# Patient Record
Sex: MALE | Race: BLACK OR AFRICAN AMERICAN | NOT HISPANIC OR LATINO | Employment: OTHER | URBAN - METROPOLITAN AREA
[De-identification: names, ages, dates, MRNs, and addresses within clinical notes are randomized per-mention and may not be internally consistent; named-entity substitution may affect disease eponyms.]

---

## 2017-03-13 PROBLEM — I25.10 CAD (CORONARY ARTERY DISEASE): Status: RESOLVED | Noted: 2017-03-13 | Resolved: 2017-03-13

## 2017-03-13 PROBLEM — I25.10 CAD (CORONARY ARTERY DISEASE): Status: ACTIVE | Noted: 2017-03-13

## 2018-05-10 ENCOUNTER — TELEPHONE (OUTPATIENT)
Dept: ADMINISTRATIVE | Facility: HOSPITAL | Age: 58
End: 2018-05-10

## 2019-07-16 PROBLEM — I25.119 ATHEROSCLEROSIS OF NATIVE CORONARY ARTERY OF NATIVE HEART WITH ANGINA PECTORIS: Status: ACTIVE | Noted: 2017-03-13

## 2019-07-16 PROBLEM — R06.02 SHORTNESS OF BREATH: Status: ACTIVE | Noted: 2019-07-16

## 2019-07-16 PROBLEM — K40.90 INGUINAL HERNIA OF LEFT SIDE WITHOUT OBSTRUCTION OR GANGRENE: Status: ACTIVE | Noted: 2019-07-16

## 2019-08-28 PROBLEM — R94.39 EQUIVOCAL STRESS TEST: Status: ACTIVE | Noted: 2019-08-28

## 2021-03-10 ENCOUNTER — OFFICE VISIT (OUTPATIENT)
Dept: INTERNAL MEDICINE | Facility: CLINIC | Age: 61
End: 2021-03-10
Payer: MEDICARE

## 2021-03-10 ENCOUNTER — TELEPHONE (OUTPATIENT)
Dept: PAIN MEDICINE | Facility: CLINIC | Age: 61
End: 2021-03-10

## 2021-03-10 VITALS
HEART RATE: 100 BPM | BODY MASS INDEX: 23.98 KG/M2 | WEIGHT: 135.38 LBS | SYSTOLIC BLOOD PRESSURE: 124 MMHG | DIASTOLIC BLOOD PRESSURE: 62 MMHG | OXYGEN SATURATION: 97 % | TEMPERATURE: 98 F

## 2021-03-10 DIAGNOSIS — K21.9 GASTROESOPHAGEAL REFLUX DISEASE WITHOUT ESOPHAGITIS: ICD-10-CM

## 2021-03-10 DIAGNOSIS — K22.0 ACHALASIA OF ESOPHAGUS: ICD-10-CM

## 2021-03-10 DIAGNOSIS — E78.2 MIXED HYPERLIPIDEMIA: ICD-10-CM

## 2021-03-10 DIAGNOSIS — Z00.00 ROUTINE GENERAL MEDICAL EXAMINATION AT A HEALTH CARE FACILITY: Primary | ICD-10-CM

## 2021-03-10 DIAGNOSIS — F41.8 MIXED ANXIETY AND DEPRESSIVE DISORDER: ICD-10-CM

## 2021-03-10 DIAGNOSIS — I25.119 ATHEROSCLEROSIS OF NATIVE CORONARY ARTERY OF NATIVE HEART WITH ANGINA PECTORIS: ICD-10-CM

## 2021-03-10 DIAGNOSIS — M47.26 OSTEOARTHRITIS OF SPINE WITH RADICULOPATHY, LUMBAR REGION: ICD-10-CM

## 2021-03-10 DIAGNOSIS — Z12.5 ENCOUNTER FOR SCREENING FOR MALIGNANT NEOPLASM OF PROSTATE: ICD-10-CM

## 2021-03-10 DIAGNOSIS — M47.22 OSTEOARTHRITIS OF SPINE WITH RADICULOPATHY, CERVICAL REGION: ICD-10-CM

## 2021-03-10 PROBLEM — R94.39 EQUIVOCAL STRESS TEST: Status: RESOLVED | Noted: 2019-08-28 | Resolved: 2021-03-10

## 2021-03-10 PROBLEM — K40.90 INGUINAL HERNIA OF LEFT SIDE WITHOUT OBSTRUCTION OR GANGRENE: Status: RESOLVED | Noted: 2019-07-16 | Resolved: 2021-03-10

## 2021-03-10 PROBLEM — R06.02 SHORTNESS OF BREATH: Status: RESOLVED | Noted: 2019-07-16 | Resolved: 2021-03-10

## 2021-03-10 PROCEDURE — 3078F PR MOST RECENT DIASTOLIC BLOOD PRESSURE < 80 MM HG: ICD-10-PCS | Mod: CPTII,S$GLB,, | Performed by: FAMILY MEDICINE

## 2021-03-10 PROCEDURE — 99386 PR PREVENTIVE VISIT,NEW,40-64: ICD-10-PCS | Mod: S$GLB,,, | Performed by: FAMILY MEDICINE

## 2021-03-10 PROCEDURE — 1125F PR PAIN SEVERITY QUANTIFIED, PAIN PRESENT: ICD-10-PCS | Mod: S$GLB,,, | Performed by: FAMILY MEDICINE

## 2021-03-10 PROCEDURE — 3074F SYST BP LT 130 MM HG: CPT | Mod: CPTII,S$GLB,, | Performed by: FAMILY MEDICINE

## 2021-03-10 PROCEDURE — 3078F DIAST BP <80 MM HG: CPT | Mod: CPTII,S$GLB,, | Performed by: FAMILY MEDICINE

## 2021-03-10 PROCEDURE — 3008F PR BODY MASS INDEX (BMI) DOCUMENTED: ICD-10-PCS | Mod: CPTII,S$GLB,, | Performed by: FAMILY MEDICINE

## 2021-03-10 PROCEDURE — 3008F BODY MASS INDEX DOCD: CPT | Mod: CPTII,S$GLB,, | Performed by: FAMILY MEDICINE

## 2021-03-10 PROCEDURE — 1125F AMNT PAIN NOTED PAIN PRSNT: CPT | Mod: S$GLB,,, | Performed by: FAMILY MEDICINE

## 2021-03-10 PROCEDURE — 99999 PR PBB SHADOW E&M-EST. PATIENT-LVL IV: ICD-10-PCS | Mod: PBBFAC,,, | Performed by: FAMILY MEDICINE

## 2021-03-10 PROCEDURE — 99999 PR PBB SHADOW E&M-EST. PATIENT-LVL IV: CPT | Mod: PBBFAC,,, | Performed by: FAMILY MEDICINE

## 2021-03-10 PROCEDURE — 3074F PR MOST RECENT SYSTOLIC BLOOD PRESSURE < 130 MM HG: ICD-10-PCS | Mod: CPTII,S$GLB,, | Performed by: FAMILY MEDICINE

## 2021-03-10 PROCEDURE — 99386 PREV VISIT NEW AGE 40-64: CPT | Mod: S$GLB,,, | Performed by: FAMILY MEDICINE

## 2021-03-10 RX ORDER — ATORVASTATIN CALCIUM 40 MG/1
40 TABLET, FILM COATED ORAL NIGHTLY
Qty: 90 TABLET | Refills: 1 | Status: SHIPPED | OUTPATIENT
Start: 2021-03-10 | End: 2021-03-11 | Stop reason: SDUPTHER

## 2021-03-11 ENCOUNTER — LAB VISIT (OUTPATIENT)
Dept: LAB | Facility: HOSPITAL | Age: 61
End: 2021-03-11
Attending: FAMILY MEDICINE
Payer: MEDICARE

## 2021-03-11 DIAGNOSIS — E78.2 MIXED HYPERLIPIDEMIA: ICD-10-CM

## 2021-03-11 DIAGNOSIS — Z12.5 ENCOUNTER FOR SCREENING FOR MALIGNANT NEOPLASM OF PROSTATE: ICD-10-CM

## 2021-03-11 DIAGNOSIS — K22.0 ACHALASIA OF ESOPHAGUS: ICD-10-CM

## 2021-03-11 DIAGNOSIS — I25.119 ATHEROSCLEROSIS OF NATIVE CORONARY ARTERY OF NATIVE HEART WITH ANGINA PECTORIS: ICD-10-CM

## 2021-03-11 DIAGNOSIS — Z00.00 ROUTINE GENERAL MEDICAL EXAMINATION AT A HEALTH CARE FACILITY: ICD-10-CM

## 2021-03-11 DIAGNOSIS — F41.8 MIXED ANXIETY AND DEPRESSIVE DISORDER: ICD-10-CM

## 2021-03-11 LAB
ALBUMIN SERPL BCP-MCNC: 3.4 G/DL (ref 3.5–5.2)
ALP SERPL-CCNC: 86 U/L (ref 55–135)
ALT SERPL W/O P-5'-P-CCNC: 15 U/L (ref 10–44)
ANION GAP SERPL CALC-SCNC: 7 MMOL/L (ref 8–16)
AST SERPL-CCNC: 19 U/L (ref 10–40)
BASOPHILS # BLD AUTO: 0.03 K/UL (ref 0–0.2)
BASOPHILS NFR BLD: 0.6 % (ref 0–1.9)
BILIRUB SERPL-MCNC: 0.5 MG/DL (ref 0.1–1)
BUN SERPL-MCNC: 9 MG/DL (ref 6–20)
CALCIUM SERPL-MCNC: 8.8 MG/DL (ref 8.7–10.5)
CHLORIDE SERPL-SCNC: 104 MMOL/L (ref 95–110)
CHOLEST SERPL-MCNC: 146 MG/DL (ref 120–199)
CHOLEST/HDLC SERPL: 1.9 {RATIO} (ref 2–5)
CO2 SERPL-SCNC: 29 MMOL/L (ref 23–29)
COMPLEXED PSA SERPL-MCNC: 0.37 NG/ML (ref 0–4)
CREAT SERPL-MCNC: 0.8 MG/DL (ref 0.5–1.4)
DIFFERENTIAL METHOD: ABNORMAL
EOSINOPHIL # BLD AUTO: 0 K/UL (ref 0–0.5)
EOSINOPHIL NFR BLD: 0.6 % (ref 0–8)
ERYTHROCYTE [DISTWIDTH] IN BLOOD BY AUTOMATED COUNT: 14.4 % (ref 11.5–14.5)
EST. GFR  (AFRICAN AMERICAN): >60 ML/MIN/1.73 M^2
EST. GFR  (NON AFRICAN AMERICAN): >60 ML/MIN/1.73 M^2
GLUCOSE SERPL-MCNC: 94 MG/DL (ref 70–110)
HCT VFR BLD AUTO: 35.9 % (ref 40–54)
HDLC SERPL-MCNC: 75 MG/DL (ref 40–75)
HDLC SERPL: 51.4 % (ref 20–50)
HGB BLD-MCNC: 11.3 G/DL (ref 14–18)
IMM GRANULOCYTES # BLD AUTO: 0.02 K/UL (ref 0–0.04)
IMM GRANULOCYTES NFR BLD AUTO: 0.4 % (ref 0–0.5)
LDLC SERPL CALC-MCNC: 59 MG/DL (ref 63–159)
LYMPHOCYTES # BLD AUTO: 1.5 K/UL (ref 1–4.8)
LYMPHOCYTES NFR BLD: 30 % (ref 18–48)
MCH RBC QN AUTO: 30.6 PG (ref 27–31)
MCHC RBC AUTO-ENTMCNC: 31.5 G/DL (ref 32–36)
MCV RBC AUTO: 97 FL (ref 82–98)
MONOCYTES # BLD AUTO: 0.4 K/UL (ref 0.3–1)
MONOCYTES NFR BLD: 8.7 % (ref 4–15)
NEUTROPHILS # BLD AUTO: 3 K/UL (ref 1.8–7.7)
NEUTROPHILS NFR BLD: 59.7 % (ref 38–73)
NONHDLC SERPL-MCNC: 71 MG/DL
NRBC BLD-RTO: 0 /100 WBC
PLATELET # BLD AUTO: 342 K/UL (ref 150–350)
PMV BLD AUTO: 9.2 FL (ref 9.2–12.9)
POTASSIUM SERPL-SCNC: 3.6 MMOL/L (ref 3.5–5.1)
PROT SERPL-MCNC: 6.6 G/DL (ref 6–8.4)
RBC # BLD AUTO: 3.69 M/UL (ref 4.6–6.2)
SODIUM SERPL-SCNC: 140 MMOL/L (ref 136–145)
TRIGL SERPL-MCNC: 60 MG/DL (ref 30–150)
TSH SERPL DL<=0.005 MIU/L-ACNC: 1.11 UIU/ML (ref 0.4–4)
WBC # BLD AUTO: 5.03 K/UL (ref 3.9–12.7)

## 2021-03-11 PROCEDURE — 84443 ASSAY THYROID STIM HORMONE: CPT | Performed by: FAMILY MEDICINE

## 2021-03-11 PROCEDURE — 36415 COLL VENOUS BLD VENIPUNCTURE: CPT | Performed by: FAMILY MEDICINE

## 2021-03-11 PROCEDURE — 84153 ASSAY OF PSA TOTAL: CPT | Performed by: FAMILY MEDICINE

## 2021-03-11 PROCEDURE — 85025 COMPLETE CBC W/AUTO DIFF WBC: CPT | Performed by: FAMILY MEDICINE

## 2021-03-11 PROCEDURE — 80053 COMPREHEN METABOLIC PANEL: CPT | Performed by: FAMILY MEDICINE

## 2021-03-11 PROCEDURE — 80061 LIPID PANEL: CPT | Performed by: FAMILY MEDICINE

## 2021-03-11 RX ORDER — ESCITALOPRAM OXALATE 20 MG/1
20 TABLET ORAL DAILY
Qty: 30 TABLET | Refills: 1 | Status: SHIPPED | OUTPATIENT
Start: 2021-03-11 | End: 2021-04-01

## 2021-03-11 RX ORDER — ATORVASTATIN CALCIUM 40 MG/1
40 TABLET, FILM COATED ORAL NIGHTLY
Qty: 90 TABLET | Refills: 1 | Status: SHIPPED | OUTPATIENT
Start: 2021-03-11 | End: 2021-06-03

## 2021-03-11 RX ORDER — CLONAZEPAM 0.5 MG/1
0.5 TABLET ORAL NIGHTLY
Qty: 30 TABLET | Refills: 1 | Status: SHIPPED | OUTPATIENT
Start: 2021-03-11 | End: 2021-08-02

## 2021-03-11 RX ORDER — QUETIAPINE FUMARATE 100 MG/1
100 TABLET, FILM COATED ORAL NIGHTLY
Qty: 30 TABLET | Refills: 1 | Status: SHIPPED | OUTPATIENT
Start: 2021-03-11 | End: 2021-04-01

## 2021-03-23 ENCOUNTER — OFFICE VISIT (OUTPATIENT)
Dept: CARDIOLOGY | Facility: CLINIC | Age: 61
End: 2021-03-23
Payer: MEDICARE

## 2021-03-23 ENCOUNTER — TELEPHONE (OUTPATIENT)
Dept: GASTROENTEROLOGY | Facility: CLINIC | Age: 61
End: 2021-03-23

## 2021-03-23 ENCOUNTER — OFFICE VISIT (OUTPATIENT)
Dept: GASTROENTEROLOGY | Facility: CLINIC | Age: 61
End: 2021-03-23
Payer: MEDICARE

## 2021-03-23 VITALS
DIASTOLIC BLOOD PRESSURE: 60 MMHG | HEART RATE: 77 BPM | SYSTOLIC BLOOD PRESSURE: 100 MMHG | WEIGHT: 138.69 LBS | DIASTOLIC BLOOD PRESSURE: 64 MMHG | WEIGHT: 138.69 LBS | BODY MASS INDEX: 24.57 KG/M2 | BODY MASS INDEX: 24.56 KG/M2 | HEART RATE: 88 BPM | HEIGHT: 63 IN | OXYGEN SATURATION: 99 % | SYSTOLIC BLOOD PRESSURE: 94 MMHG | OXYGEN SATURATION: 99 %

## 2021-03-23 DIAGNOSIS — K22.0 ACHALASIA OF ESOPHAGUS: ICD-10-CM

## 2021-03-23 DIAGNOSIS — K21.9 GASTROESOPHAGEAL REFLUX DISEASE WITHOUT ESOPHAGITIS: ICD-10-CM

## 2021-03-23 DIAGNOSIS — E78.2 MIXED HYPERLIPIDEMIA: ICD-10-CM

## 2021-03-23 DIAGNOSIS — I25.119 ATHEROSCLEROSIS OF NATIVE CORONARY ARTERY OF NATIVE HEART WITH ANGINA PECTORIS: ICD-10-CM

## 2021-03-23 DIAGNOSIS — I25.118 CORONARY ARTERY DISEASE OF NATIVE ARTERY OF NATIVE HEART WITH STABLE ANGINA PECTORIS: Primary | ICD-10-CM

## 2021-03-23 DIAGNOSIS — F17.200 SMOKING: ICD-10-CM

## 2021-03-23 PROCEDURE — 3008F PR BODY MASS INDEX (BMI) DOCUMENTED: ICD-10-PCS | Mod: CPTII,S$GLB,, | Performed by: NURSE PRACTITIONER

## 2021-03-23 PROCEDURE — 99999 PR PBB SHADOW E&M-EST. PATIENT-LVL III: CPT | Mod: PBBFAC,,, | Performed by: NURSE PRACTITIONER

## 2021-03-23 PROCEDURE — 1126F PR PAIN SEVERITY QUANTIFIED, NO PAIN PRESENT: ICD-10-PCS | Mod: S$GLB,,, | Performed by: INTERNAL MEDICINE

## 2021-03-23 PROCEDURE — 99999 PR PBB SHADOW E&M-EST. PATIENT-LVL III: ICD-10-PCS | Mod: PBBFAC,,, | Performed by: NURSE PRACTITIONER

## 2021-03-23 PROCEDURE — 3078F DIAST BP <80 MM HG: CPT | Mod: CPTII,S$GLB,, | Performed by: INTERNAL MEDICINE

## 2021-03-23 PROCEDURE — 99999 PR PBB SHADOW E&M-EST. PATIENT-LVL III: CPT | Mod: PBBFAC,,, | Performed by: INTERNAL MEDICINE

## 2021-03-23 PROCEDURE — 99999 PR PBB SHADOW E&M-EST. PATIENT-LVL III: ICD-10-PCS | Mod: PBBFAC,,, | Performed by: INTERNAL MEDICINE

## 2021-03-23 PROCEDURE — 3074F SYST BP LT 130 MM HG: CPT | Mod: CPTII,S$GLB,, | Performed by: NURSE PRACTITIONER

## 2021-03-23 PROCEDURE — 99215 PR OFFICE/OUTPT VISIT, EST, LEVL V, 40-54 MIN: ICD-10-PCS | Mod: S$GLB,,, | Performed by: INTERNAL MEDICINE

## 2021-03-23 PROCEDURE — 99215 OFFICE O/P EST HI 40 MIN: CPT | Mod: S$GLB,,, | Performed by: INTERNAL MEDICINE

## 2021-03-23 PROCEDURE — 3078F PR MOST RECENT DIASTOLIC BLOOD PRESSURE < 80 MM HG: ICD-10-PCS | Mod: CPTII,S$GLB,, | Performed by: NURSE PRACTITIONER

## 2021-03-23 PROCEDURE — 1126F AMNT PAIN NOTED NONE PRSNT: CPT | Mod: S$GLB,,, | Performed by: INTERNAL MEDICINE

## 2021-03-23 PROCEDURE — 3074F SYST BP LT 130 MM HG: CPT | Mod: CPTII,S$GLB,, | Performed by: INTERNAL MEDICINE

## 2021-03-23 PROCEDURE — 3008F PR BODY MASS INDEX (BMI) DOCUMENTED: ICD-10-PCS | Mod: CPTII,S$GLB,, | Performed by: INTERNAL MEDICINE

## 2021-03-23 PROCEDURE — 99214 OFFICE O/P EST MOD 30 MIN: CPT | Mod: S$GLB,,, | Performed by: NURSE PRACTITIONER

## 2021-03-23 PROCEDURE — 1126F PR PAIN SEVERITY QUANTIFIED, NO PAIN PRESENT: ICD-10-PCS | Mod: S$GLB,,, | Performed by: NURSE PRACTITIONER

## 2021-03-23 PROCEDURE — 3078F DIAST BP <80 MM HG: CPT | Mod: CPTII,S$GLB,, | Performed by: NURSE PRACTITIONER

## 2021-03-23 PROCEDURE — 3008F BODY MASS INDEX DOCD: CPT | Mod: CPTII,S$GLB,, | Performed by: INTERNAL MEDICINE

## 2021-03-23 PROCEDURE — 3074F PR MOST RECENT SYSTOLIC BLOOD PRESSURE < 130 MM HG: ICD-10-PCS | Mod: CPTII,S$GLB,, | Performed by: INTERNAL MEDICINE

## 2021-03-23 PROCEDURE — 3008F BODY MASS INDEX DOCD: CPT | Mod: CPTII,S$GLB,, | Performed by: NURSE PRACTITIONER

## 2021-03-23 PROCEDURE — 3078F PR MOST RECENT DIASTOLIC BLOOD PRESSURE < 80 MM HG: ICD-10-PCS | Mod: CPTII,S$GLB,, | Performed by: INTERNAL MEDICINE

## 2021-03-23 PROCEDURE — 1126F AMNT PAIN NOTED NONE PRSNT: CPT | Mod: S$GLB,,, | Performed by: NURSE PRACTITIONER

## 2021-03-23 PROCEDURE — 99214 PR OFFICE/OUTPT VISIT, EST, LEVL IV, 30-39 MIN: ICD-10-PCS | Mod: S$GLB,,, | Performed by: NURSE PRACTITIONER

## 2021-03-23 PROCEDURE — 3074F PR MOST RECENT SYSTOLIC BLOOD PRESSURE < 130 MM HG: ICD-10-PCS | Mod: CPTII,S$GLB,, | Performed by: NURSE PRACTITIONER

## 2021-03-23 RX ORDER — ASPIRIN 81 MG/1
81 TABLET ORAL DAILY
COMMUNITY
End: 2023-04-02

## 2021-03-23 RX ORDER — FAMOTIDINE 40 MG/1
40 TABLET, FILM COATED ORAL DAILY
COMMUNITY
End: 2021-07-21 | Stop reason: ALTCHOICE

## 2021-03-23 RX ORDER — PROMETHAZINE HYDROCHLORIDE 12.5 MG/1
12.5 TABLET ORAL 4 TIMES DAILY
COMMUNITY
End: 2021-07-21 | Stop reason: SDUPTHER

## 2021-04-01 ENCOUNTER — OFFICE VISIT (OUTPATIENT)
Dept: PSYCHIATRY | Facility: CLINIC | Age: 61
End: 2021-04-01
Payer: MEDICARE

## 2021-04-01 DIAGNOSIS — F41.8 MIXED ANXIETY AND DEPRESSIVE DISORDER: ICD-10-CM

## 2021-04-01 DIAGNOSIS — F63.81 INTERMITTENT EXPLOSIVE DISORDER: Primary | ICD-10-CM

## 2021-04-01 PROCEDURE — 99212 OFFICE O/P EST SF 10 MIN: CPT | Mod: PBBFAC | Performed by: PSYCHIATRY & NEUROLOGY

## 2021-04-01 PROCEDURE — 99999 PR PBB SHADOW E&M-EST. PATIENT-LVL II: CPT | Mod: PBBFAC,,, | Performed by: PSYCHIATRY & NEUROLOGY

## 2021-04-01 PROCEDURE — 90792 PR PSYCHIATRIC DIAGNOSTIC EVALUATION W/MEDICAL SERVICES: ICD-10-PCS | Mod: S$GLB,,, | Performed by: PSYCHIATRY & NEUROLOGY

## 2021-04-01 PROCEDURE — 99999 PR PBB SHADOW E&M-EST. PATIENT-LVL II: ICD-10-PCS | Mod: PBBFAC,,, | Performed by: PSYCHIATRY & NEUROLOGY

## 2021-04-01 PROCEDURE — 90792 PSYCH DIAG EVAL W/MED SRVCS: CPT | Mod: S$GLB,,, | Performed by: PSYCHIATRY & NEUROLOGY

## 2021-04-01 RX ORDER — DULOXETIN HYDROCHLORIDE 30 MG/1
CAPSULE, DELAYED RELEASE ORAL
Qty: 60 CAPSULE | Refills: 2 | Status: SHIPPED | OUTPATIENT
Start: 2021-04-01 | End: 2021-07-21 | Stop reason: SDUPTHER

## 2021-04-01 RX ORDER — DIAZEPAM 5 MG/1
5 TABLET ORAL EVERY 6 HOURS PRN
Qty: 60 TABLET | Refills: 2 | Status: SHIPPED | OUTPATIENT
Start: 2021-04-01 | End: 2021-05-19 | Stop reason: SDUPTHER

## 2021-04-01 RX ORDER — OLANZAPINE 10 MG/1
10 TABLET ORAL NIGHTLY
Qty: 30 TABLET | Refills: 2 | Status: SHIPPED | OUTPATIENT
Start: 2021-04-01 | End: 2021-06-03

## 2021-04-08 ENCOUNTER — HOSPITAL ENCOUNTER (OUTPATIENT)
Dept: CARDIOLOGY | Facility: HOSPITAL | Age: 61
Discharge: HOME | End: 2021-04-08
Attending: INTERNAL MEDICINE
Payer: MEDICARE

## 2021-04-08 ENCOUNTER — HOSPITAL ENCOUNTER (OUTPATIENT)
Dept: RADIOLOGY | Facility: HOSPITAL | Age: 61
Discharge: HOME | End: 2021-04-08
Attending: INTERNAL MEDICINE
Payer: MEDICARE

## 2021-04-08 DIAGNOSIS — I25.118 CORONARY ARTERY DISEASE OF NATIVE ARTERY OF NATIVE HEART WITH STABLE ANGINA PECTORIS: ICD-10-CM

## 2021-04-08 LAB
CV STRESS BASE HR: 59 BPM
DIASTOLIC BLOOD PRESSURE: 88 MMHG
NUC REST EJECTION FRACTION: 54
NUC STRESS EJECTION FRACTION: 65 %
OHS CV CPX 1 MINUTE RECOVERY HEART RATE: 108 BPM
OHS CV CPX 85 PERCENT MAX PREDICTED HEART RATE MALE: 136
OHS CV CPX ESTIMATED METS: 11
OHS CV CPX MAX PREDICTED HEART RATE: 160
OHS CV CPX PATIENT IS FEMALE: 0
OHS CV CPX PATIENT IS MALE: 1
OHS CV CPX PEAK DIASTOLIC BLOOD PRESSURE: 81 MMHG
OHS CV CPX PEAK HEAR RATE: 139 BPM
OHS CV CPX PEAK RATE PRESSURE PRODUCT: NORMAL
OHS CV CPX PEAK SYSTOLIC BLOOD PRESSURE: 180 MMHG
OHS CV CPX PERCENT MAX PREDICTED HEART RATE ACHIEVED: 87
OHS CV CPX RATE PRESSURE PRODUCT PRESENTING: 7670
STRESS ECHO POST EXERCISE DUR MIN: 8 MINUTES
STRESS ECHO POST EXERCISE DUR SEC: 30 SECONDS
SYSTOLIC BLOOD PRESSURE: 130 MMHG

## 2021-04-08 PROCEDURE — 78452 HT MUSCLE IMAGE SPECT MULT: CPT

## 2021-04-08 PROCEDURE — 93017 CV STRESS TEST TRACING ONLY: CPT

## 2021-04-08 PROCEDURE — 93018 CV STRESS TEST I&R ONLY: CPT | Mod: ,,, | Performed by: INTERNAL MEDICINE

## 2021-04-08 PROCEDURE — 78452 HT MUSCLE IMAGE SPECT MULT: CPT | Mod: 26,,, | Performed by: INTERNAL MEDICINE

## 2021-04-08 PROCEDURE — A9502 TC99M TETROFOSMIN: HCPCS

## 2021-04-08 PROCEDURE — 93016 STRESS TEST WITH MYOCARDIAL PERFUSION (CUPID ONLY): ICD-10-PCS | Mod: ,,, | Performed by: INTERNAL MEDICINE

## 2021-04-08 PROCEDURE — 78452 STRESS TEST WITH MYOCARDIAL PERFUSION (CUPID ONLY): ICD-10-PCS | Mod: 26,,, | Performed by: INTERNAL MEDICINE

## 2021-04-08 PROCEDURE — 93016 CV STRESS TEST SUPVJ ONLY: CPT | Mod: ,,, | Performed by: INTERNAL MEDICINE

## 2021-04-08 PROCEDURE — 93018 STRESS TEST WITH MYOCARDIAL PERFUSION (CUPID ONLY): ICD-10-PCS | Mod: ,,, | Performed by: INTERNAL MEDICINE

## 2021-04-09 ENCOUNTER — TELEPHONE (OUTPATIENT)
Dept: CARDIOLOGY | Facility: CLINIC | Age: 61
End: 2021-04-09

## 2021-04-16 ENCOUNTER — TELEPHONE (OUTPATIENT)
Dept: PREADMISSION TESTING | Facility: HOSPITAL | Age: 61
End: 2021-04-16

## 2021-04-16 ENCOUNTER — PATIENT MESSAGE (OUTPATIENT)
Dept: PREADMISSION TESTING | Facility: HOSPITAL | Age: 61
End: 2021-04-16

## 2021-04-19 ENCOUNTER — LAB VISIT (OUTPATIENT)
Dept: OTOLARYNGOLOGY | Facility: CLINIC | Age: 61
End: 2021-04-19
Payer: MEDICARE

## 2021-04-19 DIAGNOSIS — K21.9 GASTROESOPHAGEAL REFLUX DISEASE WITHOUT ESOPHAGITIS: ICD-10-CM

## 2021-04-19 DIAGNOSIS — K22.0 ACHALASIA OF ESOPHAGUS: ICD-10-CM

## 2021-04-19 PROCEDURE — U0005 INFEC AGEN DETEC AMPLI PROBE: HCPCS | Performed by: NURSE PRACTITIONER

## 2021-04-19 PROCEDURE — U0003 INFECTIOUS AGENT DETECTION BY NUCLEIC ACID (DNA OR RNA); SEVERE ACUTE RESPIRATORY SYNDROME CORONAVIRUS 2 (SARS-COV-2) (CORONAVIRUS DISEASE [COVID-19]), AMPLIFIED PROBE TECHNIQUE, MAKING USE OF HIGH THROUGHPUT TECHNOLOGIES AS DESCRIBED BY CMS-2020-01-R: HCPCS | Performed by: NURSE PRACTITIONER

## 2021-04-20 LAB — SARS-COV-2 RNA RESP QL NAA+PROBE: NOT DETECTED

## 2021-04-22 ENCOUNTER — TELEPHONE (OUTPATIENT)
Dept: ENDOSCOPY | Facility: HOSPITAL | Age: 61
End: 2021-04-22

## 2021-04-23 ENCOUNTER — OFFICE VISIT (OUTPATIENT)
Dept: PSYCHIATRY | Facility: CLINIC | Age: 61
End: 2021-04-23
Payer: MEDICARE

## 2021-04-23 DIAGNOSIS — F41.8 MIXED ANXIETY AND DEPRESSIVE DISORDER: ICD-10-CM

## 2021-04-23 DIAGNOSIS — R69 PSYCHIATRIC DIAGNOSIS DEFERRED: Primary | ICD-10-CM

## 2021-04-23 PROCEDURE — 99499 UNLISTED E&M SERVICE: CPT | Mod: S$PBB,,, | Performed by: SOCIAL WORKER

## 2021-04-23 PROCEDURE — 99499 NO LOS: ICD-10-PCS | Mod: S$PBB,,, | Performed by: SOCIAL WORKER

## 2021-04-28 ENCOUNTER — PATIENT MESSAGE (OUTPATIENT)
Dept: RESEARCH | Facility: HOSPITAL | Age: 61
End: 2021-04-28

## 2021-05-17 ENCOUNTER — OFFICE VISIT (OUTPATIENT)
Dept: INTERNAL MEDICINE | Facility: CLINIC | Age: 61
End: 2021-05-17
Payer: MEDICARE

## 2021-05-17 ENCOUNTER — LAB VISIT (OUTPATIENT)
Dept: LAB | Facility: HOSPITAL | Age: 61
End: 2021-05-17
Attending: NURSE PRACTITIONER
Payer: MEDICARE

## 2021-05-17 VITALS
HEART RATE: 84 BPM | DIASTOLIC BLOOD PRESSURE: 68 MMHG | OXYGEN SATURATION: 97 % | SYSTOLIC BLOOD PRESSURE: 94 MMHG | TEMPERATURE: 97 F | HEIGHT: 63 IN | BODY MASS INDEX: 25.32 KG/M2 | WEIGHT: 142.88 LBS

## 2021-05-17 DIAGNOSIS — D64.9 ANEMIA, UNSPECIFIED TYPE: ICD-10-CM

## 2021-05-17 DIAGNOSIS — K22.0 ACHALASIA OF ESOPHAGUS: ICD-10-CM

## 2021-05-17 DIAGNOSIS — M47.812 OSTEOARTHRITIS OF CERVICAL AND LUMBAR SPINE: ICD-10-CM

## 2021-05-17 DIAGNOSIS — M47.816 OSTEOARTHRITIS OF CERVICAL AND LUMBAR SPINE: ICD-10-CM

## 2021-05-17 DIAGNOSIS — K21.9 GASTROESOPHAGEAL REFLUX DISEASE WITHOUT ESOPHAGITIS: ICD-10-CM

## 2021-05-17 DIAGNOSIS — Z09 HOSPITAL DISCHARGE FOLLOW-UP: Primary | ICD-10-CM

## 2021-05-17 LAB
BASOPHILS # BLD AUTO: 0.04 K/UL (ref 0–0.2)
BASOPHILS NFR BLD: 0.7 % (ref 0–1.9)
DIFFERENTIAL METHOD: ABNORMAL
EOSINOPHIL # BLD AUTO: 0.3 K/UL (ref 0–0.5)
EOSINOPHIL NFR BLD: 4.8 % (ref 0–8)
ERYTHROCYTE [DISTWIDTH] IN BLOOD BY AUTOMATED COUNT: 13.5 % (ref 11.5–14.5)
FERRITIN SERPL-MCNC: 319 NG/ML (ref 20–300)
HCT VFR BLD AUTO: 40.3 % (ref 40–54)
HGB BLD-MCNC: 12.7 G/DL (ref 14–18)
IMM GRANULOCYTES # BLD AUTO: 0.06 K/UL (ref 0–0.04)
IMM GRANULOCYTES NFR BLD AUTO: 1 % (ref 0–0.5)
IRON SERPL-MCNC: 80 UG/DL (ref 45–160)
LYMPHOCYTES # BLD AUTO: 1.7 K/UL (ref 1–4.8)
LYMPHOCYTES NFR BLD: 28.4 % (ref 18–48)
MCH RBC QN AUTO: 30.9 PG (ref 27–31)
MCHC RBC AUTO-ENTMCNC: 31.5 G/DL (ref 32–36)
MCV RBC AUTO: 98 FL (ref 82–98)
MONOCYTES # BLD AUTO: 0.5 K/UL (ref 0.3–1)
MONOCYTES NFR BLD: 8.6 % (ref 4–15)
NEUTROPHILS # BLD AUTO: 3.4 K/UL (ref 1.8–7.7)
NEUTROPHILS NFR BLD: 56.5 % (ref 38–73)
NRBC BLD-RTO: 0 /100 WBC
PLATELET # BLD AUTO: 326 K/UL (ref 150–450)
PMV BLD AUTO: 9.2 FL (ref 9.2–12.9)
RBC # BLD AUTO: 4.11 M/UL (ref 4.6–6.2)
SATURATED IRON: 29 % (ref 20–50)
TOTAL IRON BINDING CAPACITY: 272 UG/DL (ref 250–450)
TRANSFERRIN SERPL-MCNC: 184 MG/DL (ref 200–375)
WBC # BLD AUTO: 6.05 K/UL (ref 3.9–12.7)

## 2021-05-17 PROCEDURE — 3008F PR BODY MASS INDEX (BMI) DOCUMENTED: ICD-10-PCS | Mod: CPTII,S$GLB,, | Performed by: NURSE PRACTITIONER

## 2021-05-17 PROCEDURE — 99213 PR OFFICE/OUTPT VISIT, EST, LEVL III, 20-29 MIN: ICD-10-PCS | Mod: S$GLB,,, | Performed by: NURSE PRACTITIONER

## 2021-05-17 PROCEDURE — 3008F BODY MASS INDEX DOCD: CPT | Mod: CPTII,S$GLB,, | Performed by: NURSE PRACTITIONER

## 2021-05-17 PROCEDURE — 1125F PR PAIN SEVERITY QUANTIFIED, PAIN PRESENT: ICD-10-PCS | Mod: S$GLB,,, | Performed by: NURSE PRACTITIONER

## 2021-05-17 PROCEDURE — 99999 PR PBB SHADOW E&M-EST. PATIENT-LVL V: ICD-10-PCS | Mod: PBBFAC,,, | Performed by: NURSE PRACTITIONER

## 2021-05-17 PROCEDURE — 36415 COLL VENOUS BLD VENIPUNCTURE: CPT | Performed by: NURSE PRACTITIONER

## 2021-05-17 PROCEDURE — 1125F AMNT PAIN NOTED PAIN PRSNT: CPT | Mod: S$GLB,,, | Performed by: NURSE PRACTITIONER

## 2021-05-17 PROCEDURE — 99999 PR PBB SHADOW E&M-EST. PATIENT-LVL V: CPT | Mod: PBBFAC,,, | Performed by: NURSE PRACTITIONER

## 2021-05-17 PROCEDURE — 85025 COMPLETE CBC W/AUTO DIFF WBC: CPT | Performed by: NURSE PRACTITIONER

## 2021-05-17 PROCEDURE — 82728 ASSAY OF FERRITIN: CPT | Performed by: NURSE PRACTITIONER

## 2021-05-17 PROCEDURE — 83540 ASSAY OF IRON: CPT | Performed by: NURSE PRACTITIONER

## 2021-05-17 PROCEDURE — 99213 OFFICE O/P EST LOW 20 MIN: CPT | Mod: S$GLB,,, | Performed by: NURSE PRACTITIONER

## 2021-05-17 RX ORDER — ESCITALOPRAM OXALATE 20 MG/1
TABLET ORAL
COMMUNITY
Start: 2021-05-12 | End: 2021-07-21 | Stop reason: ALTCHOICE

## 2021-05-19 RX ORDER — DIAZEPAM 5 MG/1
5 TABLET ORAL EVERY 6 HOURS PRN
Qty: 60 TABLET | Refills: 1 | Status: SHIPPED | OUTPATIENT
Start: 2021-05-19 | End: 2021-07-21 | Stop reason: SDUPTHER

## 2021-05-19 RX ORDER — DIAZEPAM 5 MG/1
5 TABLET ORAL EVERY 6 HOURS PRN
Qty: 60 TABLET | Refills: 2 | OUTPATIENT
Start: 2021-05-19 | End: 2021-06-18

## 2021-05-20 ENCOUNTER — TELEPHONE (OUTPATIENT)
Dept: INTERNAL MEDICINE | Facility: CLINIC | Age: 61
End: 2021-05-20

## 2021-05-21 ENCOUNTER — OFFICE VISIT (OUTPATIENT)
Dept: GASTROENTEROLOGY | Facility: CLINIC | Age: 61
End: 2021-05-21
Payer: MEDICARE

## 2021-05-21 ENCOUNTER — PATIENT MESSAGE (OUTPATIENT)
Dept: INTERNAL MEDICINE | Facility: CLINIC | Age: 61
End: 2021-05-21

## 2021-05-21 ENCOUNTER — PATIENT MESSAGE (OUTPATIENT)
Dept: GASTROENTEROLOGY | Facility: CLINIC | Age: 61
End: 2021-05-21

## 2021-05-21 ENCOUNTER — PATIENT OUTREACH (OUTPATIENT)
Dept: ADMINISTRATIVE | Facility: OTHER | Age: 61
End: 2021-05-21

## 2021-05-21 VITALS
HEART RATE: 92 BPM | HEIGHT: 63 IN | DIASTOLIC BLOOD PRESSURE: 64 MMHG | SYSTOLIC BLOOD PRESSURE: 100 MMHG | BODY MASS INDEX: 25.62 KG/M2 | OXYGEN SATURATION: 99 % | WEIGHT: 144.63 LBS

## 2021-05-21 DIAGNOSIS — K21.9 GASTROESOPHAGEAL REFLUX DISEASE WITHOUT ESOPHAGITIS: ICD-10-CM

## 2021-05-21 DIAGNOSIS — K22.0 ACHALASIA OF ESOPHAGUS: ICD-10-CM

## 2021-05-21 PROCEDURE — 3008F BODY MASS INDEX DOCD: CPT | Mod: CPTII,S$GLB,, | Performed by: PHYSICIAN ASSISTANT

## 2021-05-21 PROCEDURE — 99214 OFFICE O/P EST MOD 30 MIN: CPT | Mod: S$GLB,,, | Performed by: PHYSICIAN ASSISTANT

## 2021-05-21 PROCEDURE — 99214 PR OFFICE/OUTPT VISIT, EST, LEVL IV, 30-39 MIN: ICD-10-PCS | Mod: S$GLB,,, | Performed by: PHYSICIAN ASSISTANT

## 2021-05-21 PROCEDURE — 99999 PR PBB SHADOW E&M-EST. PATIENT-LVL IV: ICD-10-PCS | Mod: PBBFAC,,, | Performed by: PHYSICIAN ASSISTANT

## 2021-05-21 PROCEDURE — 1126F AMNT PAIN NOTED NONE PRSNT: CPT | Mod: S$GLB,,, | Performed by: PHYSICIAN ASSISTANT

## 2021-05-21 PROCEDURE — 3008F PR BODY MASS INDEX (BMI) DOCUMENTED: ICD-10-PCS | Mod: CPTII,S$GLB,, | Performed by: PHYSICIAN ASSISTANT

## 2021-05-21 PROCEDURE — 1126F PR PAIN SEVERITY QUANTIFIED, NO PAIN PRESENT: ICD-10-PCS | Mod: S$GLB,,, | Performed by: PHYSICIAN ASSISTANT

## 2021-05-21 PROCEDURE — 99999 PR PBB SHADOW E&M-EST. PATIENT-LVL IV: CPT | Mod: PBBFAC,,, | Performed by: PHYSICIAN ASSISTANT

## 2021-05-21 RX ORDER — PANTOPRAZOLE SODIUM 40 MG/1
40 TABLET, DELAYED RELEASE ORAL DAILY
Qty: 30 TABLET | Refills: 11 | Status: SHIPPED | OUTPATIENT
Start: 2021-05-21 | End: 2021-05-24 | Stop reason: ALTCHOICE

## 2021-05-24 DIAGNOSIS — K21.9 GASTROESOPHAGEAL REFLUX DISEASE WITHOUT ESOPHAGITIS: ICD-10-CM

## 2021-05-24 DIAGNOSIS — K22.0 ACHALASIA OF ESOPHAGUS: Primary | ICD-10-CM

## 2021-05-24 RX ORDER — OMEPRAZOLE 40 MG/1
40 CAPSULE, DELAYED RELEASE ORAL DAILY
Qty: 30 CAPSULE | Refills: 11 | Status: SHIPPED | OUTPATIENT
Start: 2021-05-24 | End: 2021-07-21 | Stop reason: SDUPTHER

## 2021-05-27 ENCOUNTER — OFFICE VISIT (OUTPATIENT)
Dept: PSYCHIATRY | Facility: CLINIC | Age: 61
End: 2021-05-27
Payer: MEDICARE

## 2021-05-27 VITALS
HEART RATE: 88 BPM | WEIGHT: 148.13 LBS | BODY MASS INDEX: 26.24 KG/M2 | SYSTOLIC BLOOD PRESSURE: 112 MMHG | DIASTOLIC BLOOD PRESSURE: 71 MMHG

## 2021-05-27 DIAGNOSIS — F63.81 INTERMITTENT EXPLOSIVE DISORDER: Primary | ICD-10-CM

## 2021-05-27 DIAGNOSIS — R44.0 AUDITORY HALLUCINATIONS: ICD-10-CM

## 2021-05-27 PROCEDURE — 99999 PR PBB SHADOW E&M-EST. PATIENT-LVL II: CPT | Mod: PBBFAC,,, | Performed by: PSYCHIATRY & NEUROLOGY

## 2021-05-27 PROCEDURE — 3008F BODY MASS INDEX DOCD: CPT | Mod: CPTII,S$GLB,, | Performed by: PSYCHIATRY & NEUROLOGY

## 2021-05-27 PROCEDURE — 99499 UNLISTED E&M SERVICE: CPT | Mod: S$GLB,,, | Performed by: PSYCHIATRY & NEUROLOGY

## 2021-05-27 PROCEDURE — 99499 RISK ADDL DX/OHS AUDIT: ICD-10-PCS | Mod: S$GLB,,, | Performed by: PSYCHIATRY & NEUROLOGY

## 2021-05-27 PROCEDURE — 99214 OFFICE O/P EST MOD 30 MIN: CPT | Mod: S$GLB,,, | Performed by: PSYCHIATRY & NEUROLOGY

## 2021-05-27 PROCEDURE — 99214 PR OFFICE/OUTPT VISIT, EST, LEVL IV, 30-39 MIN: ICD-10-PCS | Mod: S$GLB,,, | Performed by: PSYCHIATRY & NEUROLOGY

## 2021-05-27 PROCEDURE — 99999 PR PBB SHADOW E&M-EST. PATIENT-LVL II: ICD-10-PCS | Mod: PBBFAC,,, | Performed by: PSYCHIATRY & NEUROLOGY

## 2021-05-27 PROCEDURE — 3008F PR BODY MASS INDEX (BMI) DOCUMENTED: ICD-10-PCS | Mod: CPTII,S$GLB,, | Performed by: PSYCHIATRY & NEUROLOGY

## 2021-06-15 ENCOUNTER — TELEPHONE (OUTPATIENT)
Dept: INTERNAL MEDICINE | Facility: CLINIC | Age: 61
End: 2021-06-15

## 2021-06-22 ENCOUNTER — TELEPHONE (OUTPATIENT)
Dept: INTERNAL MEDICINE | Facility: CLINIC | Age: 61
End: 2021-06-22

## 2021-07-08 ENCOUNTER — PATIENT MESSAGE (OUTPATIENT)
Dept: PSYCHIATRY | Facility: CLINIC | Age: 61
End: 2021-07-08

## 2021-07-08 ENCOUNTER — TELEPHONE (OUTPATIENT)
Dept: PSYCHIATRY | Facility: CLINIC | Age: 61
End: 2021-07-08

## 2021-07-13 ENCOUNTER — TELEPHONE (OUTPATIENT)
Dept: INTERNAL MEDICINE | Facility: CLINIC | Age: 61
End: 2021-07-13

## 2021-07-14 ENCOUNTER — TELEPHONE (OUTPATIENT)
Dept: INTERNAL MEDICINE | Facility: CLINIC | Age: 61
End: 2021-07-14

## 2021-07-15 ENCOUNTER — TELEPHONE (OUTPATIENT)
Dept: PSYCHIATRY | Facility: CLINIC | Age: 61
End: 2021-07-15

## 2021-07-17 ENCOUNTER — NURSE TRIAGE (OUTPATIENT)
Dept: ADMINISTRATIVE | Facility: CLINIC | Age: 61
End: 2021-07-17

## 2021-07-19 ENCOUNTER — PATIENT OUTREACH (OUTPATIENT)
Dept: ADMINISTRATIVE | Facility: OTHER | Age: 61
End: 2021-07-19

## 2021-07-19 ENCOUNTER — TELEPHONE (OUTPATIENT)
Dept: INTERNAL MEDICINE | Facility: CLINIC | Age: 61
End: 2021-07-19

## 2021-07-20 ENCOUNTER — TELEPHONE (OUTPATIENT)
Dept: INTERNAL MEDICINE | Facility: CLINIC | Age: 61
End: 2021-07-20

## 2021-07-20 DIAGNOSIS — K22.0 ACHALASIA OF ESOPHAGUS: ICD-10-CM

## 2021-07-20 DIAGNOSIS — K21.9 GASTROESOPHAGEAL REFLUX DISEASE WITHOUT ESOPHAGITIS: ICD-10-CM

## 2021-07-20 RX ORDER — DULOXETIN HYDROCHLORIDE 30 MG/1
CAPSULE, DELAYED RELEASE ORAL
Qty: 60 CAPSULE | Refills: 2 | OUTPATIENT
Start: 2021-07-20

## 2021-07-20 RX ORDER — ESCITALOPRAM OXALATE 20 MG/1
TABLET ORAL
OUTPATIENT
Start: 2021-07-20

## 2021-07-20 RX ORDER — DIAZEPAM 5 MG/1
5 TABLET ORAL EVERY 6 HOURS PRN
Qty: 60 TABLET | Refills: 1 | OUTPATIENT
Start: 2021-07-20 | End: 2021-08-19

## 2021-07-21 RX ORDER — DIAZEPAM 5 MG/1
5 TABLET ORAL EVERY 6 HOURS PRN
Qty: 60 TABLET | Refills: 1 | Status: SHIPPED | OUTPATIENT
Start: 2021-07-21 | End: 2021-09-01 | Stop reason: SDUPTHER

## 2021-07-21 RX ORDER — OMEPRAZOLE 40 MG/1
40 CAPSULE, DELAYED RELEASE ORAL DAILY
Qty: 90 CAPSULE | Refills: 1 | Status: SHIPPED | OUTPATIENT
Start: 2021-07-21 | End: 2021-12-31

## 2021-07-21 RX ORDER — DULOXETIN HYDROCHLORIDE 30 MG/1
CAPSULE, DELAYED RELEASE ORAL
Qty: 60 CAPSULE | Refills: 1 | Status: SHIPPED | OUTPATIENT
Start: 2021-07-21 | End: 2021-09-10

## 2021-07-21 RX ORDER — OLANZAPINE 10 MG/1
TABLET ORAL
Qty: 30 TABLET | Refills: 1 | Status: SHIPPED | OUTPATIENT
Start: 2021-07-21 | End: 2021-08-24

## 2021-07-21 RX ORDER — PROMETHAZINE HYDROCHLORIDE 12.5 MG/1
12.5 TABLET ORAL 2 TIMES DAILY PRN
Qty: 30 TABLET | Refills: 0 | Status: SHIPPED | OUTPATIENT
Start: 2021-07-21 | End: 2021-07-22

## 2021-07-22 ENCOUNTER — OFFICE VISIT (OUTPATIENT)
Dept: INTERNAL MEDICINE | Facility: CLINIC | Age: 61
End: 2021-07-22
Payer: MEDICARE

## 2021-07-22 ENCOUNTER — LAB VISIT (OUTPATIENT)
Dept: LAB | Facility: HOSPITAL | Age: 61
End: 2021-07-22
Payer: MEDICARE

## 2021-07-22 VITALS
BODY MASS INDEX: 28.75 KG/M2 | SYSTOLIC BLOOD PRESSURE: 130 MMHG | HEART RATE: 68 BPM | WEIGHT: 162.25 LBS | TEMPERATURE: 98 F | DIASTOLIC BLOOD PRESSURE: 86 MMHG | HEIGHT: 63 IN | OXYGEN SATURATION: 97 %

## 2021-07-22 DIAGNOSIS — K22.0 ACHALASIA OF ESOPHAGUS: ICD-10-CM

## 2021-07-22 DIAGNOSIS — K21.9 GASTROESOPHAGEAL REFLUX DISEASE WITHOUT ESOPHAGITIS: ICD-10-CM

## 2021-07-22 DIAGNOSIS — R11.0 NAUSEA: ICD-10-CM

## 2021-07-22 DIAGNOSIS — R19.7 DIARRHEA, UNSPECIFIED TYPE: Primary | ICD-10-CM

## 2021-07-22 DIAGNOSIS — F41.8 MIXED ANXIETY AND DEPRESSIVE DISORDER: ICD-10-CM

## 2021-07-22 DIAGNOSIS — R19.7 DIARRHEA, UNSPECIFIED TYPE: ICD-10-CM

## 2021-07-22 LAB
ALBUMIN SERPL BCP-MCNC: 3.5 G/DL (ref 3.5–5.2)
ALP SERPL-CCNC: 99 U/L (ref 55–135)
ALT SERPL W/O P-5'-P-CCNC: 13 U/L (ref 10–44)
ANION GAP SERPL CALC-SCNC: 7 MMOL/L (ref 8–16)
AST SERPL-CCNC: 16 U/L (ref 10–40)
BASOPHILS # BLD AUTO: 0.01 K/UL (ref 0–0.2)
BASOPHILS NFR BLD: 0.1 % (ref 0–1.9)
BILIRUB SERPL-MCNC: 0.3 MG/DL (ref 0.1–1)
BUN SERPL-MCNC: 11 MG/DL (ref 8–23)
CALCIUM SERPL-MCNC: 8.9 MG/DL (ref 8.7–10.5)
CHLORIDE SERPL-SCNC: 104 MMOL/L (ref 95–110)
CO2 SERPL-SCNC: 28 MMOL/L (ref 23–29)
CREAT SERPL-MCNC: 0.8 MG/DL (ref 0.5–1.4)
DIFFERENTIAL METHOD: ABNORMAL
EOSINOPHIL # BLD AUTO: 0.6 K/UL (ref 0–0.5)
EOSINOPHIL NFR BLD: 7.9 % (ref 0–8)
ERYTHROCYTE [DISTWIDTH] IN BLOOD BY AUTOMATED COUNT: 13.7 % (ref 11.5–14.5)
EST. GFR  (AFRICAN AMERICAN): >60 ML/MIN/1.73 M^2
EST. GFR  (NON AFRICAN AMERICAN): >60 ML/MIN/1.73 M^2
GLUCOSE SERPL-MCNC: 88 MG/DL (ref 70–110)
HCT VFR BLD AUTO: 39.1 % (ref 40–54)
HGB BLD-MCNC: 12.5 G/DL (ref 14–18)
IMM GRANULOCYTES # BLD AUTO: 0.03 K/UL (ref 0–0.04)
IMM GRANULOCYTES NFR BLD AUTO: 0.4 % (ref 0–0.5)
LYMPHOCYTES # BLD AUTO: 1.6 K/UL (ref 1–4.8)
LYMPHOCYTES NFR BLD: 20.4 % (ref 18–48)
MCH RBC QN AUTO: 30.3 PG (ref 27–31)
MCHC RBC AUTO-ENTMCNC: 32 G/DL (ref 32–36)
MCV RBC AUTO: 95 FL (ref 82–98)
MONOCYTES # BLD AUTO: 0.6 K/UL (ref 0.3–1)
MONOCYTES NFR BLD: 7.3 % (ref 4–15)
NEUTROPHILS # BLD AUTO: 4.9 K/UL (ref 1.8–7.7)
NEUTROPHILS NFR BLD: 63.9 % (ref 38–73)
NRBC BLD-RTO: 0 /100 WBC
PLATELET # BLD AUTO: 234 K/UL (ref 150–450)
PMV BLD AUTO: 9.1 FL (ref 9.2–12.9)
POTASSIUM SERPL-SCNC: 3.7 MMOL/L (ref 3.5–5.1)
PROT SERPL-MCNC: 6.9 G/DL (ref 6–8.4)
RBC # BLD AUTO: 4.13 M/UL (ref 4.6–6.2)
SODIUM SERPL-SCNC: 139 MMOL/L (ref 136–145)
WBC # BLD AUTO: 7.63 K/UL (ref 3.9–12.7)

## 2021-07-22 PROCEDURE — 99999 PR PBB SHADOW E&M-EST. PATIENT-LVL IV: CPT | Mod: PBBFAC,,, | Performed by: PHYSICIAN ASSISTANT

## 2021-07-22 PROCEDURE — 99214 PR OFFICE/OUTPT VISIT, EST, LEVL IV, 30-39 MIN: ICD-10-PCS | Mod: S$GLB,,, | Performed by: PHYSICIAN ASSISTANT

## 2021-07-22 PROCEDURE — 85025 COMPLETE CBC W/AUTO DIFF WBC: CPT | Performed by: PHYSICIAN ASSISTANT

## 2021-07-22 PROCEDURE — 3008F BODY MASS INDEX DOCD: CPT | Mod: CPTII,S$GLB,, | Performed by: PHYSICIAN ASSISTANT

## 2021-07-22 PROCEDURE — 80053 COMPREHEN METABOLIC PANEL: CPT | Performed by: PHYSICIAN ASSISTANT

## 2021-07-22 PROCEDURE — 99214 OFFICE O/P EST MOD 30 MIN: CPT | Mod: S$GLB,,, | Performed by: PHYSICIAN ASSISTANT

## 2021-07-22 PROCEDURE — 99999 PR PBB SHADOW E&M-EST. PATIENT-LVL IV: ICD-10-PCS | Mod: PBBFAC,,, | Performed by: PHYSICIAN ASSISTANT

## 2021-07-22 PROCEDURE — 3008F PR BODY MASS INDEX (BMI) DOCUMENTED: ICD-10-PCS | Mod: CPTII,S$GLB,, | Performed by: PHYSICIAN ASSISTANT

## 2021-07-22 RX ORDER — PROMETHAZINE HYDROCHLORIDE 6.25 MG/5ML
25 SYRUP ORAL EVERY 6 HOURS PRN
Qty: 473 ML | Refills: 0 | Status: SHIPPED | OUTPATIENT
Start: 2021-07-22 | End: 2021-08-02

## 2021-07-28 ENCOUNTER — OFFICE VISIT (OUTPATIENT)
Dept: INTERNAL MEDICINE | Facility: CLINIC | Age: 61
End: 2021-07-28
Payer: MEDICARE

## 2021-07-28 ENCOUNTER — HOSPITAL ENCOUNTER (OUTPATIENT)
Dept: RADIOLOGY | Facility: HOSPITAL | Age: 61
Discharge: HOME | End: 2021-07-28
Attending: NURSE PRACTITIONER
Payer: MEDICARE

## 2021-07-28 ENCOUNTER — TELEPHONE (OUTPATIENT)
Dept: URGENT CARE | Facility: CLINIC | Age: 61
End: 2021-07-28

## 2021-07-28 VITALS
WEIGHT: 148.81 LBS | DIASTOLIC BLOOD PRESSURE: 84 MMHG | OXYGEN SATURATION: 96 % | BODY MASS INDEX: 26.37 KG/M2 | HEIGHT: 63 IN | TEMPERATURE: 98 F | RESPIRATION RATE: 18 BRPM | SYSTOLIC BLOOD PRESSURE: 124 MMHG | HEART RATE: 114 BPM

## 2021-07-28 DIAGNOSIS — R19.7 DIARRHEA, UNSPECIFIED TYPE: Primary | ICD-10-CM

## 2021-07-28 DIAGNOSIS — R19.7 DIARRHEA, UNSPECIFIED TYPE: ICD-10-CM

## 2021-07-28 PROCEDURE — 74019 RADEX ABDOMEN 2 VIEWS: CPT | Mod: 26,,, | Performed by: RADIOLOGY

## 2021-07-28 PROCEDURE — 3008F BODY MASS INDEX DOCD: CPT | Mod: CPTII,S$GLB,, | Performed by: NURSE PRACTITIONER

## 2021-07-28 PROCEDURE — 74019 RADEX ABDOMEN 2 VIEWS: CPT | Mod: TC

## 2021-07-28 PROCEDURE — 1159F PR MEDICATION LIST DOCUMENTED IN MEDICAL RECORD: ICD-10-PCS | Mod: CPTII,S$GLB,, | Performed by: NURSE PRACTITIONER

## 2021-07-28 PROCEDURE — 99999 PR PBB SHADOW E&M-EST. PATIENT-LVL IV: CPT | Mod: PBBFAC,,, | Performed by: NURSE PRACTITIONER

## 2021-07-28 PROCEDURE — 3074F PR MOST RECENT SYSTOLIC BLOOD PRESSURE < 130 MM HG: ICD-10-PCS | Mod: CPTII,S$GLB,, | Performed by: NURSE PRACTITIONER

## 2021-07-28 PROCEDURE — 3079F PR MOST RECENT DIASTOLIC BLOOD PRESSURE 80-89 MM HG: ICD-10-PCS | Mod: CPTII,S$GLB,, | Performed by: NURSE PRACTITIONER

## 2021-07-28 PROCEDURE — 1159F MED LIST DOCD IN RCRD: CPT | Mod: CPTII,S$GLB,, | Performed by: NURSE PRACTITIONER

## 2021-07-28 PROCEDURE — 99999 PR PBB SHADOW E&M-EST. PATIENT-LVL IV: ICD-10-PCS | Mod: PBBFAC,,, | Performed by: NURSE PRACTITIONER

## 2021-07-28 PROCEDURE — 3008F PR BODY MASS INDEX (BMI) DOCUMENTED: ICD-10-PCS | Mod: CPTII,S$GLB,, | Performed by: NURSE PRACTITIONER

## 2021-07-28 PROCEDURE — 3074F SYST BP LT 130 MM HG: CPT | Mod: CPTII,S$GLB,, | Performed by: NURSE PRACTITIONER

## 2021-07-28 PROCEDURE — 3079F DIAST BP 80-89 MM HG: CPT | Mod: CPTII,S$GLB,, | Performed by: NURSE PRACTITIONER

## 2021-07-28 PROCEDURE — 99214 OFFICE O/P EST MOD 30 MIN: CPT | Mod: S$GLB,,, | Performed by: NURSE PRACTITIONER

## 2021-07-28 PROCEDURE — 99214 PR OFFICE/OUTPT VISIT, EST, LEVL IV, 30-39 MIN: ICD-10-PCS | Mod: S$GLB,,, | Performed by: NURSE PRACTITIONER

## 2021-07-28 PROCEDURE — 74019 XR ABDOMEN FLAT AND ERECT: ICD-10-PCS | Mod: 26,,, | Performed by: RADIOLOGY

## 2021-07-28 RX ORDER — ACETAMINOPHEN AND CODEINE PHOSPHATE 300; 30 MG/1; MG/1
1 TABLET ORAL EVERY 6 HOURS PRN
COMMUNITY
Start: 2021-07-24 | End: 2021-08-02

## 2021-07-28 RX ORDER — CHOLESTYRAMINE 4 G/9G
4 POWDER, FOR SUSPENSION ORAL
Qty: 9 PACKET | Refills: 0 | Status: SHIPPED | OUTPATIENT
Start: 2021-07-28 | End: 2021-08-10 | Stop reason: SDUPTHER

## 2021-07-28 RX ORDER — PENICILLIN V POTASSIUM 500 MG/1
500 TABLET, FILM COATED ORAL
COMMUNITY
Start: 2021-07-24 | End: 2021-08-02

## 2021-07-28 RX ORDER — QUETIAPINE FUMARATE 100 MG/1
TABLET, FILM COATED ORAL
COMMUNITY
Start: 2021-06-17 | End: 2021-09-10

## 2021-08-02 ENCOUNTER — OFFICE VISIT (OUTPATIENT)
Dept: INTERNAL MEDICINE | Facility: CLINIC | Age: 61
End: 2021-08-02
Payer: MEDICARE

## 2021-08-02 VITALS
BODY MASS INDEX: 27.18 KG/M2 | TEMPERATURE: 97 F | HEART RATE: 91 BPM | WEIGHT: 153.44 LBS | SYSTOLIC BLOOD PRESSURE: 102 MMHG | OXYGEN SATURATION: 98 % | DIASTOLIC BLOOD PRESSURE: 68 MMHG

## 2021-08-02 DIAGNOSIS — F41.8 MIXED ANXIETY AND DEPRESSIVE DISORDER: ICD-10-CM

## 2021-08-02 DIAGNOSIS — R41.89 COGNITIVE DEFICITS: ICD-10-CM

## 2021-08-02 DIAGNOSIS — K21.9 GASTROESOPHAGEAL REFLUX DISEASE WITHOUT ESOPHAGITIS: ICD-10-CM

## 2021-08-02 DIAGNOSIS — K22.0 ACHALASIA OF ESOPHAGUS: ICD-10-CM

## 2021-08-02 DIAGNOSIS — R19.7 DIARRHEA, UNSPECIFIED TYPE: Primary | ICD-10-CM

## 2021-08-02 PROBLEM — F17.200 SMOKING: Status: RESOLVED | Noted: 2021-03-23 | Resolved: 2021-08-02

## 2021-08-02 PROCEDURE — 99214 OFFICE O/P EST MOD 30 MIN: CPT | Mod: S$GLB,,, | Performed by: FAMILY MEDICINE

## 2021-08-02 PROCEDURE — 3074F SYST BP LT 130 MM HG: CPT | Mod: CPTII,S$GLB,, | Performed by: FAMILY MEDICINE

## 2021-08-02 PROCEDURE — 1159F MED LIST DOCD IN RCRD: CPT | Mod: CPTII,S$GLB,, | Performed by: FAMILY MEDICINE

## 2021-08-02 PROCEDURE — 3074F PR MOST RECENT SYSTOLIC BLOOD PRESSURE < 130 MM HG: ICD-10-PCS | Mod: CPTII,S$GLB,, | Performed by: FAMILY MEDICINE

## 2021-08-02 PROCEDURE — 99214 PR OFFICE/OUTPT VISIT, EST, LEVL IV, 30-39 MIN: ICD-10-PCS | Mod: S$GLB,,, | Performed by: FAMILY MEDICINE

## 2021-08-02 PROCEDURE — 3078F DIAST BP <80 MM HG: CPT | Mod: CPTII,S$GLB,, | Performed by: FAMILY MEDICINE

## 2021-08-02 PROCEDURE — 1125F PR PAIN SEVERITY QUANTIFIED, PAIN PRESENT: ICD-10-PCS | Mod: CPTII,S$GLB,, | Performed by: FAMILY MEDICINE

## 2021-08-02 PROCEDURE — 99999 PR PBB SHADOW E&M-EST. PATIENT-LVL IV: CPT | Mod: PBBFAC,,, | Performed by: FAMILY MEDICINE

## 2021-08-02 PROCEDURE — 1160F RVW MEDS BY RX/DR IN RCRD: CPT | Mod: CPTII,S$GLB,, | Performed by: FAMILY MEDICINE

## 2021-08-02 PROCEDURE — 1125F AMNT PAIN NOTED PAIN PRSNT: CPT | Mod: CPTII,S$GLB,, | Performed by: FAMILY MEDICINE

## 2021-08-02 PROCEDURE — 3008F BODY MASS INDEX DOCD: CPT | Mod: CPTII,S$GLB,, | Performed by: FAMILY MEDICINE

## 2021-08-02 PROCEDURE — 1159F PR MEDICATION LIST DOCUMENTED IN MEDICAL RECORD: ICD-10-PCS | Mod: CPTII,S$GLB,, | Performed by: FAMILY MEDICINE

## 2021-08-02 PROCEDURE — 1160F PR REVIEW ALL MEDS BY PRESCRIBER/CLIN PHARMACIST DOCUMENTED: ICD-10-PCS | Mod: CPTII,S$GLB,, | Performed by: FAMILY MEDICINE

## 2021-08-02 PROCEDURE — 3078F PR MOST RECENT DIASTOLIC BLOOD PRESSURE < 80 MM HG: ICD-10-PCS | Mod: CPTII,S$GLB,, | Performed by: FAMILY MEDICINE

## 2021-08-02 PROCEDURE — 3008F PR BODY MASS INDEX (BMI) DOCUMENTED: ICD-10-PCS | Mod: CPTII,S$GLB,, | Performed by: FAMILY MEDICINE

## 2021-08-02 PROCEDURE — 99999 PR PBB SHADOW E&M-EST. PATIENT-LVL IV: ICD-10-PCS | Mod: PBBFAC,,, | Performed by: FAMILY MEDICINE

## 2021-08-02 RX ORDER — LOPERAMIDE HYDROCHLORIDE 2 MG/1
2 CAPSULE ORAL 4 TIMES DAILY PRN
Qty: 30 CAPSULE | Refills: 0 | Status: SHIPPED | OUTPATIENT
Start: 2021-08-02 | End: 2021-08-12

## 2021-08-02 RX ORDER — LOPERAMIDE HYDROCHLORIDE 2 MG/1
2 CAPSULE ORAL 4 TIMES DAILY PRN
Qty: 30 CAPSULE | Refills: 0 | Status: SHIPPED | OUTPATIENT
Start: 2021-08-02 | End: 2021-08-02 | Stop reason: SDUPTHER

## 2021-08-02 RX ORDER — PROMETHAZINE HYDROCHLORIDE 12.5 MG/1
TABLET ORAL
COMMUNITY
Start: 2021-07-22 | End: 2021-08-02

## 2021-08-03 ENCOUNTER — TELEPHONE (OUTPATIENT)
Dept: PAIN MEDICINE | Facility: CLINIC | Age: 61
End: 2021-08-03

## 2021-08-04 ENCOUNTER — TELEPHONE (OUTPATIENT)
Dept: RADIOLOGY | Facility: HOSPITAL | Age: 61
End: 2021-08-04

## 2021-08-04 ENCOUNTER — PATIENT MESSAGE (OUTPATIENT)
Dept: PAIN MEDICINE | Facility: CLINIC | Age: 61
End: 2021-08-04

## 2021-08-04 ENCOUNTER — TELEPHONE (OUTPATIENT)
Dept: PAIN MEDICINE | Facility: CLINIC | Age: 61
End: 2021-08-04

## 2021-08-06 ENCOUNTER — TELEPHONE (OUTPATIENT)
Dept: PAIN MEDICINE | Facility: CLINIC | Age: 61
End: 2021-08-06

## 2021-08-10 ENCOUNTER — OFFICE VISIT (OUTPATIENT)
Dept: GASTROENTEROLOGY | Facility: CLINIC | Age: 61
End: 2021-08-10
Payer: MEDICARE

## 2021-08-10 ENCOUNTER — LAB VISIT (OUTPATIENT)
Dept: LAB | Facility: HOSPITAL | Age: 61
End: 2021-08-10
Attending: NURSE PRACTITIONER
Payer: MEDICARE

## 2021-08-10 VITALS
BODY MASS INDEX: 26.48 KG/M2 | HEIGHT: 63 IN | WEIGHT: 149.44 LBS | DIASTOLIC BLOOD PRESSURE: 64 MMHG | SYSTOLIC BLOOD PRESSURE: 90 MMHG

## 2021-08-10 DIAGNOSIS — R19.7 DIARRHEA, UNSPECIFIED TYPE: ICD-10-CM

## 2021-08-10 DIAGNOSIS — K22.0 ACHALASIA OF ESOPHAGUS: ICD-10-CM

## 2021-08-10 DIAGNOSIS — R19.7 DIARRHEA, UNSPECIFIED TYPE: Primary | ICD-10-CM

## 2021-08-10 LAB
ANION GAP SERPL CALC-SCNC: 11 MMOL/L (ref 8–16)
BUN SERPL-MCNC: 10 MG/DL (ref 8–23)
CALCIUM SERPL-MCNC: 9.3 MG/DL (ref 8.7–10.5)
CHLORIDE SERPL-SCNC: 99 MMOL/L (ref 95–110)
CO2 SERPL-SCNC: 28 MMOL/L (ref 23–29)
CREAT SERPL-MCNC: 0.8 MG/DL (ref 0.5–1.4)
EST. GFR  (AFRICAN AMERICAN): >60 ML/MIN/1.73 M^2
EST. GFR  (NON AFRICAN AMERICAN): >60 ML/MIN/1.73 M^2
GLUCOSE SERPL-MCNC: 69 MG/DL (ref 70–110)
IGA SERPL-MCNC: 355 MG/DL (ref 40–350)
POTASSIUM SERPL-SCNC: 2.8 MMOL/L (ref 3.5–5.1)
SODIUM SERPL-SCNC: 138 MMOL/L (ref 136–145)

## 2021-08-10 PROCEDURE — 1160F RVW MEDS BY RX/DR IN RCRD: CPT | Mod: CPTII,S$GLB,, | Performed by: NURSE PRACTITIONER

## 2021-08-10 PROCEDURE — 1159F MED LIST DOCD IN RCRD: CPT | Mod: CPTII,S$GLB,, | Performed by: NURSE PRACTITIONER

## 2021-08-10 PROCEDURE — 1126F AMNT PAIN NOTED NONE PRSNT: CPT | Mod: CPTII,S$GLB,, | Performed by: NURSE PRACTITIONER

## 2021-08-10 PROCEDURE — 1126F PR PAIN SEVERITY QUANTIFIED, NO PAIN PRESENT: ICD-10-PCS | Mod: CPTII,S$GLB,, | Performed by: NURSE PRACTITIONER

## 2021-08-10 PROCEDURE — 99999 PR PBB SHADOW E&M-EST. PATIENT-LVL III: ICD-10-PCS | Mod: PBBFAC,,, | Performed by: NURSE PRACTITIONER

## 2021-08-10 PROCEDURE — 99999 PR PBB SHADOW E&M-EST. PATIENT-LVL III: CPT | Mod: PBBFAC,,, | Performed by: NURSE PRACTITIONER

## 2021-08-10 PROCEDURE — 3074F SYST BP LT 130 MM HG: CPT | Mod: CPTII,S$GLB,, | Performed by: NURSE PRACTITIONER

## 2021-08-10 PROCEDURE — 3078F PR MOST RECENT DIASTOLIC BLOOD PRESSURE < 80 MM HG: ICD-10-PCS | Mod: CPTII,S$GLB,, | Performed by: NURSE PRACTITIONER

## 2021-08-10 PROCEDURE — 99214 OFFICE O/P EST MOD 30 MIN: CPT | Mod: S$GLB,,, | Performed by: NURSE PRACTITIONER

## 2021-08-10 PROCEDURE — 80048 BASIC METABOLIC PNL TOTAL CA: CPT | Performed by: NURSE PRACTITIONER

## 2021-08-10 PROCEDURE — 83516 IMMUNOASSAY NONANTIBODY: CPT | Performed by: NURSE PRACTITIONER

## 2021-08-10 PROCEDURE — 3078F DIAST BP <80 MM HG: CPT | Mod: CPTII,S$GLB,, | Performed by: NURSE PRACTITIONER

## 2021-08-10 PROCEDURE — 3074F PR MOST RECENT SYSTOLIC BLOOD PRESSURE < 130 MM HG: ICD-10-PCS | Mod: CPTII,S$GLB,, | Performed by: NURSE PRACTITIONER

## 2021-08-10 PROCEDURE — 3008F PR BODY MASS INDEX (BMI) DOCUMENTED: ICD-10-PCS | Mod: CPTII,S$GLB,, | Performed by: NURSE PRACTITIONER

## 2021-08-10 PROCEDURE — 1159F PR MEDICATION LIST DOCUMENTED IN MEDICAL RECORD: ICD-10-PCS | Mod: CPTII,S$GLB,, | Performed by: NURSE PRACTITIONER

## 2021-08-10 PROCEDURE — 99214 PR OFFICE/OUTPT VISIT, EST, LEVL IV, 30-39 MIN: ICD-10-PCS | Mod: S$GLB,,, | Performed by: NURSE PRACTITIONER

## 2021-08-10 PROCEDURE — 3008F BODY MASS INDEX DOCD: CPT | Mod: CPTII,S$GLB,, | Performed by: NURSE PRACTITIONER

## 2021-08-10 PROCEDURE — 82784 ASSAY IGA/IGD/IGG/IGM EACH: CPT | Performed by: NURSE PRACTITIONER

## 2021-08-10 PROCEDURE — 1160F PR REVIEW ALL MEDS BY PRESCRIBER/CLIN PHARMACIST DOCUMENTED: ICD-10-PCS | Mod: CPTII,S$GLB,, | Performed by: NURSE PRACTITIONER

## 2021-08-10 RX ORDER — CHOLESTYRAMINE 4 G/9G
4 POWDER, FOR SUSPENSION ORAL 2 TIMES DAILY
Qty: 60 PACKET | Refills: 0 | Status: SHIPPED | OUTPATIENT
Start: 2021-08-10 | End: 2021-09-30

## 2021-08-11 ENCOUNTER — PATIENT MESSAGE (OUTPATIENT)
Dept: GASTROENTEROLOGY | Facility: CLINIC | Age: 61
End: 2021-08-11

## 2021-08-11 DIAGNOSIS — E87.6 HYPOKALEMIA: Primary | ICD-10-CM

## 2021-08-11 RX ORDER — POTASSIUM CHLORIDE 20 MEQ/1
20 TABLET, EXTENDED RELEASE ORAL 2 TIMES DAILY
Qty: 6 TABLET | Refills: 0 | Status: SHIPPED | OUTPATIENT
Start: 2021-08-11 | End: 2021-08-14

## 2021-08-12 ENCOUNTER — OFFICE VISIT (OUTPATIENT)
Dept: PAIN MEDICINE | Facility: CLINIC | Age: 61
End: 2021-08-12
Payer: MEDICARE

## 2021-08-12 DIAGNOSIS — M47.816 LUMBAR SPONDYLOSIS: ICD-10-CM

## 2021-08-12 DIAGNOSIS — M54.16 LUMBAR RADICULOPATHY: ICD-10-CM

## 2021-08-12 DIAGNOSIS — M47.812 CERVICAL SPONDYLOSIS: Primary | ICD-10-CM

## 2021-08-12 DIAGNOSIS — M54.9 DORSALGIA, UNSPECIFIED: ICD-10-CM

## 2021-08-12 PROCEDURE — 1160F PR REVIEW ALL MEDS BY PRESCRIBER/CLIN PHARMACIST DOCUMENTED: ICD-10-PCS | Mod: CPTII,95,, | Performed by: ANESTHESIOLOGY

## 2021-08-12 PROCEDURE — 99204 PR OFFICE/OUTPT VISIT, NEW, LEVL IV, 45-59 MIN: ICD-10-PCS | Mod: 95,,, | Performed by: ANESTHESIOLOGY

## 2021-08-12 PROCEDURE — 1160F RVW MEDS BY RX/DR IN RCRD: CPT | Mod: CPTII,95,, | Performed by: ANESTHESIOLOGY

## 2021-08-12 PROCEDURE — 99204 OFFICE O/P NEW MOD 45 MIN: CPT | Mod: 95,,, | Performed by: ANESTHESIOLOGY

## 2021-08-12 PROCEDURE — 1159F MED LIST DOCD IN RCRD: CPT | Mod: CPTII,95,, | Performed by: ANESTHESIOLOGY

## 2021-08-12 PROCEDURE — 1159F PR MEDICATION LIST DOCUMENTED IN MEDICAL RECORD: ICD-10-PCS | Mod: CPTII,95,, | Performed by: ANESTHESIOLOGY

## 2021-08-12 RX ORDER — GABAPENTIN 300 MG/1
CAPSULE ORAL
Qty: 90 CAPSULE | Refills: 0 | Status: SHIPPED | OUTPATIENT
Start: 2021-08-12 | End: 2021-12-08

## 2021-08-13 DIAGNOSIS — K86.89 PANCREATIC INSUFFICIENCY: Primary | ICD-10-CM

## 2021-08-13 LAB — TTG IGA SER-ACNC: 11 UNITS

## 2021-08-13 RX ORDER — PANCRELIPASE 30000; 6000; 19000 [USP'U]/1; [USP'U]/1; [USP'U]/1
1 CAPSULE, DELAYED RELEASE PELLETS ORAL
Qty: 90 CAPSULE | Refills: 11 | Status: SHIPPED | OUTPATIENT
Start: 2021-08-13 | End: 2021-09-30

## 2021-08-17 ENCOUNTER — TELEPHONE (OUTPATIENT)
Dept: RADIOLOGY | Facility: HOSPITAL | Age: 61
End: 2021-08-17

## 2021-08-18 ENCOUNTER — HOSPITAL ENCOUNTER (OUTPATIENT)
Dept: RADIOLOGY | Facility: HOSPITAL | Age: 61
Discharge: HOME | End: 2021-08-18
Attending: ANESTHESIOLOGY
Payer: MEDICARE

## 2021-08-18 ENCOUNTER — HOSPITAL ENCOUNTER (OUTPATIENT)
Dept: RADIOLOGY | Facility: HOSPITAL | Age: 61
Discharge: HOME | End: 2021-08-18
Attending: NURSE PRACTITIONER
Payer: MEDICARE

## 2021-08-18 DIAGNOSIS — R19.7 DIARRHEA, UNSPECIFIED TYPE: ICD-10-CM

## 2021-08-18 DIAGNOSIS — M54.9 DORSALGIA, UNSPECIFIED: ICD-10-CM

## 2021-08-18 DIAGNOSIS — M47.812 CERVICAL SPONDYLOSIS: ICD-10-CM

## 2021-08-18 PROCEDURE — 72052 XR CERVICAL SPINE 5 VIEW WITH FLEX AND EXT: ICD-10-PCS | Mod: 26,,, | Performed by: RADIOLOGY

## 2021-08-18 PROCEDURE — A9698 NON-RAD CONTRAST MATERIALNOC: HCPCS | Performed by: NURSE PRACTITIONER

## 2021-08-18 PROCEDURE — 72052 X-RAY EXAM NECK SPINE 6/>VWS: CPT | Mod: TC

## 2021-08-18 PROCEDURE — 72052 X-RAY EXAM NECK SPINE 6/>VWS: CPT | Mod: 26,,, | Performed by: RADIOLOGY

## 2021-08-18 PROCEDURE — 74178 CT ABDOMEN PELVIS W WO CONTRAST: ICD-10-PCS | Mod: 26,,, | Performed by: RADIOLOGY

## 2021-08-18 PROCEDURE — 72110 X-RAY EXAM L-2 SPINE 4/>VWS: CPT | Mod: TC

## 2021-08-18 PROCEDURE — 74178 CT ABD&PLV WO CNTR FLWD CNTR: CPT | Mod: TC

## 2021-08-18 PROCEDURE — 25500020 PHARM REV CODE 255: Performed by: NURSE PRACTITIONER

## 2021-08-18 PROCEDURE — 72110 X-RAY EXAM L-2 SPINE 4/>VWS: CPT | Mod: 26,,, | Performed by: RADIOLOGY

## 2021-08-18 PROCEDURE — 72110 XR LUMBAR SPINE 5 VIEW WITH FLEX AND EXT: ICD-10-PCS | Mod: 26,,, | Performed by: RADIOLOGY

## 2021-08-18 PROCEDURE — 74178 CT ABD&PLV WO CNTR FLWD CNTR: CPT | Mod: 26,,, | Performed by: RADIOLOGY

## 2021-08-18 RX ADMIN — IOHEXOL 75 ML: 350 INJECTION, SOLUTION INTRAVENOUS at 12:08

## 2021-08-18 RX ADMIN — IOHEXOL 1000 ML: 12 SOLUTION ORAL at 11:08

## 2021-08-24 RX ORDER — ISOPROPYL ALCOHOL 70 ML/100ML
SWAB TOPICAL
Qty: 100 EACH | Refills: 11 | Status: SHIPPED | OUTPATIENT
Start: 2021-08-24 | End: 2021-09-30

## 2021-08-24 RX ORDER — BLOOD-GLUCOSE METER
EACH MISCELLANEOUS
Qty: 1 EACH | Refills: 0 | Status: SHIPPED | OUTPATIENT
Start: 2021-08-24 | End: 2021-09-30

## 2021-08-24 RX ORDER — PROMETHAZINE HYDROCHLORIDE 6.25 MG/5ML
SYRUP ORAL
Qty: 2 ML | Refills: 0 | OUTPATIENT
Start: 2021-08-24

## 2021-08-24 RX ORDER — ESCITALOPRAM OXALATE 20 MG/1
20 TABLET ORAL NIGHTLY
Qty: 90 TABLET | Refills: 0 | Status: SHIPPED | OUTPATIENT
Start: 2021-08-24 | End: 2022-05-16 | Stop reason: SDUPTHER

## 2021-08-24 RX ORDER — DEXTROSE 4 G
TABLET,CHEWABLE ORAL
Qty: 1 EACH | Refills: 0 | Status: SHIPPED | OUTPATIENT
Start: 2021-08-24 | End: 2021-09-30

## 2021-08-24 RX ORDER — LANCETS 33 GAUGE
EACH MISCELLANEOUS
Qty: 100 EACH | Refills: 11 | Status: SHIPPED | OUTPATIENT
Start: 2021-08-24 | End: 2021-09-30

## 2021-08-25 ENCOUNTER — TELEPHONE (OUTPATIENT)
Dept: PAIN MEDICINE | Facility: CLINIC | Age: 61
End: 2021-08-25

## 2021-08-27 ENCOUNTER — TELEPHONE (OUTPATIENT)
Dept: PSYCHIATRY | Facility: CLINIC | Age: 61
End: 2021-08-27

## 2021-08-27 ENCOUNTER — TELEPHONE (OUTPATIENT)
Dept: INTERNAL MEDICINE | Facility: CLINIC | Age: 61
End: 2021-08-27

## 2021-09-02 RX ORDER — DIAZEPAM 5 MG/1
5 TABLET ORAL EVERY 6 HOURS PRN
Qty: 60 TABLET | Refills: 1 | Status: SHIPPED | OUTPATIENT
Start: 2021-09-02 | End: 2021-09-14 | Stop reason: SDUPTHER

## 2021-09-10 ENCOUNTER — TELEPHONE (OUTPATIENT)
Dept: GASTROENTEROLOGY | Facility: CLINIC | Age: 61
End: 2021-09-10

## 2021-09-10 ENCOUNTER — OFFICE VISIT (OUTPATIENT)
Dept: INTERNAL MEDICINE | Facility: CLINIC | Age: 61
End: 2021-09-10
Payer: MEDICARE

## 2021-09-10 DIAGNOSIS — M47.22 OSTEOARTHRITIS OF SPINE WITH RADICULOPATHY, CERVICAL REGION: ICD-10-CM

## 2021-09-10 DIAGNOSIS — E78.2 MIXED HYPERLIPIDEMIA: ICD-10-CM

## 2021-09-10 DIAGNOSIS — M47.26 OSTEOARTHRITIS OF SPINE WITH RADICULOPATHY, LUMBAR REGION: ICD-10-CM

## 2021-09-10 DIAGNOSIS — K21.9 GASTROESOPHAGEAL REFLUX DISEASE WITHOUT ESOPHAGITIS: ICD-10-CM

## 2021-09-10 DIAGNOSIS — I25.118 CORONARY ARTERY DISEASE OF NATIVE ARTERY OF NATIVE HEART WITH STABLE ANGINA PECTORIS: ICD-10-CM

## 2021-09-10 DIAGNOSIS — F41.8 MIXED ANXIETY AND DEPRESSIVE DISORDER: Primary | ICD-10-CM

## 2021-09-10 PROCEDURE — 99442 PR PHYSICIAN TELEPHONE EVALUATION 11-20 MIN: ICD-10-PCS | Mod: 95,,, | Performed by: FAMILY MEDICINE

## 2021-09-10 PROCEDURE — 1159F MED LIST DOCD IN RCRD: CPT | Mod: CPTII,95,, | Performed by: FAMILY MEDICINE

## 2021-09-10 PROCEDURE — 1160F RVW MEDS BY RX/DR IN RCRD: CPT | Mod: CPTII,95,, | Performed by: FAMILY MEDICINE

## 2021-09-10 PROCEDURE — 99442 PR PHYSICIAN TELEPHONE EVALUATION 11-20 MIN: CPT | Mod: 95,,, | Performed by: FAMILY MEDICINE

## 2021-09-10 PROCEDURE — 1159F PR MEDICATION LIST DOCUMENTED IN MEDICAL RECORD: ICD-10-PCS | Mod: CPTII,95,, | Performed by: FAMILY MEDICINE

## 2021-09-10 PROCEDURE — 1160F PR REVIEW ALL MEDS BY PRESCRIBER/CLIN PHARMACIST DOCUMENTED: ICD-10-PCS | Mod: CPTII,95,, | Performed by: FAMILY MEDICINE

## 2021-09-12 ENCOUNTER — PATIENT OUTREACH (OUTPATIENT)
Dept: ADMINISTRATIVE | Facility: OTHER | Age: 61
End: 2021-09-12

## 2021-09-13 ENCOUNTER — TELEPHONE (OUTPATIENT)
Dept: PSYCHIATRY | Facility: CLINIC | Age: 61
End: 2021-09-13

## 2021-09-14 ENCOUNTER — TELEPHONE (OUTPATIENT)
Dept: PSYCHIATRY | Facility: CLINIC | Age: 61
End: 2021-09-14

## 2021-09-14 RX ORDER — DIAZEPAM 5 MG/1
5 TABLET ORAL EVERY 6 HOURS PRN
Qty: 60 TABLET | Refills: 1 | Status: CANCELLED | OUTPATIENT
Start: 2021-09-14 | End: 2021-10-14

## 2021-09-14 RX ORDER — DIAZEPAM 5 MG/1
5 TABLET ORAL EVERY 6 HOURS PRN
Qty: 60 TABLET | Refills: 1 | Status: SHIPPED | OUTPATIENT
Start: 2021-09-14 | End: 2022-01-10 | Stop reason: SDUPTHER

## 2021-09-15 ENCOUNTER — OFFICE VISIT (OUTPATIENT)
Dept: PAIN MEDICINE | Facility: CLINIC | Age: 61
End: 2021-09-15
Payer: MEDICARE

## 2021-09-15 DIAGNOSIS — M47.816 LUMBAR SPONDYLOSIS: ICD-10-CM

## 2021-09-15 DIAGNOSIS — M54.16 LUMBAR RADICULOPATHY: Primary | ICD-10-CM

## 2021-09-15 DIAGNOSIS — M47.812 CERVICAL SPONDYLOSIS: ICD-10-CM

## 2021-09-15 PROCEDURE — 1160F PR REVIEW ALL MEDS BY PRESCRIBER/CLIN PHARMACIST DOCUMENTED: ICD-10-PCS | Mod: CPTII,95,, | Performed by: ANESTHESIOLOGY

## 2021-09-15 PROCEDURE — 99214 OFFICE O/P EST MOD 30 MIN: CPT | Mod: 95,,, | Performed by: ANESTHESIOLOGY

## 2021-09-15 PROCEDURE — 1159F MED LIST DOCD IN RCRD: CPT | Mod: CPTII,95,, | Performed by: ANESTHESIOLOGY

## 2021-09-15 PROCEDURE — 99214 PR OFFICE/OUTPT VISIT, EST, LEVL IV, 30-39 MIN: ICD-10-PCS | Mod: 95,,, | Performed by: ANESTHESIOLOGY

## 2021-09-15 PROCEDURE — 1160F RVW MEDS BY RX/DR IN RCRD: CPT | Mod: CPTII,95,, | Performed by: ANESTHESIOLOGY

## 2021-09-15 PROCEDURE — 1159F PR MEDICATION LIST DOCUMENTED IN MEDICAL RECORD: ICD-10-PCS | Mod: CPTII,95,, | Performed by: ANESTHESIOLOGY

## 2021-09-22 ENCOUNTER — TELEPHONE (OUTPATIENT)
Dept: INTERNAL MEDICINE | Facility: CLINIC | Age: 61
End: 2021-09-22

## 2021-09-27 ENCOUNTER — TELEPHONE (OUTPATIENT)
Dept: PAIN MEDICINE | Facility: CLINIC | Age: 61
End: 2021-09-27

## 2021-09-30 ENCOUNTER — OFFICE VISIT (OUTPATIENT)
Dept: GASTROENTEROLOGY | Facility: CLINIC | Age: 61
End: 2021-09-30
Payer: MEDICARE

## 2021-09-30 VITALS
SYSTOLIC BLOOD PRESSURE: 102 MMHG | WEIGHT: 165.56 LBS | HEART RATE: 64 BPM | DIASTOLIC BLOOD PRESSURE: 72 MMHG | BODY MASS INDEX: 29.34 KG/M2 | HEIGHT: 63 IN

## 2021-09-30 DIAGNOSIS — K22.0 ACHALASIA OF ESOPHAGUS: ICD-10-CM

## 2021-09-30 DIAGNOSIS — K86.89 PANCREATIC INSUFFICIENCY: Primary | ICD-10-CM

## 2021-09-30 DIAGNOSIS — R11.0 NAUSEA: ICD-10-CM

## 2021-09-30 PROCEDURE — 1159F PR MEDICATION LIST DOCUMENTED IN MEDICAL RECORD: ICD-10-PCS | Mod: CPTII,S$GLB,, | Performed by: NURSE PRACTITIONER

## 2021-09-30 PROCEDURE — 99999 PR PBB SHADOW E&M-EST. PATIENT-LVL III: CPT | Mod: PBBFAC,,, | Performed by: NURSE PRACTITIONER

## 2021-09-30 PROCEDURE — 1160F RVW MEDS BY RX/DR IN RCRD: CPT | Mod: CPTII,S$GLB,, | Performed by: NURSE PRACTITIONER

## 2021-09-30 PROCEDURE — 1160F PR REVIEW ALL MEDS BY PRESCRIBER/CLIN PHARMACIST DOCUMENTED: ICD-10-PCS | Mod: CPTII,S$GLB,, | Performed by: NURSE PRACTITIONER

## 2021-09-30 PROCEDURE — 3078F PR MOST RECENT DIASTOLIC BLOOD PRESSURE < 80 MM HG: ICD-10-PCS | Mod: CPTII,S$GLB,, | Performed by: NURSE PRACTITIONER

## 2021-09-30 PROCEDURE — 99214 PR OFFICE/OUTPT VISIT, EST, LEVL IV, 30-39 MIN: ICD-10-PCS | Mod: S$GLB,,, | Performed by: NURSE PRACTITIONER

## 2021-09-30 PROCEDURE — 99214 OFFICE O/P EST MOD 30 MIN: CPT | Mod: S$GLB,,, | Performed by: NURSE PRACTITIONER

## 2021-09-30 PROCEDURE — 3074F SYST BP LT 130 MM HG: CPT | Mod: CPTII,S$GLB,, | Performed by: NURSE PRACTITIONER

## 2021-09-30 PROCEDURE — 1159F MED LIST DOCD IN RCRD: CPT | Mod: CPTII,S$GLB,, | Performed by: NURSE PRACTITIONER

## 2021-09-30 PROCEDURE — 3078F DIAST BP <80 MM HG: CPT | Mod: CPTII,S$GLB,, | Performed by: NURSE PRACTITIONER

## 2021-09-30 PROCEDURE — 99999 PR PBB SHADOW E&M-EST. PATIENT-LVL III: ICD-10-PCS | Mod: PBBFAC,,, | Performed by: NURSE PRACTITIONER

## 2021-09-30 PROCEDURE — 3008F BODY MASS INDEX DOCD: CPT | Mod: CPTII,S$GLB,, | Performed by: NURSE PRACTITIONER

## 2021-09-30 PROCEDURE — 3008F PR BODY MASS INDEX (BMI) DOCUMENTED: ICD-10-PCS | Mod: CPTII,S$GLB,, | Performed by: NURSE PRACTITIONER

## 2021-09-30 PROCEDURE — 3074F PR MOST RECENT SYSTOLIC BLOOD PRESSURE < 130 MM HG: ICD-10-PCS | Mod: CPTII,S$GLB,, | Performed by: NURSE PRACTITIONER

## 2021-09-30 RX ORDER — ONDANSETRON 4 MG/1
4 TABLET, ORALLY DISINTEGRATING ORAL EVERY 6 HOURS PRN
Qty: 60 TABLET | Refills: 2 | Status: SHIPPED | OUTPATIENT
Start: 2021-09-30 | End: 2022-05-16

## 2021-10-01 ENCOUNTER — OFFICE VISIT (OUTPATIENT)
Dept: OPHTHALMOLOGY | Facility: CLINIC | Age: 61
End: 2021-10-01
Payer: MEDICARE

## 2021-10-01 DIAGNOSIS — H25.12 NUCLEAR SCLEROSIS OF LEFT EYE: ICD-10-CM

## 2021-10-01 DIAGNOSIS — H40.033 ANATOMICAL NARROW ANGLE OF BOTH EYES: Primary | ICD-10-CM

## 2021-10-01 DIAGNOSIS — M35.01 KERATOCONJUNCTIVITIS SICCA: ICD-10-CM

## 2021-10-01 DIAGNOSIS — H25.11 NUCLEAR SCLEROSIS OF RIGHT EYE: ICD-10-CM

## 2021-10-01 PROCEDURE — 1159F PR MEDICATION LIST DOCUMENTED IN MEDICAL RECORD: ICD-10-PCS | Mod: CPTII,S$GLB,, | Performed by: STUDENT IN AN ORGANIZED HEALTH CARE EDUCATION/TRAINING PROGRAM

## 2021-10-01 PROCEDURE — 99499 UNLISTED E&M SERVICE: CPT | Mod: S$GLB,,, | Performed by: STUDENT IN AN ORGANIZED HEALTH CARE EDUCATION/TRAINING PROGRAM

## 2021-10-01 PROCEDURE — 92133 CPTRZD OPH DX IMG PST SGM ON: CPT | Mod: S$GLB,,, | Performed by: STUDENT IN AN ORGANIZED HEALTH CARE EDUCATION/TRAINING PROGRAM

## 2021-10-01 PROCEDURE — 99999 PR PBB SHADOW E&M-EST. PATIENT-LVL II: ICD-10-PCS | Mod: PBBFAC,,, | Performed by: STUDENT IN AN ORGANIZED HEALTH CARE EDUCATION/TRAINING PROGRAM

## 2021-10-01 PROCEDURE — 92133 POSTERIOR SEGMENT OCT OPTIC NERVE(OCULAR COHERENCE TOMOGRAPHY) - OU - BOTH EYES: ICD-10-PCS | Mod: S$GLB,,, | Performed by: STUDENT IN AN ORGANIZED HEALTH CARE EDUCATION/TRAINING PROGRAM

## 2021-10-01 PROCEDURE — 99499 RISK ADDL DX/OHS AUDIT: ICD-10-PCS | Mod: S$GLB,,, | Performed by: STUDENT IN AN ORGANIZED HEALTH CARE EDUCATION/TRAINING PROGRAM

## 2021-10-01 PROCEDURE — 92004 COMPRE OPH EXAM NEW PT 1/>: CPT | Mod: S$GLB,,, | Performed by: STUDENT IN AN ORGANIZED HEALTH CARE EDUCATION/TRAINING PROGRAM

## 2021-10-01 PROCEDURE — 99999 PR PBB SHADOW E&M-EST. PATIENT-LVL II: CPT | Mod: PBBFAC,,, | Performed by: STUDENT IN AN ORGANIZED HEALTH CARE EDUCATION/TRAINING PROGRAM

## 2021-10-01 PROCEDURE — 92004 PR EYE EXAM, NEW PATIENT,COMPREHESV: ICD-10-PCS | Mod: S$GLB,,, | Performed by: STUDENT IN AN ORGANIZED HEALTH CARE EDUCATION/TRAINING PROGRAM

## 2021-10-01 PROCEDURE — 1160F PR REVIEW ALL MEDS BY PRESCRIBER/CLIN PHARMACIST DOCUMENTED: ICD-10-PCS | Mod: CPTII,S$GLB,, | Performed by: STUDENT IN AN ORGANIZED HEALTH CARE EDUCATION/TRAINING PROGRAM

## 2021-10-01 PROCEDURE — 92020 GONIOSCOPY: CPT | Mod: S$GLB,,, | Performed by: STUDENT IN AN ORGANIZED HEALTH CARE EDUCATION/TRAINING PROGRAM

## 2021-10-01 PROCEDURE — 92020 PR SPECIAL EYE EVAL,GONIOSCOPY: ICD-10-PCS | Mod: S$GLB,,, | Performed by: STUDENT IN AN ORGANIZED HEALTH CARE EDUCATION/TRAINING PROGRAM

## 2021-10-01 PROCEDURE — 1160F RVW MEDS BY RX/DR IN RCRD: CPT | Mod: CPTII,S$GLB,, | Performed by: STUDENT IN AN ORGANIZED HEALTH CARE EDUCATION/TRAINING PROGRAM

## 2021-10-01 PROCEDURE — 1159F MED LIST DOCD IN RCRD: CPT | Mod: CPTII,S$GLB,, | Performed by: STUDENT IN AN ORGANIZED HEALTH CARE EDUCATION/TRAINING PROGRAM

## 2021-10-25 ENCOUNTER — OFFICE VISIT (OUTPATIENT)
Dept: UROLOGY | Facility: CLINIC | Age: 61
End: 2021-10-25
Payer: MEDICARE

## 2021-10-25 VITALS
DIASTOLIC BLOOD PRESSURE: 87 MMHG | BODY MASS INDEX: 30 KG/M2 | HEIGHT: 63 IN | HEART RATE: 101 BPM | TEMPERATURE: 97 F | WEIGHT: 169.31 LBS | SYSTOLIC BLOOD PRESSURE: 126 MMHG

## 2021-10-25 DIAGNOSIS — N13.8 BENIGN PROSTATIC HYPERPLASIA WITH URINARY OBSTRUCTION: Primary | ICD-10-CM

## 2021-10-25 DIAGNOSIS — N52.9 ERECTILE DYSFUNCTION, UNSPECIFIED ERECTILE DYSFUNCTION TYPE: ICD-10-CM

## 2021-10-25 DIAGNOSIS — R39.15 URGENCY OF URINATION: ICD-10-CM

## 2021-10-25 DIAGNOSIS — N40.1 BENIGN PROSTATIC HYPERPLASIA WITH URINARY OBSTRUCTION: Primary | ICD-10-CM

## 2021-10-25 PROCEDURE — 3008F PR BODY MASS INDEX (BMI) DOCUMENTED: ICD-10-PCS | Mod: HCNC,CPTII,S$GLB, | Performed by: UROLOGY

## 2021-10-25 PROCEDURE — 3074F SYST BP LT 130 MM HG: CPT | Mod: HCNC,CPTII,S$GLB, | Performed by: UROLOGY

## 2021-10-25 PROCEDURE — 3074F PR MOST RECENT SYSTOLIC BLOOD PRESSURE < 130 MM HG: ICD-10-PCS | Mod: HCNC,CPTII,S$GLB, | Performed by: UROLOGY

## 2021-10-25 PROCEDURE — 1160F RVW MEDS BY RX/DR IN RCRD: CPT | Mod: HCNC,CPTII,S$GLB, | Performed by: UROLOGY

## 2021-10-25 PROCEDURE — 99999 PR PBB SHADOW E&M-EST. PATIENT-LVL IV: CPT | Mod: PBBFAC,HCNC,, | Performed by: UROLOGY

## 2021-10-25 PROCEDURE — 3008F BODY MASS INDEX DOCD: CPT | Mod: HCNC,CPTII,S$GLB, | Performed by: UROLOGY

## 2021-10-25 PROCEDURE — 3079F PR MOST RECENT DIASTOLIC BLOOD PRESSURE 80-89 MM HG: ICD-10-PCS | Mod: HCNC,CPTII,S$GLB, | Performed by: UROLOGY

## 2021-10-25 PROCEDURE — 1160F PR REVIEW ALL MEDS BY PRESCRIBER/CLIN PHARMACIST DOCUMENTED: ICD-10-PCS | Mod: HCNC,CPTII,S$GLB, | Performed by: UROLOGY

## 2021-10-25 PROCEDURE — 99204 OFFICE O/P NEW MOD 45 MIN: CPT | Mod: HCNC,S$GLB,, | Performed by: UROLOGY

## 2021-10-25 PROCEDURE — 1159F MED LIST DOCD IN RCRD: CPT | Mod: HCNC,CPTII,S$GLB, | Performed by: UROLOGY

## 2021-10-25 PROCEDURE — 3079F DIAST BP 80-89 MM HG: CPT | Mod: HCNC,CPTII,S$GLB, | Performed by: UROLOGY

## 2021-10-25 PROCEDURE — 1159F PR MEDICATION LIST DOCUMENTED IN MEDICAL RECORD: ICD-10-PCS | Mod: HCNC,CPTII,S$GLB, | Performed by: UROLOGY

## 2021-10-25 PROCEDURE — 99999 PR PBB SHADOW E&M-EST. PATIENT-LVL IV: ICD-10-PCS | Mod: PBBFAC,HCNC,, | Performed by: UROLOGY

## 2021-10-25 PROCEDURE — 99204 PR OFFICE/OUTPT VISIT, NEW, LEVL IV, 45-59 MIN: ICD-10-PCS | Mod: HCNC,S$GLB,, | Performed by: UROLOGY

## 2021-10-25 RX ORDER — TAMSULOSIN HYDROCHLORIDE 0.4 MG/1
0.4 CAPSULE ORAL DAILY
Qty: 30 CAPSULE | Refills: 11 | Status: SHIPPED | OUTPATIENT
Start: 2021-10-25 | End: 2022-08-01

## 2021-10-25 RX ORDER — SILDENAFIL CITRATE 20 MG/1
20 TABLET ORAL DAILY PRN
Qty: 40 TABLET | Refills: 11 | Status: SHIPPED | OUTPATIENT
Start: 2021-10-25 | End: 2022-08-01

## 2021-10-29 ENCOUNTER — TELEPHONE (OUTPATIENT)
Dept: UROLOGY | Facility: CLINIC | Age: 61
End: 2021-10-29
Payer: MEDICARE

## 2021-12-22 ENCOUNTER — PATIENT OUTREACH (OUTPATIENT)
Dept: ADMINISTRATIVE | Facility: OTHER | Age: 61
End: 2021-12-22
Payer: MEDICARE

## 2022-01-06 ENCOUNTER — PATIENT MESSAGE (OUTPATIENT)
Dept: PSYCHIATRY | Facility: CLINIC | Age: 62
End: 2022-01-06
Payer: COMMERCIAL

## 2022-01-10 ENCOUNTER — TELEPHONE (OUTPATIENT)
Dept: INTERNAL MEDICINE | Facility: CLINIC | Age: 62
End: 2022-01-10
Payer: COMMERCIAL

## 2022-01-10 DIAGNOSIS — F41.8 MIXED ANXIETY AND DEPRESSIVE DISORDER: Primary | ICD-10-CM

## 2022-01-10 RX ORDER — DIAZEPAM 5 MG/1
5 TABLET ORAL EVERY 12 HOURS PRN
Qty: 30 TABLET | Refills: 0 | Status: SHIPPED | OUTPATIENT
Start: 2022-01-10 | End: 2022-02-16 | Stop reason: SDUPTHER

## 2022-01-10 NOTE — TELEPHONE ENCOUNTER
Spoke with pt. Pt tested positive for Covid on 12/29/21. Pt stated he has been extremely anxious and would like a refill on anxiety medication. Pt was advised request may be denied. Please advise.

## 2022-01-10 NOTE — TELEPHONE ENCOUNTER
----- Message from Sinai Avina sent at 1/10/2022  4:22 PM CST -----  Contact: [phone]  Patient would like to get medical advice.  Symptoms (please be specific):  covid +  How long have you had these symptoms: two days ago   Would you like a call back, or a response through your [facility] portal?:   call or [facility]   Pharmacy name and phone # (copy from chart):    [id number] - [address] & [address] [address]  Phone: [phone] Fax: [phone]          Comments:

## 2022-01-11 NOTE — TELEPHONE ENCOUNTER
S/w pt and informed that pharmacy states that medication is being processed and that they actually do have medication sent in for pt. Pt wrote down RX number and informed that he will inform them of this./[place]

## 2022-01-11 NOTE — TELEPHONE ENCOUNTER
----- Message from Juana Angel sent at 1/11/2022  3:23 PM CST -----  Regarding: refill for his diazePAM (VALIUM) 5 MG tablet  Contact: pt  Caller is requesting a call back regarding a refill for his diazePAM (VALIUM) 5 MG tablet.  Please call back at [phone].  Thanks.      Caller is at the pharmacy now !! Caller stated that he call for the refill on 5-10-22.    [id number] - [address] & [address] [address]  Phone: [phone] Fax: [phone]

## 2022-01-12 ENCOUNTER — PATIENT MESSAGE (OUTPATIENT)
Dept: UROLOGY | Facility: CLINIC | Age: 62
End: 2022-01-12
Payer: MEDICARE

## 2022-01-19 ENCOUNTER — HOSPITAL ENCOUNTER (EMERGENCY)
Facility: HOSPITAL | Age: 62
Discharge: HOME | End: 2022-01-19
Attending: FAMILY MEDICINE
Payer: MEDICARE

## 2022-01-19 VITALS
BODY MASS INDEX: 30.2 KG/M2 | SYSTOLIC BLOOD PRESSURE: 110 MMHG | OXYGEN SATURATION: 98 % | TEMPERATURE: 98 F | HEIGHT: 63 IN | DIASTOLIC BLOOD PRESSURE: 84 MMHG | HEART RATE: 84 BPM | RESPIRATION RATE: 18 BRPM | WEIGHT: 170.44 LBS
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
## 2022-01-19 DIAGNOSIS — S09.90XA INJURY OF HEAD, INITIAL ENCOUNTER: ICD-10-CM

## 2022-01-19 DIAGNOSIS — M25.559 HIP PAIN: ICD-10-CM

## 2022-01-19 DIAGNOSIS — M54.2 NECK PAIN: ICD-10-CM

## 2022-01-19 PROCEDURE — 99284 EMERGENCY DEPT VISIT MOD MDM: CPT | Mod: 25

## 2022-01-19 RX ORDER — HYDROCODONE BITARTRATE AND ACETAMINOPHEN 7.5; 325 MG/1; MG/1
1 TABLET ORAL EVERY 6 HOURS PRN
Qty: 12 TABLET | Refills: 0 | Status: SHIPPED | OUTPATIENT
Start: 2022-01-19 | End: 2022-10-03

## 2022-01-19 NOTE — Clinical Note
"Lenny Perez" Yeison was seen and treated in our emergency department on 1/19/2022.  He may return to work on 01/22/2022.       If you have any questions or concerns, please don't hesitate to call.      Justin Barnes Jr., FNP"

## 2022-01-20 NOTE — ED PROVIDER NOTES
Encounter Date: 1/19/2022       History     Chief Complaint   Patient presents with    Fall     Fell 3 days ago at work; Hit head, Rt side pain    Hip Pain    Neck Injury     The history is provided by the patient.   Fall  The accident occurred several days ago. The fall occurred while walking. He landed on concrete. The point of impact was the head, neck and right hip. He was ambulatory at the scene. There was no entrapment after the fall. There was no alcohol use involved in the accident. Associated symptoms include neck pain and headaches. Pertinent negatives include no back pain, no fever, no nausea, no vomiting and no loss of consciousness. The symptoms are aggravated by activity.   Hip Pain  This is a new problem. The current episode started more than 2 days ago. The problem occurs constantly. Associated symptoms include headaches. Pertinent negatives include no chest pain and no shortness of breath.   Neck Injury  This is a new problem. The current episode started more than 2 days ago. The problem occurs constantly. The problem has not changed since onset.Associated symptoms include headaches. Pertinent negatives include no chest pain and no shortness of breath.     Review of patient's allergies indicates:  No Known Allergies  Past Medical History:   Diagnosis Date    Achalasia of esophagus 11/28/2016    Anxiety state, unspecified     Coronary artery disease     Esophageal reflux     Esophageal stricture     Hypertrophy of prostate without urinary obstruction and other lower urinary tract symptoms (LUTS)     Screening PSA (prostate specific antigen) 12/7/12    0.4    Unspecified essential hypertension      Past Surgical History:   Procedure Laterality Date    BACK SURGERY      CERVICAL EPIDURAL STEROID INJECTION      COLONOSCOPY      ESOPHAGOGASTRODUODENOSCOPY      ESOPHAGOGASTRODUODENOSCOPY N/A 8/16/2019    Procedure: EGD (ESOPHAGOGASTRODUODENOSCOPY);  Surgeon: Pawan Schwab MD;  Location:  [facility];  Service: Endoscopy;  Laterality: N/A;    HERNIA REPAIR      INGUINAL HERNIA REPAIR Right     LUMBAR EPIDURAL STEROID INJECTION      UPPER GASTROINTESTINAL ENDOSCOPY  04/27/2016     Family History   Problem Relation Age of Onset    Lung cancer Mother     Hypertension Father      Social History     Tobacco Use    Smoking status: Never Smoker    Smokeless tobacco: Never Used   Substance Use Topics    Alcohol use: Yes     Comment: Social  none since incarcerated    Drug use: Yes     Types: Marijuana     Review of Systems   Constitutional: Negative for fever.   HENT: Negative for sore throat.    Respiratory: Negative for shortness of breath.    Cardiovascular: Negative for chest pain.   Gastrointestinal: Negative for nausea and vomiting.   Genitourinary: Negative for dysuria.   Musculoskeletal: Positive for arthralgias and neck pain. Negative for back pain.   Skin: Negative for rash.   Neurological: Positive for headaches. Negative for loss of consciousness and weakness.   Hematological: Does not bruise/bleed easily.   All other systems reviewed and are negative.      Physical Exam     Initial Vitals [01/19/22 1752]   BP Pulse Resp Temp SpO2   110/84 84 18 98 °F (36.7 °C) 98 %      MAP       --         Physical Exam    Constitutional: He appears well-developed and well-nourished. No distress.   HENT:   Head: Normocephalic and atraumatic. Head is without raccoon's eyes and without Jamil's sign.       Nose: Nose normal.   Mouth/Throat: Uvula is midline and oropharynx is clear and moist.   Eyes: Conjunctivae and EOM are normal. Pupils are equal, round, and reactive to light.   Neck: Neck supple.   Normal range of motion.  Cardiovascular: Normal rate and regular rhythm.   Pulmonary/Chest: Effort normal and breath sounds normal. No respiratory distress. He has no decreased breath sounds. He has no wheezes. He has no rales.   Abdominal: Abdomen is soft. Normal appearance and bowel sounds are normal.  There is no abdominal tenderness.   Musculoskeletal:         General: Normal range of motion.      Cervical back: Normal range of motion and neck supple. Spinous process tenderness and muscular tenderness present.      Right hip: Tenderness present. Normal range of motion.        Legs:      Neurological: He is alert and oriented to person, place, and time. He has normal strength. GCS eye subscore is 4. GCS verbal subscore is 5. GCS motor subscore is 6.   Skin: Skin is warm and dry. Capillary refill takes less than 2 seconds. No rash noted.   Psychiatric: He has a normal mood and affect. His speech is normal and behavior is normal.         ED Course   Procedures  Labs Reviewed - No data to display       Imaging Results          X-Ray Hip 2 or 3 views Right (with Pelvis when performed) (Final result)  Result time 01/19/22 19:01:00    Final result by Maximus Mccarthy MD (01/19/22 19:01:00)                 Impression:      No acute fracture or dislocation.      Electronically signed by: Maximus Mccarthy MD  Date:    01/19/2022  Time:    19:01             Narrative:    EXAMINATION:  XR HIP WITH PELVIS WHEN PERFORMED, 2 OR 3  VIEWS RIGHT    CLINICAL HISTORY:  XR HIP WITH PELVIS WHEN PERFORMED, 2 OR 3  VIEWS RIGHTFall on same level from slipping, tripping and stumbling without subsequent striking against object, initial encounter    COMPARISON:  07/27/2016    FINDINGS:  Three views of the right hip were obtained.    No evidence of acute fracture or dislocation.  Bony mineralization is normal.  Soft tissues are unremarkable.   Vascular phleboliths are seen throughout the pelvis.  Mild degenerative changes.  Moderate constipation.                               CT Cervical Spine Without Contrast (Final result)  Result time 01/19/22 18:33:53    Final result by Maximus Mccarthy MD (01/19/22 18:33:53)                 Impression:      No acute posttraumatic abnormalities.    All CT scans at this facility use dose modulation,  iterative reconstruction, and/or weight base dosing when appropriate to reduce radiation dose to as low as reasonably achievable.      Electronically signed by: Maximus Mccarthy MD  Date:    01/19/2022  Time:    18:33             Narrative:    EXAMINATION:  CT CERVICAL SPINE WITHOUT CONTRAST    CLINICAL HISTORY:  Neck trauma, dangerous injury mechanism (Age 16-64y);    TECHNIQUE:  1.25 mm axial noncontrast CT images were obtained through the cervical spine using soft tissue and bony algorithm.  Sagittal coronal reformats were also submitted for interpretation.    COMPARISON:  None    FINDINGS:  The cervical vertebral bodies demonstrate adequate alignment.The vertebral body heights are well-maintained. Intervertebral disc space height is maintained. No fractures are identified. Prevertebral soft tissues are unremarkable.    Advanced degenerative changes at C5/6 with disc space narrowing and small posterior disc osteophyte complex as well as moderate bilateral neural foraminal narrowing    Please note that routine CT of the spine is limited in its evaluation of extradural/epidural disease.    Shotty adenopathy.  Diffuse esophageal thickening and moderate dilatation could reflect esophagitis.  Recommend follow-up nonemergent esophagram.                               CT Head Without Contrast (Final result)  Result time 01/19/22 18:26:30    Final result by Maximus Mccarthy MD (01/19/22 18:26:30)                 Impression:      Chronic microvascular ischemic changes.      Electronically signed by: Maximus Mccarthy MD  Date:    01/19/2022  Time:    18:26             Narrative:    EXAMINATION:  CT HEAD WITHOUT CONTRAST    CLINICAL HISTORY:  Head trauma, moderate-severe;    TECHNIQUE:  Low dose axial CT images obtained throughout the head without intravenous contrast. Sagittal and coronal reconstructions were performed.  All CT scans at this facility use dose modulation, iterative reconstruction and/or weight based dosing when  appropriate to reduce radiation dose to as low as reasonably achievable.    COMPARISON:  Comparison 06/18/2020    FINDINGS:  Intracranial compartment:    The brain parenchyma demonstrates areas of decreased attenuation with mild to moderate periventricular white matter consistent with chronic microvascular ischemic changes..  No parenchymal mass, hemorrhage, edema or major vascular distribution infarct.  Vascular calcifications are noted.    Mild prominence of the sulci and ventricles are consistent with age-related involutional changes.    No extra-axial blood or fluid collections.    Skull/extracranial contents (limited evaluation): No fracture.  Mild sinus mucosal thickening and patchy ethmoid opacification.  Mastoid air cells and paranasal sinuses are essentially clear.                                 Medications - No data to display       I discussed with patient and/or family/caretaker that negative X-ray does not rule out occult fracture or other soft tissue injury.  Persistent pain greater than 7-10 days or increased pain requires follow up, specifically with orthopedics.     The patient has family members that will observe him/her over the next 24 hours and that are competent to bring him/her back to the Emergency Department if concerning signs or symptoms develop. The family members are comfortable with this responsibility.  I have given detailed written and verbal instructions to the family to bring the patient back to the ED should any concerning signs such as excessive sleepiness, lethargy, confusion, unequal pupils, recurrent vomiting, seizure activity, loss of consciousness, or focal weakness develop.    Patient's headache is consistent with previous headaches and lacks features concerning for emergent or life threatening condition.  I do not suspect SAH, meningitis, increased IC pressure, infectious, toxic, vascular, CNS, or other EMC.  I have discussed this at length with patient.  Regarding  treatment, advised patient to take nonsteroidal antiinflammatory medications, acetaminophen, or any medications prescribed as instructed.  To prevent headaches, patient advised to avoid muscle tension (do not stay in one position for long periods of time), avoid eye strain (make sure there is adequate lighting for reading and routine tasks), eat healthy foods, exercise, and do not smoke or drink excessive alcohol.  Patient also advised to avoid overuse of over-the-counter or prescription medications. Patient was instructed to contact primary healthcare provider if: headaches continue to get worse; occur often enough that they affect daily work or normal activities; frequent medication use is needed to manage headaches; headaches that worsen and cause vomiting; or there are any questions or concerns about the condition or care. Advised patient to return to the emergency department or call 911 if they develop a sudden headache that presents suddenly and much worse than usual headaches; have difficulty seeing, speaking, or moving; become confused or have seizure activity; or develop a fever and stiff neck with the headache.                      Clinical Impression:   Final diagnoses:  [W01.0XXA] Fall from slip, trip, or stumble, initial encounter (Primary)  [M25.559] Hip pain  [M54.2] Neck pain  [S09.90XA] Injury of head, initial encounter          ED Disposition Condition    Discharge Stable        ED Prescriptions     Medication Sig Dispense Start Date End Date Auth. Provider    HYDROcodone-acetaminophen (NORCO) 7.5-325 mg per tablet Take 1 tablet by mouth every 6 (six) hours as needed for Pain. 12 tablet 1/19/2022  Justin Barnes Jr., JORDON        Follow-up Information     Follow up With Specialties Details Why Contact Info    Herminia Longoria MD Family Medicine In 1 week  [address]  [place] [address]  [phone]             JORDON Butler Jr.  01/19/22 1961

## 2022-01-20 NOTE — FIRST PROVIDER EVALUATION
Medical screening exam completed.  I have conducted a focused provider triage encounter, findings are as follows:    Brief history of present illness:  Headache since ground level fall 3 days ago, head struck concrete    There were no vitals filed for this visit.    Pertinent physical exam:  nad    Preliminary workup initiated; this workup will be continued and followed by the physician or advanced practice provider that is assigned to the patient when roomed.

## 2022-01-24 ENCOUNTER — OFFICE VISIT (OUTPATIENT)
Dept: INTERNAL MEDICINE | Facility: CLINIC | Age: 62
End: 2022-01-24
Payer: MEDICARE

## 2022-01-24 VITALS
TEMPERATURE: 98 F | DIASTOLIC BLOOD PRESSURE: 60 MMHG | SYSTOLIC BLOOD PRESSURE: 100 MMHG | OXYGEN SATURATION: 95 % | BODY MASS INDEX: 30.5 KG/M2 | WEIGHT: 172.19 LBS | HEART RATE: 100 BPM

## 2022-01-24 DIAGNOSIS — G44.311 INTRACTABLE ACUTE POST-TRAUMATIC HEADACHE: Primary | ICD-10-CM

## 2022-01-24 DIAGNOSIS — I25.118 CORONARY ARTERY DISEASE OF NATIVE ARTERY OF NATIVE HEART WITH STABLE ANGINA PECTORIS: ICD-10-CM

## 2022-01-24 DIAGNOSIS — K21.9 GASTROESOPHAGEAL REFLUX DISEASE WITHOUT ESOPHAGITIS: ICD-10-CM

## 2022-01-24 DIAGNOSIS — E66.9 OBESITY (BMI 30.0-34.9): ICD-10-CM

## 2022-01-24 DIAGNOSIS — F41.8 MIXED ANXIETY AND DEPRESSIVE DISORDER: ICD-10-CM

## 2022-01-24 DIAGNOSIS — M47.22 OSTEOARTHRITIS OF SPINE WITH RADICULOPATHY, CERVICAL REGION: ICD-10-CM

## 2022-01-24 DIAGNOSIS — R13.10 DYSPHAGIA, UNSPECIFIED TYPE: ICD-10-CM

## 2022-01-24 DIAGNOSIS — M62.838 CERVICAL PARASPINAL MUSCLE SPASM: ICD-10-CM

## 2022-01-24 PROBLEM — E66.811 OBESITY (BMI 30.0-34.9): Status: ACTIVE | Noted: 2022-01-24

## 2022-01-24 PROCEDURE — 1160F PR REVIEW ALL MEDS BY PRESCRIBER/CLIN PHARMACIST DOCUMENTED: ICD-10-PCS | Mod: CPTII,S$GLB,, | Performed by: FAMILY MEDICINE

## 2022-01-24 PROCEDURE — 1160F RVW MEDS BY RX/DR IN RCRD: CPT | Mod: CPTII,S$GLB,, | Performed by: FAMILY MEDICINE

## 2022-01-24 PROCEDURE — 3074F PR MOST RECENT SYSTOLIC BLOOD PRESSURE < 130 MM HG: ICD-10-PCS | Mod: CPTII,S$GLB,, | Performed by: FAMILY MEDICINE

## 2022-01-24 PROCEDURE — 3074F SYST BP LT 130 MM HG: CPT | Mod: CPTII,S$GLB,, | Performed by: FAMILY MEDICINE

## 2022-01-24 PROCEDURE — 3008F BODY MASS INDEX DOCD: CPT | Mod: CPTII,S$GLB,, | Performed by: FAMILY MEDICINE

## 2022-01-24 PROCEDURE — 99214 OFFICE O/P EST MOD 30 MIN: CPT | Mod: S$GLB,,, | Performed by: FAMILY MEDICINE

## 2022-01-24 PROCEDURE — 85025 COMPLETE CBC W/AUTO DIFF WBC: CPT | Performed by: FAMILY MEDICINE

## 2022-01-24 PROCEDURE — 99999 PR PBB SHADOW E&M-EST. PATIENT-LVL V: ICD-10-PCS | Mod: PBBFAC,,, | Performed by: FAMILY MEDICINE

## 2022-01-24 PROCEDURE — 1159F MED LIST DOCD IN RCRD: CPT | Mod: CPTII,S$GLB,, | Performed by: FAMILY MEDICINE

## 2022-01-24 PROCEDURE — 3078F DIAST BP <80 MM HG: CPT | Mod: CPTII,S$GLB,, | Performed by: FAMILY MEDICINE

## 2022-01-24 PROCEDURE — 3008F PR BODY MASS INDEX (BMI) DOCUMENTED: ICD-10-PCS | Mod: CPTII,S$GLB,, | Performed by: FAMILY MEDICINE

## 2022-01-24 PROCEDURE — 3078F PR MOST RECENT DIASTOLIC BLOOD PRESSURE < 80 MM HG: ICD-10-PCS | Mod: CPTII,S$GLB,, | Performed by: FAMILY MEDICINE

## 2022-01-24 PROCEDURE — 99214 PR OFFICE/OUTPT VISIT, EST, LEVL IV, 30-39 MIN: ICD-10-PCS | Mod: S$GLB,,, | Performed by: FAMILY MEDICINE

## 2022-01-24 PROCEDURE — 1159F PR MEDICATION LIST DOCUMENTED IN MEDICAL RECORD: ICD-10-PCS | Mod: CPTII,S$GLB,, | Performed by: FAMILY MEDICINE

## 2022-01-24 PROCEDURE — 99999 PR PBB SHADOW E&M-EST. PATIENT-LVL V: CPT | Mod: PBBFAC,,, | Performed by: FAMILY MEDICINE

## 2022-01-24 PROCEDURE — 80061 LIPID PANEL: CPT | Performed by: FAMILY MEDICINE

## 2022-01-24 PROCEDURE — 80053 COMPREHEN METABOLIC PANEL: CPT | Performed by: FAMILY MEDICINE

## 2022-01-24 RX ORDER — TIZANIDINE 4 MG/1
4 TABLET ORAL 3 TIMES DAILY PRN
Qty: 30 TABLET | Refills: 0 | Status: SHIPPED | OUTPATIENT
Start: 2022-01-24 | End: 2022-02-03

## 2022-01-24 NOTE — PROGRESS NOTES
Subjective:       Patient ID: Lenny Latham Jr. is a 61 y.o. male.    Chief Complaint: Neck Pain, Headache, and Shoulder Pain (Right )    61-year-old  male patient with Patient Active Problem List:     GERD (gastroesophageal reflux disease)     Achalasia of esophagus     Atherosclerosis of native coronary artery of native heart with angina pectoris     Mixed anxiety and depressive disorder     Mixed hyperlipidemia     Coronary artery disease of native artery of native heart with stable angina pectoris     Benign prostatic hyperplasia with urinary obstruction     Urgency of urination     Erectile dysfunction     Obesity (BMI 30.0-34.9)   here reports that patient had a fall at work on 01/12/2022, tripped over and fell on the right side and hit his head, since then has been having severe headaches with radiation of pain to the right side of the neck, for which he had CT scan of the head and the cervical spine imaging showing chronic changes.  Patient has been taking Norco, and is scheduled to see his pain management doctor Dr. Abdifatah staley.  Reports that since he had a fall patient has been forgetful and having memory deficits, made appointment with neurology but appointment not given until March.   Patient has been taking Zyprexa and Lexapro but reports that he has been irritable  Has appointment with psychiatry in 2 weeks, would like to consider counseling to help with anxiety.   Reports neck pain and headache up to 7/10  Denies any tingling and numbness sensation to extremities  Has been having discomfort with swallowing and reports that patient had esophageal surgery done at our [facility] couple of years ago, occasionally has been having choking like episodes    Review of Systems   Constitutional: Negative for appetite change, chills, fatigue and fever.   HENT: Positive for trouble swallowing. Negative for congestion.    Eyes: Negative for visual disturbance.   Respiratory: Negative for  shortness of breath.    Cardiovascular: Negative for chest pain, palpitations and leg swelling.   Gastrointestinal: Negative for abdominal pain, nausea and vomiting.   Musculoskeletal: Positive for myalgias and neck pain.   Skin: Negative for rash.   Neurological: Positive for headaches. Negative for weakness and numbness.   Psychiatric/Behavioral: Negative for sleep disturbance.         /60 (BP Location: Right arm, Patient Position: Sitting, BP Method: Large (Manual))   Pulse 100   Temp 97.7 °F (36.5 °C) (Tympanic)   Wt 78.1 kg (172 lb 2.9 oz)   SpO2 95%   BMI 30.50 kg/m²   Objective:      Physical Exam  Constitutional:       Appearance: He is well-developed.   HENT:      Head: Normocephalic and atraumatic.   Cardiovascular:      Rate and Rhythm: Normal rate and regular rhythm.      Heart sounds: Normal heart sounds. No murmur heard.      Pulmonary:      Effort: Pulmonary effort is normal.      Breath sounds: Normal breath sounds. No wheezing.   Abdominal:      General: Bowel sounds are normal.      Palpations: Abdomen is soft.      Tenderness: There is no abdominal tenderness.   Musculoskeletal:         General: Tenderness present.      Comments: Positive for paraspinal cervical muscle tenderness noted on the right side  No significant restricted range of motion noted with the C-spine     Skin:     General: Skin is warm and dry.      Findings: No rash.   Neurological:      General: No focal deficit present.      Mental Status: He is alert and oriented to person, place, and time.   Psychiatric:         Mood and Affect: Mood normal.         X-Ray Hip 2 or 3 views Right (with Pelvis when performed)  Narrative: EXAMINATION:  XR HIP WITH PELVIS WHEN PERFORMED, 2 OR 3  VIEWS RIGHT    CLINICAL HISTORY:  XR HIP WITH PELVIS WHEN PERFORMED, 2 OR 3  VIEWS RIGHTFall on same level from slipping, tripping and stumbling without subsequent striking against object, initial  encounter    COMPARISON:  07/27/2016    FINDINGS:  Three views of the right hip were obtained.    No evidence of acute fracture or dislocation.  Bony mineralization is normal.  Soft tissues are unremarkable.   Vascular phleboliths are seen throughout the pelvis.  Mild degenerative changes.  Moderate constipation.  Impression: No acute fracture or dislocation.    Electronically signed by: Maximus Mccarthy MD  Date:    01/19/2022  Time:    19:01  CT Cervical Spine Without Contrast  Narrative: EXAMINATION:  CT CERVICAL SPINE WITHOUT CONTRAST    CLINICAL HISTORY:  Neck trauma, dangerous injury mechanism (Age 16-64y);    TECHNIQUE:  1.25 mm axial noncontrast CT images were obtained through the cervical spine using soft tissue and bony algorithm.  Sagittal coronal reformats were also submitted for interpretation.    COMPARISON:  None    FINDINGS:  The cervical vertebral bodies demonstrate adequate alignment.The vertebral body heights are well-maintained. Intervertebral disc space height is maintained. No fractures are identified. Prevertebral soft tissues are unremarkable.    Advanced degenerative changes at C5/6 with disc space narrowing and small posterior disc osteophyte complex as well as moderate bilateral neural foraminal narrowing    Please note that routine CT of the spine is limited in its evaluation of extradural/epidural disease.    Shotty adenopathy.  Diffuse esophageal thickening and moderate dilatation could reflect esophagitis.  Recommend follow-up nonemergent esophagram.  Impression: No acute posttraumatic abnormalities.    All CT scans at this facility use dose modulation, iterative reconstruction, and/or weight base dosing when appropriate to reduce radiation dose to as low as reasonably achievable.    Electronically signed by: Maximus Mccarthy MD  Date:    01/19/2022  Time:    18:33  CT Head Without Contrast  Narrative: EXAMINATION:  CT HEAD WITHOUT CONTRAST    CLINICAL HISTORY:  Head trauma,  moderate-severe;    TECHNIQUE:  Low dose axial CT images obtained throughout the head without intravenous contrast. Sagittal and coronal reconstructions were performed.  All CT scans at this facility use dose modulation, iterative reconstruction and/or weight based dosing when appropriate to reduce radiation dose to as low as reasonably achievable.    COMPARISON:  Comparison 06/18/2020    FINDINGS:  Intracranial compartment:    The brain parenchyma demonstrates areas of decreased attenuation with mild to moderate periventricular white matter consistent with chronic microvascular ischemic changes..  No parenchymal mass, hemorrhage, edema or major vascular distribution infarct.  Vascular calcifications are noted.    Mild prominence of the sulci and ventricles are consistent with age-related involutional changes.    No extra-axial blood or fluid collections.    Skull/extracranial contents (limited evaluation): No fracture.  Mild sinus mucosal thickening and patchy ethmoid opacification.  Mastoid air cells and paranasal sinuses are essentially clear.  Impression: Chronic microvascular ischemic changes.    Electronically signed by: Maximus Mccarthy MD  Date:    01/19/2022  Time:    18:26    Assessment/Plan:   1. Intractable acute post-traumatic headache  - tiZANidine (ZANAFLEX) 4 MG tablet; Take 1 tablet (4 mg total) by mouth 3 (three) times daily as needed (pain).  Dispense: 30 tablet; Refill: 0  - CBC Auto Differential  Likely from radiation from the neck  Zanaflex prescribed today for symptomatic relief and continue Norco    2. Osteoarthritis of spine with radiculopathy, cervical region  7. Cervical paraspinal muscle spasm  - tiZANidine (ZANAFLEX) 4 MG tablet; Take 1 tablet (4 mg total) by mouth 3 (three) times daily as needed (pain).  Dispense: 30 tablet; Refill: 0  As noted above  If any worsening pains advised to discuss further with Dr. Gardiner and consider CLAUDE versus physical therapy    Reviewed CT scan of the head  and CT of the cervical spine showing chronic changes    3. Dysphagia, unspecified type  - CBC Auto Differential  Patient will be refer to Gastroenterology for further evaluation, may need non emergent esophagogram    4. Coronary artery disease of native artery of native heart with stable angina pectoris  - Comprehensive Metabolic Panel  - Lipid Panel  - CBC Auto Differential  Followed by Cardiology currently taking aspirin statins    5. Mixed anxiety and depressive disorder  - Ambulatory referral/consult to Psychology; Future  Currently on Zyprexa 10 mg, Lexapro 20 mg and Valium as needed  Patient reports that patient continues to have irritability and anxiety  Advised to discuss further with Psychiatry and referral has been placed to Psychology for counseling    6. Gastroesophageal reflux disease without esophagitis  Currently taking omeprazole 40 mg daily    8. Obesity (BMI 30.0-34.9)   Lifestyle modifications discussed to lose weight with BMI 30

## 2022-01-25 LAB
ALBUMIN SERPL BCP-MCNC: 3.7 G/DL (ref 3.5–5.2)
ALP SERPL-CCNC: 107 U/L (ref 55–135)
ALT SERPL W/O P-5'-P-CCNC: 15 U/L (ref 10–44)
ANION GAP SERPL CALC-SCNC: 11 MMOL/L (ref 8–16)
AST SERPL-CCNC: 25 U/L (ref 10–40)
BASOPHILS # BLD AUTO: 0.03 K/UL (ref 0–0.2)
BASOPHILS NFR BLD: 0.5 % (ref 0–1.9)
BILIRUB SERPL-MCNC: 0.4 MG/DL (ref 0.1–1)
BUN SERPL-MCNC: 12 MG/DL (ref 8–23)
CALCIUM SERPL-MCNC: 9.6 MG/DL (ref 8.7–10.5)
CHLORIDE SERPL-SCNC: 103 MMOL/L (ref 95–110)
CHOLEST SERPL-MCNC: 148 MG/DL (ref 120–199)
CHOLEST/HDLC SERPL: 2.3 {RATIO} (ref 2–5)
CO2 SERPL-SCNC: 22 MMOL/L (ref 23–29)
CREAT SERPL-MCNC: 1 MG/DL (ref 0.5–1.4)
DIFFERENTIAL METHOD: ABNORMAL
EOSINOPHIL # BLD AUTO: 0.1 K/UL (ref 0–0.5)
EOSINOPHIL NFR BLD: 1.8 % (ref 0–8)
ERYTHROCYTE [DISTWIDTH] IN BLOOD BY AUTOMATED COUNT: 13.7 % (ref 11.5–14.5)
EST. GFR  (AFRICAN AMERICAN): >60 ML/MIN/1.73 M^2
EST. GFR  (NON AFRICAN AMERICAN): >60 ML/MIN/1.73 M^2
GLUCOSE SERPL-MCNC: 67 MG/DL (ref 70–110)
HCT VFR BLD AUTO: 43.5 % (ref 40–54)
HDLC SERPL-MCNC: 63 MG/DL (ref 40–75)
HDLC SERPL: 42.6 % (ref 20–50)
HGB BLD-MCNC: 13.8 G/DL (ref 14–18)
IMM GRANULOCYTES # BLD AUTO: 0.02 K/UL (ref 0–0.04)
IMM GRANULOCYTES NFR BLD AUTO: 0.3 % (ref 0–0.5)
LDLC SERPL CALC-MCNC: 65.2 MG/DL (ref 63–159)
LYMPHOCYTES # BLD AUTO: 1.9 K/UL (ref 1–4.8)
LYMPHOCYTES NFR BLD: 31.8 % (ref 18–48)
MCH RBC QN AUTO: 29.9 PG (ref 27–31)
MCHC RBC AUTO-ENTMCNC: 31.7 G/DL (ref 32–36)
MCV RBC AUTO: 94 FL (ref 82–98)
MONOCYTES # BLD AUTO: 0.6 K/UL (ref 0.3–1)
MONOCYTES NFR BLD: 10.1 % (ref 4–15)
NEUTROPHILS # BLD AUTO: 3.3 K/UL (ref 1.8–7.7)
NEUTROPHILS NFR BLD: 55.5 % (ref 38–73)
NONHDLC SERPL-MCNC: 85 MG/DL
NRBC BLD-RTO: 0 /100 WBC
PLATELET # BLD AUTO: 201 K/UL (ref 150–450)
PMV BLD AUTO: 9.4 FL (ref 9.2–12.9)
POTASSIUM SERPL-SCNC: 4.2 MMOL/L (ref 3.5–5.1)
PROT SERPL-MCNC: 7.5 G/DL (ref 6–8.4)
RBC # BLD AUTO: 4.61 M/UL (ref 4.6–6.2)
SODIUM SERPL-SCNC: 136 MMOL/L (ref 136–145)
TRIGL SERPL-MCNC: 99 MG/DL (ref 30–150)
WBC # BLD AUTO: 5.95 K/UL (ref 3.9–12.7)

## 2022-01-26 ENCOUNTER — PATIENT MESSAGE (OUTPATIENT)
Dept: NEUROLOGY | Facility: CLINIC | Age: 62
End: 2022-01-26
Payer: MEDICARE

## 2022-01-31 ENCOUNTER — OFFICE VISIT (OUTPATIENT)
Dept: UROLOGY | Facility: CLINIC | Age: 62
End: 2022-01-31
Payer: MEDICARE

## 2022-01-31 VITALS
TEMPERATURE: 98 F | HEART RATE: 118 BPM | BODY MASS INDEX: 31.05 KG/M2 | DIASTOLIC BLOOD PRESSURE: 71 MMHG | WEIGHT: 175.25 LBS | SYSTOLIC BLOOD PRESSURE: 103 MMHG | HEIGHT: 63 IN

## 2022-01-31 DIAGNOSIS — N40.1 BENIGN PROSTATIC HYPERPLASIA WITH URINARY OBSTRUCTION: Primary | ICD-10-CM

## 2022-01-31 DIAGNOSIS — N52.9 ERECTILE DYSFUNCTION, UNSPECIFIED ERECTILE DYSFUNCTION TYPE: ICD-10-CM

## 2022-01-31 DIAGNOSIS — N13.8 BENIGN PROSTATIC HYPERPLASIA WITH URINARY OBSTRUCTION: Primary | ICD-10-CM

## 2022-01-31 DIAGNOSIS — R39.15 URGENCY OF URINATION: ICD-10-CM

## 2022-01-31 PROCEDURE — 99214 PR OFFICE/OUTPT VISIT, EST, LEVL IV, 30-39 MIN: ICD-10-PCS | Mod: S$GLB,,, | Performed by: UROLOGY

## 2022-01-31 PROCEDURE — 3008F PR BODY MASS INDEX (BMI) DOCUMENTED: ICD-10-PCS | Mod: CPTII,S$GLB,, | Performed by: UROLOGY

## 2022-01-31 PROCEDURE — 1159F PR MEDICATION LIST DOCUMENTED IN MEDICAL RECORD: ICD-10-PCS | Mod: CPTII,S$GLB,, | Performed by: UROLOGY

## 2022-01-31 PROCEDURE — 3074F PR MOST RECENT SYSTOLIC BLOOD PRESSURE < 130 MM HG: ICD-10-PCS | Mod: CPTII,S$GLB,, | Performed by: UROLOGY

## 2022-01-31 PROCEDURE — 99214 OFFICE O/P EST MOD 30 MIN: CPT | Mod: S$GLB,,, | Performed by: UROLOGY

## 2022-01-31 PROCEDURE — 3074F SYST BP LT 130 MM HG: CPT | Mod: CPTII,S$GLB,, | Performed by: UROLOGY

## 2022-01-31 PROCEDURE — 99999 PR PBB SHADOW E&M-EST. PATIENT-LVL IV: ICD-10-PCS | Mod: PBBFAC,,, | Performed by: UROLOGY

## 2022-01-31 PROCEDURE — 3078F DIAST BP <80 MM HG: CPT | Mod: CPTII,S$GLB,, | Performed by: UROLOGY

## 2022-01-31 PROCEDURE — 1159F MED LIST DOCD IN RCRD: CPT | Mod: CPTII,S$GLB,, | Performed by: UROLOGY

## 2022-01-31 PROCEDURE — 99999 PR PBB SHADOW E&M-EST. PATIENT-LVL IV: CPT | Mod: PBBFAC,,, | Performed by: UROLOGY

## 2022-01-31 PROCEDURE — 3078F PR MOST RECENT DIASTOLIC BLOOD PRESSURE < 80 MM HG: ICD-10-PCS | Mod: CPTII,S$GLB,, | Performed by: UROLOGY

## 2022-01-31 PROCEDURE — 3008F BODY MASS INDEX DOCD: CPT | Mod: CPTII,S$GLB,, | Performed by: UROLOGY

## 2022-01-31 NOTE — PROGRESS NOTES
Chief Complaint:   Encounter Diagnoses   Name Primary?    Benign prostatic hyperplasia with urinary obstruction Yes    Urgency of urination     Erectile dysfunction, unspecified erectile dysfunction type        HPI:    1/31/22- patient's voiding is much better on tamsulosin, including here urgency.  Erections are still an issue but he is only taking 20 mg.  61-year-old gentleman who comes in with multiple urological complaints.  He states that he is getting up to void about every hour for the last couple months.  Feels this all stems back to hernia repair 6 months ago, although he was voiding okay right after this.  He has had no dysuria, no gross hematuria, he has never been a smoker.  During the daytime he is going about every 2-3 hours.  Has definite increased frequency with increased urgency, no incontinence.  States that lately he will have intermittent split stream, otherwise his stream is usually week.  He does not remember having a Meade catheter during the surgery of note.  He has had no issues prior to this time.  In addition is also complaining of erectile dysfunction, can still get some morning erections but falls quickly.  He had a father and grandfather both with prostate cancer, no other family history of urological cancers or stones.  Patient states that he has good libido, no coronary artery disease.  He has tried no medications for his BPH or his erectile dysfunction.    Allergies:  Patient has no known allergies.    Medications:  has a current medication list which includes the following prescription(s): aspirin, atorvastatin, diazepam, escitalopram oxalate, hydrocodone-acetaminophen, olanzapine, omeprazole, ondansetron, sildenafil, tamsulosin, and tizanidine, and the following Facility-Administered Medications: sodium chloride 0.9% and ondansetron.    Review of Systems:  General: No fever, chills, fatigability, or weight loss.  Skin: No rashes, itching, or changes in color or texture of  skin.  Chest: Denies EVANS, cyanosis, wheezing, cough, and sputum production.  Abdomen: Appetite fine. No weight loss. Denies diarrhea, abdominal pain, hematemesis, or blood in stool.  Musculoskeletal: No joint stiffness or swelling. Denies back pain.  : As above.  All other review of systems negative.    PMH:   has a past medical history of Achalasia of esophagus (11/28/2016), Anxiety state, unspecified, Coronary artery disease, Esophageal reflux, Esophageal stricture, Hypertrophy of prostate without urinary obstruction and other lower urinary tract symptoms (LUTS), Screening PSA (prostate specific antigen) (12/7/12), and Unspecified essential hypertension.    PSH:   has a past surgical history that includes Back surgery; Esophagogastroduodenoscopy; Colonoscopy; Hernia repair; Upper gastrointestinal endoscopy (04/27/2016); Inguinal hernia repair (Right); CERVICAL EPIDURAL STEROID INJECTION; LUMBAR EPIDURAL STEROID INJECTION; and Esophagogastroduodenoscopy (N/A, 8/16/2019).    FamHx: family history includes Hypertension in his father; Lung cancer in his mother.    SocHx:  reports that he has never smoked. He has never used smokeless tobacco. He reports current alcohol use. He reports current drug use. Drug: Marijuana.      Physical Exam:  Vitals:    01/31/22 1359   BP: 103/71   Pulse: (!) 118   Temp: 98.4 °F (36.9 °C)     General: A&Ox3, no apparent distress, no deformities  Neck: No masses, normal ROM  Lungs: normal inspiration, no use of accessory muscles  Heart: normal pulse, no arrhythmias  Abdomen: Soft, NT, ND, no masses, no hernias, no hepatosplenomegaly  Skin: The skin is warm and dry. No jaundice.  Ext: No c/c/e.  :  10/21- Test desc shahla, no abnormalities of epididymus. Normal penile and scrotal skin. Meatus normal. Normal rectal tone. Prost 30 gm no nodules or masses appreciated. SV not palpable. Perineum and anus normal.    Labs/Studies:   PVR 12 mL 10/21  PSA 0.37 3/21    Impression/Plan:     1. BPH-  significantly improved after tamsulosin will continue for now.  See me in 6 months with a PSA and if stable yearly from there.    2. Urgency- please see above, improved on tamsulosin.    3. Erectile dysfunction- will increase this to 60 mg, patient knows not to go over 100 mg. Please see the last note in regards to our discussion.  Call with any complaints prior to the next appointment.

## 2022-02-01 ENCOUNTER — TELEPHONE (OUTPATIENT)
Dept: PSYCHIATRY | Facility: CLINIC | Age: 62
End: 2022-02-01
Payer: MEDICARE

## 2022-02-01 ENCOUNTER — TELEPHONE (OUTPATIENT)
Dept: INTERNAL MEDICINE | Facility: CLINIC | Age: 62
End: 2022-02-01
Payer: MEDICARE

## 2022-02-01 NOTE — TELEPHONE ENCOUNTER
----- Message from Mirta Adhikari sent at 2/1/2022  4:17 PM CST -----  Contact: Lenny jennifer [phone]  Requesting an RX refill or new RX.  Is this a refill or new RX: refill  RX name and strength:  diazePAM (VALIUM) 5 MG tablet  Is this a 30 day or 90 day RX:   Pharmacy name and phone # :  [id number] [place] [facility] [address] AT [facility]   Phone: [phone]  The doctors have asked that we provide their patients with the following 2 reminders -- prescription refills can take up to 72 hours, and a friendly reminder that in the future you can use your [facility] account to request refills:

## 2022-02-01 NOTE — TELEPHONE ENCOUNTER
S/w pt and informed that provider has denied refill due to pt needing appt with Psych provider. Informed pt that I will send a message to the staff to see if provider can assist pt request. Pt voiced understanding./[place]

## 2022-02-02 NOTE — TELEPHONE ENCOUNTER
----- Message from Pita Morales MA sent at 2/1/2022  4:29 PM CST -----  Contact: Lenny rodriguez [phone]  Can the provider assist pt with refill of medication? Dr. Longoria prescribed initially for pt until he is edilson to get in with Psych provider.   ----- Message -----  From: Mirta Adhikari  Sent: 2/1/2022   4:19 PM CST  To: [facility] Staff    Requesting an RX refill or new RX.  Is this a refill or new RX: refill  RX name and strength:  diazePAM (VALIUM) 5 MG tablet  Is this a 30 day or 90 day RX:   Pharmacy name and phone # :  [id number] - [place] - [address]   Phone: [phone]  The doctors have asked that we provide their patients with the following 2 reminders -- prescription refills can take up to 72 hours, and a friendly reminder that in the future you can use your [facility] account to request refills:

## 2022-02-14 ENCOUNTER — OFFICE VISIT (OUTPATIENT)
Dept: GASTROENTEROLOGY | Facility: CLINIC | Age: 62
End: 2022-02-14
Payer: MEDICARE

## 2022-02-14 VITALS
HEIGHT: 63 IN | WEIGHT: 176.56 LBS | DIASTOLIC BLOOD PRESSURE: 82 MMHG | BODY MASS INDEX: 31.29 KG/M2 | SYSTOLIC BLOOD PRESSURE: 120 MMHG | HEART RATE: 82 BPM

## 2022-02-14 DIAGNOSIS — R13.10 DYSPHAGIA, UNSPECIFIED TYPE: ICD-10-CM

## 2022-02-14 DIAGNOSIS — K22.0 ACHALASIA OF ESOPHAGUS: Primary | ICD-10-CM

## 2022-02-14 DIAGNOSIS — K21.9 GASTROESOPHAGEAL REFLUX DISEASE WITHOUT ESOPHAGITIS: ICD-10-CM

## 2022-02-14 PROCEDURE — 99214 OFFICE O/P EST MOD 30 MIN: CPT | Mod: S$GLB,,, | Performed by: NURSE PRACTITIONER

## 2022-02-14 PROCEDURE — 1159F PR MEDICATION LIST DOCUMENTED IN MEDICAL RECORD: ICD-10-PCS | Mod: CPTII,S$GLB,, | Performed by: NURSE PRACTITIONER

## 2022-02-14 PROCEDURE — 3079F DIAST BP 80-89 MM HG: CPT | Mod: CPTII,S$GLB,, | Performed by: NURSE PRACTITIONER

## 2022-02-14 PROCEDURE — 99214 PR OFFICE/OUTPT VISIT, EST, LEVL IV, 30-39 MIN: ICD-10-PCS | Mod: S$GLB,,, | Performed by: NURSE PRACTITIONER

## 2022-02-14 PROCEDURE — 99999 PR PBB SHADOW E&M-EST. PATIENT-LVL V: ICD-10-PCS | Mod: PBBFAC,,, | Performed by: NURSE PRACTITIONER

## 2022-02-14 PROCEDURE — 1160F PR REVIEW ALL MEDS BY PRESCRIBER/CLIN PHARMACIST DOCUMENTED: ICD-10-PCS | Mod: CPTII,S$GLB,, | Performed by: NURSE PRACTITIONER

## 2022-02-14 PROCEDURE — 3074F SYST BP LT 130 MM HG: CPT | Mod: CPTII,S$GLB,, | Performed by: NURSE PRACTITIONER

## 2022-02-14 PROCEDURE — 3074F PR MOST RECENT SYSTOLIC BLOOD PRESSURE < 130 MM HG: ICD-10-PCS | Mod: CPTII,S$GLB,, | Performed by: NURSE PRACTITIONER

## 2022-02-14 PROCEDURE — 3008F PR BODY MASS INDEX (BMI) DOCUMENTED: ICD-10-PCS | Mod: CPTII,S$GLB,, | Performed by: NURSE PRACTITIONER

## 2022-02-14 PROCEDURE — 99999 PR PBB SHADOW E&M-EST. PATIENT-LVL V: CPT | Mod: PBBFAC,,, | Performed by: NURSE PRACTITIONER

## 2022-02-14 PROCEDURE — 3008F BODY MASS INDEX DOCD: CPT | Mod: CPTII,S$GLB,, | Performed by: NURSE PRACTITIONER

## 2022-02-14 PROCEDURE — 3079F PR MOST RECENT DIASTOLIC BLOOD PRESSURE 80-89 MM HG: ICD-10-PCS | Mod: CPTII,S$GLB,, | Performed by: NURSE PRACTITIONER

## 2022-02-14 PROCEDURE — 1159F MED LIST DOCD IN RCRD: CPT | Mod: CPTII,S$GLB,, | Performed by: NURSE PRACTITIONER

## 2022-02-14 PROCEDURE — 1160F RVW MEDS BY RX/DR IN RCRD: CPT | Mod: CPTII,S$GLB,, | Performed by: NURSE PRACTITIONER

## 2022-02-14 NOTE — PROGRESS NOTES
Clinic Follow Up:  [facility] Follow Up Note    Reason for Follow Up:  The primary encounter diagnosis was Achalasia of esophagus. Diagnoses of Dysphagia, unspecified type and Gastroesophageal reflux disease without esophagitis were also pertinent to this visit.    PCP: Herminia Longoira   [id number] [facility] / [place] [address]    HPI:  This is a 61 y.o. male here for follow up of Achalasia.   A lot of history comes from his previous medical records.     He was first diagnosed with achalasia in 2014  Was supposed to undergo Heller myotomy with desii fundoplication in 2016 but was incarcerated and was not done.   He eventually had surgery the following year--Heller myotomy with deisi fundoplication was performed in 4/2017 at [facility].   EGD (date unknown but prior to surgery)- distal esophageal stricture. Normal esophageal mucosa. normal gastric and duodenal mucosa. Path showed mild chronic inflammation and mild squamous epithelial hyperplasia, changes consistent with reflux esophagitis.Negative for intestinal metaplasia, dysplasia, malignancy, ulcer, and exudate.  Esophagram (4/2016) that showed Numerous findings as described with narrowing of the posterior mid cervical esophagus, significant dysmotility of the esophagus with continuing changing of the esophagus at the junction of the mid and proximal thirds of the thoracic esophagus, multiple tertiary contractions, and persistent narrowing in the distal esophagus at the GE junction with contrast retention.  Manometry (date unknown but prior to surgery)- Achalasia with esophageal compression (subtype II)  EGD (2019)- I do not have report  Esophagram (10/2020)- moderate-sized hiatal hernia,a significant area of persistent narrowing of the proximal stomach just distal to the hiatal hernia. Contrast material does pass through this narrowing, slowly. The esophagus is abnormally dilated with significant associated esophageal  dysmotility.  EGD (5/5/2021- at [facility])- Botox injection- had some improvements in dysphagia.      Interval history:  He is overall feeling better with his dysphagia but still has issues. The other day, he had a food bolus (steak) that eventually cleared without intervention. He has continued to gain weight back. He finds that alcohol and chocolate worsen his symptoms so he has been avoiding those foods. He is also not eating late at night.     Review of Systems   Constitutional: Negative for activity change and appetite change.        As per interval history above   Respiratory: Negative for cough and shortness of breath.    Cardiovascular: Negative for chest pain.   Gastrointestinal: Negative for abdominal distention, abdominal pain, anal bleeding, blood in stool, constipation, diarrhea, nausea, rectal pain and vomiting.        Dysphagia    Skin: Negative for color change and rash.       Medical History:  Past Medical History:   Diagnosis Date    Achalasia of esophagus 11/28/2016    Anxiety state, unspecified     Coronary artery disease     Esophageal reflux     Esophageal stricture     Hypertrophy of prostate without urinary obstruction and other lower urinary tract symptoms (LUTS)     Screening PSA (prostate specific antigen) 12/7/12    0.4    Unspecified essential hypertension        Surgical History:   Past Surgical History:   Procedure Laterality Date    BACK SURGERY      CERVICAL EPIDURAL STEROID INJECTION      COLONOSCOPY      ESOPHAGOGASTRODUODENOSCOPY      ESOPHAGOGASTRODUODENOSCOPY N/A 8/16/2019    Procedure: EGD (ESOPHAGOGASTRODUODENOSCOPY);  Surgeon: Pawan Schwab MD;  [facility];  Service: Endoscopy;  Laterality: N/A;    HERNIA REPAIR      INGUINAL HERNIA REPAIR Right     LUMBAR EPIDURAL STEROID INJECTION      UPPER GASTROINTESTINAL ENDOSCOPY  04/27/2016       Family History:   Family History   Problem Relation Age of Onset    Lung cancer Mother     Hypertension Father         Social History:   Social History     Tobacco Use    Smoking status: Never Smoker    Smokeless tobacco: Never Used   Substance Use Topics    Alcohol use: Yes     Comment: Social  none since incarcerated    Drug use: Yes     Types: Marijuana       Allergies: Review of patient's allergies indicates:  No Known Allergies    Home Medications:  Current Outpatient Medications on File Prior to Visit   Medication Sig Dispense Refill    aspirin (ECOTRIN) 81 MG EC tablet Take 81 mg by mouth once daily.      atorvastatin (LIPITOR) 40 MG tablet Take 1 tablet (40 mg total) by mouth every evening. 90 tablet 1    HYDROcodone-acetaminophen (NORCO) 7.5-325 mg per tablet Take 1 tablet by mouth every 6 (six) hours as needed for Pain. 12 tablet 0    omeprazole (PRILOSEC) 40 MG capsule TAKE 1 CAPSULE EVERY DAY 90 capsule 1    ondansetron (ZOFRAN-ODT) 4 MG TbDL Take 1 tablet (4 mg total) by mouth every 6 (six) hours as needed (nausea). 60 tablet 2    tamsulosin (FLOMAX) 0.4 mg Cap Take 1 capsule (0.4 mg total) by mouth once daily. 30 capsule 11    diazePAM (VALIUM) 5 MG tablet Take 1 tablet (5 mg total) by mouth every 12 (twelve) hours as needed for Anxiety. 30 tablet 0    EScitalopram oxalate (LEXAPRO) 20 MG tablet Take 1 tablet (20 mg total) by mouth every evening. (Patient not taking: Reported on 2/14/2022) 90 tablet 0    OLANZapine (ZYPREXA) 10 MG tablet TAKE 1 TABLET EVERY EVENING (Patient not taking: No sig reported) 90 tablet 0    sildenafil (REVATIO) 20 mg Tab Take 1 tablet (20 mg total) by mouth daily as needed. (Patient not taking: Reported on 2/14/2022) 40 tablet 11     Current Facility-Administered Medications on File Prior to Visit   Medication Dose Route Frequency Provider Last Rate Last Admin    0.9%  NaCl infusion   Intravenous Continuous Brenden Pizarro MD        ondansetron injection 4 mg  4 mg Intravenous Q12H PRN Brenden Pizarro MD           /82 (BP Location: Left arm, Patient  "Position: Sitting, BP Method: Medium (Manual))   Pulse 82   Ht 5' 3" (1.6 m)   Wt 80.1 kg (176 lb 9.4 oz)   BMI 31.28 kg/m²   Body mass index is 31.28 kg/m².  Physical Exam  Constitutional:       General: He is not in acute distress.  HENT:      Head: Normocephalic and atraumatic.   Eyes:      General: No scleral icterus.     Conjunctiva/sclera: Conjunctivae normal.   Cardiovascular:      Rate and Rhythm: Normal rate and regular rhythm.      Heart sounds: No murmur heard.      Pulmonary:      Effort: Pulmonary effort is normal. No respiratory distress.      Breath sounds: Normal breath sounds. No wheezing.   Abdominal:      General: Abdomen is flat. Bowel sounds are normal.      Palpations: Abdomen is soft.      Tenderness: There is no abdominal tenderness.   Skin:     General: Skin is warm and dry.   Neurological:      General: No focal deficit present.      Mental Status: He is alert and oriented to person, place, and time.      Cranial Nerves: No cranial nerve deficit.   Psychiatric:         Mood and Affect: Mood normal.         Judgment: Judgment normal.         Labs: Pertinent labs reviewed.  CRC Screening:     Assessment:   1. Achalasia of esophagus    2. Dysphagia, unspecified type    3. Gastroesophageal reflux disease without esophagitis      Patient with type II achalasia. S/P myotomy in 2017, Botox injection in 2021. Still having issues with dysphagia, however, is overall better than is has been in the past.     Recommendations:   - discussed GERD diet  - discussed dysphagia diet.   - since he is still having issues, he will mostly likely need repeat manometry and workup as it was last done many years ago. Will refer to Dr. Hoover for evaluation and development of a plan.   - of note, he does request liquid phenergan. Discussed that I would not prescribe.     Achalasia of esophagus  -     Ambulatory referral/consult to Gastroenterology; Future; Expected date: 02/21/2022    Dysphagia, unspecified type  - "     Ambulatory referral/consult to Gastroenterology    Gastroesophageal reflux disease without esophagitis  -     Ambulatory referral/consult to Gastroenterology      Return to Clinic:  Dr. Hoover input     Thank you for the opportunity to participate in the care of this patient.  RANULFO Bryant

## 2022-02-14 NOTE — PATIENT INSTRUCTIONS
Lifestyle modifications  for acid reflux/GERD    These include weight loss, proper elevation of the head of the bed, eating small frequent meals, avoiding foods that trigger reflux, avoiding late meals and staying upright for at least 1.5 hours after meals.    Foods that trigger reflux and should be avoided:  - citrus foods such as tomato based products/red sauces.   - citrus drinks such as orange juice  - spicy foods   - high fat foods  - alcohol  - caffeine  - chocolate

## 2022-02-15 ENCOUNTER — TELEPHONE (OUTPATIENT)
Dept: PSYCHIATRY | Facility: CLINIC | Age: 62
End: 2022-02-15
Payer: MEDICARE

## 2022-02-16 ENCOUNTER — OFFICE VISIT (OUTPATIENT)
Dept: PSYCHIATRY | Facility: CLINIC | Age: 62
End: 2022-02-16
Payer: MEDICARE

## 2022-02-16 DIAGNOSIS — F63.81 INTERMITTENT EXPLOSIVE DISORDER: Primary | ICD-10-CM

## 2022-02-16 DIAGNOSIS — F41.8 MIXED ANXIETY AND DEPRESSIVE DISORDER: ICD-10-CM

## 2022-02-16 PROCEDURE — 99213 OFFICE O/P EST LOW 20 MIN: CPT | Mod: S$GLB,,, | Performed by: PSYCHIATRY & NEUROLOGY

## 2022-02-16 PROCEDURE — 99499 UNLISTED E&M SERVICE: CPT | Mod: S$GLB,,, | Performed by: PSYCHIATRY & NEUROLOGY

## 2022-02-16 PROCEDURE — 99999 PR PBB SHADOW E&M-EST. PATIENT-LVL I: ICD-10-PCS | Mod: PBBFAC,,, | Performed by: PSYCHIATRY & NEUROLOGY

## 2022-02-16 PROCEDURE — 99999 PR PBB SHADOW E&M-EST. PATIENT-LVL I: CPT | Mod: PBBFAC,,, | Performed by: PSYCHIATRY & NEUROLOGY

## 2022-02-16 PROCEDURE — 99213 PR OFFICE/OUTPT VISIT, EST, LEVL III, 20-29 MIN: ICD-10-PCS | Mod: S$GLB,,, | Performed by: PSYCHIATRY & NEUROLOGY

## 2022-02-16 PROCEDURE — 99499 RISK ADDL DX/OHS AUDIT: ICD-10-PCS | Mod: S$GLB,,, | Performed by: PSYCHIATRY & NEUROLOGY

## 2022-02-16 RX ORDER — DIAZEPAM 5 MG/1
5 TABLET ORAL EVERY 12 HOURS PRN
Qty: 30 TABLET | Refills: 3 | Status: SHIPPED | OUTPATIENT
Start: 2022-02-16 | End: 2022-06-16 | Stop reason: SDUPTHER

## 2022-02-16 RX ORDER — OLANZAPINE 10 MG/1
10 TABLET ORAL NIGHTLY
Qty: 90 TABLET | Refills: 1 | Status: SHIPPED | OUTPATIENT
Start: 2022-02-16 | End: 2022-06-16 | Stop reason: SDUPTHER
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
## 2022-03-11 ENCOUNTER — PATIENT MESSAGE (OUTPATIENT)
Dept: INTERNAL MEDICINE | Facility: CLINIC | Age: 62
End: 2022-03-11
Payer: MEDICARE

## 2022-03-11 ENCOUNTER — HOSPITAL ENCOUNTER (EMERGENCY)
Facility: HOSPITAL | Age: 62
Discharge: HOME | End: 2022-03-11
Attending: EMERGENCY MEDICINE
Payer: MEDICARE

## 2022-03-11 VITALS
TEMPERATURE: 99 F | RESPIRATION RATE: 18 BRPM | DIASTOLIC BLOOD PRESSURE: 78 MMHG | HEART RATE: 95 BPM | BODY MASS INDEX: 29.51 KG/M2 | WEIGHT: 166.56 LBS | SYSTOLIC BLOOD PRESSURE: 141 MMHG | OXYGEN SATURATION: 99 % | HEIGHT: 63 IN

## 2022-03-11 DIAGNOSIS — R11.2 NON-INTRACTABLE VOMITING WITH NAUSEA, UNSPECIFIED VOMITING TYPE: Primary | ICD-10-CM

## 2022-03-11 DIAGNOSIS — R00.0 TACHYCARDIA: ICD-10-CM

## 2022-03-11 LAB
ALBUMIN SERPL BCP-MCNC: 4 G/DL (ref 3.5–5.2)
ALP SERPL-CCNC: 76 U/L (ref 55–135)
ALT SERPL W/O P-5'-P-CCNC: 14 U/L (ref 10–44)
ANION GAP SERPL CALC-SCNC: 8 MMOL/L (ref 8–16)
AST SERPL-CCNC: 14 U/L (ref 10–40)
BASOPHILS # BLD AUTO: 0.02 K/UL (ref 0–0.2)
BASOPHILS NFR BLD: 0.2 % (ref 0–1.9)
BILIRUB SERPL-MCNC: 1.5 MG/DL (ref 0.1–1)
BUN SERPL-MCNC: 19 MG/DL (ref 8–23)
CALCIUM SERPL-MCNC: 9.7 MG/DL (ref 8.7–10.5)
CHLORIDE SERPL-SCNC: 102 MMOL/L (ref 95–110)
CO2 SERPL-SCNC: 27 MMOL/L (ref 23–29)
CREAT SERPL-MCNC: 0.8 MG/DL (ref 0.5–1.4)
DIFFERENTIAL METHOD: ABNORMAL
EOSINOPHIL # BLD AUTO: 0 K/UL (ref 0–0.5)
EOSINOPHIL NFR BLD: 0.3 % (ref 0–8)
ERYTHROCYTE [DISTWIDTH] IN BLOOD BY AUTOMATED COUNT: 13.4 % (ref 11.5–14.5)
EST. GFR  (AFRICAN AMERICAN): >60 ML/MIN/1.73 M^2
EST. GFR  (NON AFRICAN AMERICAN): >60 ML/MIN/1.73 M^2
GLUCOSE SERPL-MCNC: 119 MG/DL (ref 70–110)
HCT VFR BLD AUTO: 47.1 % (ref 40–54)
HGB BLD-MCNC: 15.7 G/DL (ref 14–18)
IMM GRANULOCYTES # BLD AUTO: 0.03 K/UL (ref 0–0.04)
IMM GRANULOCYTES NFR BLD AUTO: 0.3 % (ref 0–0.5)
LYMPHOCYTES # BLD AUTO: 1.8 K/UL (ref 1–4.8)
LYMPHOCYTES NFR BLD: 19.8 % (ref 18–48)
MAGNESIUM SERPL-MCNC: 2.1 MG/DL (ref 1.6–2.6)
MCH RBC QN AUTO: 29.5 PG (ref 27–31)
MCHC RBC AUTO-ENTMCNC: 33.3 G/DL (ref 32–36)
MCV RBC AUTO: 89 FL (ref 82–98)
MONOCYTES # BLD AUTO: 0.8 K/UL (ref 0.3–1)
MONOCYTES NFR BLD: 8.4 % (ref 4–15)
NEUTROPHILS # BLD AUTO: 6.5 K/UL (ref 1.8–7.7)
NEUTROPHILS NFR BLD: 71 % (ref 38–73)
NRBC BLD-RTO: 0 /100 WBC
PLATELET # BLD AUTO: 269 K/UL (ref 150–450)
PMV BLD AUTO: 9 FL (ref 9.2–12.9)
POTASSIUM SERPL-SCNC: 3.7 MMOL/L (ref 3.5–5.1)
PROT SERPL-MCNC: 7.9 G/DL (ref 6–8.4)
RBC # BLD AUTO: 5.32 M/UL (ref 4.6–6.2)
SODIUM SERPL-SCNC: 137 MMOL/L (ref 136–145)
WBC # BLD AUTO: 9.21 K/UL (ref 3.9–12.7)

## 2022-03-11 PROCEDURE — 85025 COMPLETE CBC W/AUTO DIFF WBC: CPT | Performed by: NURSE PRACTITIONER

## 2022-03-11 PROCEDURE — 63600175 PHARM REV CODE 636 W HCPCS: Performed by: NURSE PRACTITIONER

## 2022-03-11 PROCEDURE — 80053 COMPREHEN METABOLIC PANEL: CPT | Performed by: NURSE PRACTITIONER

## 2022-03-11 PROCEDURE — 25000003 PHARM REV CODE 250: Performed by: NURSE PRACTITIONER

## 2022-03-11 PROCEDURE — 99284 EMERGENCY DEPT VISIT MOD MDM: CPT | Mod: 25

## 2022-03-11 PROCEDURE — 93005 ELECTROCARDIOGRAM TRACING: CPT

## 2022-03-11 PROCEDURE — 83735 ASSAY OF MAGNESIUM: CPT | Performed by: EMERGENCY MEDICINE

## 2022-03-11 PROCEDURE — 93010 ELECTROCARDIOGRAM REPORT: CPT | Mod: ,,, | Performed by: INTERNAL MEDICINE

## 2022-03-11 PROCEDURE — 96361 HYDRATE IV INFUSION ADD-ON: CPT

## 2022-03-11 PROCEDURE — 96376 TX/PRO/DX INJ SAME DRUG ADON: CPT

## 2022-03-11 PROCEDURE — 93010 EKG 12-LEAD: ICD-10-PCS | Mod: ,,, | Performed by: INTERNAL MEDICINE

## 2022-03-11 PROCEDURE — 96374 THER/PROPH/DIAG INJ IV PUSH: CPT

## 2022-03-11 RX ORDER — ONDANSETRON 2 MG/ML
4 INJECTION INTRAMUSCULAR; INTRAVENOUS
Status: COMPLETED | OUTPATIENT
Start: 2022-03-11 | End: 2022-03-11

## 2022-03-11 RX ORDER — PROMETHAZINE HYDROCHLORIDE 25 MG/1
25 TABLET ORAL EVERY 6 HOURS PRN
Qty: 15 TABLET | Refills: 0 | Status: SHIPPED | OUTPATIENT
Start: 2022-03-11 | End: 2022-05-16

## 2022-03-11 RX ORDER — ONDANSETRON 4 MG/1
4 TABLET, FILM COATED ORAL EVERY 6 HOURS PRN
Qty: 20 TABLET | Refills: 0 | Status: SHIPPED | OUTPATIENT
Start: 2022-03-11 | End: 2022-05-16

## 2022-03-11 RX ADMIN — SODIUM CHLORIDE 1000 ML: 0.9 INJECTION, SOLUTION INTRAVENOUS at 03:03

## 2022-03-11 RX ADMIN — SODIUM CHLORIDE 1000 ML: 0.9 INJECTION, SOLUTION INTRAVENOUS at 02:03

## 2022-03-11 RX ADMIN — ONDANSETRON 4 MG: 2 INJECTION INTRAMUSCULAR; INTRAVENOUS at 01:03

## 2022-03-11 RX ADMIN — ONDANSETRON 4 MG: 2 INJECTION INTRAMUSCULAR; INTRAVENOUS at 03:03

## 2022-03-11 NOTE — ED PROVIDER NOTES
HISTORY     Chief Complaint   Patient presents with    Vomiting     Pt. States he has been vomiting for past 5-6 days, has hx. Of GERD. Pt. Advised he has been unable to hold anything down since last Sunday. Pt. States he was taking meds. Prior to vomiting starting last Sunday but has been unable to hold down since.      Review of patient's allergies indicates:  No Known Allergies     HPI   61-year-old male presents to emergency department for vomiting times several days.  States he has an upcoming appointment with GI; however, since he has not been able to hold anything down for the last several days he was instructed to come to the ER for evaluation.  Patient denies any abdominal pain, fevers, chest pain, shortness of breath, dysuria, flank pain, and all other concerns at this time.    The history is provided by the patient. No  was used.        PCP: Herminia Longoria MD     Past Medical History:  Past Medical History:   Diagnosis Date    Achalasia of esophagus 11/28/2016    Anxiety state, unspecified     Coronary artery disease     Esophageal reflux     Esophageal stricture     Hypertrophy of prostate without urinary obstruction and other lower urinary tract symptoms (LUTS)     Screening PSA (prostate specific antigen) 12/7/12    0.4    Unspecified essential hypertension         Past Surgical History:  Past Surgical History:   Procedure Laterality Date    BACK SURGERY      CERVICAL EPIDURAL STEROID INJECTION      COLONOSCOPY      ESOPHAGOGASTRODUODENOSCOPY      ESOPHAGOGASTRODUODENOSCOPY N/A 8/16/2019    Procedure: EGD (ESOPHAGOGASTRODUODENOSCOPY);  Surgeon: Pawan Schwab MD;  Location: [facility];  Service: Endoscopy;  Laterality: N/A;    HERNIA REPAIR      INGUINAL HERNIA REPAIR Right     LUMBAR EPIDURAL STEROID INJECTION      UPPER GASTROINTESTINAL ENDOSCOPY  04/27/2016        Family History:  Family History   Problem Relation Age of Onset    Lung cancer Mother      Hypertension Father         Social History:  Social History     Tobacco Use    Smoking status: Never Smoker    Smokeless tobacco: Never Used   Substance and Sexual Activity    Alcohol use: Yes     Comment: Social  none since incarcerated    Drug use: Yes     Types: Marijuana    Sexual activity: Not on file         ROS   Review of Systems   Constitutional: Negative for fever.   HENT: Negative for sore throat.    Respiratory: Negative for shortness of breath.    Cardiovascular: Negative for chest pain.   Gastrointestinal: Positive for nausea and vomiting.   Genitourinary: Negative for dysuria.   Musculoskeletal: Negative for back pain.   Skin: Negative for rash.   Neurological: Negative for weakness.   Hematological: Does not bruise/bleed easily.       PHYSICAL EXAM     Initial Vitals [03/11/22 1317]   BP Pulse Resp Temp SpO2   112/80 (!) 125 18 99 °F (37.2 °C) 95 %      MAP       --           Physical Exam    Nursing note and vitals reviewed.  Constitutional: He appears well-developed and well-nourished. He is not diaphoretic. No distress.   HENT:   Head: Normocephalic and atraumatic.   Eyes: Right eye exhibits no discharge. Left eye exhibits no discharge.   Neck: Neck supple.   Normal range of motion.  Cardiovascular:   Tachycardic   Pulmonary/Chest: No respiratory distress.   Abdominal: Abdomen is soft. He exhibits no distension. There is no abdominal tenderness.   Musculoskeletal:         General: Normal range of motion.      Cervical back: Normal range of motion and neck supple.     Neurological: He is alert and oriented to person, place, and time. He has normal strength.   Skin: Skin is warm and dry.   Psychiatric: He has a normal mood and affect. His behavior is normal. Thought content normal.          ED COURSE   Procedures  ED ONGOING VITALS:  Vitals:    03/11/22 1317 03/11/22 1450 03/11/22 1700   BP: 112/80 (!) 142/86 (!) 141/78   Pulse: (!) 125 96 95   Resp: 18 20 18   Temp: 99 °F (37.2 °C)    "  TempSrc: Oral     SpO2: 95% 98% 99%   Weight: 75.5 kg (166 lb 8.9 oz)     Height: 5' 3" (1.6 m)           ABNORMAL LAB VALUES:  Labs Reviewed   CBC W/ AUTO DIFFERENTIAL - Abnormal; Notable for the following components:       Result Value    MPV 9.0 (*)     All other components within normal limits   COMPREHENSIVE METABOLIC PANEL - Abnormal; Notable for the following components:    Glucose 119 (*)     Total Bilirubin 1.5 (*)     All other components within normal limits   MAGNESIUM         ALL LAB VALUES:  Results for orders placed or performed during the hospital encounter of 03/11/22   CBC auto differential   Result Value Ref Range    WBC 9.21 3.90 - 12.70 K/uL    RBC 5.32 4.60 - 6.20 M/uL    Hemoglobin 15.7 14.0 - 18.0 g/dL    Hematocrit 47.1 40.0 - 54.0 %    MCV 89 82 - 98 fL    MCH 29.5 27.0 - 31.0 pg    MCHC 33.3 32.0 - 36.0 g/dL    RDW 13.4 11.5 - 14.5 %    Platelets 269 150 - 450 K/uL    MPV 9.0 (L) 9.2 - 12.9 fL    Immature Granulocytes 0.3 0.0 - 0.5 %    Gran # (ANC) 6.5 1.8 - 7.7 K/uL    Immature Grans (Abs) 0.03 0.00 - 0.04 K/uL    Lymph # 1.8 1.0 - 4.8 K/uL    Mono # 0.8 0.3 - 1.0 K/uL    Eos # 0.0 0.0 - 0.5 K/uL    Baso # 0.02 0.00 - 0.20 K/uL    nRBC 0 0 /100 WBC    Gran % 71.0 38.0 - 73.0 %    Lymph % 19.8 18.0 - 48.0 %    Mono % 8.4 4.0 - 15.0 %    Eosinophil % 0.3 0.0 - 8.0 %    Basophil % 0.2 0.0 - 1.9 %    Differential Method Automated    Comprehensive metabolic panel   Result Value Ref Range    Sodium 137 136 - 145 mmol/L    Potassium 3.7 3.5 - 5.1 mmol/L    Chloride 102 95 - 110 mmol/L    CO2 27 23 - 29 mmol/L    Glucose 119 (H) 70 - 110 mg/dL    BUN 19 8 - 23 mg/dL    Creatinine 0.8 0.5 - 1.4 mg/dL    Calcium 9.7 8.7 - 10.5 mg/dL    Total Protein 7.9 6.0 - 8.4 g/dL    Albumin 4.0 3.5 - 5.2 g/dL    Total Bilirubin 1.5 (H) 0.1 - 1.0 mg/dL    Alkaline Phosphatase 76 55 - 135 U/L    AST 14 10 - 40 U/L    ALT 14 10 - 44 U/L    Anion Gap 8 8 - 16 mmol/L    eGFR if African American >60 >60 " mL/min/1.73 m^2    eGFR if non African American >60 >60 mL/min/1.73 m^2   Magnesium   Result Value Ref Range    Magnesium 2.1 1.6 - 2.6 mg/dL           RADIOLOGY STUDIES:  Imaging Results    None           EKG Readings: (Independently Interpreted)   Initial Reading: No STEMI. Rhythm: Normal Sinus Rhythm. Heart Rate: 99.          The above vital signs and test results have been reviewed by the emergency provider.     ED Medications:  Medications   sodium chloride 0.9% bolus 1,000 mL (0 mLs Intravenous Stopped 3/11/22 1451)   ondansetron injection 4 mg (4 mg Intravenous Given 3/11/22 1359)   sodium chloride 0.9% bolus 1,000 mL (0 mLs Intravenous Stopped 3/11/22 1713)   ondansetron injection 4 mg (4 mg Intravenous Given 3/11/22 1529)       Current Discharge Medication List        Discharge Medications:  Discharge Medication List as of 3/11/2022  3:38 PM      START taking these medications    Details   ondansetron (ZOFRAN) 4 MG tablet Take 1 tablet (4 mg total) by mouth every 6 (six) hours as needed., Starting Fri 3/11/2022, Print             Follow-up Information     Schedule an appointment as soon as possible for a visit  with [facility] - Gastroenterology.    Specialty: Gastroenterology  Contact information:  [address] [place] [id number] [phone]  Additional information:  Please take [address] to the Medical Office Building. Check in on the 4th floor.                      3:41 PM    I discussed with patient and/or family/caretaker that evaluation in the ED does not suggest any emergent or life threatening medical conditions requiring immediate intervention beyond what was provided in the ED, and I believe patient is safe for discharge. Regardless, an unremarkable evaluation in the ED does not preclude the development or presence of a serious or life threatening condition. As such, patient was instructed to return immediately for any worsening or change in current  symptoms.        MEDICAL DECISION MAKING   Medical Decision Making:   ED Management:  Patient reports the symptoms have greatly improved this time and is ready for discharge.  I have provided patient with a call back number for our GI team and states he will call to see if we can get a sooner appointment.               CLINICAL IMPRESSION       ICD-10-CM ICD-9-CM   1. Non-intractable vomiting with nausea, unspecified vomiting type  R11.2 787.01   2. Tachycardia  R00.0 785.0       Disposition:   Disposition: Discharged  Condition: Stable         Manuel Gaitan NP  03/12/22 0331

## 2022-03-11 NOTE — FIRST PROVIDER EVALUATION
"Medical screening exam completed.  I have conducted a focused provider triage encounter, findings are as follows:    Brief history of present illness:  Vomiting x several days     Vitals:    03/11/22 1317   BP: 112/80   BP Location: Right arm   Patient Position: Sitting   Pulse: (!) 125   Resp: 18   Temp: 99 °F (37.2 °C)   TempSrc: Oral   SpO2: 95%   Weight: 75.5 kg (166 lb 8.9 oz)   Height: 5' 3" (1.6 m)       Pertinent physical exam:  Tachycardic , uncomfortable     Brief workup plan:  Antiemetics, fluids, labs     Preliminary workup initiated; this workup will be continued and followed by the physician or advanced practice provider that is assigned to the patient when roomed.  "

## 2022-03-11 NOTE — ED NOTES
The patient is resting quietly, eyes closed, arouses easily to stimuli. Airway is open and patent, respirations are spontaneous, normal respiratory effort and rate noted, skin warm and dry, appearance: in no acute distress and resting comfortably.

## 2022-03-14 ENCOUNTER — PATIENT MESSAGE (OUTPATIENT)
Dept: GASTROENTEROLOGY | Facility: CLINIC | Age: 62
End: 2022-03-14
Payer: MEDICARE

## 2022-03-18 ENCOUNTER — TELEPHONE (OUTPATIENT)
Dept: GASTROENTEROLOGY | Facility: CLINIC | Age: 62
End: 2022-03-18
Payer: MEDICARE

## 2022-03-28 DIAGNOSIS — E78.2 MIXED HYPERLIPIDEMIA: ICD-10-CM

## 2022-03-28 NOTE — TELEPHONE ENCOUNTER
No new care gaps identified.  Powered by [employer] Reference number: [id number].   3/28/2022 2:03:56 PM CDT

## 2022-03-29 RX ORDER — ATORVASTATIN CALCIUM 40 MG/1
40 TABLET, FILM COATED ORAL NIGHTLY
Qty: 90 TABLET | Refills: 1 | Status: SHIPPED | OUTPATIENT
Start: 2022-03-29 | End: 2022-03-30 | Stop reason: SDUPTHER

## 2022-03-29 NOTE — TELEPHONE ENCOUNTER
This Rx Request does not qualify for assessment with the [facility]   Please review protocol details and the Care Due Message for extra clinical information    Reasons Rx Request may be deferred:   Patient has been seen in the ED/Hospital since the last PCP visit    Note composed:1:16 PM 03/29/2022

## 2022-03-30 DIAGNOSIS — E78.2 MIXED HYPERLIPIDEMIA: ICD-10-CM

## 2022-03-30 RX ORDER — ATORVASTATIN CALCIUM 40 MG/1
40 TABLET, FILM COATED ORAL NIGHTLY
Qty: 90 TABLET | Refills: 1 | Status: SHIPPED | OUTPATIENT
Start: 2022-03-30 | End: 2022-11-07

## 2022-03-30 NOTE — TELEPHONE ENCOUNTER
No new care gaps identified.  Powered by [employer]. Reference number: [id number].   3/30/2022 4:28:30 PM CDT

## 2022-03-30 NOTE — TELEPHONE ENCOUNTER
----- Message from Susana Norton sent at 3/30/2022  1:21 PM CDT -----  Contact: Patient, [phone]  Calling because he does not have [employer] insurance anymore, Rx atorvastatin (LIPITOR) 40 MG tablet needs to be sent to      [id number] - [place] - [address] AT [facility]  [address] [address]  Phone: [phone] Fax: [phone]

## 2022-04-02 ENCOUNTER — PATIENT OUTREACH (OUTPATIENT)
Dept: ADMINISTRATIVE | Facility: OTHER | Age: 62
End: 2022-04-02
Payer: MEDICARE

## 2022-04-02 NOTE — PROGRESS NOTES
LINKS immunization registry updated  Care Everywhere updated  Health Maintenance updated  Chart reviewed for overdue [facility]active [facility] ([facility]) health maintenance testing (CRS, Breast Ca, Diabetic Eye Exam)   Orders entered:N/A

## 2022-04-05 ENCOUNTER — TELEPHONE (OUTPATIENT)
Dept: GASTROENTEROLOGY | Facility: CLINIC | Age: 62
End: 2022-04-05
Payer: MEDICARE

## 2022-04-05 NOTE — TELEPHONE ENCOUNTER
Pt unable to attend the visit  Records review     05/05/2021.  Dr. Bud Aburto  Dysphagia for 6 weeks.  Achalasia status post Heller myotomy and fundoplication 2017. Weight loss.  Abnormal CT with retained food.  Plan EGD plus dilation plus Botox today.  Consider evaluation for polyp pending EGD results.      CT abdomen 08/18/2021:  Trace right-sided pleural effusion.  Small hiatal hernia with suspected GERD.  Tiny 6 mm hypodensity posterior segment of right hepatic lobe too small to further characterize likely representing a small cyst.  Diverticulosis without diverticulitis.  Constipation.  EGD 05/05/2021:  Distal half of the esophagus had cloudy fluid filling the lumen which was suctioned away during examination.  Extensive food stuff essentially completely occluding the lumen.  Mild resistance at GE junction.  Sigmoid esophagus.  Very tight GE junction with a ring of muscle here and the significant ambulatory dilation of esophagus around this area with an almost pseudodiverticulum type appearance at 3:00 a.m. position where large pocket or curve of esophagus was seen with foods ever retained.  We were able to evacuate the GE junction which was found at about 39 cm.  J-shaped stomach retained liquid and food.  Normal duodenum.  Botox injection and balloon dilation.  20 mm balloon.  CT abdomen pelvis 05/04/2021:  Normal CT of the abdomen and spleen quite small.  Prostate enlarged.  Distention of distal esophagus containing food stuff or other debris.  Consider achalasia versus esophageal paraesophageal diverticulum.  Recommend follow-up esophagram.  Esophagram 10/13/2020:  Moderate size hiatal hernia.  Significant area of persistent narrowing of proximal stomach just distal to the hernia contrast material does pass through the narrowing slowly.  The esophagus is abnormally dilated with significant associated esophageal dysmotility.  Limited exam secondary to patient's inability to tolerate further imaging.  CT  chest 10/13/2020:  Air and fluid filled distention of esophagus with evidence of small hiatal hernia otherwise no significant abnormality in the chest or pelvis.  Manometry 05/09/2016:  IRP greater than 15. 30.9.  No normal peristalsis.  Pan esophageal pressurization.  100%.  Achalasia type 2.  Esophagram 04/22/2016:  Numerous findings as described with narrowing of posterior mid cervical esophagus significant dysmotility of esophagus with continuous changing of esophagus at the junction in the mid and proximal 3rd of the thoracic esophagus multiple tertiary contractions a persistent narrowing in the distal esophagus eg junction with contrast retention.  Incompletely filled stomach but with irregularity and prominent folds suggestive region of body and proximal stomach being of uncertain significance an inflammatory infectious disease or even neoplastic involvement cannot be ruled out.  Of        04/26/2017:  Laparoscopic Heller myotomy with Junior fundoplication.  Dr. Nathaniel Montana.

## 2022-04-06 ENCOUNTER — PATIENT MESSAGE (OUTPATIENT)
Dept: GASTROENTEROLOGY | Facility: CLINIC | Age: 62
End: 2022-04-06
Payer: MEDICARE

## 2022-04-26 ENCOUNTER — TELEPHONE (OUTPATIENT)
Dept: GASTROENTEROLOGY | Facility: CLINIC | Age: 62
End: 2022-04-26
Payer: MEDICARE

## 2022-04-26 ENCOUNTER — OFFICE VISIT (OUTPATIENT)
Dept: GASTROENTEROLOGY | Facility: CLINIC | Age: 62
End: 2022-04-26
Payer: MEDICARE

## 2022-04-26 ENCOUNTER — TELEPHONE (OUTPATIENT)
Dept: GASTROENTEROLOGY | Facility: CLINIC | Age: 62
End: 2022-04-26

## 2022-04-26 VITALS — WEIGHT: 190.38 LBS | HEIGHT: 63 IN | BODY MASS INDEX: 33.73 KG/M2

## 2022-04-26 DIAGNOSIS — K21.9 GASTROESOPHAGEAL REFLUX DISEASE WITHOUT ESOPHAGITIS: ICD-10-CM

## 2022-04-26 DIAGNOSIS — R13.10 DYSPHAGIA, UNSPECIFIED TYPE: Primary | ICD-10-CM

## 2022-04-26 DIAGNOSIS — R07.89 NON-CARDIAC CHEST PAIN: ICD-10-CM

## 2022-04-26 DIAGNOSIS — K22.0 ACHALASIA: ICD-10-CM

## 2022-04-26 PROCEDURE — 99499 UNLISTED E&M SERVICE: CPT | Mod: S$GLB,,, | Performed by: INTERNAL MEDICINE

## 2022-04-26 PROCEDURE — 1160F RVW MEDS BY RX/DR IN RCRD: CPT | Mod: CPTII,S$GLB,, | Performed by: INTERNAL MEDICINE

## 2022-04-26 PROCEDURE — 1159F PR MEDICATION LIST DOCUMENTED IN MEDICAL RECORD: ICD-10-PCS | Mod: CPTII,S$GLB,, | Performed by: INTERNAL MEDICINE

## 2022-04-26 PROCEDURE — 99215 OFFICE O/P EST HI 40 MIN: CPT | Mod: S$GLB,,, | Performed by: INTERNAL MEDICINE

## 2022-04-26 PROCEDURE — 99999 PR PBB SHADOW E&M-EST. PATIENT-LVL IV: CPT | Mod: PBBFAC,,, | Performed by: INTERNAL MEDICINE

## 2022-04-26 PROCEDURE — 3008F PR BODY MASS INDEX (BMI) DOCUMENTED: ICD-10-PCS | Mod: CPTII,S$GLB,, | Performed by: INTERNAL MEDICINE

## 2022-04-26 PROCEDURE — 1160F PR REVIEW ALL MEDS BY PRESCRIBER/CLIN PHARMACIST DOCUMENTED: ICD-10-PCS | Mod: CPTII,S$GLB,, | Performed by: INTERNAL MEDICINE

## 2022-04-26 PROCEDURE — 3008F BODY MASS INDEX DOCD: CPT | Mod: CPTII,S$GLB,, | Performed by: INTERNAL MEDICINE

## 2022-04-26 PROCEDURE — 99999 PR PBB SHADOW E&M-EST. PATIENT-LVL IV: ICD-10-PCS | Mod: PBBFAC,,, | Performed by: INTERNAL MEDICINE

## 2022-04-26 PROCEDURE — 1159F MED LIST DOCD IN RCRD: CPT | Mod: CPTII,S$GLB,, | Performed by: INTERNAL MEDICINE

## 2022-04-26 PROCEDURE — 99499 RISK ADDL DX/OHS AUDIT: ICD-10-PCS | Mod: S$GLB,,, | Performed by: INTERNAL MEDICINE

## 2022-04-26 PROCEDURE — 99215 PR OFFICE/OUTPT VISIT, EST, LEVL V, 40-54 MIN: ICD-10-PCS | Mod: S$GLB,,, | Performed by: INTERNAL MEDICINE

## 2022-04-26 RX ORDER — TIZANIDINE 4 MG/1
4 TABLET ORAL 3 TIMES DAILY
COMMUNITY
Start: 2022-02-06 | End: 2023-04-02

## 2022-04-26 NOTE — PATIENT INSTRUCTIONS
-Eat small, frequent meals. Avoid large or medium sized meals.  -Never lay down after eating. The food sitting in  our esophagus can regurgitate and end up in your lungs.   -Give yourself 3 hours after eating before laying down.   -Avoid food that are either very cold or hot   -Be very careful about taking pills. Pills can spend a long time in your esophagus and cause significant damage. Swallow pills with a large amount of water (8-12 oz) and remain upright for 1 hr after taking them. You may also try getting liquid formulations of your medicines if possible. Compounding pharmacies can make almost any medication in liquid form.   Eat sitting completely upright only as gravity can help empty the esophagus.   Swallow only once per bite. Avoid double swallows as taking a second swallow can diminish esophageal motility. Wait 15 seconds after every swallow before initiating a second swallow.     -Complete esophageal manometry   During esophageal manometry, a thin, flexible tube (catheter) that contains pressure sensors is passed through your nose, down your esophagus and into your stomach. Esophageal manometry can be helpful in diagnosing certain disorders that can affect your esophagus.  Esophageal manometry provides information about the movement of food through the esophagus into the stomach. The test measures how well the muscles at the top and bottom of your esophagus (sphincter muscles) open and close, as well as the pressure, speed and pattern of the wave of esophageal muscle contractions that moves food along.    -Complete timed barium esophagogram   Barium esophagram is an X-ray of the esophagus. The patient drinks a liquid that contains barium (a silver-white metallic compound). The liquid coats the esophagus and X-rays are taken.    -Complete EGD with EndoFlip  EGD is an endoscopic procedure that allows your doctor to examine your esophagus, stomach and duodenum (part of your small intestine). EGD is an  outpatient procedure, meaning you can go home that same day. It takes approximately 30 to 60 minutes to perform.    EndoFLIP is a technology that simultaneously measures the area across the inside of a gastrointestinal organ (for example, the esophagus) and the pressure inside that organ. The ratio of the two measurements is called distensibility (stiffness).  How is EndoFLIP performed?  During upper endoscopy, while you are sedated, your doctor will place a catheter through your mouth into your esophagus. Fluid is passed through the catheter to inflate a cylinder-shaped balloon that contains specialized sensors. From the patients perspective, undergoing a diagnostic EndoFLIP is exactly similar to the experience of undergoing upper endoscopy. The procedure can add 10 to 15 minutes to the total duration of the endoscopy.  Who needs EndoFLIP?  EndoFLIP can help find the cause of difficulty swallowing; for example, in patients with suspected achalasia, or in patients who have difficulty swallowing after surgery for gastroesophageal reflux disease (GERD). EndoFLIP can also be used to perform a therapeutic dilation, without the need for X-rays.  What are the potential complications of EndoFLIP?  When EndoFLIP is used for diagnostic purposes, in theory, there is a risk of bleeding or perforation (putting a hole in the wall of the gut), but the catheter is connected to a computer that drives the inflation and deflation of the balloon, thus limiting the size and pressure of the inflation. There are no known reports of serious complications when EndoFLIP has been used for diagnostic purposes.

## 2022-04-26 NOTE — TELEPHONE ENCOUNTER
AVS reviewed with pt, copy given    TBS scheduled 5/5/22 @ 9 am per pt request. Reviewed pre-procedure instructions and copy given to pt.  Apt letter given to pt.    Asked pt to call Jenna KITCHEN in 1 wk if does not hear from Jenna.  Direct ph no provided.    Fu ~ 3 mos

## 2022-04-26 NOTE — TELEPHONE ENCOUNTER
MOTILITY CLINIC PROCEDURE ORDERS    CLEARANCE FOR PROCEDURES:  [x] Not needed   [] Cardiology   [] Pulmonary  [] PCP    PROCEDURES  [] EGD   [] Colonoscopy   [x] EGD with EndoFlip   [x] Esophageal manometry with impedance - dysphagia   [] Esophageal manometry with impedance - rumination  [] Esophageal manometry with impedance - belching   [] EGD with BRAVO 48 hours   [] EGD with BRAVO 96 hours   [] pH Impedance 24 hours   [] Anorectal manometry   [] Rectal Ultrasound     Does manometry need to be placed endoscopically:   [x] Yes    FLOOR:  [] 4th Floor   [x] 2nd Floor    Reason for 2nd Floor:   [] Gastroparesis   [x] End stage achalasia  [] Pre lung transplant   [] Pre hear transplant   [] Pulmonary HTN (PAP>50 or on meds)   [] Severe pulmonary Disease   [] Complex medical problems   [] BMI>50  [] History of anesthesia issues  [] Other:    PREP  [] Standard Prep  [] Severe Constipation Prep (Low residue diet 7 days.  1.5 prep the day prior, 0.5 prep the day of procedure)  [] Full liquid diet 5 days   [x] Full liquid diet 3 days   [] Full liquid diet 2 days   [] Full liquid diet 1 day   [] Other:     MEDICATIONS    pH Studies  [] ON PPI/H2 Blocker   [] OFF PPI/H2 Blocker     Metal allergy (stainless steal w chromium, nickel, copper, cobalt and iron).:   []  Yes    Pacemaker/defibrillator:  [] Yes    Motility Studies (esophageal manometry/anorectal manometry)  Hold narcotics x 1 days if able   Hold TCA x 1 days if able  Propofol/lidocaine only during sedation.  Discuss with Dr. Hoover if additional sedation needed.   Hold baclofen for thee days (at least one day) if able   Hold muscle relaxants x 1 day if able     Anticoagulation/antiplt agents:   []  Yes    ORDER OF TESTING:  Day 1: TBS  Day 2: EGD/Flip/mano    Or to switch order     [] No special requirements - pt lives locally     SCHEDULING PRIORITY  [] Urgent  (<7 days)  [x] Priority (<30 days)  [] Routine 1 (schedule ASAP)  [] Routine 2 (next available, < 3  months)   [] Routine 3 (ok if > 3 months)       PHYSICIAN  []  Ok with Dr. Elizondo   []  Ok with any GI MD

## 2022-04-26 NOTE — PROGRESS NOTES
[facility] Consultation Note    Reason for Consult:    Chief Complaint   Patient presents with    Heartburn    Dysphagia    Gas    Bloated         PCP:   Herminia Longoria   [id number] [place] [address] DRIVE / [facility] [id number]    Referring MD:  Izzy Zurita Np  [address] [place] [address],  [place] [id number]      HPI:  Lenyn Latham Jr. is a 61 y.o. male referred to motility clinic for second opinion regarding the following problems:    02/14/2022:  Izzy Zurita NP  Diagnosed with achalasia in 2014.   Heller myotomy with Junior fundoplication performed for 2017er.  He is overall feeling better with his dysphagia was still has issues.  The other day he had food bolus that eventually cleared without intervention.  He has continued to gain weight.  He finds that all:  Chocolate worsen the symptoms so he has been avoiding those.  He is not eating late at night.  Assessment and achalasia.  GERD.  Dysphagia.  Patient with type 2 achalasia status post myotomy in 2017, Botox injection 2021. Still having issues with dysphagia however is overall better than it has been in the past.  Recommendation:  Discussed GERD diet.  Discussed dysphagia diet.  Since he is still having issues we will most likely need repeat manometry in workup as it was last done many years ago.  Will refer to Dr. Hoover for evaluation.    HPI 05/2021:  60-year-old male who presented to [place] ED with reported 6 days of intractable nausea and vomiting with inability keep anything down.  Patient had past medical history of anxiety, achalasia, GERD, status post fundoplication with significant weight loss.  Patient appears to received most of his     GERD.   Retrosternal pyrosis:occasional   Regurgitation: occasional     MEDs:   no improvement w prilosec     Dysphagia.   Problems with solids: occasional 2-3 per week   Problems with liquids: rare  Location: mid to lower esophagus  Onset of symptoms: 2011  Symptoms  started after exposure to chemicals after [employer] oil spill     History of food impactions requiring ED visit: no  History of allergies/eczema. No    Chest pain/spasm: occasional     Regurgitation: occasional     Eckardt Symptom Score  Dysphagia: 1  Regurgitation: 1  Retrosternal pain: 1  Weight Loss: 0  Total: 3    Heller myotomy with Junior fundoplication performed for 2017 w significant improvement in symptoms  EGD w Botox injection/dilation 20mm  2021 w some improvement.    Early satiety  Rare nausea   No vomiting     Weight loss: not recently   Lost weight years ago, but now gained it back   240 to 140 after the oil spill   Eventually gained weight   Now 190lb     Denies vomiting, abd pain, bloating, diarrhea, constipation, BRBPR, melena, weight loss.       Total visit time was  62  minutes, more than 50% of which was spent in face-to-face counseling with patient regarding symptoms, diagnostic results, prognosis, risks and benefits of treatment options, instructions for management, importance of compliance with chosen treatment options and coping strategies.      Previous Studies:   CT abdomen 08/18/2021:  Trace right-sided pleural effusion.  Small hiatal hernia with GERD.  Tiny 6 mm hypodensity in the right hepatic lobe likely small cysts.  Diverticulosis.  Constipation.  EGD 05/05/2021:  Sigmoid esophagus with retained food consistent with diagnosis of achalasia.  Resent tonically close GE junction with significant dilation around the area consistent with achalasia.  Currently otherwise normal stomach and duodenum the some fluid retention.  Gastric emptying delay is seen in the proximally to slightly less than 20% of patients with achalasia.  Successful injection of 100 units botulin toxin in 4 different aliquots at [facility].  Successful dilation of GE junction to 20 mm.  Referral to Dr. Ariel Mcclendon to consider poem.    CT of abdomen 05/04/2021:  Normal CT of abdomen spleen is quite small.  Prostate enlarged.  Post  distention of distal esophagus containing food and debris.  Consider achalasia versus esophageal paraesophageal diverticulum.  Esophagram 10/13/2020:  Moderate size hiatal hernia.  Significant area of persistent narrowing of proximal stomach just distal to the hiatal hernia.  Contrast material this passed through this narrowing slowly.  The esophagus is abnormally dilated with significant associated esophageal dysmotility.  Limited exam secondary to patient's inability to tolerate further imaging.  CT CT chest 10/13/2020:  Radha fluid distention of esophagus with evidence of small hiatal hernia otherwise no significant abnormality.  Esophagram 04/22/2016:  Narrowing of posterior mid cervical esophagus, significant dysmotility of the esophagus with continuing narrowing of the esophagus at junction and mid and proximal 3rd of the thoracic esophagus, multiple tertiary contractions and persistent narrowing in the distal esophagus at GE junction with contrast retention.  Incomplete filled stomach with irregularity and prominent folds suggestive of region of body and proximal stomach being of uncertain significance in inflammatory or infectious disease or even neoplastic involvement cannot be ruled out.    Manometry 05/09/2016:  Achalasia type 2.  IRP 30.9.  Pan esophageal pressurization 100%.    Relevant Surgical History:    Heller myotomy with Junior fundoplication performed for 2017      ROS:  ROS   Constitutional: No fevers, no chills, no night sweats, no weight loss  ENT: No congestion, no rhinorrhea, no chronic sinus problems  CV: No chest pain, no palpitations  Pulm: No cough, no shortness of breath  Ophtho: No blurry vision, no eye redness  GI: see HPI  Derm: No rash  Heme: No lymphadenopathy, no bruising  MSK: + arthritis, no joint swelling, no Raynauds  : No dysuria, + frequent urination, no blood in urine  Endo: No hot or cold intolerance  Neuro: No dizziness, no syncope, no seizure  Psych: + anxiety, no  depression  Complete ROS negative except as above    Medical History:   Past Medical History:   Diagnosis Date    Achalasia of esophagus 11/28/2016    Anxiety state, unspecified     Coronary artery disease     Esophageal reflux     Esophageal stricture     Hypertrophy of prostate without urinary obstruction and other lower urinary tract symptoms (LUTS)     Screening PSA (prostate specific antigen) 12/7/12    0.4    Unspecified essential hypertension         Surgical History:   Past Surgical History:   Procedure Laterality Date    BACK SURGERY      CERVICAL EPIDURAL STEROID INJECTION      COLONOSCOPY      ESOPHAGOGASTRODUODENOSCOPY      ESOPHAGOGASTRODUODENOSCOPY N/A 8/16/2019    Procedure: EGD (ESOPHAGOGASTRODUODENOSCOPY);  Surgeon: Pawan Schwab MD;  Location: [facility];  Service: Endoscopy;  Laterality: N/A;    HERNIA REPAIR      INGUINAL HERNIA REPAIR Right     LUMBAR EPIDURAL STEROID INJECTION      UPPER GASTROINTESTINAL ENDOSCOPY  04/27/2016        Family History:   Family History   Problem Relation Age of Onset    Lung cancer Mother     Hypertension Father     Ulcers Sister     Ulcers Brother     Prostate cancer Maternal Grandfather     Ulcers Sister     Celiac disease Neg Hx     Colon cancer Neg Hx     Colon polyps Neg Hx     Crohn's disease Neg Hx     Esophageal cancer Neg Hx     Inflammatory bowel disease Neg Hx     Irritable bowel syndrome Neg Hx     Liver cancer Neg Hx     Liver disease Neg Hx     Rectal cancer Neg Hx     Stomach cancer Neg Hx         Social History:   Social History     Socioeconomic History    Marital status: Single    Years of education: 12th   Occupational History    Occupation: Disabled   Tobacco Use    Smoking status: Never Smoker    Smokeless tobacco: Never Used   Substance and Sexual Activity    Alcohol use: Not Currently     Comment: Social  none since incarcerated    Drug use: Not Currently     Types: Marijuana        Review of  patient's allergies indicates:  No Known Allergies    Current Outpatient Medications   Medication Sig Dispense Refill    aspirin (ECOTRIN) 81 MG EC tablet Take 81 mg by mouth once daily.      atorvastatin (LIPITOR) 40 MG tablet Take 1 tablet (40 mg total) by mouth every evening. 90 tablet 1    EScitalopram oxalate (LEXAPRO) 20 MG tablet Take 1 tablet (20 mg total) by mouth every evening. 90 tablet 0    HYDROcodone-acetaminophen (NORCO) 7.5-325 mg per tablet Take 1 tablet by mouth every 6 (six) hours as needed for Pain. 12 tablet 0    OLANZapine (ZYPREXA) 10 MG tablet Take 1 tablet (10 mg total) by mouth every evening. 90 tablet 1    omeprazole (PRILOSEC) 40 MG capsule TAKE 1 CAPSULE EVERY DAY 90 capsule 1    ondansetron (ZOFRAN) 4 MG tablet Take 1 tablet (4 mg total) by mouth every 6 (six) hours as needed. 20 tablet 0    promethazine (PHENERGAN) 25 MG tablet Take 1 tablet (25 mg total) by mouth every 6 (six) hours as needed for Nausea. 15 tablet 0    sildenafil (REVATIO) 20 mg Tab Take 1 tablet (20 mg total) by mouth daily as needed. 40 tablet 11    tiZANidine (ZANAFLEX) 4 MG tablet Take 4 mg by mouth 3 (three) times daily.      diazePAM (VALIUM) 5 MG tablet Take 1 tablet (5 mg total) by mouth every 12 (twelve) hours as needed for Anxiety. 30 tablet 3    ondansetron (ZOFRAN-ODT) 4 MG TbDL Take 1 tablet (4 mg total) by mouth every 6 (six) hours as needed (nausea). 60 tablet 2    tamsulosin (FLOMAX) 0.4 mg Cap Take 1 capsule (0.4 mg total) by mouth once daily. (Patient not taking: Reported on 4/26/2022) 30 capsule 11     No current facility-administered medications for this visit.     Facility-Administered Medications Ordered in Other Visits   Medication Dose Route Frequency Provider Last Rate Last Admin    0.9%  NaCl infusion   Intravenous Continuous Brenden Pizarro MD        ondansetron injection 4 mg  4 mg Intravenous Q12H PRN Brenden Pizarro MD            Objective Findings:  Vital  "Signs:  Ht 5' 3" (1.6 m)   Wt 86.4 kg (190 lb 6.4 oz)   BMI 33.73 kg/m²   Body mass index is 33.73 kg/m².    Physical Exam:  General appearance: alert, cooperative, no distress  HENT: Normocephalic, atraumatic, neck symmetrical, no nasal discharge  Eyes: conjunctivae/corneas clear, PERRL, EOM's intact  Lungs: clear to auscultation bilaterally, no dullness to percussion bilaterally  Heart: regular rate and rhythm without rub; no displacement of the PMI  Abdomen: soft, non-tender; bowel sounds normoactive; no organomegaly  Extremities: extremities symmetric; no clubbing, cyanosis, or edema  Integument: Skin color, texture, turgor normal; no rashes; hair distrubution normal  Neurologic: Alert and oriented X 3, normal strength, normal coordination and gait  Psychiatric: no pressured speech; normal affect; no evidence of impaired cognition    Labs:   Reviewed in Epic/record      Assessment and Plan:  Lenny Armstrong Yeison Tejeda is a 61 y.o. male with referred to Es[facility]ility Clinic for 2nd opinion regarding following problems:    GERD.   On prilosec 40 daily     Dysphagia to solids   Chest pain/spasm  Regurgitation  Onset of symptoms: 2011, after exposure to chemicals during clean up of [employer] oil spill  Eckardt Symptom Score:3/12  Manometry w Achalasia Type II 2016  Heller myotomy with Junior fundoplication performed for 2017 w significant improvement in symptoms  EGD w sigmoid esophagus s/p Botox injection/dilation 20mm  2021 w some improvement.  Esophagogram w mod HH, narrowing of proximal stomach, dilated esophagus - concerning for slipped fundoplication   On norco BID   On zanaflex   On sidenafil for ED  -TBS   -EGD W EOE, Flip  -Manometry endoscopically placed   -Discussed pathophysiology, presentation and treatment of achalasia.  Discussed testing     Narrowing of posteriocervical esophagus on esophagogram 4/2016    Early satiety. Rare nausea     Weight loss  Lost weight years ago, but now stable     Arthritis   On " norco BID   On Zanaflex     Anxiety   -On lexapro   -On Zyprexa     Follow up in about 3 months (around 7/26/2022).    1. Dysphagia, unspecified type    2. Gastroesophageal reflux disease without esophagitis    3. Non-cardiac chest pain    4. Achalasia          Order summary:  Orders Placed This Encounter    FL Esophagram Complete    Case Request Endoscopy: ESOPHAGOGASTRODUODENOSCOPY (EGD), MANOMETRY-ESOPHAGEAL-WITH IMPEDANCE       Discussed with PT that I act as a consult service and do not accept patients to be their primary GI provider. Discussed that the goal of our visits is to address relevant motility problems while deferring other GI problems as well as screening and surveillance to his/her primary GI provider.   Discussed that he/she needs to continue to follow with his local primary GI provider.  Discussed that we will complete his/her workup, clarify diagnosis and attempt to optimize his/her symptoms with intention of him/her returning to referring GI provider for long term GI care.   Pt verbalized understanding.        Thank you so much for allowing me to participate in the care of Lenny Latham Jr.      Madina Hoover MD      This note was created using voice recognition software, and may contain some unrecognized transcriptional errors.

## 2022-04-29 ENCOUNTER — PATIENT MESSAGE (OUTPATIENT)
Dept: ENDOSCOPY | Facility: HOSPITAL | Age: 62
End: 2022-04-29
Payer: MEDICARE

## 2022-04-29 ENCOUNTER — TELEPHONE (OUTPATIENT)
Dept: ENDOSCOPY | Facility: HOSPITAL | Age: 62
End: 2022-04-29
Payer: MEDICARE

## 2022-04-29 NOTE — TELEPHONE ENCOUNTER
Contacted patient to schedule procedure. As per our conversation,  patient is scheduled for EGD/Endoflip and Esophageal Manometry on 5/23/22 at 1:00 pm and 2:30 pm . Instructions reviewed with patient and informed instructions will be mailed and on patient portal for review.

## 2022-05-05 ENCOUNTER — HOSPITAL ENCOUNTER (OUTPATIENT)
Dept: RADIOLOGY | Facility: HOSPITAL | Age: 62
Discharge: HOME | End: 2022-05-05
Attending: INTERNAL MEDICINE
Payer: MEDICARE

## 2022-05-05 DIAGNOSIS — R13.10 DYSPHAGIA, UNSPECIFIED TYPE: ICD-10-CM

## 2022-05-05 PROCEDURE — 74220 X-RAY XM ESOPHAGUS 1CNTRST: CPT | Mod: 26,,, | Performed by: INTERNAL MEDICINE

## 2022-05-05 PROCEDURE — 74220 X-RAY XM ESOPHAGUS 1CNTRST: CPT | Mod: TC

## 2022-05-05 PROCEDURE — A9698 NON-RAD CONTRAST MATERIALNOC: HCPCS | Performed by: INTERNAL MEDICINE

## 2022-05-05 PROCEDURE — 74220 FL ESOPHAGRAM COMPLETE: ICD-10-PCS | Mod: 26,,, | Performed by: INTERNAL MEDICINE

## 2022-05-05 PROCEDURE — 25500020 PHARM REV CODE 255: Performed by: INTERNAL MEDICINE

## 2022-05-05 RX ADMIN — BARIUM SULFATE 200 ML: 0.81 POWDER, FOR SUSPENSION ORAL at 09:05

## 2022-05-05 RX ADMIN — BARIUM SULFATE 50 ML: 0.6 SUSPENSION ORAL at 09:05

## 2022-05-15 ENCOUNTER — PATIENT MESSAGE (OUTPATIENT)
Dept: INTERNAL MEDICINE | Facility: CLINIC | Age: 62
End: 2022-05-15
Payer: MEDICARE

## 2022-05-16 ENCOUNTER — OFFICE VISIT (OUTPATIENT)
Dept: INTERNAL MEDICINE | Facility: CLINIC | Age: 62
End: 2022-05-16
Payer: MEDICARE

## 2022-05-16 ENCOUNTER — HOSPITAL ENCOUNTER (OUTPATIENT)
Dept: CARDIOLOGY | Facility: HOSPITAL | Age: 62
Discharge: HOME | End: 2022-05-16
Payer: MEDICARE

## 2022-05-16 VITALS
OXYGEN SATURATION: 96 % | WEIGHT: 187.81 LBS | SYSTOLIC BLOOD PRESSURE: 126 MMHG | HEART RATE: 82 BPM | TEMPERATURE: 98 F | DIASTOLIC BLOOD PRESSURE: 72 MMHG | BODY MASS INDEX: 33.27 KG/M2

## 2022-05-16 DIAGNOSIS — Z12.11 COLON CANCER SCREENING: ICD-10-CM

## 2022-05-16 DIAGNOSIS — N13.8 BENIGN PROSTATIC HYPERPLASIA WITH URINARY OBSTRUCTION: ICD-10-CM

## 2022-05-16 DIAGNOSIS — F41.8 MIXED ANXIETY AND DEPRESSIVE DISORDER: ICD-10-CM

## 2022-05-16 DIAGNOSIS — N40.1 BENIGN PROSTATIC HYPERPLASIA WITH URINARY OBSTRUCTION: ICD-10-CM

## 2022-05-16 DIAGNOSIS — G89.29 CHRONIC PAIN OF RIGHT KNEE: ICD-10-CM

## 2022-05-16 DIAGNOSIS — M12.811 ROTATOR CUFF TEAR ARTHROPATHY OF RIGHT SHOULDER: ICD-10-CM

## 2022-05-16 DIAGNOSIS — M75.101 ROTATOR CUFF TEAR ARTHROPATHY OF RIGHT SHOULDER: ICD-10-CM

## 2022-05-16 DIAGNOSIS — I25.119 ATHEROSCLEROSIS OF NATIVE CORONARY ARTERY OF NATIVE HEART WITH ANGINA PECTORIS: ICD-10-CM

## 2022-05-16 DIAGNOSIS — K21.9 GASTROESOPHAGEAL REFLUX DISEASE WITHOUT ESOPHAGITIS: ICD-10-CM

## 2022-05-16 DIAGNOSIS — K22.0 ACHALASIA OF ESOPHAGUS: ICD-10-CM

## 2022-05-16 DIAGNOSIS — Z01.818 PRE-OPERATIVE CLEARANCE: ICD-10-CM

## 2022-05-16 DIAGNOSIS — E66.9 OBESITY (BMI 30.0-34.9): ICD-10-CM

## 2022-05-16 DIAGNOSIS — G89.29 CHRONIC PAIN OF RIGHT KNEE: Primary | ICD-10-CM

## 2022-05-16 DIAGNOSIS — E78.2 MIXED HYPERLIPIDEMIA: ICD-10-CM

## 2022-05-16 DIAGNOSIS — I25.118 CORONARY ARTERY DISEASE OF NATIVE ARTERY OF NATIVE HEART WITH STABLE ANGINA PECTORIS: ICD-10-CM

## 2022-05-16 DIAGNOSIS — M25.561 CHRONIC PAIN OF RIGHT KNEE: Primary | ICD-10-CM

## 2022-05-16 DIAGNOSIS — M25.561 CHRONIC PAIN OF RIGHT KNEE: ICD-10-CM

## 2022-05-16 PROCEDURE — 3008F BODY MASS INDEX DOCD: CPT | Mod: CPTII,S$GLB,, | Performed by: FAMILY MEDICINE

## 2022-05-16 PROCEDURE — 93010 EKG 12-LEAD: ICD-10-PCS | Mod: ,,, | Performed by: INTERNAL MEDICINE

## 2022-05-16 PROCEDURE — 1159F PR MEDICATION LIST DOCUMENTED IN MEDICAL RECORD: ICD-10-PCS | Mod: CPTII,S$GLB,, | Performed by: FAMILY MEDICINE

## 2022-05-16 PROCEDURE — 93010 ELECTROCARDIOGRAM REPORT: CPT | Mod: ,,, | Performed by: INTERNAL MEDICINE

## 2022-05-16 PROCEDURE — 99214 PR OFFICE/OUTPT VISIT, EST, LEVL IV, 30-39 MIN: ICD-10-PCS | Mod: S$GLB,,, | Performed by: FAMILY MEDICINE

## 2022-05-16 PROCEDURE — 3074F PR MOST RECENT SYSTOLIC BLOOD PRESSURE < 130 MM HG: ICD-10-PCS | Mod: CPTII,S$GLB,, | Performed by: FAMILY MEDICINE

## 2022-05-16 PROCEDURE — 99214 OFFICE O/P EST MOD 30 MIN: CPT | Mod: S$GLB,,, | Performed by: FAMILY MEDICINE

## 2022-05-16 PROCEDURE — 99999 PR PBB SHADOW E&M-EST. PATIENT-LVL III: CPT | Mod: PBBFAC,,, | Performed by: FAMILY MEDICINE

## 2022-05-16 PROCEDURE — 3008F PR BODY MASS INDEX (BMI) DOCUMENTED: ICD-10-PCS | Mod: CPTII,S$GLB,, | Performed by: FAMILY MEDICINE

## 2022-05-16 PROCEDURE — 99499 UNLISTED E&M SERVICE: CPT | Mod: S$GLB,,, | Performed by: FAMILY MEDICINE

## 2022-05-16 PROCEDURE — 1160F PR REVIEW ALL MEDS BY PRESCRIBER/CLIN PHARMACIST DOCUMENTED: ICD-10-PCS | Mod: CPTII,S$GLB,, | Performed by: FAMILY MEDICINE

## 2022-05-16 PROCEDURE — 1159F MED LIST DOCD IN RCRD: CPT | Mod: CPTII,S$GLB,, | Performed by: FAMILY MEDICINE

## 2022-05-16 PROCEDURE — 99999 PR PBB SHADOW E&M-EST. PATIENT-LVL III: ICD-10-PCS | Mod: PBBFAC,,, | Performed by: FAMILY MEDICINE

## 2022-05-16 PROCEDURE — 1160F RVW MEDS BY RX/DR IN RCRD: CPT | Mod: CPTII,S$GLB,, | Performed by: FAMILY MEDICINE

## 2022-05-16 PROCEDURE — 3078F DIAST BP <80 MM HG: CPT | Mod: CPTII,S$GLB,, | Performed by: FAMILY MEDICINE

## 2022-05-16 PROCEDURE — 3074F SYST BP LT 130 MM HG: CPT | Mod: CPTII,S$GLB,, | Performed by: FAMILY MEDICINE

## 2022-05-16 PROCEDURE — 3078F PR MOST RECENT DIASTOLIC BLOOD PRESSURE < 80 MM HG: ICD-10-PCS | Mod: CPTII,S$GLB,, | Performed by: FAMILY MEDICINE

## 2022-05-16 PROCEDURE — 93005 ELECTROCARDIOGRAM TRACING: CPT

## 2022-05-16 PROCEDURE — 99499 RISK ADDL DX/OHS AUDIT: ICD-10-PCS | Mod: S$GLB,,, | Performed by: FAMILY MEDICINE

## 2022-05-16 RX ORDER — ESCITALOPRAM OXALATE 20 MG/1
20 TABLET ORAL NIGHTLY
Qty: 90 TABLET | Refills: 1 | Status: SHIPPED | OUTPATIENT
Start: 2022-05-16 | End: 2022-08-05

## 2022-05-16 NOTE — PROGRESS NOTES
Subjective:       Patient ID: Lenny Latham Jr. is a 61 y.o. male.    Chief Complaint: Pre-op Exam    61-year-old  male patient with Patient Active Problem List:     GERD (gastroesophageal reflux disease)     Achalasia of esophagus     Atherosclerosis of native coronary artery of native heart with angina pectoris     Mixed anxiety and depressive disorder     Mixed hyperlipidemia     Coronary artery disease of native artery of native heart with stable angina pectoris     Benign prostatic hyperplasia with urinary obstruction     Urgency of urination     Erectile dysfunction     Obesity (BMI 30.0-34.9)  Here for preop clearance secondary to chronic right knee pain scheduled for right knee arthroscopy with partial medial meniscectomy by Dr. Navi Barragan at [facility], take to be decided.  Patient has been having chronic right knee pain with gait discomfort and has been having right shoulder pain as well for which she had MRI of the shoulder done showing mild rotator cuff tendinosis with low-grade partial-thickness tear as 50% supraspinatus insertional fibers and moderate arthritis changes.   MRI of the right knee shows degenerative appearance of the medial meniscus body segment with associated long to little tear and advanced medial compartment degenerative arthrosis and moderate patellofemoral and lateral tibiofemoral degenerative changes also noted medium-sized effusion with moderate synovitis and 5 cm minimally complex popliteal Baker's cyst.   Denies any chest pain or difficulty breathing with palpitations but has been having occasional cough and patient is scheduled to get EGD on 05/23/2022, also due for colonoscopy and would like to get it scheduled at the same time    Denies any other complaints today    Review of Systems   Constitutional: Negative for appetite change and fatigue.   Eyes: Negative for visual disturbance.   Respiratory: Positive for cough. Negative for shortness  of breath.    Cardiovascular: Negative for chest pain, palpitations and leg swelling.   Gastrointestinal: Negative for abdominal pain, nausea and vomiting.   Musculoskeletal: Positive for arthralgias and myalgias.   Skin: Negative for rash.   Neurological: Negative for headaches.   Psychiatric/Behavioral: Negative for sleep disturbance.         /72 (BP Location: Left arm, Patient Position: Sitting, BP Method: Large (Manual))   Pulse 82   Temp 97.6 °F (36.4 °C) (Tympanic)   Wt 85.2 kg (187 lb 13.3 oz)   SpO2 96%   BMI 33.27 kg/m²   Objective:      Physical Exam  Constitutional:       Appearance: He is well-developed.   HENT:      Head: Normocephalic and atraumatic.   Cardiovascular:      Rate and Rhythm: Normal rate and regular rhythm.      Heart sounds: Normal heart sounds. No murmur heard.  Pulmonary:      Effort: Pulmonary effort is normal.      Breath sounds: Normal breath sounds. No wheezing.   Abdominal:      General: Bowel sounds are normal.      Palpations: Abdomen is soft.      Tenderness: There is no abdominal tenderness.   Musculoskeletal:         General: Tenderness present.      Comments: Positive for right shoulder anteriorly and positive for right knee anteriorly and medially   Skin:     General: Skin is warm and dry.      Findings: No rash.   Neurological:      Mental Status: He is alert and oriented to person, place, and time.   Psychiatric:         Mood and Affect: Mood normal.         Lab Visit on 05/16/2022   Component Date Value Ref Range Status    WBC 05/16/2022 6.33  3.90 - 12.70 K/uL Final    RBC 05/16/2022 4.92  4.60 - 6.20 M/uL Final    Hemoglobin 05/16/2022 14.7  14.0 - 18.0 g/dL Final    Hematocrit 05/16/2022 46.7  40.0 - 54.0 % Final    MCV 05/16/2022 95  82 - 98 fL Final    MCH 05/16/2022 29.9  27.0 - 31.0 pg Final    MCHC 05/16/2022 31.5 (A) 32.0 - 36.0 g/dL Final    RDW 05/16/2022 14.5  11.5 - 14.5 % Final    Platelets 05/16/2022 241  150 - 450 K/uL Final    MPV  05/16/2022 9.6  9.2 - 12.9 fL Final    Immature Granulocytes 05/16/2022 0.3  0.0 - 0.5 % Final    Gran # (ANC) 05/16/2022 3.5  1.8 - 7.7 K/uL Final    Immature Grans (Abs) 05/16/2022 0.02  0.00 - 0.04 K/uL Final    Comment: Mild elevation in immature granulocytes is non specific and   can be seen in a variety of conditions including stress response,   acute inflammation, trauma and pregnancy. Correlation with other   laboratory and clinical findings is essential.      Lymph # 05/16/2022 2.3  1.0 - 4.8 K/uL Final    Mono # 05/16/2022 0.5  0.3 - 1.0 K/uL Final    Eos # 05/16/2022 0.1  0.0 - 0.5 K/uL Final    Baso # 05/16/2022 0.03  0.00 - 0.20 K/uL Final    nRBC 05/16/2022 0  0 /100 WBC Final    Gran % 05/16/2022 54.8  38.0 - 73.0 % Final    Lymph % 05/16/2022 35.7  18.0 - 48.0 % Final    Mono % 05/16/2022 7.6  4.0 - 15.0 % Final    Eosinophil % 05/16/2022 1.1  0.0 - 8.0 % Final    Basophil % 05/16/2022 0.5  0.0 - 1.9 % Final    Differential Method 05/16/2022 Automated   Final    Sodium 05/16/2022 136  136 - 145 mmol/L Final    Potassium 05/16/2022 4.6  3.5 - 5.1 mmol/L Final    Chloride 05/16/2022 102  95 - 110 mmol/L Final    CO2 05/16/2022 24  23 - 29 mmol/L Final    Glucose 05/16/2022 86  70 - 110 mg/dL Final    BUN 05/16/2022 10  8 - 23 mg/dL Final    Creatinine 05/16/2022 1.0  0.5 - 1.4 mg/dL Final    Calcium 05/16/2022 9.4  8.7 - 10.5 mg/dL Final    Anion Gap 05/16/2022 10  8 - 16 mmol/L Final    eGFR if African American 05/16/2022 >60.0  >60 mL/min/1.73 m^2 Final    eGFR if non African American 05/16/2022 >60.0  >60 mL/min/1.73 m^2 Final    Comment: Calculation used to obtain the estimated glomerular filtration  rate (eGFR) is the CKD-EPI equation.        Results for orders placed or performed during the hospital encounter of 05/16/22   EKG 12-lead    Collection Time: 05/16/22  3:13 PM    Narrative    Test Reason : G89.29    Vent. Rate : 074 BPM     Atrial Rate : 074 BPM     P-R Int :  132 ms          QRS Dur : 084 ms      QT Int : 368 ms       P-R-T Axes : 051 003 028 degrees     QTc Int : 408 ms    Normal sinus rhythm  Normal ECG  When compared with ECG of 11-MAR-2022 13:37,  No significant change was found  Confirmed by ELIZABETH PLASCENCIA MD ([id number]) on 5/16/2022 11:33:57 PM    Referred By: TAPAN PINEDA           Confirmed By:ELIZABETH PLASCENCIA MD     Assessment/Plan:   1. Chronic pain of right knee  - CBC Auto Differential; Future  - Basic Metabolic Panel; Future  - EKG 12-lead; Future  2. Pre-operative clearance  - CBC Auto Differential; Future  - Basic Metabolic Panel; Future  - EKG 12-lead; Future    Reviewed preop labs which looks stable including EKG  Patient is at low risk for the proposed surgery  will fax preop documents at [phone]  Patient clinically doing well, currently taking Norco as needed    3. Rotator cuff tear arthropathy of right shoulder  To be discuss further with orthopedic physician    4. Gastroesophageal reflux disease without esophagitis  5. Achalasia of esophagus  Currently scheduled for EGD soon  Currently taking omeprazole 40 mg daily    6. Atherosclerosis of native coronary artery of native heart with angina pectoris  7. Coronary artery disease of native artery of native heart with stable angina pectoris  Stable and asymptomatic    8. Mixed hyperlipidemia  Currently on Lipitor 40 mg daily    9. Mixed anxiety and depressive disorder  - EScitalopram oxalate (LEXAPRO) 20 MG tablet; Take 1 tablet (20 mg total) by mouth every evening.  Dispense: 90 tablet; Refill: 1  Refill given on Lexapro 20 mg daily and patient was encouraged to take it regularly and avoid stress  Also taking Zyprexa 10 mg daily    10. Benign prostatic hyperplasia with urinary obstruction  Stable on Flomax    11. Obesity (BMI 30.0-34.9)  Lifestyle modifications discussed to lose weight with BMI 33    12. Colon cancer screening  - Ambulatory referral/consult to Endo Procedure ; Future   Due for  colonoscopy and was advised to consider getting it scheduled along with EGD

## 2022-05-17 ENCOUNTER — PATIENT MESSAGE (OUTPATIENT)
Dept: INTERNAL MEDICINE | Facility: CLINIC | Age: 62
End: 2022-05-17
Payer: MEDICARE

## 2022-05-19 ENCOUNTER — TELEPHONE (OUTPATIENT)
Dept: GASTROENTEROLOGY | Facility: CLINIC | Age: 62
End: 2022-05-19
Payer: MEDICARE

## 2022-05-19 NOTE — TELEPHONE ENCOUNTER
----- Message from Clementina Drake sent at 5/19/2022  8:40 AM CDT -----  Contact: [phone]  Type:  Needs Medical Advice    Who Called: Pt   Reason for call: regarding Procedure on 5/23 and instructions    Would the patient rather a call back or a response via [facility]? Call back  Best Call Back Number: [phone]

## 2022-05-23 ENCOUNTER — ANESTHESIA EVENT (OUTPATIENT)
Dept: ENDOSCOPY | Facility: HOSPITAL | Age: 62
End: 2022-05-23
Payer: MEDICARE

## 2022-05-23 ENCOUNTER — HOSPITAL ENCOUNTER (OUTPATIENT)
Facility: HOSPITAL | Age: 62
Discharge: HOME | End: 2022-05-23
Attending: INTERNAL MEDICINE | Admitting: INTERNAL MEDICINE
Payer: MEDICARE

## 2022-05-23 ENCOUNTER — ANESTHESIA (OUTPATIENT)
Dept: ENDOSCOPY | Facility: HOSPITAL | Age: 62
End: 2022-05-23
Payer: MEDICARE

## 2022-05-23 VITALS
OXYGEN SATURATION: 98 % | HEIGHT: 63 IN | RESPIRATION RATE: 20 BRPM | HEART RATE: 70 BPM | BODY MASS INDEX: 31.89 KG/M2 | DIASTOLIC BLOOD PRESSURE: 90 MMHG | SYSTOLIC BLOOD PRESSURE: 136 MMHG | WEIGHT: 180 LBS | TEMPERATURE: 98 F

## 2022-05-23 DIAGNOSIS — K22.0 ACHALASIA OF ESOPHAGUS: Primary | ICD-10-CM

## 2022-05-23 DIAGNOSIS — R13.10 DYSPHAGIA: ICD-10-CM

## 2022-05-23 PROCEDURE — 91040 PR ESOPH BALLOON DISTENSION TST: ICD-10-PCS | Mod: 26,,, | Performed by: INTERNAL MEDICINE

## 2022-05-23 PROCEDURE — 88305 TISSUE EXAM BY PATHOLOGIST: CPT | Performed by: STUDENT IN AN ORGANIZED HEALTH CARE EDUCATION/TRAINING PROGRAM

## 2022-05-23 PROCEDURE — D9220A PRA ANESTHESIA: Mod: CRNA,,, | Performed by: NURSE ANESTHETIST, CERTIFIED REGISTERED

## 2022-05-23 PROCEDURE — 43239 EGD BIOPSY SINGLE/MULTIPLE: CPT | Mod: ,,, | Performed by: INTERNAL MEDICINE

## 2022-05-23 PROCEDURE — D9220A PRA ANESTHESIA: Mod: ANES,,, | Performed by: ANESTHESIOLOGY

## 2022-05-23 PROCEDURE — 25000003 PHARM REV CODE 250: Performed by: NURSE ANESTHETIST, CERTIFIED REGISTERED

## 2022-05-23 PROCEDURE — 37000008 HC ANESTHESIA 1ST 15 MINUTES: Performed by: INTERNAL MEDICINE

## 2022-05-23 PROCEDURE — 43239 PR EGD, FLEX, W/BIOPSY, SGL/MULTI: ICD-10-PCS | Mod: ,,, | Performed by: INTERNAL MEDICINE

## 2022-05-23 PROCEDURE — 91040 ESOPH BALLOON DISTENSION TST: CPT | Mod: 26,,, | Performed by: INTERNAL MEDICINE

## 2022-05-23 PROCEDURE — 63600175 PHARM REV CODE 636 W HCPCS: Performed by: NURSE ANESTHETIST, CERTIFIED REGISTERED

## 2022-05-23 PROCEDURE — 27201012 HC FORCEPS, HOT/COLD, DISP: Performed by: INTERNAL MEDICINE

## 2022-05-23 PROCEDURE — 27201014 HC GRASPER DEVICE: Performed by: INTERNAL MEDICINE

## 2022-05-23 PROCEDURE — 91040 ESOPH BALLOON DISTENSION TST: CPT | Performed by: INTERNAL MEDICINE

## 2022-05-23 PROCEDURE — D9220A PRA ANESTHESIA: ICD-10-PCS | Mod: CRNA,,, | Performed by: NURSE ANESTHETIST, CERTIFIED REGISTERED

## 2022-05-23 PROCEDURE — 88305 TISSUE EXAM BY PATHOLOGIST: CPT | Mod: 26,59,, | Performed by: STUDENT IN AN ORGANIZED HEALTH CARE EDUCATION/TRAINING PROGRAM

## 2022-05-23 PROCEDURE — 88305 TISSUE EXAM BY PATHOLOGIST: ICD-10-PCS | Mod: 26,,, | Performed by: STUDENT IN AN ORGANIZED HEALTH CARE EDUCATION/TRAINING PROGRAM

## 2022-05-23 PROCEDURE — D9220A PRA ANESTHESIA: ICD-10-PCS | Mod: ANES,,, | Performed by: ANESTHESIOLOGY

## 2022-05-23 PROCEDURE — 37000009 HC ANESTHESIA EA ADD 15 MINS: Performed by: INTERNAL MEDICINE

## 2022-05-23 PROCEDURE — 88342 IMHCHEM/IMCYTCHM 1ST ANTB: CPT | Performed by: STUDENT IN AN ORGANIZED HEALTH CARE EDUCATION/TRAINING PROGRAM

## 2022-05-23 PROCEDURE — 43239 EGD BIOPSY SINGLE/MULTIPLE: CPT | Performed by: INTERNAL MEDICINE

## 2022-05-23 PROCEDURE — C1726 CATH, BAL DIL, NON-VASCULAR: HCPCS | Performed by: INTERNAL MEDICINE

## 2022-05-23 PROCEDURE — 88342 IMHCHEM/IMCYTCHM 1ST ANTB: CPT | Mod: 26,,, | Performed by: STUDENT IN AN ORGANIZED HEALTH CARE EDUCATION/TRAINING PROGRAM

## 2022-05-23 PROCEDURE — 25000003 PHARM REV CODE 250: Performed by: STUDENT IN AN ORGANIZED HEALTH CARE EDUCATION/TRAINING PROGRAM

## 2022-05-23 PROCEDURE — 88342 CHG IMMUNOCYTOCHEMISTRY: ICD-10-PCS | Mod: 26,,, | Performed by: STUDENT IN AN ORGANIZED HEALTH CARE EDUCATION/TRAINING PROGRAM

## 2022-05-23 RX ORDER — PROPOFOL 10 MG/ML
VIAL (ML) INTRAVENOUS
Status: DISCONTINUED | OUTPATIENT
Start: 2022-05-23 | End: 2022-05-23

## 2022-05-23 RX ORDER — PHENYLEPHRINE HCL IN 0.9% NACL 1 MG/10 ML
SYRINGE (ML) INTRAVENOUS
Status: DISCONTINUED | OUTPATIENT
Start: 2022-05-23 | End: 2022-05-23

## 2022-05-23 RX ORDER — PROCHLORPERAZINE EDISYLATE 5 MG/ML
5 INJECTION INTRAMUSCULAR; INTRAVENOUS EVERY 30 MIN PRN
Status: DISCONTINUED | OUTPATIENT
Start: 2022-05-23 | End: 2022-05-24 | Stop reason: HOSPADM

## 2022-05-23 RX ORDER — LIDOCAINE HYDROCHLORIDE 20 MG/ML
INJECTION, SOLUTION EPIDURAL; INFILTRATION; INTRACAUDAL; PERINEURAL
Status: DISCONTINUED | OUTPATIENT
Start: 2022-05-23 | End: 2022-05-23

## 2022-05-23 RX ORDER — SODIUM CHLORIDE 0.9 % (FLUSH) 0.9 %
10 SYRINGE (ML) INJECTION
Status: DISCONTINUED | OUTPATIENT
Start: 2022-05-23 | End: 2022-05-24 | Stop reason: HOSPADM

## 2022-05-23 RX ORDER — SODIUM CHLORIDE 9 MG/ML
INJECTION, SOLUTION INTRAVENOUS CONTINUOUS
Status: DISCONTINUED | OUTPATIENT
Start: 2022-05-23 | End: 2022-05-24 | Stop reason: HOSPADM

## 2022-05-23 RX ORDER — LIDOCAINE HYDROCHLORIDE 20 MG/ML
JELLY TOPICAL ONCE
Status: DISCONTINUED | OUTPATIENT
Start: 2022-05-23 | End: 2022-05-24 | Stop reason: HOSPADM

## 2022-05-23 RX ORDER — ONDANSETRON 2 MG/ML
4 INJECTION INTRAMUSCULAR; INTRAVENOUS DAILY PRN
Status: DISCONTINUED | OUTPATIENT
Start: 2022-05-23 | End: 2022-05-24 | Stop reason: HOSPADM

## 2022-05-23 RX ORDER — PROPOFOL 10 MG/ML
VIAL (ML) INTRAVENOUS CONTINUOUS PRN
Status: DISCONTINUED | OUTPATIENT
Start: 2022-05-23 | End: 2022-05-23

## 2022-05-23 RX ADMIN — LIDOCAINE HYDROCHLORIDE 100 MG: 20 INJECTION, SOLUTION EPIDURAL; INFILTRATION; INTRACAUDAL at 01:05

## 2022-05-23 RX ADMIN — PROPOFOL 175 MCG/KG/MIN: 10 INJECTION, EMULSION INTRAVENOUS at 01:05

## 2022-05-23 RX ADMIN — Medication 100 MCG: at 01:05

## 2022-05-23 RX ADMIN — Medication 70 MG: at 01:05

## 2022-05-23 RX ADMIN — SODIUM CHLORIDE: 0.9 INJECTION, SOLUTION INTRAVENOUS at 12:05

## 2022-05-23 NOTE — H&P
Short Stay Endoscopy History and Physical    PCP - Herminia Longoria MD     Procedure - EGD/Flip/mano  ASA - per anesthesia  Mallampati - per anesthesia  History of Anesthesia problems - no  Family history Anesthesia problems -  no   Plan of anesthesia - General    HPI:  This is a 61 y.o. male here for evaluation of dysphagia, gerd        Medical History:  has a past medical history of Achalasia of esophagus (11/28/2016), Anxiety state, unspecified, Coronary artery disease, Esophageal reflux, Esophageal stricture, Hypertrophy of prostate without urinary obstruction and other lower urinary tract symptoms (LUTS), Screening PSA (prostate specific antigen) (12/7/12), and Unspecified essential hypertension.    Surgical History:  has a past surgical history that includes Back surgery; Esophagogastroduodenoscopy; Colonoscopy; Hernia repair; Upper gastrointestinal endoscopy (04/27/2016); Inguinal hernia repair (Right); CERVICAL EPIDURAL STEROID INJECTION; LUMBAR EPIDURAL STEROID INJECTION; and Esophagogastroduodenoscopy (N/A, 8/16/2019).    Family History: family history includes Hypertension in his father; Lung cancer in his mother; Prostate cancer in his maternal grandfather; Ulcers in his brother, sister, and sister.. Otherwise no colon cancer, inflammatory bowel disease, or GI malignancies.    Social History:  reports that he has never smoked. He has never used smokeless tobacco. He reports previous alcohol use. He reports previous drug use. Drug: Marijuana.    Review of patient's allergies indicates:  No Known Allergies    Medications:   No medications prior to admission.       Physical Exam:    Vital Signs: There were no vitals filed for this visit.    I have explained the risks and benefits of endoscopy procedures to the patient including but not limited to bleeding, perforation, infection, and death.      Madina Hoover MD

## 2022-05-23 NOTE — TRANSFER OF CARE
"Anesthesia Transfer of Care Note    Patient: Lenny Latham Jr.    Procedure(s) Performed: Procedure(s) (LRB):  ESOPHAGOGASTRODUODENOSCOPY (EGD) (N/A)  MANOMETRY-ESOPHAGEAL-WITH IMPEDANCE (N/A)    Patient location: [facility]    Anesthesia Type: general    Transport from OR: Transported from OR on room air with adequate spontaneous ventilation    Post pain: adequate analgesia    Post assessment: no apparent anesthetic complications and tolerated procedure well    Post vital signs: stable    Level of consciousness: awake, alert and oriented    Nausea/Vomiting: no nausea/vomiting    Complications: none    Transfer of care protocol was followed      Last vitals:   Visit Vitals  /79 (BP Location: Left arm, Patient Position: Lying)   Pulse 69   Temp 36.7 °C (98.1 °F) (Temporal)   Resp 16   Ht 5' 3" (1.6 m)   Wt 81.6 kg (180 lb)   SpO2 96%   BMI 31.89 kg/m²     "

## 2022-05-23 NOTE — PROVATION PATIENT INSTRUCTIONS
Discharge Summary/Instructions after an Endoscopic Procedure  Patient Name: Lenny Latham  Patient MRN: [id number]  Patient YOB: 1960  Monday, May 23, 2022  Madina Hoover MD  Dear patient,  As a result of recent federal legislation (The Federal Cures Act), you may   receive lab or pathology results from your procedure in your [facility]   account before your physician is able to contact you. Your physician or   their representative will relay the results to you with their   recommendations at their soonest availability.  Thank you,  RESTRICTIONS:  During your procedure today, you received medications for sedation.  These   medications may affect your judgment, balance and coordination.  Therefore,   for 24 hours, you have the following restrictions:   - DO NOT drive a car, operate machinery, make legal/financial decisions,   sign important papers or drink alcohol.    ACTIVITY:  Today: no heavy lifting, straining or running due to procedural   sedation/anesthesia.  The following day: return to full activity including work.  DIET:  Eat and drink normally unless instructed otherwise.     TREATMENT FOR COMMON SIDE EFFECTS:  - Mild abdominal pain, nausea, belching, bloating or excessive gas:  rest,   eat lightly and use a heating pad.  - Sore Throat: treat with throat lozenges and/or gargle with warm salt   water.  - Because air was used during the procedure, expelling large amounts of air   from your rectum or belching is normal.  - If a bowel prep was taken, you may not have a bowel movement for 1-3 days.    This is normal.  SYMPTOMS TO WATCH FOR AND REPORT TO YOUR PHYSICIAN:  1. Abdominal pain or bloating, other than gas cramps.  2. Chest pain.  3. Back pain.  4. Signs of infection such as: chills or fever occurring within 24 hours   after the procedure.  5. Rectal bleeding, which would show as bright red, maroon, or black stools.   (A tablespoon of blood from the rectum is not serious, especially if    hemorrhoids are present.)  6. Vomiting.  7. Weakness or dizziness.  GO DIRECTLY TO THE NEAREST EMERGENCY ROOM IF YOU HAVE ANY OF THE FOLLOWING:      Difficulty breathing              Chills and/or fever over 101 F   Persistent vomiting and/or vomiting blood   Severe abdominal pain   Severe chest pain   Black, tarry stools   Bleeding- more than one tablespoon   Any other symptom or condition that you feel may need urgent attention  Your doctor recommends these additional instructions:  If any biopsies were taken, your doctors clinic will contact you in 1 to 2   weeks with any results.  - Await pathology results.   - Discharge patient to home (with escort).   - Resume previous diet.   - Continue present medications.   - The findings and recommendations were discussed with the patient.   - Return to my office as previously scheduled.   For questions, problems or results please call your physician - Madina Hoover MD at Work:  [phone].  [facility] [place], EMERGENCY ROOM PHONE NUMBER: [phone]  IF A COMPLICATION OR EMERGENCY SITUATION ARISES AND YOU ARE UNABLE TO REACH   YOUR PHYSICIAN - GO DIRECTLY TO THE EMERGENCY ROOM.  Madina Hoover MD  5/23/2022 1:45:03 PM  This report has been verified and signed electronically.  Dear patient,  As a result of recent federal legislation (The Federal Cures Act), you may   receive lab or pathology results from your procedure in your [facility]   account before your physician is able to contact you. Your physician or   their representative will relay the results to you with their   recommendations at their soonest availability.  Thank you,  PROVATION

## 2022-05-23 NOTE — ANESTHESIA PREPROCEDURE EVALUATION
05/23/2022  Lenny Latham Jr. is a 61 y.o., male     Patient Active Problem List    Diagnosis Date Noted    Obesity (BMI 30.0-34.9) 01/24/2022    Benign prostatic hyperplasia with urinary obstruction 10/25/2021    Urgency of urination 10/25/2021    Erectile dysfunction 10/25/2021    Coronary artery disease of native artery of native heart with stable angina pectoris 03/23/2021    Mixed anxiety and depressive disorder 03/10/2021    Mixed hyperlipidemia 03/10/2021    Atherosclerosis of native coronary artery of native heart with angina pectoris 03/13/2017    Achalasia of esophagus 11/28/2016    GERD (gastroesophageal reflux disease) 10/27/2014     .      Pre-op Assessment    I have reviewed the Patient Summary Reports.     I have reviewed the Nursing Notes. I have reviewed the NPO Status.   I have reviewed the Medications.     Review of Systems  Social:  Social Alcohol Use, Smoker Occasional cigars   Cardiovascular:   Hypertension CAD asymptomatic  hyperlipidemia ECG has been reviewed. Normal sinus rhythm with sinus arrhythmia    4/2021 nuclear stress test    Normal myocardial perfusion scan. There is no evidence of myocardial ischemia or infarction.    The gated perfusion images showed an ejection fraction of 54% at rest. The gated perfusion images showed an ejection fraction of 65% post stress. Normal ejection fraction is greater than 53%.    The EKG portion of this study is negative for ischemia.    The patient reported no chest pain during the stress test.    There were no arrhythmias during stress.   Hepatic/GI:   GERD, poorly controlled Typically poorly controlled GERD, on liquid diet since Thursday with no symptoms since then   Psych:   Psychiatric History anxiety depression          Physical Exam  General: Well nourished, Cooperative and Alert    Airway:  Mallampati: I   Mouth  Opening: Normal  TM Distance: Normal  Tongue: Normal  Neck ROM: Normal ROM    Dental:        Anesthesia Plan  Type of Anesthesia, risks & benefits discussed:    Anesthesia Type: Gen Natural Airway  Intra-op Monitoring Plan: Standard ASA Monitors  Post Op Pain Control Plan: multimodal analgesia and IV/PO Opioids PRN  Induction:  IV  Informed Consent: Informed consent signed with the Patient and all parties understand the risks and agree with anesthesia plan.  All questions answered.   ASA Score: 3    Ready For Surgery From Anesthesia Perspective.     .

## 2022-05-23 NOTE — ANESTHESIA POSTPROCEDURE EVALUATION
Anesthesia Post Evaluation    Patient: Lenny Latham Jr.    Procedure(s) Performed: Procedure(s) (LRB):  ESOPHAGOGASTRODUODENOSCOPY (EGD) (N/A)  MANOMETRY-ESOPHAGEAL-WITH IMPEDANCE (N/A)    Final Anesthesia Type: general      Patient location during evaluation: [facility]  Patient participation: Yes- Able to Participate  Level of consciousness: awake and alert  Post-procedure vital signs: reviewed and stable  Pain management: adequate  Airway patency: patent  NATALIE mitigation strategies: Extubation while patient is awake  PONV status at discharge: No PONV  Anesthetic complications: no      Cardiovascular status: stable  Respiratory status: unassisted and spontaneous ventilation  Hydration status: euvolemic  Follow-up not needed.          Vitals Value Taken Time   /90 05/23/22 1416   Temp 36.7 °C (98 °F) 05/23/22 1415   Pulse 72 05/23/22 1424   Resp 20 05/23/22 1421   SpO2 96 % 05/23/22 1424   Vitals shown include unvalidated device data.      No case tracking events are documented in the log.      Pain/Laly Score: Laly Score: 10 (5/23/2022  2:18 PM)

## 2022-05-23 NOTE — PLAN OF CARE
Discharge paper work given to wife. MD Hoover to bedside to address findings and POC. Pt to be discharged by 4th floor endo post manometry study. No acute distress noted.

## 2022-05-27 PROCEDURE — 91010 ESOPHAGUS MOTILITY STUDY: CPT | Mod: 26,,, | Performed by: INTERNAL MEDICINE

## 2022-05-27 PROCEDURE — 91037 PR GERD TST W/ NASAL IMPEDENCE ELECTROD: ICD-10-PCS | Mod: 26,,, | Performed by: INTERNAL MEDICINE

## 2022-05-27 PROCEDURE — 91010 PR ESOPHAGEAL MOTILITY STUDY, MA2METRY: ICD-10-PCS | Mod: 26,,, | Performed by: INTERNAL MEDICINE

## 2022-05-27 PROCEDURE — 91037 ESOPH IMPED FUNCTION TEST: CPT | Mod: 26,,, | Performed by: INTERNAL MEDICINE

## 2022-05-31 LAB
FINAL PATHOLOGIC DIAGNOSIS: NORMAL
Lab: NORMAL

## 2022-05-31 NOTE — PROGRESS NOTES
Subjective:       Patient ID: Lenny Latham Jr. is a 61 y.o. male.    Chief Complaint: Memory issues  and Neck and back pain           HPI The patient is here for memory evaluating. Accompanied by girlfriend. Patient began having memory changes in 2020. On January 12, 2022, he reports worsening of s/s after falling.   He reports he was at work, and he tripped and fell and hit his head on the concrete, no KRISTIN, No LOC, he states he was dizzy following the fall and he began having headaches,and became very forgetful. The main problems the patient has are related to short term memory loss. For example, the patient would repeat the same question over and over again. The patient excessively forgets where placed certain things. The patient denies forgetting names. The patient is driving and denies getting lost.He has trouble sometimes navigating new areas and requires assisatance.  The patient is not losing personal items and does not put them in odd places. No confusion around and inside the house. Has trouble remembering the date and time, he states he has to write everything down, The patient gf keep up with medications and appointments. Patient is aware  major holidays and political changes. The patient gf assist with handling finances. The patient is still independent with ADLs. No hallucinations or delusions. No seizures. Patient reports that he is easily agitated. He reports he takes Valium daily as long term use for ADI. He began taking valium at the  age of 5 years old. As an aldult he went 20 years off the valium but resumed taking the medication over a year ago. He is currently being managed per psychiatrist he is currently taking Lexapro, Zyprexa, and Cymbalta as well. Admits to social Alcohol use, states he drink 1 to 2 beers per month. No history of B12 deficiency. Patient has chronic pain and has Chronic opioid use for Chronic pain back neck, right knee pain.  No language problems. No problems  handling tools. No history of strokes. Chronic headaches right side, throbbing pain last 1 hour 2 hour, he states increased anxiety causes a headaches. No history of hypothyroidism.   No history of STDs.  No history of HIV infection. 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  No tremors. Last  Fall was yesterday no serious injury noted.  No urinary incontinence. Has difficult with sleep, difficult initiating sleep and remaining asleep. GF states he snores at night, denies waking up feeling tired, denies gasping for air. No change appetite. No family history of dementia.        Review of Systems   Constitutional: Positive for activity change.   HENT: Negative.    Eyes: Negative.    Respiratory: Negative.    Cardiovascular: Positive for leg swelling.        Right knee and leg swelling    Gastrointestinal: Negative.    Endocrine: Negative.    Genitourinary: Negative.    Musculoskeletal: Positive for arthralgias, back pain, gait problem, joint swelling and myalgias.   Skin: Negative.    Allergic/Immunologic: Negative.    Neurological: Positive for dizziness and headaches.   Psychiatric/Behavioral: Positive for confusion, decreased concentration, dysphoric mood and sleep disturbance. The patient is nervous/anxious.                  Current Outpatient Medications:     aspirin (ECOTRIN) 81 MG EC tablet, Take 81 mg by mouth once daily., Disp: , Rfl:     atorvastatin (LIPITOR) 40 MG tablet, Take 1 tablet (40 mg total) by mouth every evening., Disp: 90 tablet, Rfl: 1    EScitalopram oxalate (LEXAPRO) 20 MG tablet, Take 1 tablet (20 mg total) by mouth every evening., Disp: 90 tablet, Rfl: 1    HYDROcodone-acetaminophen (NORCO) 7.5-325 mg per tablet, Take 1 tablet by mouth every 6 (six) hours as needed for Pain., Disp: 12 tablet, Rfl: 0    OLANZapine (ZYPREXA) 10 MG tablet, Take 1 tablet (10 mg total) by mouth every evening., Disp: 90 tablet, Rfl: 1    omeprazole (PRILOSEC) 40 MG  capsule, TAKE 1 CAPSULE EVERY DAY, Disp: 90 capsule, Rfl: 1    tamsulosin (FLOMAX) 0.4 mg Cap, Take 1 capsule (0.4 mg total) by mouth once daily., Disp: 30 capsule, Rfl: 11    tiZANidine (ZANAFLEX) 4 MG tablet, Take 4 mg by mouth 3 (three) times daily., Disp: , Rfl:     diazePAM (VALIUM) 5 MG tablet, Take 1 tablet (5 mg total) by mouth every 12 (twelve) hours as needed for Anxiety., Disp: 30 tablet, Rfl: 3    sildenafil (REVATIO) 20 mg Tab, Take 1 tablet (20 mg total) by mouth daily as needed. (Patient not taking: No sig reported), Disp: 40 tablet, Rfl: 11  No current facility-administered medications for this visit.    Facility-Administered Medications Ordered in Other Visits:     0.9%  NaCl infusion, , Intravenous, Continuous, Brenden Pizarro MD, [place] at 05/23/22 1238    ondansetron injection 4 mg, 4 mg, Intravenous, Q12H PRN, Brenden Pizarro MD  Past Medical History:   Diagnosis Date    Achalasia of esophagus 11/28/2016    Anxiety state, unspecified     Coronary artery disease     Esophageal reflux     Esophageal stricture     Hypertrophy of prostate without urinary obstruction and other lower urinary tract symptoms (LUTS)     Screening PSA (prostate specific antigen) 12/7/12    0.4    Unspecified essential hypertension      Past Surgical History:   Procedure Laterality Date    BACK SURGERY      CERVICAL EPIDURAL STEROID INJECTION      COLONOSCOPY      ESOPHAGEAL MANOMETRY WITH MEASUREMENT OF IMPEDANCE N/A 5/23/2022    Procedure: MANOMETRY-ESOPHAGEAL-WITH IMPEDANCE;  Surgeon: Madina Hoover MD;  Location: [facility] ([address] [place] [address]);  Service: Endoscopy;  Laterality: N/A;  hold pain medication 24 hours prior to procedure    ESOPHAGOGASTRODUODENOSCOPY      ESOPHAGOGASTRODUODENOSCOPY N/A 8/16/2019    Procedure: EGD (ESOPHAGOGASTRODUODENOSCOPY);  Surgeon: Pawan Schwab MD;  Location: [facility];  Service: Endoscopy;  Laterality: N/A;    ESOPHAGOGASTRODUODENOSCOPY N/A 5/23/2022     Procedure: ESOPHAGOGASTRODUODENOSCOPY (EGD);  Surgeon: Madina Hoover MD;  Location: [facility] ([address] [place] [address]);  Service: Endoscopy;  Laterality: N/A;  Endoflip/Endoscopically placed manometry probe  2nd floor-End stage achalasia  propofol only  full iquid diet x3 days, clear liquid diet x1 day prior to procedure  fully vaccinated, instructions sent to [facility] and [facility]    HERNIA REPAIR      INGUINAL HERNIA REPAIR Right     LUMBAR EPIDURAL STEROID INJECTION      UPPER GASTROINTESTINAL ENDOSCOPY  04/27/2016     Social History     Socioeconomic History    Marital status: Single    Years of education: 12th   Occupational History    Occupation: Disabled   Tobacco Use    Smoking status: Never Smoker    Smokeless tobacco: Never Used   Substance and Sexual Activity    Alcohol use: Not Currently     Comment: Social  none since incarcerated    Drug use: Not Currently     Types: Marijuana             Past/Current Medical/Surgical History, Past/Current Social History, Past/Current Family History and Past/Current Medications were reviewed in detail.        Objective:           VITAL SIGNS WERE REVIEWED      GENERAL APPEARANCE:     The patient looks comfortable.    Body habitus overweight BMI 33.27     No signs of respiratory distress.    Normal breathing pattern.    No dysmorphic features    Normal eye contact.     GENERAL MEDICAL EXAM:    HEENT:  Head is atraumatic normocephalic.     Neck and Axillae: No JVD. No visible lesions.    No carotid bruits. No thyromegaly. No lymphadenopathy.    Cardiopulmonary: No cyanosis. No tachypnea. Normal respiratory effort.    Gastrointestinal/Urogenital:  No jaundice. No stomas or lesions. No visible hernias. No catheters.     Abdomen is soft non-tender. No masses or organomegaly.    Skin, Hair and Nails: No pathognonomic skin rash. No neurofibromatosis. No visible lesions.No stigmata of autoimmune disease. No clubbing.    Skin is warm and moist. No palpable masses.    Limbs:  No varicose veins. Right knee visible swelling, non-pitting  Pulses are symmetric. Pedal pulses are palpable.limping gait due to right knee pain, antalgic gait      Muskoskeletal: RT knee, visible deformities.No visible lesions.    No spine tenderness. No signs of longstanding neuropathy. No dislocations or fractures.            Neurologic Exam     Mental Status   Oriented to person, place, and time.   Registration: recalls 3 of 3 objects. Recall at 5 minutes: recalls 3 of 3 objects. Follows 3 step commands.   Attention: normal. Concentration: normal.   Speech: speech is normal   Level of consciousness: alert  Knowledge: good and consistent with education.   Able to name object. Able to read. Able to repeat. Able to write. Normal comprehension.     MOCA 30/30    Visuospatial/Executive 5/5    Naming                            3/3    Attention                         6/6    Language                         3/3    Abstraction                    2/2    Recall                                5/5    Orientation                     6/6    NORMAL-MILD NCD 26-30    HS graduate      Cranial Nerves     CN II   Visual fields full to confrontation.   Visual acuity: normal  Right visual field deficit: none  Left visual field deficit: none     CN III, IV, VI   Pupils are equal, round, and reactive to light.  Extraocular motions are normal.   Right pupil: Size: 2 mm. Shape: regular. Reactivity: brisk. Consensual response: intact.   Left pupil: Size: 2 mm. Shape: regular. Reactivity: brisk. Consensual response: intact.   Nystagmus: none   Diplopia: none  Ophthalmoparesis: none  Upgaze: normal  Downgaze: normal  Conjugate gaze: present  Vestibulo-ocular reflex: present    CN V   Facial sensation intact.   Right facial sensation deficit: none  Left facial sensation deficit: none    CN VII   Facial expression full, symmetric.     CN VIII   CN VIII normal.   Hearing: intact    CN IX, X   CN IX normal.     CN XI   CN XI normal.     CN XII    Tongue: not atrophic  Fasciculations: absent  Tongue deviation: none    Motor Exam   Muscle bulk: normal  Overall muscle tone: normal  Right arm tone: normal  Left arm tone: normal  Right arm pronator drift: absent  Left arm pronator drift: absent  Right leg tone: normal  Left leg tone: normal    Strength   Right neck flexion: 5/5  Left neck flexion: 5/5  Right neck extension: 5/5  Left neck extension: 5/5  Right deltoid: 5/5  Left deltoid: 5/5  Right biceps: 5/5  Left biceps: 5/5  Right triceps: 5/5  Left triceps: 5/5  Right wrist flexion: 5/5  Left wrist flexion: 5/5  Right wrist extension: 5/5  Left wrist extension: 5/5  Right interossei: 5/5  Left interossei: 5/5  Right abdominals: 5/5  Left abdominals: 5/5  Right iliopsoas: 5/5  Left iliopsoas: 5/5  Right quadriceps: 4/5  Left quadriceps: 5/5  Right hamstrin/5  Left hamstrin/5  Right glutei: 5/5  Left glutei: 5/5  Right anterior tibial: 5/5  Left anterior tibial: 5/5  Right posterior tibial: 5/5  Left posterior tibial: 5/5  Right peroneal: 5/5  Left peroneal: 5/5  Right gastroc: 5/5  Left gastroc: 5/5    Sensory Exam   Light touch normal.   Right arm light touch: normal  Left arm light touch: normal  Right leg light touch: normal  Left leg light touch: normal  Vibration normal.   Right arm vibration: normal  Left arm vibration: normal  Right leg vibration: normal  Left leg vibration: normal  Proprioception normal.   Right arm proprioception: normal  Left arm proprioception: normal  Right leg proprioception: normal  Left leg proprioception: normal  Pinprick normal.   Right arm pinprick: normal  Left arm pinprick: normal  Right leg pinprick: normal  Left leg pinprick: normal  Graphesthesia: normal  Stereognosis: normal    Gait, Coordination, and Reflexes     Gait  Gait: (limping gait )    Coordination   Romberg: negative  Finger to nose coordination: normal    Tremor   Resting tremor: absent  Intention tremor: absent  Action tremor: absent    Reflexes    Right brachioradialis: 2+  Left brachioradialis: 2+  Right biceps: 2+  Left biceps: 2+  Right triceps: 2+  Left triceps: 2+  Right patellar: 2+  Left patellar: 2+  Right plantar: normal  Left plantar: normal  Right Ortega: absent  Left Ortega: absent  Right ankle clonus: absent  Left ankle clonus: absent  Right pendular knee jerk: absent      Lab Results   Component Value Date    WBC 6.33 05/16/2022    HGB 14.7 05/16/2022    HCT 46.7 05/16/2022    MCV 95 05/16/2022     05/16/2022     Sodium   Date Value Ref Range Status   05/16/2022 136 136 - 145 mmol/L Final     Potassium   Date Value Ref Range Status   05/16/2022 4.6 3.5 - 5.1 mmol/L Final     Chloride   Date Value Ref Range Status   05/16/2022 102 95 - 110 mmol/L Final     CO2   Date Value Ref Range Status   05/16/2022 24 23 - 29 mmol/L Final     Glucose   Date Value Ref Range Status   05/16/2022 86 70 - 110 mg/dL Final     BUN   Date Value Ref Range Status   05/16/2022 10 8 - 23 mg/dL Final     Creatinine   Date Value Ref Range Status   05/16/2022 1.0 0.5 - 1.4 mg/dL Final     Calcium   Date Value Ref Range Status   05/16/2022 9.4 8.7 - 10.5 mg/dL Final     Total Protein   Date Value Ref Range Status   03/11/2022 7.9 6.0 - 8.4 g/dL Final     Albumin   Date Value Ref Range Status   03/11/2022 4.0 3.5 - 5.2 g/dL Final     Total Bilirubin   Date Value Ref Range Status   03/11/2022 1.5 (H) 0.1 - 1.0 mg/dL Final     Comment:     For infants and newborns, interpretation of results should be based  on gestational age, weight and in agreement with clinical  observations.    Premature Infant recommended reference ranges:  Up to 24 hours.............<8.0 mg/dL  Up to 48 hours............<12.0 mg/dL  3-5 days..................<15.0 mg/dL  6-29 days.................<15.0 mg/dL       Alkaline Phosphatase   Date Value Ref Range Status   03/11/2022 76 55 - 135 U/L Final     AST   Date Value Ref Range Status   03/11/2022 14 10 - 40 U/L Final     ALT   Date Value Ref  Range Status   03/11/2022 14 10 - 44 U/L Final     Anion Gap   Date Value Ref Range Status   05/16/2022 10 8 - 16 mmol/L Final     eGFR if    Date Value Ref Range Status   05/16/2022 >60.0 >60 mL/min/1.73 m^2 Final     eGFR if non    Date Value Ref Range Status   05/16/2022 >60.0 >60 mL/min/1.73 m^2 Final     Comment:     Calculation used to obtain the estimated glomerular filtration  rate (eGFR) is the CKD-EPI equation.        No results found for: [id number]  Lab Results   Component Value Date    TSH 1.111 03/11/2021 07-     NMO neg, Vitamin B 6 2 Low (5- 50)      Labs Reviewed:     06-     MMA 0.15 NL, Vit B 6 2 Low, Vit B2 5 NL, HC 11.0 NL, Paraneoplastic NL, RPR Neg, Vitamin B 12 744 NL FA 6.1  NL, TSH NL    MRI Brain WO Results:    07-       No acute abnormality or significant change compared to the head CT from 01/19/2022.   No results found in the last 24 hours.      Reviewed the neuroimaging independently       Assessment:       1. Cognitive complaints with normal exam    2. Mixed anxiety and depressive disorder    3. Chronic pain syndrome    4. Agitation    5. Adjustment disorder, unspecified type    6. Chronic prescription benzodiazepine use    7. Opioid abuse, daily use    8. Fear of needles        Plan:         COGNITIVE COMPLAINT WITH NORMAL EXAM/ MIXED ANXIETY AND MDD/ CHRONIC PAIN SYNDROME/ AGITATION/ CHRONIC BENZODIAZEPINE USE/ OPIOID DAILY USE/INSOMMINA/ FEAR OF NEEDLES.       EVALUATION     Brain MRI to evaluate the frontal and temporal lobes.    T4, FA,TSH, B2, B6, HC, B12, MMA, RPR to evaluate for treatable causes of memory loss.    Comprehensive Neuropsychological Testing     No DMA treatment recommended at this time    Recommend optimizing ADI/ MDD therapy    Follow up with Psychiatrist for management      Informed patient that chronic opioid use and benzodiazapine use can alter cognitive function    Existing PM referral     MOCA 30/30  normal testing     Refer for sleep evaluation see Pulmonology to evaluation and treatment     HOME CARE     Falling Down Precautions. Gait is affected by the disease as well.     Avoid antihistamines and anticholinergics.    Avoid changing routine.    Use written reminders.    Avoid multitasking.    Healthy diet, exercise (physical and cognitive).    Good sleep hygiene.    MEDICAL/SURGICAL COMORBIDITIES     All relevant medical comorbidities noted and managed by primary care physician and medical care team.          MISCELLANEOUS MEDICAL PROBLEMS       HEALTHY LIFESTYLE AND PREVENTATIVE CARE    Encouraged the patient to adhere to the age-appropriate health maintenance guidelines including screening tests and vaccinations.     Discussed the overall importance of healthy lifestyle, optimal weight, exercise, healthy diet, good sleep hygiene and avoiding drugs including smoking, alcohol and recreational drugs. The patient verbalized full understanding.       Advised the patient to follow COVID-19 prevention measures.       I spent 128 minutes face to face with the patient    Time spent in counseling and coordination of care including discussions etiology of diagnosis, pathogenesis of diagnosis, prognosis of diagnosis,, diagnostic results, impression and recommendations, diagnostic studies, management, risks and benefits of treatment, instructions of disease self-management, treatment instructions, follow up requirements, patient and family counseling/involvement in care compliance with treatment regimen. All of the patient's questions were answered during this discussion.       Avril Florence, MSN NP      Collaborating Provider: Manny Merida MD, FAAN Neurologist/Epileptologist

## 2022-06-01 ENCOUNTER — LAB VISIT (OUTPATIENT)
Dept: LAB | Facility: HOSPITAL | Age: 62
End: 2022-06-01
Attending: NURSE PRACTITIONER
Payer: MEDICARE

## 2022-06-01 ENCOUNTER — OFFICE VISIT (OUTPATIENT)
Dept: NEUROLOGY | Facility: CLINIC | Age: 62
End: 2022-06-01
Payer: MEDICARE

## 2022-06-01 VITALS
DIASTOLIC BLOOD PRESSURE: 77 MMHG | RESPIRATION RATE: 16 BRPM | SYSTOLIC BLOOD PRESSURE: 107 MMHG | HEART RATE: 94 BPM | WEIGHT: 187.81 LBS | OXYGEN SATURATION: 95 % | BODY MASS INDEX: 33.28 KG/M2 | HEIGHT: 63 IN

## 2022-06-01 DIAGNOSIS — R41.9 COGNITIVE COMPLAINTS WITH NORMAL EXAM: ICD-10-CM

## 2022-06-01 DIAGNOSIS — F11.10 OPIOID ABUSE, DAILY USE: ICD-10-CM

## 2022-06-01 DIAGNOSIS — F43.20 ADJUSTMENT DISORDER, UNSPECIFIED TYPE: ICD-10-CM

## 2022-06-01 DIAGNOSIS — R41.9 COGNITIVE COMPLAINTS WITH NORMAL EXAM: Primary | ICD-10-CM

## 2022-06-01 DIAGNOSIS — R06.83 SNORES: ICD-10-CM

## 2022-06-01 DIAGNOSIS — R45.1 AGITATION: ICD-10-CM

## 2022-06-01 DIAGNOSIS — G89.4 CHRONIC PAIN SYNDROME: ICD-10-CM

## 2022-06-01 DIAGNOSIS — Z79.899 CHRONIC PRESCRIPTION BENZODIAZEPINE USE: ICD-10-CM

## 2022-06-01 DIAGNOSIS — F40.298 FEAR OF NEEDLES: ICD-10-CM

## 2022-06-01 DIAGNOSIS — F41.8 MIXED ANXIETY AND DEPRESSIVE DISORDER: ICD-10-CM

## 2022-06-01 DIAGNOSIS — G47.00 INSOMNIA, UNSPECIFIED TYPE: ICD-10-CM

## 2022-06-01 LAB
FOLATE SERPL-MCNC: 6.1 NG/ML (ref 4–24)
HCYS SERPL-SCNC: 11 UMOL/L (ref 4–16.5)
TSH SERPL DL<=0.005 MIU/L-ACNC: 1.23 UIU/ML (ref 0.4–4)
VIT B12 SERPL-MCNC: 744 PG/ML (ref 210–950)

## 2022-06-01 PROCEDURE — 83519 RIA NONANTIBODY: CPT | Mod: 59 | Performed by: NURSE PRACTITIONER

## 2022-06-01 PROCEDURE — 84207 ASSAY OF VITAMIN B-6: CPT | Performed by: NURSE PRACTITIONER

## 2022-06-01 PROCEDURE — 99499 RISK ADDL DX/OHS AUDIT: ICD-10-PCS | Mod: S$GLB,,, | Performed by: NURSE PRACTITIONER

## 2022-06-01 PROCEDURE — 82607 VITAMIN B-12: CPT | Performed by: NURSE PRACTITIONER

## 2022-06-01 PROCEDURE — 99999 PR PBB SHADOW E&M-EST. PATIENT-LVL V: CPT | Mod: PBBFAC,,, | Performed by: NURSE PRACTITIONER

## 2022-06-01 PROCEDURE — 86255 FLUORESCENT ANTIBODY SCREEN: CPT | Mod: 59 | Performed by: NURSE PRACTITIONER

## 2022-06-01 PROCEDURE — 86592 SYPHILIS TEST NON-TREP QUAL: CPT | Performed by: NURSE PRACTITIONER

## 2022-06-01 PROCEDURE — 83090 ASSAY OF HOMOCYSTEINE: CPT | Performed by: NURSE PRACTITIONER

## 2022-06-01 PROCEDURE — 1159F PR MEDICATION LIST DOCUMENTED IN MEDICAL RECORD: ICD-10-PCS | Mod: CPTII,S$GLB,, | Performed by: NURSE PRACTITIONER

## 2022-06-01 PROCEDURE — 99499 UNLISTED E&M SERVICE: CPT | Mod: S$GLB,,, | Performed by: NURSE PRACTITIONER

## 2022-06-01 PROCEDURE — 87389 HIV-1 AG W/HIV-1&-2 AB AG IA: CPT | Performed by: NURSE PRACTITIONER

## 2022-06-01 PROCEDURE — 99205 PR OFFICE/OUTPT VISIT, NEW, LEVL V, 60-74 MIN: ICD-10-PCS | Mod: S$GLB,,, | Performed by: NURSE PRACTITIONER

## 2022-06-01 PROCEDURE — 3074F SYST BP LT 130 MM HG: CPT | Mod: CPTII,S$GLB,, | Performed by: NURSE PRACTITIONER

## 2022-06-01 PROCEDURE — 36415 COLL VENOUS BLD VENIPUNCTURE: CPT | Performed by: NURSE PRACTITIONER

## 2022-06-01 PROCEDURE — 3008F BODY MASS INDEX DOCD: CPT | Mod: CPTII,S$GLB,, | Performed by: NURSE PRACTITIONER

## 2022-06-01 PROCEDURE — 1160F RVW MEDS BY RX/DR IN RCRD: CPT | Mod: CPTII,S$GLB,, | Performed by: NURSE PRACTITIONER

## 2022-06-01 PROCEDURE — 3074F PR MOST RECENT SYSTOLIC BLOOD PRESSURE < 130 MM HG: ICD-10-PCS | Mod: CPTII,S$GLB,, | Performed by: NURSE PRACTITIONER

## 2022-06-01 PROCEDURE — 84443 ASSAY THYROID STIM HORMONE: CPT | Performed by: NURSE PRACTITIONER

## 2022-06-01 PROCEDURE — 1159F MED LIST DOCD IN RCRD: CPT | Mod: CPTII,S$GLB,, | Performed by: NURSE PRACTITIONER

## 2022-06-01 PROCEDURE — 82746 ASSAY OF FOLIC ACID SERUM: CPT | Performed by: NURSE PRACTITIONER

## 2022-06-01 PROCEDURE — 3078F DIAST BP <80 MM HG: CPT | Mod: CPTII,S$GLB,, | Performed by: NURSE PRACTITIONER

## 2022-06-01 PROCEDURE — 99205 OFFICE O/P NEW HI 60 MIN: CPT | Mod: S$GLB,,, | Performed by: NURSE PRACTITIONER

## 2022-06-01 PROCEDURE — 3008F PR BODY MASS INDEX (BMI) DOCUMENTED: ICD-10-PCS | Mod: CPTII,S$GLB,, | Performed by: NURSE PRACTITIONER

## 2022-06-01 PROCEDURE — 1160F PR REVIEW ALL MEDS BY PRESCRIBER/CLIN PHARMACIST DOCUMENTED: ICD-10-PCS | Mod: CPTII,S$GLB,, | Performed by: NURSE PRACTITIONER

## 2022-06-01 PROCEDURE — 83921 ORGANIC ACID SINGLE QUANT: CPT | Performed by: NURSE PRACTITIONER

## 2022-06-01 PROCEDURE — 99999 PR PBB SHADOW E&M-EST. PATIENT-LVL V: ICD-10-PCS | Mod: PBBFAC,,, | Performed by: NURSE PRACTITIONER

## 2022-06-01 PROCEDURE — 84252 ASSAY OF VITAMIN B-2: CPT | Performed by: NURSE PRACTITIONER

## 2022-06-01 PROCEDURE — 3078F PR MOST RECENT DIASTOLIC BLOOD PRESSURE < 80 MM HG: ICD-10-PCS | Mod: CPTII,S$GLB,, | Performed by: NURSE PRACTITIONER

## 2022-06-02 ENCOUNTER — PATIENT MESSAGE (OUTPATIENT)
Dept: PULMONOLOGY | Facility: CLINIC | Age: 62
End: 2022-06-02
Payer: MEDICARE

## 2022-06-02 LAB
HIV 1+2 AB+HIV1 P24 AG SERPL QL IA: NEGATIVE
RPR SER QL: NORMAL

## 2022-06-03 ENCOUNTER — TELEPHONE (OUTPATIENT)
Dept: GASTROENTEROLOGY | Facility: CLINIC | Age: 62
End: 2022-06-03
Payer: MEDICARE

## 2022-06-03 DIAGNOSIS — K22.0 ACHALASIA: Primary | ICD-10-CM

## 2022-06-03 NOTE — TELEPHONE ENCOUNTER
Spoke with to review pt results and recommendations per dr truong     Pt is schedule to dr truong 6/27/22 @ 1:40 and dr edgar @ 2:30

## 2022-06-03 NOTE — TELEPHONE ENCOUNTER
Manometry Results:    No [place] Classification abnormality   Normal LES pressure with complete relaxation   Panesophageal pressurization  Incomplete bolus clearance  Hiatal hernia (vs myotomy with diverticulum)   Abnormal multiple rapid swallows test  No significant difference with provocative maneuvers   Evidence of residual at the end of 200cc bolus      Please let patient know that the manometry shows    Weak muscles of the esophagus    Recommendation:  Follow up with Dr. Hoover and Dr. Gwen Parnell on the same day (after appointment with Dr. Hoover)

## 2022-06-03 NOTE — PROVATION PATIENT INSTRUCTIONS
Discharge Summary/Instructions after an Endoscopic Procedure  Patient Name: Lenny Latham  Patient MRN: [id number]  Patient YOB: 1960  Friday, May 27, 2022  Madina Hoover MD  Dear patient,  As a result of recent federal legislation (The Federal Cures Act), you may   receive lab or pathology results from your procedure in your [facility]   account before your physician is able to contact you. Your physician or   their representative will relay the results to you with their   recommendations at their soonest availability.  Thank you,  RESTRICTIONS:  During your procedure today, you received medications for sedation.  These   medications may affect your judgment, balance and coordination.  Therefore,   for 24 hours, you have the following restrictions:   - DO NOT drive a car, operate machinery, make legal/financial decisions,   sign important papers or drink alcohol.    ACTIVITY:  Today: no heavy lifting, straining or running due to procedural   sedation/anesthesia.  The following day: return to full activity including work.  DIET:  Eat and drink normally unless instructed otherwise.     TREATMENT FOR COMMON SIDE EFFECTS:  - Mild abdominal pain, nausea, belching, bloating or excessive gas:  rest,   eat lightly and use a heating pad.  - Sore Throat: treat with throat lozenges and/or gargle with warm salt   water.  - Because air was used during the procedure, expelling large amounts of air   from your rectum or belching is normal.  - If a bowel prep was taken, you may not have a bowel movement for 1-3 days.    This is normal.  SYMPTOMS TO WATCH FOR AND REPORT TO YOUR PHYSICIAN:  1. Abdominal pain or bloating, other than gas cramps.  2. Chest pain.  3. Back pain.  4. Signs of infection such as: chills or fever occurring within 24 hours   after the procedure.  5. Rectal bleeding, which would show as bright red, maroon, or black stools.   (A tablespoon of blood from the rectum is not serious, especially if    hemorrhoids are present.)  6. Vomiting.  7. Weakness or dizziness.  GO DIRECTLY TO THE NEAREST EMERGENCY ROOM IF YOU HAVE ANY OF THE FOLLOWING:      Difficulty breathing              Chills and/or fever over 101 F   Persistent vomiting and/or vomiting blood   Severe abdominal pain   Severe chest pain   Black, tarry stools   Bleeding- more than one tablespoon   Any other symptom or condition that you feel may need urgent attention  Your doctor recommends these additional instructions:  If any biopsies were taken, your doctors clinic will contact you in 1 to 2   weeks with any results.  - Return to my office as previously scheduled.  For questions, problems or results please call your physician - Madina Hoover MD at Work:  [phone].  [facility] [place], EMERGENCY ROOM PHONE NUMBER: [phone]  IF A COMPLICATION OR EMERGENCY SITUATION ARISES AND YOU ARE UNABLE TO REACH   YOUR PHYSICIAN - GO DIRECTLY TO THE EMERGENCY ROOM.  Madina Hoover MD  6/3/2022 1:18:17 PM  This report has been verified and signed electronically.  Dear patient,  As a result of recent federal legislation (The Federal Cures Act), you may   receive lab or pathology results from your procedure in your [facility]   account before your physician is able to contact you. Your physician or   their representative will relay the results to you with their   recommendations at their soonest availability.  Thank you,  PROVATION

## 2022-06-04 LAB — VIT B2 SERPL-MCNC: 5 MCG/L (ref 1–19)

## 2022-06-07 ENCOUNTER — OFFICE VISIT (OUTPATIENT)
Dept: PULMONOLOGY | Facility: CLINIC | Age: 62
End: 2022-06-07
Payer: MEDICARE

## 2022-06-07 VITALS
BODY MASS INDEX: 32.66 KG/M2 | OXYGEN SATURATION: 96 % | WEIGHT: 184.31 LBS | HEART RATE: 72 BPM | HEIGHT: 63 IN | RESPIRATION RATE: 18 BRPM | SYSTOLIC BLOOD PRESSURE: 120 MMHG | DIASTOLIC BLOOD PRESSURE: 78 MMHG

## 2022-06-07 DIAGNOSIS — R06.81 WITNESSED APNEIC SPELLS: ICD-10-CM

## 2022-06-07 DIAGNOSIS — Z23 NEED FOR VACCINATION FOR PNEUMOCOCCUS: ICD-10-CM

## 2022-06-07 DIAGNOSIS — R06.83 SNORING: ICD-10-CM

## 2022-06-07 DIAGNOSIS — G47.8 NON-RESTORATIVE SLEEP: ICD-10-CM

## 2022-06-07 DIAGNOSIS — R29.818 SUSPECTED SLEEP APNEA: Primary | ICD-10-CM

## 2022-06-07 DIAGNOSIS — J43.2 CENTRILOBULAR EMPHYSEMA: ICD-10-CM

## 2022-06-07 LAB
METHYLMALONATE SERPL-SCNC: 0.15 UMOL/L
PYRIDOXAL SERPL-MCNC: 2 UG/L (ref 5–50)

## 2022-06-07 PROCEDURE — 90677 PNEUMOCOCCAL CONJUGATE VACCINE 20-VALENT: ICD-10-PCS | Mod: S$GLB,,, | Performed by: INTERNAL MEDICINE

## 2022-06-07 PROCEDURE — 3078F PR MOST RECENT DIASTOLIC BLOOD PRESSURE < 80 MM HG: ICD-10-PCS | Mod: CPTII,S$GLB,, | Performed by: INTERNAL MEDICINE

## 2022-06-07 PROCEDURE — 3078F DIAST BP <80 MM HG: CPT | Mod: CPTII,S$GLB,, | Performed by: INTERNAL MEDICINE

## 2022-06-07 PROCEDURE — 99204 PR OFFICE/OUTPT VISIT, NEW, LEVL IV, 45-59 MIN: ICD-10-PCS | Mod: 25,S$GLB,, | Performed by: INTERNAL MEDICINE

## 2022-06-07 PROCEDURE — 3008F PR BODY MASS INDEX (BMI) DOCUMENTED: ICD-10-PCS | Mod: CPTII,S$GLB,, | Performed by: INTERNAL MEDICINE

## 2022-06-07 PROCEDURE — 3074F PR MOST RECENT SYSTOLIC BLOOD PRESSURE < 130 MM HG: ICD-10-PCS | Mod: CPTII,S$GLB,, | Performed by: INTERNAL MEDICINE

## 2022-06-07 PROCEDURE — 99999 PR PBB SHADOW E&M-EST. PATIENT-LVL IV: CPT | Mod: PBBFAC,,, | Performed by: INTERNAL MEDICINE

## 2022-06-07 PROCEDURE — 99204 OFFICE O/P NEW MOD 45 MIN: CPT | Mod: 25,S$GLB,, | Performed by: INTERNAL MEDICINE

## 2022-06-07 PROCEDURE — G0009 PNEUMOCOCCAL CONJUGATE VACCINE 20-VALENT: ICD-10-PCS | Mod: S$GLB,,, | Performed by: INTERNAL MEDICINE

## 2022-06-07 PROCEDURE — 1160F PR REVIEW ALL MEDS BY PRESCRIBER/CLIN PHARMACIST DOCUMENTED: ICD-10-PCS | Mod: CPTII,S$GLB,, | Performed by: INTERNAL MEDICINE

## 2022-06-07 PROCEDURE — 3074F SYST BP LT 130 MM HG: CPT | Mod: CPTII,S$GLB,, | Performed by: INTERNAL MEDICINE

## 2022-06-07 PROCEDURE — 1160F RVW MEDS BY RX/DR IN RCRD: CPT | Mod: CPTII,S$GLB,, | Performed by: INTERNAL MEDICINE

## 2022-06-07 PROCEDURE — 1159F PR MEDICATION LIST DOCUMENTED IN MEDICAL RECORD: ICD-10-PCS | Mod: CPTII,S$GLB,, | Performed by: INTERNAL MEDICINE

## 2022-06-07 PROCEDURE — 1159F MED LIST DOCD IN RCRD: CPT | Mod: CPTII,S$GLB,, | Performed by: INTERNAL MEDICINE

## 2022-06-07 PROCEDURE — G0009 ADMIN PNEUMOCOCCAL VACCINE: HCPCS | Mod: S$GLB,,, | Performed by: INTERNAL MEDICINE

## 2022-06-07 PROCEDURE — 90677 PCV20 VACCINE IM: CPT | Mod: S$GLB,,, | Performed by: INTERNAL MEDICINE

## 2022-06-07 PROCEDURE — 99999 PR PBB SHADOW E&M-EST. PATIENT-LVL IV: ICD-10-PCS | Mod: PBBFAC,,, | Performed by: INTERNAL MEDICINE

## 2022-06-07 PROCEDURE — 3008F BODY MASS INDEX DOCD: CPT | Mod: CPTII,S$GLB,, | Performed by: INTERNAL MEDICINE

## 2022-06-07 NOTE — PROGRESS NOTES
Initial Outpatient Pulmonary Evaluation       SUBJECTIVE:     Chief Complaint   Patient presents with    Sleep Apnea       History of Present Illness:    Patient is a 61 y.o. male  Referred for evaluation of suspected sleep apnea.      Complains of snoring, witnessed apneic spells, excessive daytime sleepiness and nonrestorative sleep.      Former smoker.      [place]        STOP - BANG Questionnaire:     1. Snoring : Do you snore loudly ?    Yes    2. Tired : Do you often feel tired, fatigued, or sleepy during daytime? Yes    3. Observed: Has anyone observed you stop breathing during your sleep?   Yes     4. Blood pressure : Do you have or are you being treated for high blood pressure?   No    5. BMI :BMI more than 35 kg/m2?   No    6. Age : Age over 50 yr old?   Yes    7. Neck circumference:   For male, is your shirt collar 17 inches / 43cm or larger?  For female, is your shirt collar 16 inches / 41cm or larger?    No    8. Gender: Gender male?   Yes    STOP BANG SCORE 5    High risk of NATALIE: Yes 5 - 8  Intermediate risk of NATALIE: Yes 3 - 4  Low risk of NATALIE: Yes 0 - 2      References:   STOP Questionnaire   A Tool to Screen Patients for Obstructive Sleep Apnea: DOUGLAS Catalan.R.C.P.C., Babatunde Reynoso M.B.B.S., Angel Staley M.D.,Sarah Be, Ph.D., KENNEDY Khan.B.B.S.,_ KENNEDY Crump.Sc.,_ Vicenta Rivers M.D., Balbir Llamas F.R.C.P.C.; Anesthesiology 2008; 108:812-21 Copyright © 2008, the American Society of Anesthesiologists, Inc. [employer]      Review of Systems   Constitutional: Positive for fatigue. Negative for fever and chills.   HENT: Negative for nosebleeds.    Eyes: Negative for redness.   Respiratory: Positive for apnea, snoring and somnolence. Negative for choking.    Cardiovascular: Negative for chest pain.   Genitourinary: Negative for hematuria.   Endocrine: Negative for cold intolerance.     Musculoskeletal: Positive for arthralgias and back pain.   Skin: Negative for rash.   Gastrointestinal: Negative for vomiting.   Neurological: Negative for syncope.   Hematological: Negative for adenopathy.   Psychiatric/Behavioral: Positive for sleep disturbance. Negative for confusion. The patient is nervous/anxious.        Review of patient's allergies indicates:  No Known Allergies    Current Outpatient Medications   Medication Sig Dispense Refill    aspirin (ECOTRIN) 81 MG EC tablet Take 81 mg by mouth once daily.      atorvastatin (LIPITOR) 40 MG tablet Take 1 tablet (40 mg total) by mouth every evening. 90 tablet 1    diazePAM (VALIUM) 5 MG tablet Take 1 tablet (5 mg total) by mouth every 12 (twelve) hours as needed for Anxiety. 30 tablet 3    HYDROcodone-acetaminophen (NORCO) 7.5-325 mg per tablet Take 1 tablet by mouth every 6 (six) hours as needed for Pain. 12 tablet 0    omeprazole (PRILOSEC) 40 MG capsule TAKE 1 CAPSULE EVERY DAY 90 capsule 1    sildenafil (REVATIO) 20 mg Tab Take 1 tablet (20 mg total) by mouth daily as needed. 40 tablet 11    tamsulosin (FLOMAX) 0.4 mg Cap Take 1 capsule (0.4 mg total) by mouth once daily. 30 capsule 11    tiZANidine (ZANAFLEX) 4 MG tablet Take 4 mg by mouth 3 (three) times daily.      EScitalopram oxalate (LEXAPRO) 20 MG tablet Take 1 tablet (20 mg total) by mouth every evening. (Patient not taking: Reported on 6/7/2022) 90 tablet 1    OLANZapine (ZYPREXA) 10 MG tablet Take 1 tablet (10 mg total) by mouth every evening. (Patient not taking: Reported on 6/7/2022) 90 tablet 1     No current facility-administered medications for this visit.     Facility-Administered Medications Ordered in Other Visits   Medication Dose Route Frequency Provider Last Rate Last Admin    0.9%  NaCl infusion   Intravenous Continuous Brenden Pizarro MD   New Bag at 05/23/22 1238    ondansetron injection 4 mg  4 mg Intravenous Q12H PRN Brenden Pizarro MD           Past  Medical History:   Diagnosis Date    Achalasia of esophagus 11/28/2016    Anxiety state, unspecified     Coronary artery disease     Esophageal reflux     Esophageal stricture     Hypertrophy of prostate without urinary obstruction and other lower urinary tract symptoms (LUTS)     Screening PSA (prostate specific antigen) 12/7/12    0.4    Unspecified essential hypertension      Past Surgical History:   Procedure Laterality Date    BACK SURGERY      CERVICAL EPIDURAL STEROID INJECTION      COLONOSCOPY      ESOPHAGEAL MANOMETRY WITH MEASUREMENT OF IMPEDANCE N/A 5/23/2022    Procedure: MANOMETRY-ESOPHAGEAL-WITH IMPEDANCE;  Surgeon: Madina Hoover MD;  Location: [facility] ([address] [place] [address]);  Service: Endoscopy;  Laterality: N/A;  hold pain medication 24 hours prior to procedure    ESOPHAGOGASTRODUODENOSCOPY      ESOPHAGOGASTRODUODENOSCOPY N/A 8/16/2019    Procedure: EGD (ESOPHAGOGASTRODUODENOSCOPY);  Surgeon: Pawan Schwab MD;  Location: [facility];  Service: Endoscopy;  Laterality: N/A;    ESOPHAGOGASTRODUODENOSCOPY N/A 5/23/2022    Procedure: ESOPHAGOGASTRODUODENOSCOPY (EGD);  Surgeon: Madina Hoover MD;  Location: [facility] ([address] [place] [address]);  Service: Endoscopy;  Laterality: N/A;  Endoflip/Endoscopically placed manometry probe  2nd floor-End stage achalasia  propofol only  full iquid diet x3 days, clear liquid diet x1 day prior to procedure  fully vaccinated, instructions [facility] to [facility] and mailed[facility]\""    HERNIA REPAIR      INGUINAL HERNIA REPAIR Right     LUMBAR EPIDURAL STEROID INJECTION      UPPER GASTROINTESTINAL ENDOSCOPY  04/27/2016     Family History   Problem Relation Age of Onset    Lung cancer Mother     Hypertension Father     Ulcers Sister     Ulcers Brother     Prostate cancer Maternal Grandfather     Ulcers Sister     Celiac disease Neg Hx     Colon cancer Neg Hx     Colon polyps Neg Hx     Crohn's disease Neg Hx     Esophageal cancer Neg Hx     Inflammatory bowel disease Neg Hx  "    Irritable bowel syndrome Neg Hx     Liver cancer Neg Hx     Liver disease Neg Hx     Rectal cancer Neg Hx     Stomach cancer Neg Hx      Social History     Tobacco Use    Smoking status: Never Smoker    Smokeless tobacco: Never Used   Substance Use Topics    Alcohol use: Not Currently     Comment: Social  none since incarcerated    Drug use: Not Currently     Types: Marijuana          OBJECTIVE:     Vital Signs (Most Recent)  Vital Signs  Pulse: 72  Resp: 18  SpO2: 96 %  BP: 120/78  Height and Weight  Height: 5' 3" (160 cm)  Weight: 83.6 kg (184 lb 4.9 oz)  BSA (Calculated - sq m): 1.93 sq meters  BMI (Calculated): 32.7  Weight in (lb) to have BMI = 25: 140.8]  Wt Readings from Last 2 Encounters:   06/07/22 83.6 kg (184 lb 4.9 oz)   06/01/22 85.2 kg (187 lb 13.3 oz)         Physical Exam:  Physical Exam   Constitutional: He is oriented to person, place, and time. He appears well-developed and well-nourished. No distress.   HENT:   Head: Normocephalic.   Cardiovascular: Normal rate and regular rhythm.   Pulmonary/Chest: Normal expansion and effort normal. No stridor. No respiratory distress. He has decreased breath sounds. He exhibits no tenderness.   Abdominal: He exhibits no distension.   Musculoskeletal:         General: No tenderness.      Cervical back: Neck supple.   Lymphadenopathy:     He has no cervical adenopathy.   Neurological: He is alert and oriented to person, place, and time. Gait normal.   Skin: Skin is warm. No cyanosis. Nails show no clubbing.   Psychiatric: He has a normal mood and affect. His behavior is normal. Judgment and thought content normal.   Nursing note and vitals reviewed.      Laboratory  Lab Results   Component Value Date    WBC 6.33 05/16/2022    RBC 4.92 05/16/2022    HGB 14.7 05/16/2022    HCT 46.7 05/16/2022    MCV 95 05/16/2022    MCH 29.9 05/16/2022    MCHC 31.5 (L) 05/16/2022    RDW 14.5 05/16/2022     05/16/2022    MPV 9.6 05/16/2022    GRAN 3.5 05/16/2022 "    GRAN 54.8 05/16/2022    LYMPH 2.3 05/16/2022    LYMPH 35.7 05/16/2022    MONO 0.5 05/16/2022    MONO 7.6 05/16/2022    EOS 0.1 05/16/2022    BASO 0.03 05/16/2022    EOSINOPHIL 1.1 05/16/2022    BASOPHIL 0.5 05/16/2022       BMP  Lab Results   Component Value Date     05/16/2022    K 4.6 05/16/2022     05/16/2022    CO2 24 05/16/2022    BUN 10 05/16/2022    CREATININE 1.0 05/16/2022    CALCIUM 9.4 05/16/2022    ANIONGAP 10 05/16/2022    ESTGFRAFRICA >60.0 05/16/2022    EGFRNONAA >60.0 05/16/2022    AST 14 03/11/2022    ALT 14 03/11/2022    PROT 7.9 03/11/2022       Lab Results   Component Value Date    BNP 18 10/13/2020    BNP 17 07/23/2019       Lab Results   Component Value Date    TSH 1.228 06/01/2022       No results found for: SEDRATE    No results found for: CRP    No results found for: IGE    No results found for: ASPERGILLUS  No results found for: AFUMIGATUSCL   Nuclear stress test April 2021    Normal myocardial perfusion scan. There is no evidence of myocardial ischemia or infarction.  No results found for: ACE    Diagnostic Results:  I have personally reviewed today the following studies :    CT chest 10/2020  CLINICAL HISTORY:  dysphagia;     TECHNIQUE:  5 mm contiguous axial images are performed through the chest abdomen and pelvis following administration of IV and oral contrast.  Multiplanar reconstruction is performed     COMPARISON:  01/31/2013     FINDINGS:  CT scan chest:     Lung windows:     There are mild emphysematous changes in both lungs.  The central airways are widely patent.     Mediastinum:     The esophagus is distended with both air and fluid.  A small hiatal hernia is noted.     The heart size is normal there are no pleural effusions.  No mediastinal or hilar lymphadenopathy is seen.     Bone windows appear normal for age.                ASSESSMENT/PLAN:     Suspected sleep apnea  -     Home Sleep Studies; Future    Snoring  -     Home Sleep Studies; Future    Witnessed  apneic spells  -     Home Sleep Studies; Future    Non-restorative sleep  -     Home Sleep Studies; Future    Centrilobular emphysema  -     Complete PFT without bronchodilator; Future  -     Stress test, pulmonary; Future    Need for vaccination for pneumococcus  -     (In Office Administered) Pneumococcal Conjugate Vaccine (20 Valent) (IM)      Proceed with home sleep study.  CPAP recommendation instructions to follow sleep study results.      Check PFT and 6 minute walking test.      Recommendation regarding inhalation treatment need to follow 6 minute walking test and PFT results.      Emphysema.  Will administer PCV 20.          Follow up in about 3 months (around 9/7/2022).    This note was prepared using voice recognition system and is likely to have sound alike errors that may have been overlooked even after proof reading.  Please call me with any questions    Discussed diagnosis, its evaluation, treatment and usual course. All questions answered.    Thank you for the courtesy of participating in the care of this patient    Marguerite Ruelas MD

## 2022-06-07 NOTE — PATIENT INSTRUCTIONS
Improving sleep hygiene was discussed with the patient  Advise to Sleep as long as necessary to feel rested (usually seven to eight hours for adults) and then get out of bed  Maintain a regular sleep schedule, particularly a regular wake-up time in the morning  Try not to force sleep  Avoid caffeinated beverages after lunch, alcohol near bedtime , smoking or other nicotine intake, particularly during the evening  Adjust the bedroom environment as needed to decrease stimuli (reduce ambient light, turn off the television or radio)  Avoid prolonged use of light-emitting screens (laptops, tablets, smartphones, ebooks) before bedtime   Resolve concerns or worries before bedtime  Exercise regularly for at least 20 minutes, preferably more than four to five hours prior to bedtime   Avoid daytime naps, especially if they are longer than 20 to 30 minutes or occur late in the day           No eating / drinking for 3 hours before going to bed.  Elevate head of bed 30 - 45 %     CPAP HABITUATION PROCEDURE     Arnel Srinivasan, Ph.D., [facility] and Ian Salcido M.D.  [facility], [facility] [place]     Some people have difficulty adjusting to CPAP/BiPAP/AutoCPAP.  This is not unusual or hard to understand: Breathing with CPAP is different from ordinary breathing, and this difference is aversive to some. The problem can be overcome, however, and the benefits CPAP confers are certainly worth the effort.  Below, you will find a simple and gradual way to get used to CPAP before you try to use it all night, every night.  The essence of this procedure is to relax and let breathing with CPAP become a habit.  It may take about 2 weeks, and involves the following:      CPAP while awake and comfortably seated, during the late evening.     CPAP in bed while attempting sleep at night.     If your discomfort is too great at any time, discontinue and attempt again later the same night, for the same amount of time.    You and your physician may alter the times and pressures if necessary.     If you find that it is very easy to get used to CPAP, you may start using it every night when you are comfortable enough to do so.  IMPORTANT REMINDER: If you have a cold or sinus congestion it is okay to miss a night or two of CPAP. Consider using antihistamines or decongestants to clear up your sinus congestion prior to sleeping.     DAYS  1-3   Start CPAP while awake and comfortably seated during the late evening, after having prepared for bed.  You may do this while watching television, listening to music or reading. Use for 1 hour, then take off CPAP and go directly to bed to sleep     DAYS  4-6     Start CPAP when you go to bed and use for 1 hour, or until you fall asleep.  If your discomfort is too great at any time, discontinue and attempt again later the same night, for the same designated amount of time (1 hour).      DAYS  7-9     Increase time with CPAP to 2 hours a night.  If your discomfort is too great at any time, discontinue and attempt again later the same night, for the same designated amount of time (2 hours).      DAYS 10-12    Increase time with CPAP to 3 hours a night. If your discomfort is too great at any time, discontinue and attempt again later the same night, for the same designated amount of time (3 hours).      DAYS 13-15     Sleep the entire night with CPAP.      OPTIONAL: You may use Progressive Muscle Relaxation (PMR) to help put you at ease when using CPAP; do PMR twice each day, once in the morning or afternoon, and once in the evening just before using CPAP. You may do PMR prior to any attempt until you are comfortable with CPAP.        Continuous Positive Air Pressure (CPAP)  Continuous positive air pressure (CPAP) uses gentle air pressure to hold the airway open. CPAP is often the most effective treatment for sleep apnea and severe snoring. It works very well for many people. But keep in mind that it  can take several adjustments before the setup is right for you.       How CPAP Works  CPAP is a small portable pump beside the bed. The pump sends air through a hose, which is held over your nose and/or mouth by a mask. Mild air pressure is gently pushed through your airway. The air pressure nudges sagging tissues aside. This widens the airway so you can breathe better. CPAP may be combined with other kinds of therapy for sleep apnea.       Types of Air Pressure Treatments  There are different types of CPAP. Your doctor or CPAP technician will help you decide which type is best for you:  Basic CPAP keeps the pressure constant all night long.  A bilevel device (BiPAP) provides more pressure when you breathe in and less when you breathe out. A BiPAP machine also may be set to provide automatic breaths to maintain breathing if you stop breathing while sleeping.  An autoCPAP device automatically adjusts pressure throughout the night and in response to changes such as body position, sleep stage, and snoring.  © 2701-2441 [employer]. [address] [place] [address]. All rights reserved. This information is not intended as a substitute for professional medical care. Always follow your healthcare professional's instructions.        Snoring and Sleep Apnea: Notes for a Partner  Snoring and sleep apnea affect your life, as well as your partners. You can help in the treatment of the problem. Be supportive. Encourage your partner both to get treatment and to make the adjustments needed to follow through.       Adjusting to Changes  Your partners treatment may involve making changes to certain life habits. You can help your partner make and stick with these changes. For example:  Support and even join your partners exercise program.  Be supportive if your partner gets CPAP (continuous positive airway pressure). He or she may feel self-conscious at first. Remind your partner to expect adjustments to  CPAP before it feels just right.  Consider joining a snoring and sleep apnea support group.  Go Along to See the Health Care Provider  You can give the health care provider the best account of your partners nighttime breathing and snoring patterns. Try to go along to health care providers appointments. If you cant go, write notes for your partner to give to the health care provider. Describe your partners snoring and sleep breathing patterns in detail.  Tips for Sleeping with a Snorer  Until treatment takes care of your partners snoring:  Try to go to bed first. It may help if youre already asleep when your partner starts to snore.  Wear earplugs to bed. A fan or other source of background noise may also help drown out snoring.   © 8719-6212 [employer]. [address] [place] [address], [place] [address]. All rights reserved. This information is not intended as a substitute for professional medical care. Always follow your healthcare professional's instructions.        Continuous Positive Airway Pressure (CPAP)  Your health care provider has prescribed continuous positive airway pressure (CPAP) therapy for you. A CPAP device helps you breathe better at night. The device delivers air through your nose or mouth when you breathe in to keep your air passages open. CPAP is:  Used most often to treat sleep apnea and some other problems (Sleep apnea is a chronic condition with periods of sleep in which you briefly stop breathing.)  Safe and very effective, but it takes time to get used to the mask.   Your health care provider, nurse, or medical supplier will give you tips for wearing and caring for your CPAP device.  General guidelines  It's very important not to give up! It takes time to get used to wearing the mask at night.  Practice using your CPAP device during the day, especially whenever you take a nap.  Remember, there are several different types of masks. If you cant get used to your mask, ask your  provider or medical supply company about trying another style.  If you have nasal stuffiness or dryness when using your CPAP device, talk with your provider or medical supply company. There are ways to lessen these problems. For example, your provider may recommend moistening nasal spray or the medical supply company may recommend a device with a humidifier.  The goal is to use your CPAP all night, every night, during all naps, and even when you travel.  Keep your mask clean. Wash it with soap and water. Be sure to rinse the mask and tubing well with water to remove any soap. Let them air-dry thoroughly before using.  Make yourself comfortable when sleeping with CPAP. Try using extra pillows.  Work with your medical supply company so that you know how to correctly use your CPAP. Their representative will be able to help you:  Use the CPAP correctly  Troubleshoot any problems that come up  Learn to clean and maintain the device  Adjust to regular use of the CPAP  © 8312-1071 [employer]. [address] [place] [address], [place] [address]. All rights reserved. This information is not intended as a substitute for professional medical care. Always follow your healthcare professional's instructions.

## 2022-06-08 ENCOUNTER — TELEPHONE (OUTPATIENT)
Dept: PULMONOLOGY | Facility: HOSPITAL | Age: 62
End: 2022-06-08
Payer: MEDICARE

## 2022-06-14 ENCOUNTER — TELEPHONE (OUTPATIENT)
Dept: NEUROLOGY | Facility: CLINIC | Age: 62
End: 2022-06-14
Payer: MEDICAID

## 2022-06-14 NOTE — TELEPHONE ENCOUNTER
Hi good morning, Patient is requesting a call in regards to getting appointment scheduled with Dr. Wilder. He does know the wait is far out but would still like a call regarding to getting a date for an appointment.     Thank you in advance!

## 2022-06-14 NOTE — PROGRESS NOTES
Labs Reviewed:      06-    MMA 0.15 NL, Vit B 6 2 Low, Vit B2 5 NL, HC 11.0 NL, Paraneoplastic NL, RPR Neg, Vitamin B 12 744 NL FA 6.1  NL, TSH NL    Recommend a vitamin b complex daily related to low B 6 level and low normal B2 level

## 2022-06-14 NOTE — TELEPHONE ENCOUNTER
Contacted patient in regards to further information about appointment with Dr. Wilder and I left a VM to contact office.

## 2022-06-16 ENCOUNTER — OFFICE VISIT (OUTPATIENT)
Dept: PSYCHIATRY | Facility: CLINIC | Age: 62
End: 2022-06-16
Payer: MEDICARE

## 2022-06-16 VITALS
DIASTOLIC BLOOD PRESSURE: 80 MMHG | BODY MASS INDEX: 32.8 KG/M2 | HEART RATE: 101 BPM | SYSTOLIC BLOOD PRESSURE: 113 MMHG | WEIGHT: 185.19 LBS

## 2022-06-16 DIAGNOSIS — F41.8 MIXED ANXIETY AND DEPRESSIVE DISORDER: ICD-10-CM

## 2022-06-16 PROCEDURE — 3008F BODY MASS INDEX DOCD: CPT | Mod: CPTII,S$GLB,, | Performed by: PSYCHIATRY & NEUROLOGY

## 2022-06-16 PROCEDURE — 3079F DIAST BP 80-89 MM HG: CPT | Mod: CPTII,S$GLB,, | Performed by: PSYCHIATRY & NEUROLOGY

## 2022-06-16 PROCEDURE — 99999 PR PBB SHADOW E&M-EST. PATIENT-LVL II: CPT | Mod: PBBFAC,,, | Performed by: PSYCHIATRY & NEUROLOGY

## 2022-06-16 PROCEDURE — 3074F PR MOST RECENT SYSTOLIC BLOOD PRESSURE < 130 MM HG: ICD-10-PCS | Mod: CPTII,S$GLB,, | Performed by: PSYCHIATRY & NEUROLOGY

## 2022-06-16 PROCEDURE — 99214 OFFICE O/P EST MOD 30 MIN: CPT | Mod: S$GLB,,, | Performed by: PSYCHIATRY & NEUROLOGY

## 2022-06-16 PROCEDURE — 3074F SYST BP LT 130 MM HG: CPT | Mod: CPTII,S$GLB,, | Performed by: PSYCHIATRY & NEUROLOGY

## 2022-06-16 PROCEDURE — 99999 PR PBB SHADOW E&M-EST. PATIENT-LVL II: ICD-10-PCS | Mod: PBBFAC,,, | Performed by: PSYCHIATRY & NEUROLOGY

## 2022-06-16 PROCEDURE — 3079F PR MOST RECENT DIASTOLIC BLOOD PRESSURE 80-89 MM HG: ICD-10-PCS | Mod: CPTII,S$GLB,, | Performed by: PSYCHIATRY & NEUROLOGY

## 2022-06-16 PROCEDURE — 3008F PR BODY MASS INDEX (BMI) DOCUMENTED: ICD-10-PCS | Mod: CPTII,S$GLB,, | Performed by: PSYCHIATRY & NEUROLOGY

## 2022-06-16 PROCEDURE — 99214 PR OFFICE/OUTPT VISIT, EST, LEVL IV, 30-39 MIN: ICD-10-PCS | Mod: S$GLB,,, | Performed by: PSYCHIATRY & NEUROLOGY

## 2022-06-16 RX ORDER — OLANZAPINE 10 MG/1
10 TABLET ORAL NIGHTLY
Qty: 90 TABLET | Refills: 1 | Status: SHIPPED | OUTPATIENT
Start: 2022-06-16 | End: 2022-08-05

## 2022-06-16 RX ORDER — BUSPIRONE HYDROCHLORIDE 15 MG/1
15 TABLET ORAL 2 TIMES DAILY
Qty: 60 TABLET | Refills: 2 | Status: SHIPPED | OUTPATIENT
Start: 2022-06-16 | End: 2023-02-01 | Stop reason: SDUPTHER

## 2022-06-16 RX ORDER — DIAZEPAM 5 MG/1
5 TABLET ORAL DAILY PRN
Qty: 30 TABLET | Refills: 2 | Status: SHIPPED | OUTPATIENT
Start: 2022-06-16 | End: 2022-06-16 | Stop reason: SDUPTHER
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
## 2022-06-27 ENCOUNTER — TELEPHONE (OUTPATIENT)
Dept: GASTROENTEROLOGY | Facility: CLINIC | Age: 62
End: 2022-06-27
Payer: MEDICAID

## 2022-06-27 ENCOUNTER — OFFICE VISIT (OUTPATIENT)
Dept: GASTROENTEROLOGY | Facility: CLINIC | Age: 62
End: 2022-06-27
Payer: MEDICARE

## 2022-06-27 VITALS
HEIGHT: 63 IN | OXYGEN SATURATION: 96 % | BODY MASS INDEX: 33.66 KG/M2 | SYSTOLIC BLOOD PRESSURE: 120 MMHG | DIASTOLIC BLOOD PRESSURE: 83 MMHG | HEART RATE: 88 BPM | WEIGHT: 190 LBS

## 2022-06-27 DIAGNOSIS — R68.81 EARLY SATIETY: ICD-10-CM

## 2022-06-27 DIAGNOSIS — R13.10 DYSPHAGIA, UNSPECIFIED TYPE: Primary | ICD-10-CM

## 2022-06-27 DIAGNOSIS — K21.9 GASTROESOPHAGEAL REFLUX DISEASE WITHOUT ESOPHAGITIS: ICD-10-CM

## 2022-06-27 PROCEDURE — 3074F PR MOST RECENT SYSTOLIC BLOOD PRESSURE < 130 MM HG: ICD-10-PCS | Mod: CPTII,S$GLB,, | Performed by: INTERNAL MEDICINE

## 2022-06-27 PROCEDURE — 3008F BODY MASS INDEX DOCD: CPT | Mod: CPTII,S$GLB,, | Performed by: INTERNAL MEDICINE

## 2022-06-27 PROCEDURE — 1159F MED LIST DOCD IN RCRD: CPT | Mod: CPTII,S$GLB,, | Performed by: INTERNAL MEDICINE

## 2022-06-27 PROCEDURE — 3008F PR BODY MASS INDEX (BMI) DOCUMENTED: ICD-10-PCS | Mod: CPTII,S$GLB,, | Performed by: INTERNAL MEDICINE

## 2022-06-27 PROCEDURE — 1159F PR MEDICATION LIST DOCUMENTED IN MEDICAL RECORD: ICD-10-PCS | Mod: CPTII,S$GLB,, | Performed by: INTERNAL MEDICINE

## 2022-06-27 PROCEDURE — 99999 PR PBB SHADOW E&M-EST. PATIENT-LVL V: ICD-10-PCS | Mod: PBBFAC,,, | Performed by: INTERNAL MEDICINE

## 2022-06-27 PROCEDURE — 3074F SYST BP LT 130 MM HG: CPT | Mod: CPTII,S$GLB,, | Performed by: INTERNAL MEDICINE

## 2022-06-27 PROCEDURE — 99215 PR OFFICE/OUTPT VISIT, EST, LEVL V, 40-54 MIN: ICD-10-PCS | Mod: S$GLB,,, | Performed by: INTERNAL MEDICINE

## 2022-06-27 PROCEDURE — 99215 OFFICE O/P EST HI 40 MIN: CPT | Mod: S$GLB,,, | Performed by: INTERNAL MEDICINE

## 2022-06-27 PROCEDURE — 3079F DIAST BP 80-89 MM HG: CPT | Mod: CPTII,S$GLB,, | Performed by: INTERNAL MEDICINE

## 2022-06-27 PROCEDURE — 1160F PR REVIEW ALL MEDS BY PRESCRIBER/CLIN PHARMACIST DOCUMENTED: ICD-10-PCS | Mod: CPTII,S$GLB,, | Performed by: INTERNAL MEDICINE

## 2022-06-27 PROCEDURE — 99999 PR PBB SHADOW E&M-EST. PATIENT-LVL V: CPT | Mod: PBBFAC,,, | Performed by: INTERNAL MEDICINE

## 2022-06-27 PROCEDURE — 3079F PR MOST RECENT DIASTOLIC BLOOD PRESSURE 80-89 MM HG: ICD-10-PCS | Mod: CPTII,S$GLB,, | Performed by: INTERNAL MEDICINE

## 2022-06-27 PROCEDURE — 1160F RVW MEDS BY RX/DR IN RCRD: CPT | Mod: CPTII,S$GLB,, | Performed by: INTERNAL MEDICINE

## 2022-06-27 NOTE — PATIENT INSTRUCTIONS
-Eat small, frequent meals. Avoid large or medium sized meals.  -Never lay down after eating. The food sitting in  our esophagus can regurgitate and end up in your lungs.   -Give yourself 3 hours after eating before laying down.   -Be very careful about taking pills. Pills can spend a long time in your esophagus and cause significant damage. Swallow pills with a large amount of water (8-12 oz) and remain upright for 1 hr after taking them. You may also try getting liquid formulations of your medicines if possible. Compounding pharmacies can make almost any medication in liquid form.   Eat sitting completely upright only as gravity can help empty the esophagus.   Swallow only once per bite. Avoid double swallows as taking a second swallow can diminish esophageal motility. Wait 15 seconds after every swallow before initiating a second swallow.     -Complete gastric emptying study   A gastric emptying study is used to assess your stomach's motility, or its ability to empty itself. Slow gastric emptying (gastroparesis) can cause symptoms such as pain, distention, gastroesophageal reflux, nausea, vomiting and poor appetite.    -See Dr. Hidalgo to discuss surgical treatment options

## 2022-06-27 NOTE — TELEPHONE ENCOUNTER
AVS instructions reviewed with pt, copy given    Reviewed GES pre-procedure instructions, copy given.    Asked to have GES same day as apt with Dr Hidalgo.    Both apts scheduled for 7/11/22.    Instructed to hold valium, norco and zanaflex x 2 days prior to GES.    Apt letter reminders mailed.

## 2022-06-27 NOTE — PROGRESS NOTES
[facility] Consultation Note    Reason for Consult:    Chief Complaint   Patient presents with    Dysphagia    Abdominal Pain    Gas    Bloated         PCP:   Herminia Longoria       Referring MD:  [place]  [address] [place] [address]    HPI:  Lenny Latham Jr. is a 62 y.o. male referred to motility clinic for second opinion regarding the following problems:    GERD.   Retrosternal pyrosis: occasional   Regurgitation: occasional     MEDs:   Omeprazole 40 daily w improvement     Dysphagia.   Problems with solids: occasional 2-3 per month   Problems with liquids: rare  Location: mid to lower esophagus  Onset of symptoms: 2011  Symptoms started after exposure to chemicals after [employer] oil spill  Improved w soft/liquid diet      Chest pain/spasm: occasional   Stopped ASA and sx resolved - instructed to discuss w cardiology/PCP as pt has CAD    Regurgitation: occasional     Eckardt Symptom Score  Dysphagia: 1  Regurgitation: 1  Retrosternal pain: 1  Weight Loss: 0  Total: 3    Heller myotomy with Junior fundoplication performed for 2017 w significant improvement in symptoms  EGD w Botox injection/dilation 20mm  2021 w some improvement.    Early satiety.  Daily   Rare nausea   No vomiting     Weight loss:   Stable at 190lb   No shakes    Denies vomiting, abd pain, bloating, diarrhea, constipation, BRBPR, melena, weight loss.       Total visit time was  41  minutes, more than 50% of which was spent in face-to-face counseling with patient regarding symptoms, diagnostic results, prognosis, risks and benefits of treatment options, instructions for management, importance of compliance with chosen treatment options and coping strategies.      Previous Studies:   Manometry 05/27/2022:  No [place] classification abnormality.  Normal LES pressure with complete relaxation.  Esophageal pressurization.  Incomplete bolus clearance.  Hiatal hernia versus myotomy  with diverticulum.  Abnormal multiple rapid swallow test.  No significant difference with provocative maneuvers.  Evidence of residual at the end of 200 cc bolus.  EGD 05/23/2022:  Small amount of fluid in the esophagus.  Dilation in the entire esophagus.  Atrophic mucosa in the esophagus (-).  Z-line 40 cm.  Diverticulum in distal esophagus (35-40 cm) mild esophageal stenosis at 35 cm proximal to diverticulum.  Normal GE junction.  No puckering resistance or pop.  Flip placed with endoscopic guidance.  Diagnostic of normal esophageal distensibility and absent contractility.  Continuous pressure zone proximal to GE junction.  See pictures.  Erythema in the stomach (-).  Normal duodenum.  Manometry catheter placed with endoscopic guidance  Timed barium swallow 05/05/2022:  13 mm tablet passed easily into the stomach.  Patulous esophagus.  Saccular outpouching of the distal esophagus just proximal to GE junction.  Incomplete relaxation of the lower esophageal sphincter.  Esophageal mucosa was with within normal limits.  Esophageal dysmotility characterized by proximal escape in tertiary contractions.) Position there was significant stasis of contrast throughout the esophagus 1 cm at 0 minutes.  Trace residual at 1 minute.    CT abdomen 08/18/2021:  Trace right-sided pleural effusion.  Small hiatal hernia with GERD.  Tiny 6 mm hypodensity in the right hepatic lobe likely small cysts.  Diverticulosis.  Constipation.  EGD 05/05/2021:  Sigmoid esophagus with retained food consistent with diagnosis of achalasia.  Resent tonically close GE junction with significant dilation around the area consistent with achalasia.  Currently otherwise normal stomach and duodenum the some fluid retention.  Gastric emptying delay is seen in the proximally to slightly less than 20% of patients with achalasia.  Successful injection of 100 units botulin toxin in 4 different aliquots at SCJ.  Successful dilation of GE junction to 20 mm.   Referral to Dr. Ariel Mcclendon to consider poem.    CT of abdomen 05/04/2021:  Normal CT of abdomen spleen is quite small.  Prostate enlarged.  Post distention of distal esophagus containing food and debris.  Consider achalasia versus esophageal paraesophageal diverticulum.  Esophagram 10/13/2020:  Moderate size hiatal hernia.  Significant area of persistent narrowing of proximal stomach just distal to the hiatal hernia.  Contrast material this passed through this narrowing slowly.  The esophagus is abnormally dilated with significant associated esophageal dysmotility.  Limited exam secondary to patient's inability to tolerate further imaging.  CT CT chest 10/13/2020:  Radha fluid distention of esophagus with evidence of small hiatal hernia otherwise no significant abnormality.  Esophagram 04/22/2016:  Narrowing of posterior mid cervical esophagus, significant dysmotility of the esophagus with continuing narrowing of the esophagus at junction and mid and proximal 3rd of the thoracic esophagus, multiple tertiary contractions and persistent narrowing in the distal esophagus at GE junction with contrast retention.  Incomplete filled stomach with irregularity and prominent folds suggestive of region of body and proximal stomach being of uncertain significance in inflammatory or infectious disease or even neoplastic involvement cannot be ruled out.    Manometry 05/09/2016:  Achalasia type 2.  IRP 30.9.  Pan esophageal pressurization 100%.    Relevant Surgical History:    Heller myotomy with Junior fundoplication performed for 2017      ROS:  ROS   Constitutional: No fevers, no chills, no night sweats, no weight loss  ENT: No congestion, no rhinorrhea, no chronic sinus problems  CV: No chest pain, no palpitations  Pulm: No cough, no shortness of breath  Ophtho: No blurry vision, no eye redness  GI: see HPI  Derm: No rash  Heme: No lymphadenopathy, no bruising  MSK: + arthritis, no joint swelling, no Raynauds  : No  dysuria, + frequent urination, no blood in urine  Endo: No hot or cold intolerance  Neuro: No dizziness, no syncope, no seizure  Psych: + anxiety, no depression  Complete ROS negative except as above    Medical History:   Past Medical History:   Diagnosis Date    Achalasia of esophagus 11/28/2016    Anxiety state, unspecified     Coronary artery disease     Esophageal reflux     Esophageal stricture     Hypertrophy of prostate without urinary obstruction and other lower urinary tract symptoms (LUTS)     Screening PSA (prostate specific antigen) 12/7/12    0.4    Unspecified essential hypertension         Surgical History:   Past Surgical History:   Procedure Laterality Date    BACK SURGERY      CERVICAL EPIDURAL STEROID INJECTION      COLONOSCOPY      ESOPHAGEAL MANOMETRY WITH MEASUREMENT OF IMPEDANCE N/A 5/23/2022    Procedure: MANOMETRY-ESOPHAGEAL-WITH IMPEDANCE;  Surgeon: Madina Hoover MD;  Location: [facility] ([address] [place] [address]);  Service: Endoscopy;  Laterality: N/A;  hold pain medication 24 hours prior to procedure    ESOPHAGOGASTRODUODENOSCOPY      ESOPHAGOGASTRODUODENOSCOPY N/A 8/16/2019    Procedure: EGD (ESOPHAGOGASTRODUODENOSCOPY);  Surgeon: Pawan Schwab MD;  Location[facility];  Service: Endoscopy;  Laterality: N/A;    ESOPHAGOGASTRODUODENOSCOPY N/A 5/23/2022    Procedure: ESOPHAGOGASTRODUODENOSCOPY (EGD);  Surgeon: Madina Hoover MD;  Location: [address]);  Service: Endoscopy;  Laterality: N/A;  Endoflip/Endoscopically placed manometry probe  2nd floor-End stage achalasia  propofol only  full iquid diet x3 days, clear liquid diet x1 day prior to procedure  fully vaccinated, [facility] and [facility] [place]    HERNIA REPAIR      INGUINAL HERNIA REPAIR Right     LUMBAR EPIDURAL STEROID INJECTION      UPPER GASTROINTESTINAL ENDOSCOPY  04/27/2016        Family History:   Family History   Problem Relation Age of Onset    Lung cancer Mother     Hypertension Father      Prostate cancer Father     Ulcers Sister     Ulcers Brother     Prostate cancer Maternal Grandfather     Ulcers Sister     Celiac disease Neg Hx     Colon cancer Neg Hx     Colon polyps Neg Hx     Crohn's disease Neg Hx     Esophageal cancer Neg Hx     Inflammatory bowel disease Neg Hx     Irritable bowel syndrome Neg Hx     Liver cancer Neg Hx     Liver disease Neg Hx     Rectal cancer Neg Hx     Stomach cancer Neg Hx         Social History:   Social History     Socioeconomic History    Marital status: Single    Years of education: 12th   Occupational History    Occupation: Disabled   Tobacco Use    Smoking status: Never Smoker    Smokeless tobacco: Never Used   Substance and Sexual Activity    Alcohol use: Not Currently     Comment: Social  none since incarcerated    Drug use: Not Currently     Types: Marijuana        Review of patient's allergies indicates:  No Known Allergies    Current Outpatient Medications   Medication Sig Dispense Refill    aspirin (ECOTRIN) 81 MG EC tablet Take 81 mg by mouth once daily.      atorvastatin (LIPITOR) 40 MG tablet Take 1 tablet (40 mg total) by mouth every evening. 90 tablet 1    busPIRone (BUSPAR) 15 MG tablet Take 1 tablet (15 mg total) by mouth 2 (two) times daily. 60 tablet 2    diazePAM (VALIUM) 5 MG tablet Take 1 tablet (5 mg total) by mouth daily as needed for Anxiety. 30 tablet 2    HYDROcodone-acetaminophen (NORCO) 7.5-325 mg per tablet Take 1 tablet by mouth every 6 (six) hours as needed for Pain. 12 tablet 0    OLANZapine (ZYPREXA) 10 MG tablet Take 1 tablet (10 mg total) by mouth every evening. 90 tablet 1    omeprazole (PRILOSEC) 40 MG capsule TAKE 1 CAPSULE EVERY DAY 90 capsule 1    sildenafil (REVATIO) 20 mg Tab Take 1 tablet (20 mg total) by mouth daily as needed. 40 tablet 11    tamsulosin (FLOMAX) 0.4 mg Cap Take 1 capsule (0.4 mg total) by mouth once daily. 30 capsule 11    tiZANidine (ZANAFLEX) 4 MG tablet Take 4 mg by  "mouth 3 (three) times daily.      EScitalopram oxalate (LEXAPRO) 20 MG tablet Take 1 tablet (20 mg total) by mouth every evening. (Patient not taking: Reported on 6/7/2022) 90 tablet 1     No current facility-administered medications for this visit.     Facility-Administered Medications Ordered in Other Visits   Medication Dose Route Frequency Provider Last Rate Last Admin    0.9%  NaCl infusion   Intravenous Continuous Brenden Pizarro MD   New Bag at 05/23/22 1238    ondansetron injection 4 mg  4 mg Intravenous Q12H PRN Brenden Pizarro MD            Objective Findings:  Vital Signs:  /83   Pulse 88   Ht 5' 3" (1.6 m)   Wt 86.2 kg (190 lb)   SpO2 96%   BMI 33.66 kg/m²   Body mass index is 33.66 kg/m².    Physical Exam:  General appearance: alert, cooperative, no distress  HENT: Normocephalic, atraumatic, neck symmetrical, no nasal discharge  Eyes: conjunctivae/corneas clear, PERRL, EOM's intact  Lungs: clear to auscultation bilaterally, no dullness to percussion bilaterally  Heart: regular rate and rhythm without rub; no displacement of the PMI  Abdomen: soft, non-tender; bowel sounds normoactive; no organomegaly  Extremities: extremities symmetric; no clubbing, cyanosis, or edema  Integument: Skin color, texture, turgor normal; no rashes; hair distrubution normal  Neurologic: Alert and oriented X 3, normal strength, normal coordination and gait  Psychiatric: no pressured speech; normal affect; no evidence of impaired cognition    Labs:   Reviewed in Epic/record      Assessment and Plan:  Lenny Isaacmainor Tejeda is a 62 y.o. male with referred to [facility] for 2nd opinion regarding following problems:    GERD.   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  Esophagogram w mod HH, narrowing of proximal stomach, dilated esophagus - concerning for slipped fundoplication   On norco BID   On zanaflex   On sidenafil for ED  TBS w patulous esophagus, epiphrenic diverticulum, trace at 1min, cleared by 2 min  EGD w fluid filled, dilated, atrophic esophagus, stenosis at 35cm, TIC 35-40cm, patent GEJ (no puckering, resistance, pop)  Flip nl distensibility and and absent contractility, double pressure zone    Manometry with nl LES w complete relaxation, pressurization, incomplete clearance and residual w 200cc bolus.  HH vs myotomy w diverticulum   -Discussed results and possible treatments   -Ref to Dr. Hidalgo  -Will discuss in Swallowing Conference     Early satiety. Rare nausea   -Check GES    Weight loss  Lost weight years ago, but now stable     Arthritis   On norco BID   On Zanaflex     Anxiety   -On lexapro   -On Zyprexa     Follow up in about 6 months (around 12/27/2022).    1. Dysphagia, unspecified type    2. Early satiety    3. Gastroesophageal reflux disease without esophagitis          Order summary:  Orders Placed This Encounter    NM Gastric Emptying    Ambulatory referral/consult to General Surgery       Discussed with PT that I act as a consult service and do not accept patients to be their primary GI provider. Discussed that the goal of our visits is to address relevant motility problems while deferring other GI problems as well as screening and surveillance to his/her primary GI provider.   Discussed that he/she needs to continue to follow with his local primary GI provider.  Discussed that we will complete his/her workup, clarify diagnosis and attempt to optimize his/her symptoms with intention of him/her returning to referring GI provider for long term GI care.   Pt verbalized understanding.        Thank you so  much for allowing me to participate in the care of Lenny Hoover MD      This note was created using voice recognition software, and may contain some unrecognized transcriptional errors.

## 2022-07-01 ENCOUNTER — TELEPHONE (OUTPATIENT)
Dept: INTERNAL MEDICINE | Facility: CLINIC | Age: 62
End: 2022-07-01
Payer: MEDICAID

## 2022-07-11 ENCOUNTER — HOSPITAL ENCOUNTER (OUTPATIENT)
Dept: RADIOLOGY | Facility: HOSPITAL | Age: 62
Discharge: HOME | End: 2022-07-11
Attending: INTERNAL MEDICINE
Payer: MEDICARE

## 2022-07-11 ENCOUNTER — OFFICE VISIT (OUTPATIENT)
Dept: SURGERY | Facility: CLINIC | Age: 62
End: 2022-07-11
Payer: MEDICARE

## 2022-07-11 VITALS
HEART RATE: 91 BPM | HEIGHT: 63 IN | WEIGHT: 188.38 LBS | BODY MASS INDEX: 33.38 KG/M2 | SYSTOLIC BLOOD PRESSURE: 129 MMHG | DIASTOLIC BLOOD PRESSURE: 84 MMHG

## 2022-07-11 DIAGNOSIS — K22.5 EPIPHRENIC DIVERTICULUM: Primary | ICD-10-CM

## 2022-07-11 DIAGNOSIS — R68.81 EARLY SATIETY: ICD-10-CM

## 2022-07-11 DIAGNOSIS — R13.14 PHARYNGOESOPHAGEAL DYSPHAGIA: ICD-10-CM

## 2022-07-11 PROCEDURE — 78264 GASTRIC EMPTYING IMG STUDY: CPT | Mod: 26,,, | Performed by: RADIOLOGY

## 2022-07-11 PROCEDURE — 3074F SYST BP LT 130 MM HG: CPT | Mod: CPTII,S$GLB,, | Performed by: SURGERY

## 2022-07-11 PROCEDURE — 3074F PR MOST RECENT SYSTOLIC BLOOD PRESSURE < 130 MM HG: ICD-10-PCS | Mod: CPTII,S$GLB,, | Performed by: SURGERY

## 2022-07-11 PROCEDURE — 99204 OFFICE O/P NEW MOD 45 MIN: CPT | Mod: S$GLB,,, | Performed by: SURGERY

## 2022-07-11 PROCEDURE — 1159F PR MEDICATION LIST DOCUMENTED IN MEDICAL RECORD: ICD-10-PCS | Mod: CPTII,S$GLB,, | Performed by: SURGERY

## 2022-07-11 PROCEDURE — 99204 PR OFFICE/OUTPT VISIT, NEW, LEVL IV, 45-59 MIN: ICD-10-PCS | Mod: S$GLB,,, | Performed by: SURGERY

## 2022-07-11 PROCEDURE — 99999 PR PBB SHADOW E&M-EST. PATIENT-LVL IV: ICD-10-PCS | Mod: PBBFAC,,, | Performed by: SURGERY

## 2022-07-11 PROCEDURE — 3079F PR MOST RECENT DIASTOLIC BLOOD PRESSURE 80-89 MM HG: ICD-10-PCS | Mod: CPTII,S$GLB,, | Performed by: SURGERY

## 2022-07-11 PROCEDURE — 3079F DIAST BP 80-89 MM HG: CPT | Mod: CPTII,S$GLB,, | Performed by: SURGERY

## 2022-07-11 PROCEDURE — 78264 NM GASTRIC EMPTYING: ICD-10-PCS | Mod: 26,,, | Performed by: RADIOLOGY

## 2022-07-11 PROCEDURE — 99999 PR PBB SHADOW E&M-EST. PATIENT-LVL IV: CPT | Mod: PBBFAC,,, | Performed by: SURGERY

## 2022-07-11 PROCEDURE — 1159F MED LIST DOCD IN RCRD: CPT | Mod: CPTII,S$GLB,, | Performed by: SURGERY

## 2022-07-11 PROCEDURE — 3008F BODY MASS INDEX DOCD: CPT | Mod: CPTII,S$GLB,, | Performed by: SURGERY

## 2022-07-11 PROCEDURE — 3008F PR BODY MASS INDEX (BMI) DOCUMENTED: ICD-10-PCS | Mod: CPTII,S$GLB,, | Performed by: SURGERY

## 2022-07-11 PROCEDURE — A9541 TC99M SULFUR COLLOID: HCPCS

## 2022-07-11 NOTE — PROGRESS NOTES
GENERAL SURGERY CLINIC NOTE    CC:  Dysphagia    HPI:  Lenny Latham Jr. is a 62 y.o. male with Hx of GERD, CAD, HLD, BPH, and Achalasia who presents to clinic for evaluation of dysphagia. He underwent a heller myotomy with Junior fundoplication in 2017 for achalasia with resolution of symptoms. However, his symptoms gradually came back and he underwent an EGD w/ botox inj and dilation in 2021 with some resolution of his symptoms. Unfortunately, he states his symptoms of dysphagia and regurgitation are as worse as they were prior to his surgery in 2017. His Eckardt score today is a 4. He denies chest or back pain, nausea, vomiting, weight loss. He has undergone several imaging studies prior to this appointment.     Aspirin: Yes. Anticoagulants: No  PSHx: Heller myotomy with D'or fundoplication 2017. Inguinal hernia repair 2020.  Smoking: Yes   DMII: No     ROS:   Review of Systems   Constitutional: Negative.    HENT: Negative.    Eyes: Negative.    Respiratory: Negative.    Cardiovascular: Negative.    Gastrointestinal: Negative for abdominal pain, constipation, diarrhea, heartburn, nausea and vomiting.        Dysphagia.  Regurgitation.   Genitourinary: Negative.    Musculoskeletal: Negative.    Skin: Negative.    Neurological: Negative.    Endo/Heme/Allergies: Negative.    Psychiatric/Behavioral: Negative.         Past Medical History:   Diagnosis Date    Achalasia of esophagus 11/28/2016    Anxiety state, unspecified     Coronary artery disease     Esophageal reflux     Esophageal stricture     Hypertrophy of prostate without urinary obstruction and other lower urinary tract symptoms (LUTS)     Screening PSA (prostate specific antigen) 12/7/12    0.4    Unspecified essential hypertension        Past Surgical History:   Procedure Laterality Date    BACK SURGERY      CERVICAL EPIDURAL STEROID INJECTION      COLONOSCOPY      ESOPHAGEAL MANOMETRY WITH MEASUREMENT OF IMPEDANCE N/A 5/23/2022     Procedure: MANOMETRY-ESOPHAGEAL-WITH IMPEDANCE;  Surgeon: Maidna Hoover MD;  Location: [facility] ENDO (2ND FLR);  Service: Endoscopy;  Laterality: N/A;  hold pain medication 24 hours prior to procedure    ESOPHAGOGASTRODUODENOSCOPY      ESOPHAGOGASTRODUODENOSCOPY N/A 8/16/2019    Procedure: EGD (ESOPHAGOGASTRODUODENOSCOPY);  Surgeon: Pawan Schwab MD;  Location: [facility];  Service: Endoscopy;  Laterality: N/A;    ESOPHAGOGASTRODUODENOSCOPY N/A 5/23/2022    Procedure: ESOPHAGOGASTRODUODENOSCOPY (EGD);  Surgeon: Madina Hoover MD;  Location: [facility] ENDO (2ND FLR);  Service: Endoscopy;  Laterality: N/A;  Endoflip/Endoscopically placed manometry probe  2nd floor-End stage achalasia  propofol only  full iquid diet x3 days, clear liquid diet x1 day prior to procedure  fully vaccinated, instructions [facility] and mailed-[facility]    HERNIA REPAIR      INGUINAL HERNIA REPAIR Right     LUMBAR EPIDURAL STEROID INJECTION      UPPER GASTROINTESTINAL ENDOSCOPY  04/27/2016       Current Outpatient Medications on File Prior to Visit   Medication Sig Dispense Refill    aspirin (ECOTRIN) 81 MG EC tablet Take 81 mg by mouth once daily.      atorvastatin (LIPITOR) 40 MG tablet Take 1 tablet (40 mg total) by mouth every evening. 90 tablet 1    busPIRone (BUSPAR) 15 MG tablet Take 1 tablet (15 mg total) by mouth 2 (two) times daily. 60 tablet 2    diazePAM (VALIUM) 5 MG tablet Take 1 tablet (5 mg total) by mouth daily as needed for Anxiety. 30 tablet 2    EScitalopram oxalate (LEXAPRO) 20 MG tablet Take 1 tablet (20 mg total) by mouth every evening. 90 tablet 1    HYDROcodone-acetaminophen (NORCO) 7.5-325 mg per tablet Take 1 tablet by mouth every 6 (six) hours as needed for Pain. 12 tablet 0    OLANZapine (ZYPREXA) 10 MG tablet Take 1 tablet (10 mg total) by mouth every evening. 90 tablet 1    omeprazole (PRILOSEC) 40 MG capsule TAKE 1 CAPSULE(40 MG) BY MOUTH EVERY DAY 30 capsule 5    sildenafil (REVATIO) 20 mg Tab  Take 1 tablet (20 mg total) by mouth daily as needed. 40 tablet 11    tamsulosin (FLOMAX) 0.4 mg Cap Take 1 capsule (0.4 mg total) by mouth once daily. 30 capsule 11    tiZANidine (ZANAFLEX) 4 MG tablet Take 4 mg by mouth 3 (three) times daily.       Current Facility-Administered Medications on File Prior to Visit   Medication Dose Route Frequency Provider Last Rate Last Admin    0.9%  NaCl infusion   Intravenous Continuous Brenden Pizarro MD   New Bag at 05/23/22 1238    ondansetron injection 4 mg  4 mg Intravenous Q12H PRN Brenden Pizarro MD           Social History     Socioeconomic History    Marital status: Single    Years of education: 12th   Occupational History    Occupation: Disabled   Tobacco Use    Smoking status: Never Smoker    Smokeless tobacco: Never Used   Substance and Sexual Activity    Alcohol use: Not Currently     Comment: Social  none since incarcerated    Drug use: Not Currently     Types: Marijuana       Review of patient's allergies indicates:  No Known Allergies      PHYSICAL EXAM:  Vitals:    07/11/22 1339   BP: 129/84   Pulse: 91       Physical Exam  Vitals and nursing note reviewed.   Constitutional:       Appearance: Normal appearance. He is obese.   HENT:      Head: Normocephalic and atraumatic.      Nose: Nose normal.      Mouth/Throat:      Mouth: Mucous membranes are moist.      Pharynx: Oropharynx is clear.   Cardiovascular:      Rate and Rhythm: Normal rate and regular rhythm.      Pulses: Normal pulses.      Heart sounds: Normal heart sounds.   Pulmonary:      Effort: Pulmonary effort is normal.      Breath sounds: Normal breath sounds.   Abdominal:      General: Abdomen is flat. Bowel sounds are normal.      Palpations: Abdomen is soft.   Musculoskeletal:         General: Normal range of motion.      Cervical back: Normal range of motion.   Skin:     General: Skin is warm and dry.   Neurological:      Mental Status: He is alert and oriented to person, place,  and time.   Psychiatric:         Behavior: Behavior normal.         Thought Content: Thought content normal.         PERTINENT IMAGING:  Attending MD: Madina Hoover MD   Procedure:             Esophageal Manometry with Impedance   Indications:           Esophageal dysphagia   Providers:             Madina Hoover MD, Chelsey Dhillon, FIDELINA   Impression:            No [place] Classification abnormality                          Normal LES pressure with complete relaxation                          Panesophageal pressurization                          Incomplete bolus clearance                          Hiatal hernia (vs myotomy with diverticulum)                          Abnormal multiple rapid swallows test                          No significant difference with provocative                          maneuvers                          Evidence of residual at the end of 200cc bolus                          Study performed: 5/23/2022                          Interpretation: 6/3/2022     Attending MD: Madina Hoover MD   Procedure:             Upper GI endoscopy   Indications:           Dysphagia   Providers:             Madina Hoover MD, Willa Srivastava LPN, Willa Boucher CRNA, Swapna Alcazar, FIDELINA   Referring MD:          Madina Hoover MD   Complications:         No immediate complications.  Impression:            - Small amount of fluid in the esophagus.                          - Dilation in the entire esophagus.                          - Atrophic mucosa in the esophagus. Biopsied.                          - Z-line regular, 40 cm from the incisors.                          - Diverticulum in the distal esophagus (35cm                          -40cm).                          - Mild esophageal stenosis at 35c, proximal to                          diverticulum.                          - Normal gastroesophageal junction. No puckering,                          resisitance or pop.                           - FLIP placed w endoscopic guidance. Diagnostic of                          normal esophageal distensibility and absent                          contractility. Continuous pressure zone proximal                          to GEJ. See pictures.                          - Erythematous mucosa in the stomach. Biopsied.                          - Normal examined duodenum.                          -An esophageal manometry catheter was placed with                          endoscopic guidance.   Recommendation:        - Await pathology results.                          - Discharge patient to home (with escort).                          - Resume previous diet.                          - Continue present medications.                          - The findings and recommendations were discussed     EXAMINATION:  FL ESOPHAGRAM COMPLETE   Impression:  Patulous esophagus with dysmotility, an epiphrenic diverticulum, and incomplete relaxation of the lower esophageal sphincter.  Constellation of findings are compatible with achalasia.  FINDINGS:  At 4 hour(s) the percentage of retention is 35 % (normal retention at 4 hours is 10% and lower).   Impression:   Delayed gastric emptying time..      ASSESSMENT/PLAN:  Lenny Latham Jr. is a 62 y.o. male with history of dysphagia s/p heller myotomy w/ Junior fundoplication and egd with botox and dilation who complains of recurrent dysphagia. symptoms.     will be discussed at swallowing conference  Feel surgical intervention is increased risk  Could consider wrap takedown, vs HH repair, vs PD, vs diverticulectomy.  Will discuss.      Betsey Cheema MD  [facility] PGY-I  Pager: [phone]

## 2022-07-12 ENCOUNTER — DOCUMENTATION ONLY (OUTPATIENT)
Dept: GASTROENTEROLOGY | Facility: CLINIC | Age: 62
End: 2022-07-12
Payer: MEDICAID

## 2022-07-12 ENCOUNTER — PATIENT MESSAGE (OUTPATIENT)
Dept: GASTROENTEROLOGY | Facility: CLINIC | Age: 62
End: 2022-07-12
Payer: MEDICAID

## 2022-07-12 ENCOUNTER — TELEPHONE (OUTPATIENT)
Dept: GASTROENTEROLOGY | Facility: CLINIC | Age: 62
End: 2022-07-12
Payer: MEDICAID

## 2022-07-12 ENCOUNTER — TELEPHONE (OUTPATIENT)
Dept: NEUROLOGY | Facility: CLINIC | Age: 62
End: 2022-07-12
Payer: MEDICAID

## 2022-07-12 ENCOUNTER — HOSPITAL ENCOUNTER (OUTPATIENT)
Dept: RADIOLOGY | Facility: HOSPITAL | Age: 62
Discharge: HOME | End: 2022-07-12
Attending: NURSE PRACTITIONER
Payer: MEDICARE

## 2022-07-12 DIAGNOSIS — R45.1 AGITATION: ICD-10-CM

## 2022-07-12 DIAGNOSIS — R41.9 COGNITIVE COMPLAINTS WITH NORMAL EXAM: ICD-10-CM

## 2022-07-12 DIAGNOSIS — F11.10 OPIOID ABUSE, DAILY USE: ICD-10-CM

## 2022-07-12 DIAGNOSIS — F43.20 ADJUSTMENT DISORDER, UNSPECIFIED TYPE: ICD-10-CM

## 2022-07-12 DIAGNOSIS — F41.8 MIXED ANXIETY AND DEPRESSIVE DISORDER: ICD-10-CM

## 2022-07-12 DIAGNOSIS — F40.298 FEAR OF NEEDLES: ICD-10-CM

## 2022-07-12 DIAGNOSIS — G89.4 CHRONIC PAIN SYNDROME: ICD-10-CM

## 2022-07-12 DIAGNOSIS — Z79.899 CHRONIC PRESCRIPTION BENZODIAZEPINE USE: ICD-10-CM

## 2022-07-12 PROCEDURE — 70551 MRI BRAIN WITHOUT CONTRAST: ICD-10-PCS | Mod: 26,,, | Performed by: RADIOLOGY

## 2022-07-12 PROCEDURE — 70551 MRI BRAIN STEM W/O DYE: CPT | Mod: 26,,, | Performed by: RADIOLOGY

## 2022-07-12 PROCEDURE — 70551 MRI BRAIN STEM W/O DYE: CPT | Mod: TC

## 2022-07-12 NOTE — PROGRESS NOTES
MRI Brain WO Results:      07-       No acute abnormality or significant change compared to the head CT from 01/19/2022.

## 2022-07-12 NOTE — TELEPHONE ENCOUNTER
Called and spoke with pt, explained per Dr Hoover:    Pls let pt know that hir/her gastric emptying scan shows significant delay if emptying of food from the stomach.  He/she should eat six small, low fat and low fiber meals per day.  Arrange apt w dietitian to discuss Gp diet         Dietician apt scheduled for 7/27 @ 9am virtual. Will send abv message to portal per pt request.  Will also mail pt information on Gastroparesis Diet Tips

## 2022-07-12 NOTE — PATIENT CARE CONFERENCE
[facility]   BENIGN FOREGUT MULTIDISCIPLINARY CONFERENCE  PATIENT REVIEW FORM  ____________________________________________________________    CLINIC #: [id number]    DATE: 07/12/2022    DIAGNOSIS:     [x] Dysphagia [x] GERD                                        [] Slipped Nissen                      [] Gastroparesis                     [] LP Reflux     [x] Achalasia        [] Hypercontractile esophagus      [] Hiatal Hernia                         [] Rumination Syndrome    [] CP Bar     [] EGJOO            [] Diffuse esophageal spasm        [] Para-esophageal Hernia       [] Cyclic Vomiting                  [] Zenker's Diverticulum     [] Dysmotility     [] Pseudoachalasia                       [] Esophageal diverticulum       [] Dumping Syndrome            [] Oropharyngeal dysphagia     [] Buckner's              [] OTHER:     PRESENTER:      [] Dr. Hoover    [] Dr. Hidalgo   [] Dr. Ibarra   [] Dr. Haywood           [] Dr. Lindsey  [] Dr. Parnell   [] Dr. Elizondo    [] Other:       PATIENT SUMMARY:   61-year-old male with heartburn, regurgitation, dysphagia to solids, chest spasms.  Symptoms started in 2011 after exposure to chemical during clean up of BP orals.  Kelvin 3/2  Diagnosed with achalasia type 2 in 2016.   Status post Heller myotomy with Junior fundoplication in 2017 with significant improvement in symptoms.  EGD at outside hospital with sigmoid esophagus status Botox injection with 20 mm dilation in 2021 with some improvement.  EGD with narrowing at 35 cm dilation, possible tick 35-40 cm, open GE junction.  Flip with normal distensibility and absent contractility.  Continues pressure zone proximal to GE junction.  Manometry with normal LES pressure, normal IRP.  Pan esophageal pressurization.  Double pressure zone at GE junction.  Timed barium swallow patulous esophagus, epiphrenic diverticulum, incomplete relaxation of lower esophageal sphincter, clearance of barium within 1 minute.  13 mm  tablet passed easily into the stomach.  Imaging concerning for incomplete myotomy or scar related narrowing proximal to diverticulum.   Surgical intervention carries high risk because narrowing is proximal.  Thoracic approach may be necessary  Overall patient's symptoms are not severe and significantly improved with diet     RECOMMENDATIONS:   Can consider EGD with Botox or pneumatic dilation    CONSULT NEEDED:         [] Foregut Surg   [] ENT                [] GI            [] CT Surg         [] Psychiatry        [] Psychology     [] Speech       IMAGING:       [] TBS      [] MBS    [] CT ABD/PELVIS       [] GES      [] MRI     [] CT chest    [] EKG      [] U/S     [] UGI w/SBFT        PROCEDURES:    [] EGD       [] EGD w dilation  [] EGD w Botox        [] BRAVO  [] Ph Impedance  [] Manometry    [] Endoflip  [] EUS                     [] OTHER:     SURGERY  [] Heller Myotomy      [] POEM                 []  Diverticulectomy  [] Nissen Fundoplication       [] Junior Fundoplication      [] Toupet Fundoplication  [] Hiatal Hernia Repair   [] Paraesophageal Hernia Repair

## 2022-07-12 NOTE — TELEPHONE ENCOUNTER
PATIENT SUMMARY:   61-year-old male with heartburn, regurgitation, dysphagia to solids, chest spasms.  Symptoms started in 2011 after exposure to chemical during clean up of BP orals.  Kelvin 3/2  Diagnosed with achalasia type 2 in 2016.   Status post Heller myotomy with Junior fundoplication in 2017 with significant improvement in symptoms.  EGD at outside hospital with sigmoid esophagus status Botox injection with 20 mm dilation in 2021 with some improvement.  EGD with narrowing at 35 cm dilation, possible tick 35-40 cm, open GE junction.  Flip with normal distensibility and absent contractility.  Continues pressure zone proximal to GE junction.  Manometry with normal LES pressure, normal IRP.  Pan esophageal pressurization.  Double pressure zone at GE junction.  Timed barium swallow patulous esophagus, epiphrenic diverticulum, incomplete relaxation of lower esophageal sphincter, clearance of barium within 1 minute.  13 mm tablet passed easily into the stomach.  Imaging concerning for incomplete myotomy or scar related narrowing proximal to diverticulum.   Surgical intervention carries high risk because narrowing is proximal.  Thoracic approach may be necessary  Overall patient's symptoms are not severe and significantly improved with diet     RECOMMENDATIONS:   Can consider EGD with Botox or pneumatic dilation      Please let patient know that we discussed his case in multidisciplinary swallowing conference.  His case is very complicated and I would like to bring him to see me in clinic to discuss treatment options.  Please arrange next available appointment.

## 2022-07-12 NOTE — TELEPHONE ENCOUNTER
----- Message from Elin Florence NP sent at 7/12/2022  4:07 PM CDT -----  MRI Brain WO Results:      07-       No acute abnormality or significant change compared to the head CT from 01/19/2022.

## 2022-07-14 ENCOUNTER — PATIENT MESSAGE (OUTPATIENT)
Dept: GASTROENTEROLOGY | Facility: CLINIC | Age: 62
End: 2022-07-14
Payer: MEDICAID

## 2022-07-18 LAB
AMPHIPHYSIN AB TITR SER: NEGATIVE TITER
CV2 IGG TITR SER: NEGATIVE TITER
GLIAL NUC TYPE 1 AB TITR SER: NEGATIVE TITER
HU1 AB TITR SER: NEGATIVE TITER
HU2 AB TITR SER IF: NEGATIVE TITER
HU3 AB TITR SER: NEGATIVE TITER
IMMUNOLOGIST REVIEW: NORMAL
NACHR AB SER-SCNC: NORMAL NMOL/ML
PAVAL REFLEX TEST ADDED: NORMAL
PCA-1 AB TITR SER: NEGATIVE TITER
PCA-TR AB TITR SER: NEGATIVE TITER
PURKINJE CELL CYTOPLASMIC AB TYPE2: NEGATIVE TITER
VGCC-P/Q BIND AB SER-SCNC: 0 NMOL/L
VGKC AB SER-SCNC: 0 NMOL/L

## 2022-07-18 RX ORDER — PYRIDOXINE HCL (VITAMIN B6) 100 MG
100 TABLET ORAL DAILY
Qty: 30 TABLET | Refills: 2 | Status: SHIPPED | OUTPATIENT
Start: 2022-07-18 | End: 2022-10-16

## 2022-07-18 NOTE — PROGRESS NOTES
Labs Reviewed:     -  07-    NMO neg, Vitamin B 6 2 Low (5- 50)    Recommend Vitamin B 6 replacement, it is important for normal brain development. Vitamin B6/Pyridoxine 100 mg po daily recommended, [facility]

## 2022-07-27 ENCOUNTER — TELEPHONE (OUTPATIENT)
Dept: GASTROENTEROLOGY | Facility: CLINIC | Age: 62
End: 2022-07-27
Payer: MEDICAID

## 2022-08-01 ENCOUNTER — OFFICE VISIT (OUTPATIENT)
Dept: UROLOGY | Facility: CLINIC | Age: 62
End: 2022-08-01
Payer: MEDICARE

## 2022-08-01 VITALS
TEMPERATURE: 99 F | SYSTOLIC BLOOD PRESSURE: 104 MMHG | DIASTOLIC BLOOD PRESSURE: 71 MMHG | WEIGHT: 186.06 LBS | BODY MASS INDEX: 32.96 KG/M2 | HEART RATE: 72 BPM

## 2022-08-01 DIAGNOSIS — N40.1 BENIGN PROSTATIC HYPERPLASIA WITH URINARY OBSTRUCTION: Primary | ICD-10-CM

## 2022-08-01 DIAGNOSIS — N13.8 BENIGN PROSTATIC HYPERPLASIA WITH URINARY OBSTRUCTION: Primary | ICD-10-CM

## 2022-08-01 DIAGNOSIS — N52.9 ERECTILE DYSFUNCTION, UNSPECIFIED ERECTILE DYSFUNCTION TYPE: ICD-10-CM

## 2022-08-01 DIAGNOSIS — R39.15 URGENCY OF URINATION: ICD-10-CM

## 2022-08-01 PROCEDURE — 3074F SYST BP LT 130 MM HG: CPT | Mod: CPTII,S$GLB,, | Performed by: UROLOGY

## 2022-08-01 PROCEDURE — 1160F PR REVIEW ALL MEDS BY PRESCRIBER/CLIN PHARMACIST DOCUMENTED: ICD-10-PCS | Mod: CPTII,S$GLB,, | Performed by: UROLOGY

## 2022-08-01 PROCEDURE — 1159F MED LIST DOCD IN RCRD: CPT | Mod: CPTII,S$GLB,, | Performed by: UROLOGY

## 2022-08-01 PROCEDURE — 3008F BODY MASS INDEX DOCD: CPT | Mod: CPTII,S$GLB,, | Performed by: UROLOGY

## 2022-08-01 PROCEDURE — 99999 PR PBB SHADOW E&M-EST. PATIENT-LVL IV: ICD-10-PCS | Mod: PBBFAC,,, | Performed by: UROLOGY

## 2022-08-01 PROCEDURE — 3078F DIAST BP <80 MM HG: CPT | Mod: CPTII,S$GLB,, | Performed by: UROLOGY

## 2022-08-01 PROCEDURE — 99999 PR PBB SHADOW E&M-EST. PATIENT-LVL IV: CPT | Mod: PBBFAC,,, | Performed by: UROLOGY

## 2022-08-01 PROCEDURE — 99214 PR OFFICE/OUTPT VISIT, EST, LEVL IV, 30-39 MIN: ICD-10-PCS | Mod: S$GLB,,, | Performed by: UROLOGY

## 2022-08-01 PROCEDURE — 99214 OFFICE O/P EST MOD 30 MIN: CPT | Mod: S$GLB,,, | Performed by: UROLOGY

## 2022-08-01 PROCEDURE — 1159F PR MEDICATION LIST DOCUMENTED IN MEDICAL RECORD: ICD-10-PCS | Mod: CPTII,S$GLB,, | Performed by: UROLOGY

## 2022-08-01 PROCEDURE — 1160F RVW MEDS BY RX/DR IN RCRD: CPT | Mod: CPTII,S$GLB,, | Performed by: UROLOGY

## 2022-08-01 PROCEDURE — 3008F PR BODY MASS INDEX (BMI) DOCUMENTED: ICD-10-PCS | Mod: CPTII,S$GLB,, | Performed by: UROLOGY

## 2022-08-01 PROCEDURE — 3078F PR MOST RECENT DIASTOLIC BLOOD PRESSURE < 80 MM HG: ICD-10-PCS | Mod: CPTII,S$GLB,, | Performed by: UROLOGY

## 2022-08-01 PROCEDURE — 3074F PR MOST RECENT SYSTOLIC BLOOD PRESSURE < 130 MM HG: ICD-10-PCS | Mod: CPTII,S$GLB,, | Performed by: UROLOGY

## 2022-08-01 RX ORDER — TAMSULOSIN HYDROCHLORIDE 0.4 MG/1
0.4 CAPSULE ORAL DAILY
Qty: 90 CAPSULE | Refills: 3 | Status: SHIPPED | OUTPATIENT
Start: 2022-08-01 | End: 2023-10-23

## 2022-08-01 RX ORDER — TADALAFIL 20 MG/1
20 TABLET ORAL
Qty: 20 TABLET | Refills: 11 | Status: SHIPPED | OUTPATIENT
Start: 2022-08-01 | End: 2022-08-24

## 2022-08-01 NOTE — PROGRESS NOTES
Chief Complaint:   Encounter Diagnoses   Name Primary?    Benign prostatic hyperplasia with urinary obstruction Yes    Urgency of urination     Erectile dysfunction, unspecified erectile dysfunction type        HPI:    8/1/22- patient states his voiding status is well, Viagra 100 mg is no longer working.  61-year-old gentleman who comes in with multiple urological complaints.  He states that he is getting up to void about every hour for the last couple months.  Feels this all stems back to hernia repair 6 months ago, although he was voiding okay right after this.  He has had no dysuria, no gross hematuria, he has never been a smoker.  During the daytime he is going about every 2-3 hours.  Has definite increased frequency with increased urgency, no incontinence.  States that lately he will have intermittent split stream, otherwise his stream is usually week.  He does not remember having a Meade catheter during the surgery of note.  He has had no issues prior to this time.  In addition is also complaining of erectile dysfunction, can still get some morning erections but falls quickly.  He had a father and grandfather both with prostate cancer, no other family history of urological cancers or stones.  Patient states that he has good libido, no coronary artery disease.  He has tried no medications for his BPH or his erectile dysfunction.    Allergies:  Patient has no known allergies.    Medications:  has a current medication list which includes the following prescription(s): aspirin, atorvastatin, buspirone, diazepam, hydrocodone-acetaminophen, olanzapine, omeprazole, pyridoxine (vitamin b6), sildenafil, tamsulosin, tizanidine, and escitalopram oxalate, and the following Facility-Administered Medications: sodium chloride 0.9% and ondansetron.    Review of Systems:  General: No fever, chills, fatigability, or weight loss.  Skin: No rashes, itching, or changes in color or texture of skin.  Chest: Denies EVANS, cyanosis,  wheezing, cough, and sputum production.  Abdomen: Appetite fine. No weight loss. Denies diarrhea, abdominal pain, hematemesis, or blood in stool.  Musculoskeletal: No joint stiffness or swelling. Denies back pain.  : As above.  All other review of systems negative.    PMH:   has a past medical history of Achalasia of esophagus (11/28/2016), Anxiety state, unspecified, Coronary artery disease, Esophageal reflux, Esophageal stricture, Hypertrophy of prostate without urinary obstruction and other lower urinary tract symptoms (LUTS), Screening PSA (prostate specific antigen) (12/7/12), and Unspecified essential hypertension.    PSH:   has a past surgical history that includes Back surgery; Esophagogastroduodenoscopy; Colonoscopy; Hernia repair; Upper gastrointestinal endoscopy (04/27/2016); Inguinal hernia repair (Right); CERVICAL EPIDURAL STEROID INJECTION; LUMBAR EPIDURAL STEROID INJECTION; Esophagogastroduodenoscopy (N/A, 8/16/2019); Esophagogastroduodenoscopy (N/A, 5/23/2022); and Esophageal manometry with measurement of impedance (N/A, 5/23/2022).    FamHx: family history includes Hypertension in his father; Lung cancer in his mother; Prostate cancer in his father and maternal grandfather; Ulcers in his brother, sister, and sister.    SocHx:  reports that he has never smoked. He has never used smokeless tobacco. He reports previous alcohol use. He reports previous drug use. Drug: Marijuana.      Physical Exam:  Vitals:    08/01/22 1440   BP: 104/71   Pulse: 72   Temp: 98.7 °F (37.1 °C)     General: A&Ox3, no apparent distress, no deformities  Neck: No masses, normal ROM  Lungs: normal inspiration, no use of accessory muscles  Heart: normal pulse, no arrhythmias  Abdomen: Soft, NT, ND, no masses, no hernias, no hepatosplenomegaly  Skin: The skin is warm and dry. No jaundice.  Ext: No c/c/e.  :  10/21- Test desc shahla, no abnormalities of epididymus. Normal penile and scrotal skin. Meatus normal. Normal rectal  tone. Prost 30 gm no nodules or masses appreciated. SV not palpable. Perineum and anus normal.    Labs/Studies:   PVR 12 mL 10/21  PSA 0.37 3/21    Impression/Plan:      1. BPH- tamsulosin is controlling his flow and will continue.  Call when he needs a refill.  PSA today and proceed as appropriate.    2. Urgency- doing well and no need for medical management.    3. Erectile dysfunction- sildenafil 100 mg is no longer assisting, he does not want to pursue TriMix as of yet.  He would like to do another medical trial, therefore will attempt Cialis 20 mg.  He will contact me with any complaints, otherwise see me in 6 months and if stable yearly from there.

## 2022-08-05 ENCOUNTER — TELEPHONE (OUTPATIENT)
Dept: GASTROENTEROLOGY | Facility: CLINIC | Age: 62
End: 2022-08-05
Payer: MEDICAID

## 2022-08-05 NOTE — TELEPHONE ENCOUNTER
Called pt, no ans  [facility] re: confirming virtual apt for 8/8/22 @ 8 am.  Asked pt to call the clinic, ph no provided

## 2022-08-08 ENCOUNTER — PATIENT MESSAGE (OUTPATIENT)
Dept: GASTROENTEROLOGY | Facility: CLINIC | Age: 62
End: 2022-08-08

## 2022-08-08 ENCOUNTER — TELEPHONE (OUTPATIENT)
Dept: GASTROENTEROLOGY | Facility: CLINIC | Age: 62
End: 2022-08-08

## 2022-08-08 ENCOUNTER — OFFICE VISIT (OUTPATIENT)
Dept: GASTROENTEROLOGY | Facility: CLINIC | Age: 62
End: 2022-08-08
Payer: MEDICARE

## 2022-08-08 DIAGNOSIS — R13.10 DYSPHAGIA, UNSPECIFIED TYPE: Primary | ICD-10-CM

## 2022-08-08 DIAGNOSIS — K22.0 ACHALASIA: ICD-10-CM

## 2022-08-08 DIAGNOSIS — R68.81 EARLY SATIETY: ICD-10-CM

## 2022-08-08 DIAGNOSIS — K31.84 GASTROPARESIS: ICD-10-CM

## 2022-08-08 DIAGNOSIS — K21.9 GASTROESOPHAGEAL REFLUX DISEASE WITHOUT ESOPHAGITIS: ICD-10-CM

## 2022-08-08 PROCEDURE — 99214 OFFICE O/P EST MOD 30 MIN: CPT | Mod: 95,,, | Performed by: INTERNAL MEDICINE

## 2022-08-08 PROCEDURE — 1159F MED LIST DOCD IN RCRD: CPT | Mod: CPTII,95,, | Performed by: INTERNAL MEDICINE

## 2022-08-08 PROCEDURE — 1160F PR REVIEW ALL MEDS BY PRESCRIBER/CLIN PHARMACIST DOCUMENTED: ICD-10-PCS | Mod: CPTII,95,, | Performed by: INTERNAL MEDICINE

## 2022-08-08 PROCEDURE — 1159F PR MEDICATION LIST DOCUMENTED IN MEDICAL RECORD: ICD-10-PCS | Mod: CPTII,95,, | Performed by: INTERNAL MEDICINE

## 2022-08-08 PROCEDURE — 99214 PR OFFICE/OUTPT VISIT, EST, LEVL IV, 30-39 MIN: ICD-10-PCS | Mod: 95,,, | Performed by: INTERNAL MEDICINE

## 2022-08-08 PROCEDURE — 1160F RVW MEDS BY RX/DR IN RCRD: CPT | Mod: CPTII,95,, | Performed by: INTERNAL MEDICINE

## 2022-08-08 NOTE — PATIENT INSTRUCTIONS
"Your gastric emptying scan shows significant delay of emptying of food from the stomach consistent with gastroparesis. Here is some information about gastroparesis.   I would like you to start by changing your diet.  Please follow up with GI dietitian to work on gastroparesis diet     What is gastroparesis?   Gastroparesis is a condition that causes nausea and vomiting. It can also make you feel full too soon after you start eating. It happens because the stomach takes too long to empty and does not move food along through your body fast enough. Gastroparesis is also called "delayed gastric emptying." ("Gastric" means "having to do with the stomach.")    Gastroparesis is a common problem among people with diabetes. It also can happen to people who have had food poisoning (gastroenteritis). But it can sometimes happen to people who have not been sick and who do not have diabetes.    When gastroparesis starts after someone has had food poisoning, it often gets better in a few days to weeks. Sometimes it lasts longer or never goes away. In people with diabetes, it usually does not go away, but there are things that can make it better.    What are the symptoms of gastroparesis?   The symptoms can include:  -Nausea with or without vomiting  -Belly pain  -Feeling full too soon after you start eating  -Bloating (feeling as though your stomach is full of air)  -Weight loss    Should I change my diet to feel better?  Yes. Some people feel better if they:  -Eat 4 to 5 small meals during the day instead of 2 or 3 big ones.  -Put food through the  before eating it.  -Cut down on foods that have a lot of fat, such as cheese and fried foods.  -Cut down on foods that have a lot of "insoluble" fiber, such as some fruits, vegetables, and beans.  -Avoid fizzy drinks, like soda, as they can cause more bloating and gas  -Avoid alcohol and smoking    If you have diabetes, it's also very important to keep your blood sugar as close " to normal as possible.    How is gastroparesis treated?  Treatments can include:  -Treatments to help you get the food and fluids you need - This might include taking liquid food supplements or - in rare cases - being fed through a tube.  -Medicines that make the stomach empty faster  -Medicines that help prevent nausea  -Emptying the stomach with a tube - To do this, doctors can put a tube down your throat or directly into your stomach through your skin.  -Electrical stimulation or surgery of the stomach - In very rare cases, doctors use electrical stimulation or surgery to make the stomach empty.    To help with swallowing difficulty  -Make sure to drink lots of water or other fluids w each meal  -Sit up straight while eating    -Complete EGD with botox injection   EGD is an endoscopic procedure that allows your doctor to examine your esophagus, stomach and duodenum (part of your small intestine). EGD is an outpatient procedure, meaning you can go home that same day. It takes approximately 30 to 60 minutes to perform.    Botulinum toxin injection - Botulinum toxin injections temporarily paralyze the nerves that signal the LES to contract, thereby helping to relieve the obstruction. Botulinum toxin injection also is used occasionally as a diagnostic test for people who appear to have achalasia but who have inconclusive test results.   Procedure - The injection procedure is performed during endoscopy, while the patient is sedated. The botulinum toxin is injected through the lining of the esophagus directly into the LES muscle.   Success rate - A single botulinum toxin injection session relieves symptoms in 65 to 90 percent of people in the short term (three months to approximately one year). Additional injections can relieve symptoms in patients whose symptoms return. Botulinum toxin injection is more likely to be effective in people over the age of 50 years and in people who have the vigorous form of achalasia.  "  When compared with balloon dilation, botulinum toxin has a similar effectiveness for relieving symptoms in the first one to two years after the procedure; however, prolonged effectiveness requires multiple botulinum toxin injections because the paralyzing effect of the toxin is temporary. The long-term safety and effectiveness of botulinum toxin injection are unknown.   Complications - About 25 percent of people have chest pain for a few hours after the procedure and about 5 percent develop heartburn. Damage to the esophageal wall and lining are rare. The short-term safety of botulinum toxin injection is greater than the short-term safety of both balloon dilation and surgery; this greater short-term safety may make botulinum toxin injection a better choice for patients with other serious medical conditions (eg, advanced age, severe heart or lung problems) who cannot tolerate a balloon dilation or myotomy.     LONG-TERM RISK OF ESOPHAGEAL CANCER - People with achalasia have an increased risk of developing esophageal cancer. Your doctor might recommend endoscopy for early detection of this cancer. (See "Patient education: Upper endoscopy (Beyond the Basics)".)     ACHALASIA FOLLOW-UP - Since none of the treatments for achalasia cure the underlying disease, regular follow-up is needed. The goal is to recognize and treat recurrent symptoms or complications of treatment (eg, acid reflux) early. Recognizing and treating these problems can help to prevent the development of severe enlargement of the esophagus (elina-esophagus) and cancer, which could require surgical removal of the entire esophagus.        "

## 2022-08-08 NOTE — PROGRESS NOTES
The patient location is: home  The chief complaint leading to consultation is: dysphagia, early satiety,      Visit type: audiovisual    Face to Face time with patient: 32  37 minutes of total time spent on the encounter, which includes face to face time and non-face to face time preparing to see the patient (eg, review of tests), Obtaining and/or reviewing separately obtained history, Documenting clinical information in the electronic or other health record, Independently interpreting results (not separately reported) and communicating results to the patient/family/caregiver, or Care coordination (not separately reported).         Each patient to whom he or she provides medical services by telemedicine is:  (1) informed of the relationship between the physician and patient and the respective role of any other health care provider with respect to management of the patient; and (2) notified that he or she may decline to receive medical services by telemedicine and may withdraw from such care at any time.    Notes:        [facility] Consultation Note    Reason for Consult:    No chief complaint on file.        PCP:   Herminia Longoria       Referring MD:  [place]  [id number] [place] [address]    HPI:  Lennyramone Latham Jr. is a 62 y.o. male referred to motility clinic for second opinion regarding the following problems:    GERD.   Retrosternal pyrosis: occasional   Regurgitation: occasional     MEDs:   Well controlled w omeprazole 40 daily    Dysphagia.   Problems with solids: occasional 2-3 per month   Problems with liquids: rare  Location: mid to lower esophagus    Chest pain/spasm: none   Stopped ASA and sx resolved - instructed to discuss w cardiology/PCP as pt has CAD    Regurgitation: occasional     Eckardt Symptom Score  Dysphagia: 1  Regurgitation: 1  Retrosternal pain: 0  Weight Loss: 0  Total: 2    Heller myotomy with Junior fundoplication  performed for 2017 w significant improvement in symptoms  EGD w Botox injection/dilation 20mm  5/2021 w some improvement.    Early satiety.  Daily   Rare nausea   No vomiting     Weight loss:   Stable at 185b   No shakes    Denies vomiting, abd pain, bloating, diarrhea, constipation, BRBPR, melena, weight loss.       Total visit time was 37  minutes, more than 50% of which was spent in face-to-face counseling with patient regarding symptoms, diagnostic results, prognosis, risks and benefits of treatment options, instructions for management, importance of compliance with chosen treatment options and coping strategies.      Previous Studies:   GES: delayed.  35% retention   Manometry 05/27/2022:  No [place] classification abnormality.  Normal LES pressure with complete relaxation.  Esophageal pressurization.  Incomplete bolus clearance.  Hiatal hernia versus myotomy with diverticulum.  Abnormal multiple rapid swallow test.  No significant difference with provocative maneuvers.  Evidence of residual at the end of 200 cc bolus.  EGD 05/23/2022:  Small amount of fluid in the esophagus.  Dilation in the entire esophagus.  Atrophic mucosa in the esophagus (-).  Z-line 40 cm.  Diverticulum in distal esophagus (35-40 cm) mild esophageal stenosis at 35 cm proximal to diverticulum.  Normal GE junction.  No puckering resistance or pop.  Flip placed with endoscopic guidance.  Diagnostic of normal esophageal distensibility and absent contractility.  Continuous pressure zone proximal to GE junction.  See pictures.  Erythema in the stomach (-).  Normal duodenum.  Manometry catheter placed with endoscopic guidance  Timed barium swallow 05/05/2022:  13 mm tablet passed easily into the stomach.  Patulous esophagus.  Saccular outpouching of the distal esophagus just proximal to GE junction.  Incomplete relaxation of the lower esophageal sphincter.  Esophageal mucosa was with within normal limits.  Esophageal dysmotility characterized  by proximal escape in tertiary contractions.) Position there was significant stasis of contrast throughout the esophagus 1 cm at 0 minutes.  Trace residual at 1 minute.    CT abdomen 08/18/2021:  Trace right-sided pleural effusion.  Small hiatal hernia with GERD.  Tiny 6 mm hypodensity in the right hepatic lobe likely small cysts.  Diverticulosis.  Constipation.  EGD 05/05/2021:  Sigmoid esophagus with retained food consistent with diagnosis of achalasia.  Resent tonically close GE junction with significant dilation around the area consistent with achalasia.  Currently otherwise normal stomach and duodenum the some fluid retention.  Gastric emptying delay is seen in the proximally to slightly less than 20% of patients with achalasia.  Successful injection of 100 units botulin toxin in 4 different aliquots at SCJ.  Successful dilation of GE junction to 20 mm.  Referral to Dr. Ariel Mcclendon to consider poem.    CT of abdomen 05/04/2021:  Normal CT of abdomen spleen is quite small.  Prostate enlarged.  Post distention of distal esophagus containing food and debris.  Consider achalasia versus esophageal paraesophageal diverticulum.  Esophagram 10/13/2020:  Moderate size hiatal hernia.  Significant area of persistent narrowing of proximal stomach just distal to the hiatal hernia.  Contrast material this passed through this narrowing slowly.  The esophagus is abnormally dilated with significant associated esophageal dysmotility.  Limited exam secondary to patient's inability to tolerate further imaging.  CT CT chest 10/13/2020:  Radha fluid distention of esophagus with evidence of small hiatal hernia otherwise no significant abnormality.  Esophagram 04/22/2016:  Narrowing of posterior mid cervical esophagus, significant dysmotility of the esophagus with continuing narrowing of the esophagus at junction and mid and proximal 3rd of the thoracic esophagus, multiple tertiary contractions and persistent narrowing in the  distal esophagus at GE junction with contrast retention.  Incomplete filled stomach with irregularity and prominent folds suggestive of region of body and proximal stomach being of uncertain significance in inflammatory or infectious disease or even neoplastic involvement cannot be ruled out.    Manometry 05/09/2016:  Achalasia type 2.  IRP 30.9.  Pan esophageal pressurization 100%.    Relevant Surgical History:    Heller myotomy with Junior fundoplication performed for 2017      ROS:  ROS   Complete ROS negative except as above    Medical History:   Past Medical History:   Diagnosis Date    Achalasia of esophagus 11/28/2016    Anxiety state, unspecified     Coronary artery disease     Esophageal reflux     Esophageal stricture     Hypertrophy of prostate without urinary obstruction and other lower urinary tract symptoms (LUTS)     Screening PSA (prostate specific antigen) 12/7/12    0.4    Unspecified essential hypertension         Surgical History:   Past Surgical History:   Procedure Laterality Date    BACK SURGERY      CERVICAL EPIDURAL STEROID INJECTION      COLONOSCOPY      ESOPHAGEAL MANOMETRY WITH MEASUREMENT OF IMPEDANCE N/A 5/23/2022    Procedure: MANOMETRY-ESOPHAGEAL-WITH IMPEDANCE;  Surgeon: Madina Hoover MD;  Location: [facility] ([address] [place] [address]);  Service: Endoscopy;  Laterality: N/A;  hold pain medication 24 hours prior to procedure    ESOPHAGOGASTRODUODENOSCOPY      ESOPHAGOGASTRODUODENOSCOPY N/A 8/16/2019    Procedure: EGD (ESOPHAGOGASTRODUODENOSCOPY);  Surgeon: Pawan Schwab MD;  Location: [facility];  Service: Endoscopy;  Laterality: N/A;    ESOPHAGOGASTRODUODENOSCOPY N/A 5/23/2022    Procedure: ESOPHAGOGASTRODUODENOSCOPY (EGD);  Surgeon: Madina Hoover MD;  Location: [address]);  Service: Endoscopy;  Laterality: N/A;  Endoflip/Endoscopically placed manometry probe  2nd floor-End stage achalasia  propofol only  full iquid diet x3 days, clear liquid diet x1 day prior to procedure  fully  vaccinated, instructions [facility] and mailed-[facility]    HERNIA REPAIR      INGUINAL HERNIA REPAIR Right     LUMBAR EPIDURAL STEROID INJECTION      UPPER GASTROINTESTINAL ENDOSCOPY  04/27/2016        Family History:   Family History   Problem Relation Age of Onset    Lung cancer Mother     Hypertension Father     Prostate cancer Father     Ulcers Sister     Ulcers Brother     Prostate cancer Maternal Grandfather     Ulcers Sister     Celiac disease Neg Hx     Colon cancer Neg Hx     Colon polyps Neg Hx     Crohn's disease Neg Hx     Esophageal cancer Neg Hx     Inflammatory bowel disease Neg Hx     Irritable bowel syndrome Neg Hx     Liver cancer Neg Hx     Liver disease Neg Hx     Rectal cancer Neg Hx     Stomach cancer Neg Hx         Social History:   Social History     Socioeconomic History    Marital status: Single    Years of education: 12th   Occupational History    Occupation: Disabled   Tobacco Use    Smoking status: Never    Smokeless tobacco: Never   Substance and Sexual Activity    Alcohol use: Not Currently     Comment: Social  none since incarcerated    Drug use: Not Currently     Types: Marijuana        Review of patient's allergies indicates:  No Known Allergies    Current Outpatient Medications   Medication Sig Dispense Refill    atorvastatin (LIPITOR) 40 MG tablet Take 1 tablet (40 mg total) by mouth every evening. 90 tablet 1    busPIRone (BUSPAR) 15 MG tablet Take 1 tablet (15 mg total) by mouth 2 (two) times daily. 60 tablet 2    HYDROcodone-acetaminophen (NORCO) 7.5-325 mg per tablet Take 1 tablet by mouth every 6 (six) hours as needed for Pain. 12 tablet 0    omeprazole (PRILOSEC) 40 MG capsule TAKE 1 CAPSULE(40 MG) BY MOUTH EVERY DAY 30 capsule 5    tamsulosin (FLOMAX) 0.4 mg Cap Take 1 capsule (0.4 mg total) by mouth once daily. 90 capsule 3    tiZANidine (ZANAFLEX) 4 MG tablet Take 4 mg by mouth 3 (three) times daily.      aspirin (ECOTRIN) 81 MG EC tablet Take 81 mg by mouth once  daily.      diazePAM (VALIUM) 5 MG tablet Take 1 tablet (5 mg total) by mouth daily as needed for Anxiety. 30 tablet 1    pyridoxine, vitamin B6, (B-6) 100 MG Tab Take 1 tablet (100 mg total) by mouth once daily. 30 tablet 2     No current facility-administered medications for this visit.     Facility-Administered Medications Ordered in Other Visits   Medication Dose Route Frequency Provider Last Rate Last Admin    0.9%  NaCl infusion   Intravenous Continuous Brenden Pizarro MD   New Bag at 05/23/22 1238    ondansetron injection 4 mg  4 mg Intravenous Q12H PRN Brenden Pizarro MD            Objective Findings:  Vital Signs:  There were no vitals taken for this visit.  There is no height or weight on file to calculate BMI.    Physical Exam:  Physical Exam:limited due to video visit  General appearance: alert, cooperative, no distress  HENT: Normocephalic, atraumatic, neck symmetrical, no nasal discharge  Eyes: conjunctivae/corneas clear,  EOM's intact  Extremities: visible extremities symmetric; no clubbing, cyanosis, or edema  Integument: visible Skin color, texture, turgor normal; no rashes; hair distrubution normal  Neurologic: Alert and oriented X 3,  Psychiatric: no pressured speech; normal affect; no evidence of impaired cognition      Labs:   Reviewed in Epic/record      Assessment and Plan:  Lenny Latham . is a 62 y.o. male with referred to [facility] for 2nd opinion regarding following problems:    GERD.   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  On norco BID   On zanaflex   On sidenafil for ED  TBS w patulous  esophagus, epiphrenic diverticulum, trace at 1min, cleared by 2 min, 13 mm tablet passed easily into the stomach.   EGD w fluid filled, dilated, atrophic esophagus, stenosis at 35cm, TIC 35-40cm, patent GEJ (no puckering, resistance, pop)  Flip nl distensibility and and absent contractility, double pressure zone    Manometry with nl LES w complete relaxation, pressurization, incomplete clearance and residual w 200cc bolus.  HH vs myotomy w diverticulum   Seen by Dr. East. Case discuss in Swallowing Conference.  Imaging concerning for incomplete myotomy or scar related narrowing proximal to diverticulum. Surgical intervention carries high risk because narrowing is proximal.  Thoracic approach may be necessary. Recommended EGD w Botox or PD  -Discussed results and treatment options and risks.  Pt does not want to consider PD due to risk of perforation    -EGD w Botox     Early satiety. Rare nausea   GES w delay  -GP diet      Weight loss.  Stable     Arthritis   On norco BID   On Zanaflex     Anxiety   -On lexapro   -On Zyprexa     Follow up in about 4 months (around 12/8/2022).    1. Dysphagia, unspecified type    2. Gastroesophageal reflux disease without esophagitis    3. Early satiety    4. Achalasia    5. Gastroparesis          Order summary:  Orders Placed This Encounter    Case Request Endoscopy: ESOPHAGOGASTRODUODENOSCOPY (EGD)       Discussed with PT that I act as a consult service and do not accept patients to be their primary GI provider. Discussed that the goal of our visits is to address relevant motility problems while deferring other GI problems as well as screening and surveillance to his/her primary GI provider.   Discussed that he/she needs to continue to follow with his local primary GI provider.  Discussed that we will complete his/her workup, clarify diagnosis and attempt to optimize his/her symptoms with intention of him/her returning to referring GI provider for long term GI care.   Pt  verbalized understanding.        Thank you so much for allowing me to participate in the care of Lenny Latham Jr.      Madina Hoover MD      This note was created using voice recognition software, and may contain some unrecognized transcriptional errors.

## 2022-08-09 ENCOUNTER — CLINICAL SUPPORT (OUTPATIENT)
Dept: GASTROENTEROLOGY | Facility: CLINIC | Age: 62
End: 2022-08-09
Payer: MEDICAID

## 2022-08-09 DIAGNOSIS — K21.9 GASTROESOPHAGEAL REFLUX DISEASE WITHOUT ESOPHAGITIS: ICD-10-CM

## 2022-08-09 DIAGNOSIS — K22.0 ACHALASIA OF ESOPHAGUS: Primary | ICD-10-CM

## 2022-08-12 ENCOUNTER — PES CALL (OUTPATIENT)
Dept: ADMINISTRATIVE | Facility: CLINIC | Age: 62
End: 2022-08-12
Payer: MEDICAID

## 2022-08-13 NOTE — PROGRESS NOTES
The patient location is: Home , wife is present   The chief complaint leading to consultation is: difficulty swallowing   Visit type: Virtual visit with synchronous audio and video  Total time spent with patient: 60 minutes   Each patient to whom he or she provides medical services by telemedicine is:  (1) informed of the relationship between the physician and patient and the respective role of any other health care provider with respect to management of the patient; and (2) notified that he or she may decline to receive medical services by telemedicine and may withdraw from such care at any time.  GI NUTRITION REFERRAL     Referring professional: Dr. Betzy Hoover  Reason for visit Esophageal Dysphagia  Age: 62 y.o.  Sex: male  Past Medical History:   Diagnosis Date    Achalasia of esophagus 11/28/2016    Anxiety state, unspecified     Coronary artery disease     Esophageal reflux     Esophageal stricture     Hypertrophy of prostate without urinary obstruction and other lower urinary tract symptoms (LUTS)     Screening PSA (prostate specific antigen) 12/7/12    0.4    Unspecified essential hypertension      Past Surgical History:   Procedure Laterality Date    BACK SURGERY      CERVICAL EPIDURAL STEROID INJECTION      COLONOSCOPY      ESOPHAGEAL MANOMETRY WITH MEASUREMENT OF IMPEDANCE N/A 5/23/2022    Procedure: MANOMETRY-ESOPHAGEAL-WITH IMPEDANCE;  Surgeon: Madina Hoover MD;  Location: [address]);  Service: Endoscopy;  Laterality: N/A;  hold pain medication 24 hours prior to procedure    ESOPHAGOGASTRODUODENOSCOPY      ESOPHAGOGASTRODUODENOSCOPY N/A 8/16/2019    Procedure: EGD (ESOPHAGOGASTRODUODENOSCOPY);  Surgeon: Pawan Schwab MD;  Location[facility];  Service: Endoscopy;  Laterality: N/A;    ESOPHAGOGASTRODUODENOSCOPY N/A 5/23/2022    Procedure: ESOPHAGOGASTRODUODENOSCOPY (EGD);  Surgeon: Madina Hoover MD;  Location: [address]);  Service: Endoscopy;  Laterality: N/A;   Endoflip/Endoscopically placed manometry probe  2nd floor-End stage achalasia  propofol only  full iquid diet x3 days, clear liquid diet x1 day prior to procedure  fully vaccinated, instructions sent to [facility] and mailed-\[facility] [place] [facility]\""    HERNIA REPAIR      INGUINAL HERNIA REPAIR Right     LUMBAR EPIDURAL STEROID INJECTION      UPPER GASTROINTESTINAL ENDOSCOPY  04/27/2016     Pertinent Medications:   Current Outpatient Medications on File Prior to Visit   Medication Sig Dispense Refill    aspirin (ECOTRIN) 81 MG EC tablet Take 81 mg by mouth once daily.      atorvastatin (LIPITOR) 40 MG tablet Take 1 tablet (40 mg total) by mouth every evening. 90 tablet 1    busPIRone (BUSPAR) 15 MG tablet Take 1 tablet (15 mg total) by mouth 2 (two) times daily. 60 tablet 2    diazePAM (VALIUM) 5 MG tablet Take 1 tablet (5 mg total) by mouth daily as needed for Anxiety. 30 tablet 2    HYDROcodone-acetaminophen (NORCO) 7.5-325 mg per tablet Take 1 tablet by mouth every 6 (six) hours as needed for Pain. 12 tablet 0    omeprazole (PRILOSEC) 40 MG capsule TAKE 1 CAPSULE(40 MG) BY MOUTH EVERY DAY 30 capsule 5    pyridoxine, vitamin B6, (B-6) 100 MG Tab Take 1 tablet (100 mg total) by mouth once daily. 30 tablet 2    tadalafiL (CIALIS) 20 MG Tab Take 1 tablet (20 mg total) by mouth every 24 hours as needed (erectile dysfunction). 20 tablet 11    tamsulosin (FLOMAX) 0.4 mg Cap Take 1 capsule (0.4 mg total) by mouth once daily. 90 capsule 3    tiZANidine (ZANAFLEX) 4 MG tablet Take 4 mg by mouth 3 (three) times daily.       Current Facility-Administered Medications on File Prior to Visit   Medication Dose Route Frequency Provider Last Rate Last Admin    0.9%  NaCl infusion   Intravenous Continuous Brenden Pizarro MD   New Bag at 05/23/22 1238    ondansetron injection 4 mg  4 mg Intravenous Q12H PRN Brenden Pizarro MD           Vitamins/Supplements/Herbs: refer to medications   Labs  Lab Results   Component Value Date     GLU 86 05/16/2022    K 4.6 05/16/2022    PHOS 3.0 04/28/2017    MG 2.1 03/11/2022    CHOL 148 01/24/2022    HDL 63 01/24/2022    TRIG 99 01/24/2022    ALBUMIN 4.0 03/11/2022    PREALBUMIN 25 10/13/2020    HGBA1C 5.6 11/28/2016    CALCIUM 9.4 05/16/2022     Current Outpatient Medications   Medication Sig    aspirin (ECOTRIN) 81 MG EC tablet Take 81 mg by mouth once daily.    atorvastatin (LIPITOR) 40 MG tablet Take 1 tablet (40 mg total) by mouth every evening.    busPIRone (BUSPAR) 15 MG tablet Take 1 tablet (15 mg total) by mouth 2 (two) times daily.    diazePAM (VALIUM) 5 MG tablet Take 1 tablet (5 mg total) by mouth daily as needed for Anxiety.    HYDROcodone-acetaminophen (NORCO) 7.5-325 mg per tablet Take 1 tablet by mouth every 6 (six) hours as needed for Pain.    omeprazole (PRILOSEC) 40 MG capsule TAKE 1 CAPSULE(40 MG) BY MOUTH EVERY DAY    pyridoxine, vitamin B6, (B-6) 100 MG Tab Take 1 tablet (100 mg total) by mouth once daily.    tadalafiL (CIALIS) 20 MG Tab Take 1 tablet (20 mg total) by mouth every 24 hours as needed (erectile dysfunction).    tamsulosin (FLOMAX) 0.4 mg Cap Take 1 capsule (0.4 mg total) by mouth once daily.    tiZANidine (ZANAFLEX) 4 MG tablet Take 4 mg by mouth 3 (three) times daily.     No current facility-administered medications for this visit.     Facility-Administered Medications Ordered in Other Visits   Medication    0.9%  NaCl infusion    ondansetron injection 4 mg     No components found for: VITD  Glucose   Date Value Ref Range Status   05/16/2022 86 70 - 110 mg/dL Final   03/11/2022 119 (H) 70 - 110 mg/dL Final     BUN   Date Value Ref Range Status   05/16/2022 10 8 - 23 mg/dL Final   03/11/2022 19 8 - 23 mg/dL Final     Creatinine   Date Value Ref Range Status   05/16/2022 1.0 0.5 - 1.4 mg/dL Final   03/11/2022 0.8 0.5 - 1.4 mg/dL Final     Sodium   Date Value Ref Range Status   05/16/2022 136 136 - 145 mmol/L Final   03/11/2022 137 136 - 145 mmol/L Final      Potassium   Date Value Ref Range Status   05/16/2022 4.6 3.5 - 5.1 mmol/L Final   03/11/2022 3.7 3.5 - 5.1 mmol/L Final     Phosphorus   Date Value Ref Range Status   04/28/2017 3.0 2.7 - 4.5 mg/dL Final   04/27/2017 3.6 2.7 - 4.5 mg/dL Final     Calcium   Date Value Ref Range Status   05/16/2022 9.4 8.7 - 10.5 mg/dL Final   03/11/2022 9.7 8.7 - 10.5 mg/dL Final     Prealbumin   Date Value Ref Range Status   10/13/2020 25 20 - 43 mg/dL Final     Total Protein   Date Value Ref Range Status   03/11/2022 7.9 6.0 - 8.4 g/dL Final   01/24/2022 7.5 6.0 - 8.4 g/dL Final     Cholesterol   Date Value Ref Range Status   01/24/2022 148 120 - 199 mg/dL Final     Comment:     The National Cholesterol Education Program (NCEP) has set the  following guidelines (reference ranges) for Cholesterol:  Optimal.....................<200 mg/dL  Borderline High.............200-239 mg/dL  High........................> or = 240 mg/dL     03/11/2021 146 120 - 199 mg/dL Final     Comment:     The National Cholesterol Education Program (NCEP) has set the  following guidelines (reference ranges) for Cholesterol:  Optimal.....................<200 mg/dL  Borderline High.............200-239 mg/dL  High........................> or = 240 mg/dL       Hemoglobin A1C   Date Value Ref Range Status   11/28/2016 5.6 4.5 - 6.2 % Final     Comment:     According to ADA guidelines, hemoglobin A1C <7.0% represents  optimal control in non-pregnant diabetic patients.  Different  metrics may apply to specific populations.   Standards of Medical Care in Diabetes - 2016.  For the purpose of screening for the presence of diabetes:  <5.7%     Consistent with the absence of diabetes  5.7-6.4%  Consistent with increasing risk for diabetes   (prediabetes)  >or=6.5%  Consistent with diabetes  Currently no consensus exists for use of hemoglobin A1C  for diagnosis of diabetes for children.       Hemoglobin   Date Value Ref Range Status   05/16/2022 14.7 14.0 - 18.0 g/dL  Final   03/11/2022 15.7 14.0 - 18.0 g/dL Final     Hematocrit   Date Value Ref Range Status   05/16/2022 46.7 40.0 - 54.0 % Final   03/11/2022 47.1 40.0 - 54.0 % Final     Iron   Date Value Ref Range Status   05/17/2021 80 45 - 160 ug/dL Final     No components found for: FROLATE  No results found for: CGFYDCGT21FG  WBC   Date Value Ref Range Status   05/16/2022 6.33 3.90 - 12.70 K/uL Final   03/11/2022 9.21 3.90 - 12.70 K/uL Final     WBC   Date Value Ref Range Status   05/16/2022 6.33 3.90 - 12.70 K/uL Final   03/11/2022 9.21 3.90 - 12.70 K/uL Final     Current Outpatient Medications   Medication Sig    aspirin (ECOTRIN) 81 MG EC tablet Take 81 mg by mouth once daily.    atorvastatin (LIPITOR) 40 MG tablet Take 1 tablet (40 mg total) by mouth every evening.    busPIRone (BUSPAR) 15 MG tablet Take 1 tablet (15 mg total) by mouth 2 (two) times daily.    diazePAM (VALIUM) 5 MG tablet Take 1 tablet (5 mg total) by mouth daily as needed for Anxiety.    HYDROcodone-acetaminophen (NORCO) 7.5-325 mg per tablet Take 1 tablet by mouth every 6 (six) hours as needed for Pain.    omeprazole (PRILOSEC) 40 MG capsule TAKE 1 CAPSULE(40 MG) BY MOUTH EVERY DAY    pyridoxine, vitamin B6, (B-6) 100 MG Tab Take 1 tablet (100 mg total) by mouth once daily.    tadalafiL (CIALIS) 20 MG Tab Take 1 tablet (20 mg total) by mouth every 24 hours as needed (erectile dysfunction).    tamsulosin (FLOMAX) 0.4 mg Cap Take 1 capsule (0.4 mg total) by mouth once daily.    tiZANidine (ZANAFLEX) 4 MG tablet Take 4 mg by mouth 3 (three) times daily.     No current facility-administered medications for this visit.     Facility-Administered Medications Ordered in Other Visits   Medication    0.9%  NaCl infusion    ondansetron injection 4 mg     Lab Results   Component Value Date    TSH 1.228 06/01/2022     Lab Results   Component Value Date    FERRITIN 319 (H) 05/17/2021     @RESUFAST(NA,K,CL,CO2,GLU,BUN,CREATININE,  No results found for:  "CRP  Lab Results   Component Value Date    GLU 86 05/16/2022    K 4.6 05/16/2022    PHOS 3.0 04/28/2017    MG 2.1 03/11/2022    CHOL 148 01/24/2022    HDL 63 01/24/2022    TRIG 99 01/24/2022    ALBUMIN 4.0 03/11/2022    PREALBUMIN 25 10/13/2020    HGBA1C 5.6 11/28/2016    CALCIUM 9.4 05/16/2022     Food intolerances or Allergies   Review of patient's allergies indicates:  No Known Allergies    Ht Readings from Last 3 Encounters:   07/11/22 5' 3" (1.6 m)   06/27/22 5' 3" (1.6 m)   06/07/22 5' 3" (1.6 m)     Wt Readings from Last 12 Encounters:   08/01/22 84.4 kg (186 lb 1.1 oz)   07/11/22 85.4 kg (188 lb 5.8 oz)   06/27/22 86.2 kg (190 lb)   06/07/22 83.6 kg (184 lb 4.9 oz)   06/01/22 85.2 kg (187 lb 13.3 oz)   05/23/22 81.6 kg (180 lb)   05/16/22 85.2 kg (187 lb 13.3 oz)   04/26/22 86.4 kg (190 lb 6.4 oz)   03/11/22 75.5 kg (166 lb 8.9 oz)   02/14/22 80.1 kg (176 lb 9.4 oz)   01/31/22 79.5 kg (175 lb 4.3 oz)   01/24/22 78.1 kg (172 lb 2.9 oz)      Weight status: consistently increasing  14# increase     Eating Behaviors: Inadequate moisture in foods ( dry bread, meat, rice) , Consuming  hand held foods ( sandwiches, bars, trail mix ) while engaged in other activities leading to fast pace of eating and inadequate mastication , Consuming foods with nuts, seeds, skins contributing to bloating , Anxiety around eating / difficulty regulating anxiety leading to rumination or vomiting  and Consuming largest meal later in the day contributing to Reflux and or delayed gastric emptying     Consumes hard candy during the day and chocolate to satisfy cravings for sweets  Nutrition History     Meal patterns: 3 meals daily and 3 or more snacks daily  Breakfast: cooked cereal  and Eggs  Lunch: sandwich from home   Dinner: broiled or baked meats   Dining Out : Fried foods , Poor boys   Meal preparation/shopping: spouse    Nutrition beverages: drinks juices, drinks caffeine   Smoking/alcohol:  Social History     Tobacco Use    " Smoking status: Never Smoker    Smokeless tobacco: Never Used   Substance Use Topics    Alcohol use: Not Currently     Comment: Social  none since incarcerated       Nutrient Requirements :    Calories - 1800 calories   Protein 70 grams protein  Fluid : 1800-2000cc     Nutrition Diagnosis:   Altered GI function related to Esophageal dysmotility /dysphagia   As evidenced by difficulty swallowing low moisture foods ( bread, rice, meat)    Motivation to change: high    MNT Recommendations/Interventions/Goals:    Avoid hand held foods ( sandwiches, bars, trail mix ) which encourage fast pace of eating and inadequate mastication yes  Consider cooked foods or foods downgraded in texture to minimize required chewing  yes  Adequate fluid intake especially with mouth drying medications yes  Adequate moisture added to foods through use of FF gravy   yes  Addition of liquids to foods in cooking , sips of liquid between bites, crock pot cooking , soups yes  Consider liquid meal replacement  yes  Vitamin/mineral supplementation to offset malabsorption yes    Smoothies made with pureed fruits and vegetables may be better tolerated than whole fruits and vegetables ; avoid raw fruits and vegetables , skins and seeds , Use low fiber grains/ cooked fruits and vegetables without seeds and skins , Use of crock pot/pressure cooker /instapot/immersion  to break down food fibers and add moisture to food enhancing digestibility   yes    Comprehensiongood   Patient demonstrated understanding: stated factors which trigger Reflux : fat( fried foods, gravies ), caffeine, chocolate, alcohol, carbonation, large meals , postural factors such as laying down after meal or eating large meal before reclining to sleep  and stated foods with low moisture which are difficult to swallow without added moisture or lubrication ( gravy, sauces , soaking food ) needed to facilitate swallow     Nutrition follow-up:  written material emailed to patient    Counseling time: 1 Hour

## 2022-08-15 ENCOUNTER — TELEPHONE (OUTPATIENT)
Dept: ADMINISTRATIVE | Facility: CLINIC | Age: 62
End: 2022-08-15
Payer: MEDICAID

## 2022-08-15 NOTE — TELEPHONE ENCOUNTER
Called pt; informed pt I was calling to confirm his virtual EAWV appt; pt stated he was at an appointment and request a call back later this afternoon

## 2022-08-15 NOTE — TELEPHONE ENCOUNTER
Called pt; informed pt I was calling to confirm his virtual EAWV on 8/17/22 at 2:00pm and to see if he needed any help; pt stated he did not need any help and completed his e-pre check already; pt informed to login 15 minutes prior to appt time

## 2022-08-17 ENCOUNTER — TELEPHONE (OUTPATIENT)
Dept: ADMINISTRATIVE | Facility: CLINIC | Age: 62
End: 2022-08-17
Payer: MEDICAID

## 2022-08-17 NOTE — TELEPHONE ENCOUNTER
Called pt; informed pt I was just making a reminder call for his virtual visit today at 2:00pm and to see if he needed any help; pt stated he didn't need any help; pt informed to login 15 minutes prior to appt time

## 2022-08-18 ENCOUNTER — TELEPHONE (OUTPATIENT)
Dept: ADMINISTRATIVE | Facility: CLINIC | Age: 62
End: 2022-08-18
Payer: MEDICAID

## 2022-08-18 NOTE — TELEPHONE ENCOUNTER
Called pt; informed pt I was calling to reschedule pt's missed virtual EAWV appointment from 8/17/22; pt stated he ran into tech issues with the [employer] [facility]bert; pt stated he would like to reschedule for a home visit; appt rescheduled to 10/3/22 per pt request for a home visit with was approved by  Katelyn Henning.

## 2022-08-24 ENCOUNTER — OFFICE VISIT (OUTPATIENT)
Dept: INTERNAL MEDICINE | Facility: CLINIC | Age: 62
End: 2022-08-24
Payer: MEDICARE

## 2022-08-24 VITALS
HEIGHT: 63 IN | HEART RATE: 89 BPM | RESPIRATION RATE: 18 BRPM | SYSTOLIC BLOOD PRESSURE: 108 MMHG | WEIGHT: 183.44 LBS | BODY MASS INDEX: 32.5 KG/M2 | OXYGEN SATURATION: 97 % | DIASTOLIC BLOOD PRESSURE: 76 MMHG

## 2022-08-24 DIAGNOSIS — Z12.5 ENCOUNTER FOR SCREENING FOR MALIGNANT NEOPLASM OF PROSTATE: ICD-10-CM

## 2022-08-24 DIAGNOSIS — N13.8 BENIGN PROSTATIC HYPERPLASIA WITH URINARY OBSTRUCTION: ICD-10-CM

## 2022-08-24 DIAGNOSIS — F41.8 MIXED ANXIETY AND DEPRESSIVE DISORDER: ICD-10-CM

## 2022-08-24 DIAGNOSIS — I25.119 ATHEROSCLEROSIS OF NATIVE CORONARY ARTERY OF NATIVE HEART WITH ANGINA PECTORIS: ICD-10-CM

## 2022-08-24 DIAGNOSIS — E78.2 MIXED HYPERLIPIDEMIA: ICD-10-CM

## 2022-08-24 DIAGNOSIS — Z12.11 COLON CANCER SCREENING: ICD-10-CM

## 2022-08-24 DIAGNOSIS — K21.9 GASTROESOPHAGEAL REFLUX DISEASE WITHOUT ESOPHAGITIS: ICD-10-CM

## 2022-08-24 DIAGNOSIS — G89.4 CHRONIC PAIN SYNDROME: ICD-10-CM

## 2022-08-24 DIAGNOSIS — Z00.00 ROUTINE GENERAL MEDICAL EXAMINATION AT A HEALTH CARE FACILITY: Primary | ICD-10-CM

## 2022-08-24 DIAGNOSIS — E66.9 OBESITY (BMI 30.0-34.9): ICD-10-CM

## 2022-08-24 DIAGNOSIS — N40.1 BENIGN PROSTATIC HYPERPLASIA WITH URINARY OBSTRUCTION: ICD-10-CM

## 2022-08-24 DIAGNOSIS — K22.0 ACHALASIA OF ESOPHAGUS: ICD-10-CM

## 2022-08-24 LAB
ALBUMIN SERPL BCP-MCNC: 3.9 G/DL (ref 3.5–5.2)
ALP SERPL-CCNC: 93 U/L (ref 55–135)
ALT SERPL W/O P-5'-P-CCNC: 16 U/L (ref 10–44)
ANION GAP SERPL CALC-SCNC: 8 MMOL/L (ref 8–16)
AST SERPL-CCNC: 17 U/L (ref 10–40)
BASOPHILS # BLD AUTO: 0.03 K/UL (ref 0–0.2)
BASOPHILS NFR BLD: 0.4 % (ref 0–1.9)
BILIRUB SERPL-MCNC: 0.5 MG/DL (ref 0.1–1)
BILIRUB UR QL STRIP: NEGATIVE
BUN SERPL-MCNC: 7 MG/DL (ref 8–23)
CALCIUM SERPL-MCNC: 9.2 MG/DL (ref 8.7–10.5)
CHLORIDE SERPL-SCNC: 104 MMOL/L (ref 95–110)
CHOLEST SERPL-MCNC: 156 MG/DL (ref 120–199)
CHOLEST/HDLC SERPL: 2.4 {RATIO} (ref 2–5)
CLARITY UR: CLEAR
CO2 SERPL-SCNC: 25 MMOL/L (ref 23–29)
COLOR UR: YELLOW
COMPLEXED PSA SERPL-MCNC: 0.4 NG/ML (ref 0–4)
CREAT SERPL-MCNC: 1 MG/DL (ref 0.5–1.4)
DIFFERENTIAL METHOD: NORMAL
EOSINOPHIL # BLD AUTO: 0.1 K/UL (ref 0–0.5)
EOSINOPHIL NFR BLD: 0.8 % (ref 0–8)
ERYTHROCYTE [DISTWIDTH] IN BLOOD BY AUTOMATED COUNT: 13.6 % (ref 11.5–14.5)
EST. GFR  (NO RACE VARIABLE): >60 ML/MIN/1.73 M^2
GLUCOSE SERPL-MCNC: 85 MG/DL (ref 70–110)
GLUCOSE UR QL STRIP: NEGATIVE
HCT VFR BLD AUTO: 44 % (ref 40–54)
HDLC SERPL-MCNC: 65 MG/DL (ref 40–75)
HDLC SERPL: 41.7 % (ref 20–50)
HGB BLD-MCNC: 14.4 G/DL (ref 14–18)
HGB UR QL STRIP: NEGATIVE
IMM GRANULOCYTES # BLD AUTO: 0.01 K/UL (ref 0–0.04)
IMM GRANULOCYTES NFR BLD AUTO: 0.1 % (ref 0–0.5)
KETONES UR QL STRIP: NEGATIVE
LDLC SERPL CALC-MCNC: 74.6 MG/DL (ref 63–159)
LEUKOCYTE ESTERASE UR QL STRIP: NEGATIVE
LYMPHOCYTES # BLD AUTO: 2.3 K/UL (ref 1–4.8)
LYMPHOCYTES NFR BLD: 30.8 % (ref 18–48)
MCH RBC QN AUTO: 30.7 PG (ref 27–31)
MCHC RBC AUTO-ENTMCNC: 32.7 G/DL (ref 32–36)
MCV RBC AUTO: 94 FL (ref 82–98)
MONOCYTES # BLD AUTO: 0.5 K/UL (ref 0.3–1)
MONOCYTES NFR BLD: 7.2 % (ref 4–15)
NEUTROPHILS # BLD AUTO: 4.6 K/UL (ref 1.8–7.7)
NEUTROPHILS NFR BLD: 60.7 % (ref 38–73)
NITRITE UR QL STRIP: NEGATIVE
NONHDLC SERPL-MCNC: 91 MG/DL
NRBC BLD-RTO: 0 /100 WBC
PH UR STRIP: 7 [PH] (ref 5–8)
PLATELET # BLD AUTO: 228 K/UL (ref 150–450)
PMV BLD AUTO: 9.5 FL (ref 9.2–12.9)
POTASSIUM SERPL-SCNC: 3.6 MMOL/L (ref 3.5–5.1)
PROT SERPL-MCNC: 7.4 G/DL (ref 6–8.4)
PROT UR QL STRIP: NEGATIVE
RBC # BLD AUTO: 4.69 M/UL (ref 4.6–6.2)
SODIUM SERPL-SCNC: 137 MMOL/L (ref 136–145)
SP GR UR STRIP: 1.02 (ref 1–1.03)
TRIGL SERPL-MCNC: 82 MG/DL (ref 30–150)
TSH SERPL DL<=0.005 MIU/L-ACNC: 1.19 UIU/ML (ref 0.4–4)
URN SPEC COLLECT METH UR: NORMAL
WBC # BLD AUTO: 7.53 K/UL (ref 3.9–12.7)

## 2022-08-24 PROCEDURE — 99999 PR PBB SHADOW E&M-EST. PATIENT-LVL IV: CPT | Mod: PBBFAC,,, | Performed by: FAMILY MEDICINE

## 2022-08-24 PROCEDURE — 85025 COMPLETE CBC W/AUTO DIFF WBC: CPT | Performed by: FAMILY MEDICINE

## 2022-08-24 PROCEDURE — 84153 ASSAY OF PSA TOTAL: CPT | Performed by: FAMILY MEDICINE

## 2022-08-24 PROCEDURE — 3008F BODY MASS INDEX DOCD: CPT | Mod: CPTII,S$GLB,, | Performed by: FAMILY MEDICINE

## 2022-08-24 PROCEDURE — 99999 PR PBB SHADOW E&M-EST. PATIENT-LVL IV: ICD-10-PCS | Mod: PBBFAC,,, | Performed by: FAMILY MEDICINE

## 2022-08-24 PROCEDURE — 81003 URINALYSIS AUTO W/O SCOPE: CPT | Performed by: FAMILY MEDICINE

## 2022-08-24 PROCEDURE — 3074F SYST BP LT 130 MM HG: CPT | Mod: CPTII,S$GLB,, | Performed by: FAMILY MEDICINE

## 2022-08-24 PROCEDURE — 3078F DIAST BP <80 MM HG: CPT | Mod: CPTII,S$GLB,, | Performed by: FAMILY MEDICINE

## 2022-08-24 PROCEDURE — 1159F PR MEDICATION LIST DOCUMENTED IN MEDICAL RECORD: ICD-10-PCS | Mod: CPTII,S$GLB,, | Performed by: FAMILY MEDICINE

## 2022-08-24 PROCEDURE — 3074F PR MOST RECENT SYSTOLIC BLOOD PRESSURE < 130 MM HG: ICD-10-PCS | Mod: CPTII,S$GLB,, | Performed by: FAMILY MEDICINE

## 2022-08-24 PROCEDURE — 80053 COMPREHEN METABOLIC PANEL: CPT | Performed by: FAMILY MEDICINE

## 2022-08-24 PROCEDURE — 99396 PR PREVENTIVE VISIT,EST,40-64: ICD-10-PCS | Mod: GZ,S$GLB,, | Performed by: FAMILY MEDICINE

## 2022-08-24 PROCEDURE — 3078F PR MOST RECENT DIASTOLIC BLOOD PRESSURE < 80 MM HG: ICD-10-PCS | Mod: CPTII,S$GLB,, | Performed by: FAMILY MEDICINE

## 2022-08-24 PROCEDURE — 1160F RVW MEDS BY RX/DR IN RCRD: CPT | Mod: CPTII,S$GLB,, | Performed by: FAMILY MEDICINE

## 2022-08-24 PROCEDURE — 84443 ASSAY THYROID STIM HORMONE: CPT | Performed by: FAMILY MEDICINE

## 2022-08-24 PROCEDURE — 99396 PREV VISIT EST AGE 40-64: CPT | Mod: GZ,S$GLB,, | Performed by: FAMILY MEDICINE

## 2022-08-24 PROCEDURE — 1160F PR REVIEW ALL MEDS BY PRESCRIBER/CLIN PHARMACIST DOCUMENTED: ICD-10-PCS | Mod: CPTII,S$GLB,, | Performed by: FAMILY MEDICINE

## 2022-08-24 PROCEDURE — 3008F PR BODY MASS INDEX (BMI) DOCUMENTED: ICD-10-PCS | Mod: CPTII,S$GLB,, | Performed by: FAMILY MEDICINE

## 2022-08-24 PROCEDURE — 80061 LIPID PANEL: CPT | Performed by: FAMILY MEDICINE

## 2022-08-24 PROCEDURE — 1159F MED LIST DOCD IN RCRD: CPT | Mod: CPTII,S$GLB,, | Performed by: FAMILY MEDICINE

## 2022-08-29 ENCOUNTER — PATIENT MESSAGE (OUTPATIENT)
Dept: UROLOGY | Facility: CLINIC | Age: 62
End: 2022-08-29
Payer: MEDICAID

## 2022-09-02 ENCOUNTER — PATIENT MESSAGE (OUTPATIENT)
Dept: PULMONOLOGY | Facility: CLINIC | Age: 62
End: 2022-09-02
Payer: MEDICAID

## 2022-09-02 ENCOUNTER — HOSPITAL ENCOUNTER (OUTPATIENT)
Dept: PREADMISSION TESTING | Facility: HOSPITAL | Age: 62
Discharge: HOME | End: 2022-09-02
Attending: FAMILY MEDICINE
Payer: MEDICARE

## 2022-09-02 DIAGNOSIS — Z12.11 COLON CANCER SCREENING: Primary | ICD-10-CM

## 2022-09-05 RX ORDER — SODIUM, POTASSIUM,MAG SULFATES 17.5-3.13G
1 SOLUTION, RECONSTITUTED, ORAL ORAL DAILY
Qty: 1 KIT | Refills: 0 | Status: SHIPPED | OUTPATIENT
Start: 2022-09-05 | End: 2022-09-07

## 2022-09-06 ENCOUNTER — OFFICE VISIT (OUTPATIENT)
Dept: PULMONOLOGY | Facility: CLINIC | Age: 62
End: 2022-09-06
Payer: MEDICARE

## 2022-09-06 VITALS
RESPIRATION RATE: 17 BRPM | SYSTOLIC BLOOD PRESSURE: 112 MMHG | OXYGEN SATURATION: 97 % | DIASTOLIC BLOOD PRESSURE: 78 MMHG | BODY MASS INDEX: 31.87 KG/M2 | HEIGHT: 63 IN | WEIGHT: 179.88 LBS | HEART RATE: 97 BPM

## 2022-09-06 DIAGNOSIS — R06.81 WITNESSED APNEIC SPELLS: ICD-10-CM

## 2022-09-06 DIAGNOSIS — G47.00 INSOMNIA, UNSPECIFIED TYPE: ICD-10-CM

## 2022-09-06 DIAGNOSIS — R06.83 SNORING: ICD-10-CM

## 2022-09-06 DIAGNOSIS — J43.2 CENTRILOBULAR EMPHYSEMA: ICD-10-CM

## 2022-09-06 DIAGNOSIS — R29.818 SUSPECTED SLEEP APNEA: Primary | ICD-10-CM

## 2022-09-06 DIAGNOSIS — G47.8 NON-RESTORATIVE SLEEP: ICD-10-CM

## 2022-09-06 PROCEDURE — 3078F PR MOST RECENT DIASTOLIC BLOOD PRESSURE < 80 MM HG: ICD-10-PCS | Mod: CPTII,S$GLB,, | Performed by: INTERNAL MEDICINE

## 2022-09-06 PROCEDURE — 3008F BODY MASS INDEX DOCD: CPT | Mod: CPTII,S$GLB,, | Performed by: INTERNAL MEDICINE

## 2022-09-06 PROCEDURE — 99999 PR PBB SHADOW E&M-EST. PATIENT-LVL IV: CPT | Mod: PBBFAC,,, | Performed by: INTERNAL MEDICINE

## 2022-09-06 PROCEDURE — 3074F PR MOST RECENT SYSTOLIC BLOOD PRESSURE < 130 MM HG: ICD-10-PCS | Mod: CPTII,S$GLB,, | Performed by: INTERNAL MEDICINE

## 2022-09-06 PROCEDURE — 99213 PR OFFICE/OUTPT VISIT, EST, LEVL III, 20-29 MIN: ICD-10-PCS | Mod: S$GLB,,, | Performed by: INTERNAL MEDICINE

## 2022-09-06 PROCEDURE — 99213 OFFICE O/P EST LOW 20 MIN: CPT | Mod: S$GLB,,, | Performed by: INTERNAL MEDICINE

## 2022-09-06 PROCEDURE — 3078F DIAST BP <80 MM HG: CPT | Mod: CPTII,S$GLB,, | Performed by: INTERNAL MEDICINE

## 2022-09-06 PROCEDURE — 1159F PR MEDICATION LIST DOCUMENTED IN MEDICAL RECORD: ICD-10-PCS | Mod: CPTII,S$GLB,, | Performed by: INTERNAL MEDICINE

## 2022-09-06 PROCEDURE — 3008F PR BODY MASS INDEX (BMI) DOCUMENTED: ICD-10-PCS | Mod: CPTII,S$GLB,, | Performed by: INTERNAL MEDICINE

## 2022-09-06 PROCEDURE — 99999 PR PBB SHADOW E&M-EST. PATIENT-LVL IV: ICD-10-PCS | Mod: PBBFAC,,, | Performed by: INTERNAL MEDICINE

## 2022-09-06 PROCEDURE — 3074F SYST BP LT 130 MM HG: CPT | Mod: CPTII,S$GLB,, | Performed by: INTERNAL MEDICINE

## 2022-09-06 PROCEDURE — 1159F MED LIST DOCD IN RCRD: CPT | Mod: CPTII,S$GLB,, | Performed by: INTERNAL MEDICINE

## 2022-09-06 NOTE — PROGRESS NOTES
Pulmonary Outpatient Follow Up Visit     Subjective:       Patient ID: Lenny Latham Jr. is a 62 y.o. male.    Chief Complaint: Sleep Apnea      HPI        Patient is a 61 y.o. male  presenting for 3 months follow-up.      Referred initially June 2022 or evaluation of suspected sleep apnea.  Awaiting home sleep study PFT and 6 minute walking test.  Home sleep study scheduled September 2022.    Complains of snoring, witnessed apneic spells, excessive daytime sleepiness and nonrestorative sleep.      Former smoker.  Smokes cigars 1 / week     Used to work off [employer].        Review of Systems   Constitutional:  Positive for fatigue. Negative for fever and chills.   HENT:  Negative for nosebleeds.    Eyes:  Negative for redness.   Respiratory:  Positive for apnea, snoring and somnolence. Negative for choking.    Cardiovascular:  Negative for chest pain.   Genitourinary:  Negative for hematuria.   Endocrine:  Negative for cold intolerance.    Musculoskeletal:  Positive for arthralgias and back pain.   Skin:  Negative for rash.   Gastrointestinal:  Negative for vomiting.   Neurological:  Negative for syncope.   Hematological:  Negative for adenopathy.   Psychiatric/Behavioral:  Positive for sleep disturbance. Negative for confusion. The patient is nervous/anxious.      Outpatient Encounter Medications as of 9/6/2022   Medication Sig Dispense Refill    aspirin (ECOTRIN) 81 MG EC tablet Take 81 mg by mouth once daily.      atorvastatin (LIPITOR) 40 MG tablet Take 1 tablet (40 mg total) by mouth every evening. 90 tablet 1    busPIRone (BUSPAR) 15 MG tablet Take 1 tablet (15 mg total) by mouth 2 (two) times daily. 60 tablet 2    diazePAM (VALIUM) 5 MG tablet Take 1 tablet (5 mg total) by mouth daily as needed for Anxiety. 30 tablet 2    HYDROcodone-acetaminophen (NORCO) 7.5-325 mg per tablet Take 1 tablet by mouth every 6 (six) hours as needed for Pain. 12 tablet 0    omeprazole  "(PRILOSEC) 40 MG capsule TAKE 1 CAPSULE(40 MG) BY MOUTH EVERY DAY 30 capsule 5    pyridoxine, vitamin B6, (B-6) 100 MG Tab Take 1 tablet (100 mg total) by mouth once daily. 30 tablet 2    sodium,potassium,mag sulfates (SUPREP BOWEL PREP KIT) 17.5-3.13-1.6 gram SolR Take 177 mLs by mouth once daily. for 2 days 1 kit 0    tamsulosin (FLOMAX) 0.4 mg Cap Take 1 capsule (0.4 mg total) by mouth once daily. 90 capsule 3    tiZANidine (ZANAFLEX) 4 MG tablet Take 4 mg by mouth 3 (three) times daily.       Facility-Administered Encounter Medications as of 9/6/2022   Medication Dose Route Frequency Provider Last Rate Last Admin    0.9%  NaCl infusion   Intravenous Continuous Brenden Pizarro MD   New Bag at 05/23/22 1238    ondansetron injection 4 mg  4 mg Intravenous Q12H PRN Brenden Pizarro MD           Objective:     Vital Signs (Most Recent)  Vital Signs  Pulse: 97  Resp: 17  SpO2: 97 %  BP: 112/78  Height and Weight  Height: 5' 3" (160 cm)  Weight: 81.6 kg (179 lb 14.3 oz)  BSA (Calculated - sq m): 1.9 sq meters  BMI (Calculated): 31.9  Weight in (lb) to have BMI = 25: 140.8]  Wt Readings from Last 2 Encounters:   09/06/22 81.6 kg (179 lb 14.3 oz)   08/24/22 83.2 kg (183 lb 6.8 oz)       Physical Exam   Constitutional: He is oriented to person, place, and time. He appears well-developed and well-nourished. No distress.   HENT:   Head: Normocephalic.   Cardiovascular: Normal rate and regular rhythm.   Pulmonary/Chest: Normal expansion and effort normal. No stridor. No respiratory distress. He has decreased breath sounds. He exhibits no tenderness.   Abdominal: He exhibits no distension.   Musculoskeletal:         General: No tenderness.      Cervical back: Neck supple.   Lymphadenopathy:     He has no cervical adenopathy.   Neurological: He is alert and oriented to person, place, and time. Gait normal.   Skin: Skin is warm. No cyanosis. Nails show no clubbing.   Psychiatric: He has a normal mood and affect. His " behavior is normal. Judgment and thought content normal.   Nursing note and vitals reviewed.    Laboratory  Lab Results   Component Value Date    WBC 7.53 08/24/2022    RBC 4.69 08/24/2022    HGB 14.4 08/24/2022    HCT 44.0 08/24/2022    MCV 94 08/24/2022    MCH 30.7 08/24/2022    MCHC 32.7 08/24/2022    RDW 13.6 08/24/2022     08/24/2022    MPV 9.5 08/24/2022    GRAN 4.6 08/24/2022    GRAN 60.7 08/24/2022    LYMPH 2.3 08/24/2022    LYMPH 30.8 08/24/2022    MONO 0.5 08/24/2022    MONO 7.2 08/24/2022    EOS 0.1 08/24/2022    BASO 0.03 08/24/2022    EOSINOPHIL 0.8 08/24/2022    BASOPHIL 0.4 08/24/2022       BMP  Lab Results   Component Value Date     08/24/2022    K 3.6 08/24/2022     08/24/2022    CO2 25 08/24/2022    BUN 7 (L) 08/24/2022    CREATININE 1.0 08/24/2022    CALCIUM 9.2 08/24/2022    ANIONGAP 8 08/24/2022    ESTGFRAFRICA >60.0 05/16/2022    EGFRNONAA >60.0 05/16/2022    AST 17 08/24/2022    ALT 16 08/24/2022    PROT 7.4 08/24/2022       Lab Results   Component Value Date    BNP 18 10/13/2020    BNP 17 07/23/2019       Lab Results   Component Value Date    TSH 1.189 08/24/2022       No results found for: SEDRATE    No results found for: CRP  No results found for: IGE     No results found for: ASPERGILLUS  No results found for: AFUMIGATUSCL     No results found for: ACE     Diagnostic Results:  I have personally reviewed today the following studies:    CT chest 10/2020  CLINICAL HISTORY:  dysphagia;     TECHNIQUE:  5 mm contiguous axial images are performed through the chest abdomen and pelvis following administration of IV and oral contrast.  Multiplanar reconstruction is performed     COMPARISON:  01/31/2013     FINDINGS:  CT scan chest:     Lung windows:     There are mild emphysematous changes in both lungs.  The central airways are widely patent.     Mediastinum:     The esophagus is distended with both air and fluid.  A small hiatal hernia is noted.     The heart size is normal  there are no pleural effusions.  No mediastinal or hilar lymphadenopathy is seen.     Bone windows appear normal for age.           Assessment/Plan:   Suspected sleep apnea    Witnessed apneic spells    Snoring    Non-restorative sleep    Insomnia, unspecified type    Centrilobular emphysema  Awaiting home sleep study September 2022.      Counseled about smoking cessation, he smokes cigars on and off.  Willing to make effort to quit smoking on his own.      Check PFT and 6 minute walking test.      Received PCV 20 last visit.      Complaining of difficulty falling asleep, takes daily Valium  for anxiety advised him to take Valium at bedtime.      Follow up in about 3 months (around 12/6/2022).    This note was prepared using voice recognition system and is likely to have sound alike errors that may have been overlooked even after proof reading.  Please call me with any questions    Discussed diagnosis, its evaluation, treatment and usual course. All questions answered.      Marguerite Ruelas MD

## 2022-09-14 ENCOUNTER — OFFICE VISIT (OUTPATIENT)
Dept: NEUROLOGY | Facility: CLINIC | Age: 62
End: 2022-09-14
Payer: MEDICARE

## 2022-09-14 DIAGNOSIS — F41.8 MIXED ANXIETY AND DEPRESSIVE DISORDER: ICD-10-CM

## 2022-09-14 DIAGNOSIS — Z79.899 CHRONIC PRESCRIPTION BENZODIAZEPINE USE: ICD-10-CM

## 2022-09-14 DIAGNOSIS — G89.4 CHRONIC PAIN SYNDROME: ICD-10-CM

## 2022-09-14 DIAGNOSIS — R41.9 COGNITIVE COMPLAINTS: Primary | ICD-10-CM

## 2022-09-14 DIAGNOSIS — F11.10 OPIOID ABUSE, DAILY USE: ICD-10-CM

## 2022-09-14 DIAGNOSIS — R06.83 SNORES: ICD-10-CM

## 2022-09-14 DIAGNOSIS — G47.00 INSOMNIA, UNSPECIFIED TYPE: ICD-10-CM

## 2022-09-14 PROCEDURE — 99999 PR PBB SHADOW E&M-EST. PATIENT-LVL II: CPT | Mod: PBBFAC,,, | Performed by: STUDENT IN AN ORGANIZED HEALTH CARE EDUCATION/TRAINING PROGRAM

## 2022-09-14 PROCEDURE — 1159F PR MEDICATION LIST DOCUMENTED IN MEDICAL RECORD: ICD-10-PCS | Mod: CPTII,S$GLB,, | Performed by: STUDENT IN AN ORGANIZED HEALTH CARE EDUCATION/TRAINING PROGRAM

## 2022-09-14 PROCEDURE — 90791 PSYCH DIAGNOSTIC EVALUATION: CPT | Mod: S$GLB,,, | Performed by: STUDENT IN AN ORGANIZED HEALTH CARE EDUCATION/TRAINING PROGRAM

## 2022-09-14 PROCEDURE — 90791 PR PSYCHIATRIC DIAGNOSTIC EVALUATION: ICD-10-PCS | Mod: S$GLB,,, | Performed by: STUDENT IN AN ORGANIZED HEALTH CARE EDUCATION/TRAINING PROGRAM

## 2022-09-14 PROCEDURE — 99999 PR PBB SHADOW E&M-EST. PATIENT-LVL II: ICD-10-PCS | Mod: PBBFAC,,, | Performed by: STUDENT IN AN ORGANIZED HEALTH CARE EDUCATION/TRAINING PROGRAM

## 2022-09-14 PROCEDURE — 1159F MED LIST DOCD IN RCRD: CPT | Mod: CPTII,S$GLB,, | Performed by: STUDENT IN AN ORGANIZED HEALTH CARE EDUCATION/TRAINING PROGRAM
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
## 2022-09-14 NOTE — PATIENT INSTRUCTIONS
Today you completed the interview portion of your neuropsychological evaluation. The next step will be to come in for a testing appointment. Our office will reach out to you to schedule this. Some helpful information is included below to help you be ready for your testing appointment.    Please call Dr. Wilder at [phone] or send a Wix message with any questions.     In the meantime, please see below for recommendations and resources we discussed during your visit.     Try out the 20 minute rule for sleep. If you are awake for more than 20 minutes and unable to get to sleep get out of bed and read something boring until you are feeling sleepy. Then return to bed.         _____________________  Robin Wilder, Ph.D.  Neuropsychologist  [facility]  Department of Neurology      Instructions for your upcoming neuropsychological assessment    If you must cancel your appointment please do so 48 hours in advance of your scheduled appointment.   Please take all of your medications as prescribed on the date of testing, unless specifically asked not to do so.   Be sure to eat a full breakfast the morning before testing if you typically eat in the morning.   Bring any water, snacks, or other food supplies you may need for a 2-4 hour appointment.  Do your best to get a good night of sleep before testing.  There is nothing you should do or need to do to study for the tests. Just come ready to do your best.

## 2022-09-16 ENCOUNTER — PATIENT OUTREACH (OUTPATIENT)
Dept: ADMINISTRATIVE | Facility: HOSPITAL | Age: 62
End: 2022-09-16
Payer: MEDICAID

## 2022-09-16 NOTE — PROGRESS NOTES
Health Maintenance Due   Topic Date Due    TETANUS VACCINE  Never done    Shingles Vaccine (1 of 2) Never done    COVID-19 Vaccine (3 - Booster for Moderna series) 04/06/2022    Influenza Vaccine (1) Never done

## 2022-09-23 ENCOUNTER — OFFICE VISIT (OUTPATIENT)
Dept: NEUROLOGY | Facility: CLINIC | Age: 62
End: 2022-09-23
Payer: MEDICARE

## 2022-09-23 ENCOUNTER — TELEPHONE (OUTPATIENT)
Dept: NEUROLOGY | Facility: CLINIC | Age: 62
End: 2022-09-23
Payer: MEDICAID

## 2022-09-23 DIAGNOSIS — G47.00 INSOMNIA, UNSPECIFIED TYPE: ICD-10-CM

## 2022-09-23 DIAGNOSIS — G89.4 CHRONIC PAIN SYNDROME: ICD-10-CM

## 2022-09-23 DIAGNOSIS — Z87.820 HISTORY OF CONCUSSION: ICD-10-CM

## 2022-09-23 DIAGNOSIS — F41.8 MIXED ANXIETY AND DEPRESSIVE DISORDER: ICD-10-CM

## 2022-09-23 DIAGNOSIS — F45.1 SOMATIC SYMPTOM DISORDER: Primary | ICD-10-CM

## 2022-09-23 PROCEDURE — 96139 PSYCL/NRPSYC TST TECH EA: CPT | Mod: S$GLB,,, | Performed by: STUDENT IN AN ORGANIZED HEALTH CARE EDUCATION/TRAINING PROGRAM

## 2022-09-23 PROCEDURE — 1159F MED LIST DOCD IN RCRD: CPT | Mod: CPTII,S$GLB,, | Performed by: STUDENT IN AN ORGANIZED HEALTH CARE EDUCATION/TRAINING PROGRAM

## 2022-09-23 PROCEDURE — 96132 NRPSYC TST EVAL PHYS/QHP 1ST: CPT | Mod: S$GLB,,, | Performed by: STUDENT IN AN ORGANIZED HEALTH CARE EDUCATION/TRAINING PROGRAM

## 2022-09-23 PROCEDURE — 1159F PR MEDICATION LIST DOCUMENTED IN MEDICAL RECORD: ICD-10-PCS | Mod: CPTII,S$GLB,, | Performed by: STUDENT IN AN ORGANIZED HEALTH CARE EDUCATION/TRAINING PROGRAM

## 2022-09-23 PROCEDURE — 96139 PR PSYCH/NEUROPSYCH TEST ADMIN/SCORING, BY TECH, 2+ TESTS, EA ADDTL 30 MIN: ICD-10-PCS | Mod: S$GLB,,, | Performed by: STUDENT IN AN ORGANIZED HEALTH CARE EDUCATION/TRAINING PROGRAM

## 2022-09-23 PROCEDURE — 96133 NRPSYC TST EVAL PHYS/QHP EA: CPT | Mod: S$GLB,,, | Performed by: STUDENT IN AN ORGANIZED HEALTH CARE EDUCATION/TRAINING PROGRAM

## 2022-09-23 PROCEDURE — 99999 PR PBB SHADOW E&M-EST. PATIENT-LVL II: ICD-10-PCS | Mod: PBBFAC,,, | Performed by: STUDENT IN AN ORGANIZED HEALTH CARE EDUCATION/TRAINING PROGRAM

## 2022-09-23 PROCEDURE — 99999 PR PBB SHADOW E&M-EST. PATIENT-LVL II: CPT | Mod: PBBFAC,,, | Performed by: STUDENT IN AN ORGANIZED HEALTH CARE EDUCATION/TRAINING PROGRAM

## 2022-09-23 PROCEDURE — 96132 PR NEUROPSYCHOLOGIC TEST EVAL SVCS, 1ST HR: ICD-10-PCS | Mod: S$GLB,,, | Performed by: STUDENT IN AN ORGANIZED HEALTH CARE EDUCATION/TRAINING PROGRAM

## 2022-09-23 PROCEDURE — 96133 PR NEUROPSYCHOLOGIC TEST EVAL SVCS, EA ADDTL HR: ICD-10-PCS | Mod: S$GLB,,, | Performed by: STUDENT IN AN ORGANIZED HEALTH CARE EDUCATION/TRAINING PROGRAM

## 2022-09-23 PROCEDURE — 99499 UNLISTED E&M SERVICE: CPT | Mod: S$GLB,,, | Performed by: STUDENT IN AN ORGANIZED HEALTH CARE EDUCATION/TRAINING PROGRAM

## 2022-09-23 PROCEDURE — 96138 PSYCL/NRPSYC TECH 1ST: CPT | Mod: S$GLB,,, | Performed by: STUDENT IN AN ORGANIZED HEALTH CARE EDUCATION/TRAINING PROGRAM

## 2022-09-23 PROCEDURE — 96138 PR PSYCH/NEUROPSYCH TEST ADMIN/SCORING, BY TECH, 2+ TESTS, 1ST 30 MIN: ICD-10-PCS | Mod: S$GLB,,, | Performed by: STUDENT IN AN ORGANIZED HEALTH CARE EDUCATION/TRAINING PROGRAM

## 2022-09-23 PROCEDURE — 99499 NO LOS: ICD-10-PCS | Mod: S$GLB,,, | Performed by: STUDENT IN AN ORGANIZED HEALTH CARE EDUCATION/TRAINING PROGRAM

## 2022-09-27 ENCOUNTER — TELEPHONE (OUTPATIENT)
Dept: GASTROENTEROLOGY | Facility: CLINIC | Age: 62
End: 2022-09-27
Payer: MEDICAID

## 2022-09-27 ENCOUNTER — TELEPHONE (OUTPATIENT)
Dept: GASTROENTEROLOGY | Facility: HOSPITAL | Age: 62
End: 2022-09-27
Payer: MEDICAID

## 2022-09-27 RX ORDER — PROMETHAZINE HYDROCHLORIDE 25 MG/1
12.5 TABLET ORAL EVERY 8 HOURS PRN
Qty: 30 TABLET | Refills: 0 | Status: SHIPPED | OUTPATIENT
Start: 2022-09-27 | End: 2022-10-03

## 2022-09-27 NOTE — TELEPHONE ENCOUNTER
"Called pt and explained has c/o  trouble swallowing x 3 wks.  "Like a nausea feeling and I want to vomit."      denies vomiting    C/o feeling soreness at bottom of esophagus.    Pretty constant for past 3 wk with eating solids.    Pt asking promethazine to help with nausea. Explained has used in the past and medication works well.      "

## 2022-09-27 NOTE — TELEPHONE ENCOUNTER
----- Message from Og Segovia sent at 9/27/2022  9:21 AM CDT -----  Type:  Needs Medical Advice    Who Called:  Lenny  Symptoms (please be specific):  trouble swallowing, throat soreness   How long has patient had these symptoms:  about 3 weeks  Pharmacy name and phone #:  [facility] [phone]  Would the patient rather a call back or a response via [facility]?  Call back  Best Call Back Number:  [phone]  Additional Information: no

## 2022-09-27 NOTE — TELEPHONE ENCOUNTER
[facility] PROCEDURE ORDERS    CLEARANCE FOR PROCEDURES:  [x] Not needed   [] Cardiology   [] Pulmonary  [] PCP    PROCEDURES  [x] EGD w Botox  [] Colonoscopy   [] EGD with EndoFlip   [] Esophageal manometry with impedance - dysphagia   [] Esophageal manometry with impedance - rumination  [] Esophageal manometry with impedance - belching   [] EGD with BRAVO 48 hours   [] EGD with BRAVO 96 hours   [] pH Impedance 24 hours   [] Anorectal manometry   [] Rectal Ultrasound     Does manometry need to be placed endoscopically:   [] Yes    FLOOR:  [] 4th Floor   [x] 2nd Floor    Reason for 2nd Floor:   [] Gastroparesis   [x] End stage achalasia  [] Pre lung transplant   [] Pre hear transplant   [] Pulmonary HTN (PAP>50 or on meds)   [] Severe pulmonary Disease   [] Complex medical problems   [] BMI>50  [] History of anesthesia issues  [] Other:    PREP  [] Standard Prep  [] Severe Constipation Prep (Low residue diet 7 days.  1.5 prep the day prior, 0.5 prep the day of procedure)  [] Full liquid diet 5 days   [x] Full liquid diet 3 days   [] Full liquid diet 2 days   [] Full liquid diet 1 day   [] Other:     MEDICATIONS    pH Studies  [] ON PPI/H2 Blocker   [] OFF PPI/H2 Blocker     Metal allergy (stainless steal w chromium, nickel, copper, cobalt and iron).:   []  Yes    Pacemaker/defibrillator:  [] Yes    Motility Studies (esophageal manometry/anorectal manometry)  Hold narcotics x 1 days if able   Hold TCA x 1 days if able  Propofol/lidocaine only during sedation.  Discuss with Dr. Hoover if additional sedation needed.   Hold baclofen for thee days (at least one day) if able   Hold muscle relaxants x 1 day if able     Anticoagulation/antiplt agents:   []  Yes    ORDER OF TESTING:  Day 1: EGD w Botox     [] No special requirements - pt lives locally     SCHEDULING PRIORITY  [] Urgent  (<7 days)  [] Lung Transplant Workup  [] Priority (<30 days)  [x] Routine 1 (schedule ASAP)  [] Routine 2 (next available, < 3 months)    [] Routine 3 (ok if > 3 months)       PHYSICIAN  []  Ok with Dr. Elizondo   []  Ok with any GI MD

## 2022-09-28 ENCOUNTER — TELEPHONE (OUTPATIENT)
Dept: ENDOSCOPY | Facility: HOSPITAL | Age: 62
End: 2022-09-28
Payer: MEDICAID

## 2022-09-28 RX ORDER — PROMETHAZINE HYDROCHLORIDE 6.25 MG/5ML
12.5 SYRUP ORAL EVERY 8 HOURS PRN
Qty: 473 ML | Refills: 2 | Status: SHIPPED | OUTPATIENT
Start: 2022-09-28 | End: 2022-11-17 | Stop reason: SDUPTHER

## 2022-09-28 NOTE — TELEPHONE ENCOUNTER
Called and spoke with pt, explained rx  phenergan has been sent to [facility] on [address].  And fu visit scheduled for 11/14/22 per pt request

## 2022-09-28 NOTE — TELEPHONE ENCOUNTER
Contacted patient to schedule procedure. As per our conversation,  patient is scheduled for EGD with botox on 11/7/22 at 2:00 pm. Instructions reviewed with patient and informed instructions will be mailed and on patient portal for review.

## 2022-09-29 ENCOUNTER — PATIENT MESSAGE (OUTPATIENT)
Dept: ENDOSCOPY | Facility: HOSPITAL | Age: 62
End: 2022-09-29
Payer: MEDICAID

## 2022-09-29 NOTE — PROGRESS NOTES
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 alcohol use in the past but due to GERD he no longer drinks regularly.   Recreational Substances: He reported a history of marijuana and cocaine use in the 1990's. He reported that cocaine use led to significant consequences until he quit in approximately 1995.  Tobacco Products: He denied a history of tobacco use.     MENTAL STATUS AND OBSERVATIONS:  APPEARANCE: Casually dressed and adequate grooming/hygiene.   ALERTNESS/ORIENTATION: Attentive and alert.   GAIT/MOTOR: Mr. Latham walked with an assistive device. Gait and motor functioning were otherwise grossly within normal limits on observation.    SENSORY: Vision and hearing appeared adequate for assessment purposes.   SPEECH/LANGUAGE: Normal in rate, rhythm, tone, and volume. Expressive and receptive language were grossly intact.  MOOD/AFFECT: Mood ranged from euthymic to anxious and affect was mood-congruent.   INTERPERSONAL BEHAVIOR: Rapport was quickly and easily established   THOUGHT PROCESSES: Thoughts seemed logical and goal-directed.   TESTING OBSERVATIONS: Mr. Latham did all that was asked of him; however, he often found the testing process anxiety-provoking and frustrating. He required  "frequent encouragement during testing and often sought reassurance from the examiner. He made frequent self-disparaging comments, particularly regarding his memory. Overall, his response style was impulsive and he very quickly gave up when tests became difficult. Some of his errors were also atypical of those with neurologic injuries. In particular he reported that he became stuck at the last item of a timed set-shifting task and could not locate the final item for 90" and in spite of his performance on learning and memory tasks during testing, he accurately recounted his appointments and schedule to arrange feedback, suggesting he is accurately retaining recent information.     RESULTS     Tests Administered (manual norms used unless otherwise indicated): [employer] - Test of Premorbid Functioning (TOPF),  [employer] Word Choice (WCT) , Wechsler Adult Intelligence Scale 4th Ed. (WAIS-IV) select subtests (Digit Span, Arithmetic, Coding, Vocabulary, Similarities, Matrix Reasoning, and Block Design), Controlled Oral Word Association Test (COWAT; [facility] norms), Semantic Fluency (Animals; Jason norms), [place] Naming Test (BNT; [facility] norms), Jose Verbal Learning Test, Revised (HVLT-R), Wechsler Memory Scale, 4th Ed. Logical Memory (WMS IV-LM), Brief Visuospatial Memory Test, Revised (BVMT-R), Trail Making Test (TMT A/B; Jason norms), Grooved Pegboard Test (Jason norms), and Personality Assessment Inventory (MARCO A).    Performance Validity: Mr. Latham completed both stand-alone and embedded measures of task engagement. Below-cutoff performance on any one stand-alone measure and/ or any two embedded measures of task engagement is highly unlikely to occur outside the context of poor or inconsistent effort during testing. Mr. Latham scored below predetermined cutoff on one embedded measure of task engagement and above predetermined cutoffs on all other administered measures of task " engagement. Some behavioral observations suggest that mood or other factors interfered with his ability to perform at his best. As such, there is some suggestion for inconsistent effort across testing and results are interpreted cautiously as a reflection of underlying neuroanatomic dysfunction.     PREMORBID FUNCTIONING Raw Score Type of Standardized Score Standardized Score Percentile/CP Descriptor   TOPF simple dem. eFSIQ - SS 88 21 Low Average   TOPF pred. eFSIQ - SS 74 4 Below Average   TOPF simple + pred. eFSIQ - SS 80 9 Low Average   INTELLECTUAL FUNCTIONING Raw Score Type of Standardized Score Standardized Score Percentile/CP Descriptor   WAIS-IV         VCI - SS 78 7 Below Average   SHERIF - SS 77 6 Below Average   WMI - SS 74 4 Below Average   GAI - ss 75 5 Below Average   LANGUAGE FUNCTIONING Raw Score Type of Standardized Score Standardized Score Percentile/CP Descriptor   WAIS-IV Vocabulary 15 ss 4 2 Below Average   TOPF Word Reading 17 SS 76 5 Below Average   BNT-60 43 Tscore 43 25 Average   CFL 27 ss 9 37 Average   Animals/Fruits/Veggies 35 ss 10 50 Average   VISUOSPATIAL FUNCTIONING Raw Score Type of Standardized Score Standardized Score Percentile/CP Descriptor   WAIS-IV Block Design 24 ss 7 16 Low Average   BVMT-R Copy 11 - - - -   LEARNING & MEMORY Raw Score Type of Standardized Score Standardized Score Percentile/CP Descriptor   HVLT-R         Total Immediate (3, 4, 4 / 12) 11 Tscore <20 0.1 Exceptionally Low    Delayed Recall 3 Tscore <20 0.1 Exceptionally Low    Retention % 75 Tscore 38 12 Low Average   Discrimination (10 hits, 3 FP) 7 Tscore <20 0.1 Exceptionally Low    WMS-IV Subtests         LM I 19 ss 7 16 Low Average   LM II 17 ss 8 25 Average   LM Recognition 21 - - 10-16 Low Average   BVMT-R         IR (2, 5, 5/ 12) 12 Tscore 31 3 Below Average   DR 5 Tscore 34 5 Below Average   Discrimination Index 6 - - >16 WNL   ATTENTION/WORKING MEMORY Raw Score Type of Standardized Score Standardized  Score Percentile/CP Descriptor   WAIS-IV Digit Span 21 ss 7 16 Low Average         DS Forward 9 ss 9 37 Average         DS Backward 7 ss 8 25 Average         DS Sequence 5 ss 6 9 Low Average         Longest Digit Forward 6 - - - -         Longest Digit Backward 4 - - - -         Longest Digit Sequence 4 - - - -   WAIS-IV Arithmetic 7 ss 4 2 Below Average   MENTAL PROCESSING SPEED Raw Score Type of Standardized Score Standardized Score Percentile/CP Descriptor   WAIS-IV Coding 49 ss 8 25 Average   TMT A  33 Tscore 60 84 High Average   TMT A errors 0 - - - -   EXECUTIVE FUNCTIONING Raw Score Type of Standardized Score Standardized Score Percentile/CP Descriptor   TMT B 300 Tscore 28 1 Exceptionally Low    TMT B errors 1 - - - -   WAIS-IV Similarities 21 ss 8 25 Average   WAIS-IV Matrix Reasoning 7 ss 5 5 Below Average   FRONTOMOTOR  Raw Score Type of Standardized Score Standardized Score Percentile/CP Descriptor   GPT DH 95 Tscore 47 38 Average   GPD  Tscore 48 42 Average   MOOD & PERSONALITY Raw Score Type of Standardized Score Standardized Score Percentile/CP Descriptor   MARCO A    - -   ICN 5 Tscore 49 - -   INF 5 Tscore 59 - -   NIM 10 Tscore 81 - -   PIM 8 Tscore 34 - -   [place] 53 Tscore 92 - -   ANX 49 Tscore 81 - -   ZACH 44 Tscore 79 - -   DEP 36 Tscore 73 - -   MAN 42 Tscore 71 - -   PAR 44 Tscore 79 - -   SCZ 39 Tscore 82 - -   BOR 39 Tscore 71 - -   ANT 14 Tscore 51 - -   ALC 19 Tscore 75 - -   DRG 15 Tscore 72 - -   AGG 21 Tscore 57 - -   ALEXEI 5 Tscore 54 - -   STR 14 Tscore 68 - -   NON 13 Tscore 72 - -   RXR 2 Tscore 25 - -   DOM 28 Tscore 63 - -   WRM 28 Tscore 58 - -   ss = scaled score (mean = 10, SD = 3); SS = standard score (mean = 100, SD = 15); Tscore mean = 50, SD = 10; zscore (mean = 0.00, SD = 1)       Billing Documentation     Time spent in review of pertinent records, conducting face to face interview with the patient: 60 minutes; [id number], billed separately.  Time on review of  neuropsychological test data, data interpretation, providing feedback about these results, and documentation of my interpretation: 272 minutes; [id number] &[id number] (x 4).  Psychometrist time spent in the administration and scoring of 2 or more neuropsychological tests 264 minutes; [id number] & [id number] (x8).

## 2022-09-30 PROBLEM — G47.00 INSOMNIA: Status: ACTIVE | Noted: 2022-09-30

## 2022-10-03 ENCOUNTER — OFFICE VISIT (OUTPATIENT)
Dept: HOME HEALTH SERVICES | Facility: CLINIC | Age: 62
End: 2022-10-03
Payer: MEDICARE

## 2022-10-03 VITALS
WEIGHT: 179 LBS | TEMPERATURE: 99 F | HEIGHT: 63 IN | BODY MASS INDEX: 31.71 KG/M2 | DIASTOLIC BLOOD PRESSURE: 80 MMHG | HEART RATE: 84 BPM | OXYGEN SATURATION: 98 % | SYSTOLIC BLOOD PRESSURE: 104 MMHG

## 2022-10-03 DIAGNOSIS — F41.8 MIXED ANXIETY AND DEPRESSIVE DISORDER: ICD-10-CM

## 2022-10-03 DIAGNOSIS — Z79.899 CHRONIC PRESCRIPTION BENZODIAZEPINE USE: ICD-10-CM

## 2022-10-03 DIAGNOSIS — R39.15 URGENCY OF URINATION: ICD-10-CM

## 2022-10-03 DIAGNOSIS — Z59.9 FINANCIAL DIFFICULTIES: ICD-10-CM

## 2022-10-03 DIAGNOSIS — J43.2 CENTRILOBULAR EMPHYSEMA: ICD-10-CM

## 2022-10-03 DIAGNOSIS — G47.00 INSOMNIA, UNSPECIFIED TYPE: ICD-10-CM

## 2022-10-03 DIAGNOSIS — Z00.00 ENCOUNTER FOR PREVENTIVE HEALTH EXAMINATION: Primary | ICD-10-CM

## 2022-10-03 DIAGNOSIS — K21.9 GASTROESOPHAGEAL REFLUX DISEASE WITHOUT ESOPHAGITIS: ICD-10-CM

## 2022-10-03 DIAGNOSIS — N13.8 BENIGN PROSTATIC HYPERPLASIA WITH URINARY OBSTRUCTION: ICD-10-CM

## 2022-10-03 DIAGNOSIS — N40.1 BENIGN PROSTATIC HYPERPLASIA WITH URINARY OBSTRUCTION: ICD-10-CM

## 2022-10-03 DIAGNOSIS — I25.119 ATHEROSCLEROSIS OF NATIVE CORONARY ARTERY OF NATIVE HEART WITH ANGINA PECTORIS: ICD-10-CM

## 2022-10-03 DIAGNOSIS — G89.4 CHRONIC PAIN SYNDROME: ICD-10-CM

## 2022-10-03 DIAGNOSIS — E66.9 OBESITY (BMI 30.0-34.9): ICD-10-CM

## 2022-10-03 DIAGNOSIS — K22.0 ACHALASIA OF ESOPHAGUS: ICD-10-CM

## 2022-10-03 DIAGNOSIS — R41.9 COGNITIVE COMPLAINTS WITH NORMAL EXAM: ICD-10-CM

## 2022-10-03 DIAGNOSIS — E78.2 MIXED HYPERLIPIDEMIA: ICD-10-CM

## 2022-10-03 DIAGNOSIS — F43.20 ADJUSTMENT DISORDER, UNSPECIFIED TYPE: ICD-10-CM

## 2022-10-03 DIAGNOSIS — I25.118 CORONARY ARTERY DISEASE OF NATIVE ARTERY OF NATIVE HEART WITH STABLE ANGINA PECTORIS: ICD-10-CM

## 2022-10-03 DIAGNOSIS — M47.27 LUMBOSACRAL SPONDYLOSIS WITH RADICULOPATHY: ICD-10-CM

## 2022-10-03 DIAGNOSIS — Z74.09 REDUCED MOBILITY: ICD-10-CM

## 2022-10-03 PROBLEM — F11.10 OPIOID ABUSE, DAILY USE: Status: RESOLVED | Noted: 2022-06-01 | Resolved: 2022-10-03

## 2022-10-03 PROCEDURE — 3008F BODY MASS INDEX DOCD: CPT | Mod: CPTII,S$GLB,, | Performed by: NURSE PRACTITIONER

## 2022-10-03 PROCEDURE — 1159F MED LIST DOCD IN RCRD: CPT | Mod: CPTII,S$GLB,, | Performed by: NURSE PRACTITIONER

## 2022-10-03 PROCEDURE — G0439 PR MEDICARE ANNUAL WELLNESS SUBSEQUENT VISIT: ICD-10-PCS | Mod: S$GLB,,, | Performed by: NURSE PRACTITIONER

## 2022-10-03 PROCEDURE — 3008F PR BODY MASS INDEX (BMI) DOCUMENTED: ICD-10-PCS | Mod: CPTII,S$GLB,, | Performed by: NURSE PRACTITIONER

## 2022-10-03 PROCEDURE — G0439 PPPS, SUBSEQ VISIT: HCPCS | Mod: S$GLB,,, | Performed by: NURSE PRACTITIONER

## 2022-10-03 PROCEDURE — 1160F RVW MEDS BY RX/DR IN RCRD: CPT | Mod: CPTII,S$GLB,, | Performed by: NURSE PRACTITIONER

## 2022-10-03 PROCEDURE — 1160F PR REVIEW ALL MEDS BY PRESCRIBER/CLIN PHARMACIST DOCUMENTED: ICD-10-PCS | Mod: CPTII,S$GLB,, | Performed by: NURSE PRACTITIONER

## 2022-10-03 PROCEDURE — 1159F PR MEDICATION LIST DOCUMENTED IN MEDICAL RECORD: ICD-10-PCS | Mod: CPTII,S$GLB,, | Performed by: NURSE PRACTITIONER

## 2022-10-03 SDOH — SOCIAL DETERMINANTS OF HEALTH (SDOH): PROBLEM RELATED TO HOUSING AND ECONOMIC CIRCUMSTANCES, UNSPECIFIED: Z59.9

## 2022-10-03 NOTE — Clinical Note
Medicare awv complete. Health maintenance:  Tetanus vaccine, shingles vaccine, COVID-19 3rd vaccine, flu vaccine due-encourage patient to obtain.  Colonoscopy scheduled for 10/17/2022.  Patient requests walker/Rollator due to knee pain. Order placed.   Financial needs-consulted case management.

## 2022-10-03 NOTE — PATIENT INSTRUCTIONS
Counseling and Referral of Other Preventative  (Italic type indicates deductible and co-insurance are waived)    Patient Name: Lenny Latham  Today's Date: 10/3/2022    Health Maintenance       Date Due Completion Date    TETANUS VACCINE Never done ---    Shingles Vaccine (1 of 2) Never done ---    COVID-19 Vaccine (3 - Booster for Moderna series) 01/01/2022 11/6/2021    Influenza Vaccine (1) Never done ---    Colorectal Cancer Screening 10/21/2022 (Originally 1960) ---    High Dose Statin 10/03/2023 10/3/2022    Aspirin/Antiplatelet Therapy 10/03/2023 10/3/2022    Lipid Panel 08/24/2027 8/24/2022        Orders Placed This Encounter   Procedures    BATH/SHOWER CHAIR FOR HOME USE    WALKER FOR HOME USE    Ambulatory referral/consult to Outpatient Case Management       The following information is provided to all patients.  This information is to help you find resources for any of the problems found today that may be affecting your health:                Living healthy guide: www.[facility].[place].gov      Understanding Diabetes: www.diabetes.org      Eating healthy: www.cdc.gov/healthyweight      CDC home safety checklist: www.cdc.gov/steadi/patient.html      Agency on Aging: www.goea.[place].gov      Alcoholics anonymous (AA): www.aa.org      Physical Activity: www.za.nih.gov/bj9rzxr      Tobacco use: www.quitwithusla.org

## 2022-10-03 NOTE — PROGRESS NOTES
"Lenny Latham presented for Medicare AW today. The following components were reviewed and updated:    Medical history  Family History  Social history  Allergies and Current Medications  Health Risk Assessment  Health Maintenance  Care Team    **See Completed Assessments for Annual Wellness visit with in the encounter summary    The following assessments were completed:  Depression Screening  Cognitive function Screening      Timed Get Up Test  Whisper Test    Vitals:    10/03/22 1040 10/03/22 1105   BP: 105/78 104/80   BP Location: Left arm Left arm   Patient Position: Sitting Standing   Pulse: 84    Temp: 98.7 °F (37.1 °C)    TempSrc: Temporal    SpO2: 98%    Weight: 81.2 kg (179 lb)    Height: 5' 3" (1.6 m)      Body mass index is 31.71 kg/m².   ]    Physical Exam  Vitals reviewed.   Constitutional:       Appearance: Normal appearance. He is obese.   HENT:      Head: Normocephalic and atraumatic.   Cardiovascular:      Rate and Rhythm: Normal rate and regular rhythm.      Pulses: Normal pulses.      Heart sounds: Normal heart sounds.   Pulmonary:      Effort: Pulmonary effort is normal.      Breath sounds: Normal breath sounds.   Musculoskeletal:         General: Normal range of motion.      Cervical back: Normal range of motion and neck supple.      Comments: Mild swelling right knee   Skin:     General: Skin is warm and dry.   Neurological:      Mental Status: He is alert and oriented to person, place, and time.      Gait: Gait abnormal.   Psychiatric:         Mood and Affect: Mood normal.         Behavior: Behavior normal.        Outpatient Medications Marked as Taking for the 10/3/22 encounter (Office Visit) with JORDON Henriquez   Medication Sig Dispense Refill    aspirin (ECOTRIN) 81 MG EC tablet Take 81 mg by mouth once daily.      atorvastatin (LIPITOR) 40 MG tablet Take 1 tablet (40 mg total) by mouth every evening. 90 tablet 1    busPIRone (BUSPAR) 15 MG tablet Take 1 tablet (15 mg total) by " mouth 2 (two) times daily. 60 tablet 2    diazePAM (VALIUM) 5 MG tablet Take 1 tablet (5 mg total) by mouth daily as needed for Anxiety. 30 tablet 1    omeprazole (PRILOSEC) 40 MG capsule TAKE 1 CAPSULE(40 MG) BY MOUTH EVERY DAY 30 capsule 5    promethazine (PHENERGAN) 6.25 mg/5 mL syrup Take 10 mLs (12.5 mg total) by mouth every 8 (eight) hours as needed for Nausea. 473 mL 2    pyridoxine, vitamin B6, (B-6) 100 MG Tab Take 1 tablet (100 mg total) by mouth once daily. 30 tablet 2    tamsulosin (FLOMAX) 0.4 mg Cap Take 1 capsule (0.4 mg total) by mouth once daily. 90 capsule 3    tiZANidine (ZANAFLEX) 4 MG tablet Take 4 mg by mouth 3 (three) times daily.          Diagnoses and health risks identified today and associated recommendations/orders:  1. Encounter for preventive health examination  Medicare aw complete. Health maintenance:  Tetanus vaccine, shingles vaccine, COVID-19 3rd vaccine, flu vaccine due-encourage patient to obtain.  Colonoscopy scheduled for 10/17/2022.    2. Centrilobular emphysema  Chronic and stable on current management.  Not on any medication.  Follow up with pulmonology.      3. Atherosclerosis of native coronary artery of native heart with angina pectoris  Chronic and stable.  Aspirin and Lipitor.  Blood pressure controlled.  No acute issues. Continue current management. See med list above. Follow up with cardiology.      4. Coronary artery disease of native artery of native heart with stable angina pectoris  Chronic and stable.  Aspirin and Lipitor.  Blood pressure controlled.  Patient denies any chest pain.  No acute issues. Continue current management. See med list above. F    5. Obesity (BMI 30.0-34.9)  This problem is currently not controlled. Instructed patient to diet and exercise to lose weight. Follow up with your PCP as planned to discuss adjustments to your treatment plan.      6. Benign prostatic hyperplasia with urinary obstruction   8. Urgency of urination  Chronic and stable  on current management.  On Flomax.  See med list above. Follow up with PCP.      7. Mixed hyperlipidemia  Lab Results   Component Value Date    CHOL 156 08/24/2022    CHOL 148 01/24/2022    CHOL 146 03/11/2021     Lab Results   Component Value Date    HDL 65 08/24/2022    HDL 63 01/24/2022    HDL 75 03/11/2021     Lab Results   Component Value Date    LDLCALC 74.6 08/24/2022    LDLCALC 65.2 01/24/2022    LDLCALC 59.0 (L) 03/11/2021     Lab Results   Component Value Date    TRIG 82 08/24/2022    TRIG 99 01/24/2022    TRIG 60 03/11/2021     Lab Results   Component Value Date    CHOLHDL 41.7 08/24/2022    CHOLHDL 42.6 01/24/2022    CHOLHDL 51.4 (H) 03/11/2021    Chronic and stable on current management.  On statin.  See med list above. Follow up with PCP.      9. Chronic pain syndrome  Chronic and stable on current management. See med list above. Follow up with PCP.  Not currently on any pain medicine at this time.  Patient states he is going to have right shoulder surgery and right knee surgery soon.  Patient requests walker/Rollator due to knee pain.  Patient states Dr. Barragan, orthopedic surgeon, will perform Shoulder surgery in 2 week and right knee surgery in the future.   - WALKER FOR HOME USE    10. Gastroesophageal reflux disease without esophagitis  Chronic and stable on current management. See med list above. Follow up with PCP.  On Prilosec.    11. Achalasia of esophagus  Chronic and stable on current management. See med list above. Follow up with PCP.  On Prilosec.    12. Cognitive complaints with normal exam  Patient states he is having memory issues.  Scored 5/5 on cognitive screening today.  Discussed with patient that some of his medications such as Valium can cause memory issues.  Has been seen by neuropsychology and will follow them also.    13. Adjustment disorder, unspecified type  Patient states he has difficulty with adjusting to being unable to work.  Follow-up with neuropsychology.    14.  Chronic prescription benzodiazepine use  Takes Valium daily.  And takes it as prescribed.    15. Mixed anxiety and depressive disorder  Chronic and stable on current management.  On BuSpar and Valium.  See med list above. Follow up with PCP.      16. Lumbosacral spondylosis with radiculopathy  Chronic and stable on current management. See med list above. Follow up with PCP.    - WALKER FOR HOME USE    17. Insomnia, unspecified type  Chronic and stable on current management. See med list above. Follow up with PCP.      18. Financial difficulties  Patient states he has financial difficulties, late on bills.  Case management consulted.  - Ambulatory referral/consult to Outpatient Case Management    19. Reduced mobility  Chronic. Fall precautions recommended. Follow up with PCP.  Patient requested shower chair and walker for home use.  Case management consulted to assist patient in getting equipment.  - BATH/SHOWER CHAIR FOR HOME USE  - WALKER FOR HOME USE        Provided Lenny with a 5-10 year written screening schedule and personal prevention plan. Recommendations were developed using the USPSTF age appropriate recommendations. Education, counseling, and referrals were provided as needed.  After Visit Summary printed and given to patient which includes a list of additional screenings\tests needed.    Follow up in about 1 year (around 10/3/2023) for annual wellness visit.      JORDON Henriquez      I offered to discuss advanced care planning, including how to pick a person who would make decisions for you if you were unable to make them for yourself, called a health care power of , and what kind of decisions you might make such as use of life sustaining treatments such as ventilators and tube feeding when faced with a life limiting illness recorded on a living will that they will need to know. (How you want to be cared for as you near the end of your natural life)     X Patient is interested in learning  more about how to make advanced directives.  I provided them paperwork and offered to discuss this with them.

## 2022-10-04 ENCOUNTER — HOSPITAL ENCOUNTER (OUTPATIENT)
Dept: RADIOLOGY | Facility: HOSPITAL | Age: 62
Discharge: HOME | End: 2022-10-04
Attending: FAMILY MEDICINE
Payer: MEDICARE

## 2022-10-04 ENCOUNTER — OFFICE VISIT (OUTPATIENT)
Dept: INTERNAL MEDICINE | Facility: CLINIC | Age: 62
End: 2022-10-04
Payer: MEDICARE

## 2022-10-04 VITALS
SYSTOLIC BLOOD PRESSURE: 108 MMHG | DIASTOLIC BLOOD PRESSURE: 70 MMHG | OXYGEN SATURATION: 97 % | RESPIRATION RATE: 16 BRPM | HEIGHT: 63 IN | WEIGHT: 184.94 LBS | HEART RATE: 90 BPM | BODY MASS INDEX: 32.77 KG/M2

## 2022-10-04 DIAGNOSIS — K21.9 GASTROESOPHAGEAL REFLUX DISEASE WITHOUT ESOPHAGITIS: ICD-10-CM

## 2022-10-04 DIAGNOSIS — F41.8 MIXED ANXIETY AND DEPRESSIVE DISORDER: ICD-10-CM

## 2022-10-04 DIAGNOSIS — G89.29 CHRONIC RIGHT SHOULDER PAIN: ICD-10-CM

## 2022-10-04 DIAGNOSIS — M25.511 CHRONIC RIGHT SHOULDER PAIN: Primary | ICD-10-CM

## 2022-10-04 DIAGNOSIS — N40.1 BENIGN PROSTATIC HYPERPLASIA WITH URINARY OBSTRUCTION: ICD-10-CM

## 2022-10-04 DIAGNOSIS — I25.118 CORONARY ARTERY DISEASE OF NATIVE ARTERY OF NATIVE HEART WITH STABLE ANGINA PECTORIS: ICD-10-CM

## 2022-10-04 DIAGNOSIS — E66.9 OBESITY (BMI 30.0-34.9): ICD-10-CM

## 2022-10-04 DIAGNOSIS — M25.511 CHRONIC RIGHT SHOULDER PAIN: ICD-10-CM

## 2022-10-04 DIAGNOSIS — Z01.818 PRE-OPERATIVE CLEARANCE: ICD-10-CM

## 2022-10-04 DIAGNOSIS — N13.8 BENIGN PROSTATIC HYPERPLASIA WITH URINARY OBSTRUCTION: ICD-10-CM

## 2022-10-04 DIAGNOSIS — K22.0 ACHALASIA OF ESOPHAGUS: ICD-10-CM

## 2022-10-04 DIAGNOSIS — G89.29 CHRONIC RIGHT SHOULDER PAIN: Primary | ICD-10-CM

## 2022-10-04 DIAGNOSIS — E78.2 MIXED HYPERLIPIDEMIA: ICD-10-CM

## 2022-10-04 PROBLEM — Z79.899 CHRONIC PRESCRIPTION BENZODIAZEPINE USE: Status: RESOLVED | Noted: 2022-06-01 | Resolved: 2022-10-04

## 2022-10-04 PROBLEM — R39.15 URGENCY OF URINATION: Status: RESOLVED | Noted: 2021-10-25 | Resolved: 2022-10-04

## 2022-10-04 PROBLEM — R45.1 AGITATION: Status: RESOLVED | Noted: 2022-06-01 | Resolved: 2022-10-04

## 2022-10-04 PROBLEM — G89.4 CHRONIC PAIN SYNDROME: Status: RESOLVED | Noted: 2022-06-01 | Resolved: 2022-10-04

## 2022-10-04 PROCEDURE — 71046 X-RAY EXAM CHEST 2 VIEWS: CPT | Mod: 26,,, | Performed by: RADIOLOGY

## 2022-10-04 PROCEDURE — 99499 UNLISTED E&M SERVICE: CPT | Mod: S$GLB,,, | Performed by: FAMILY MEDICINE

## 2022-10-04 PROCEDURE — 90686 IIV4 VACC NO PRSV 0.5 ML IM: CPT | Mod: S$GLB,ICN,, | Performed by: FAMILY MEDICINE

## 2022-10-04 PROCEDURE — 90686 FLU VACCINE (QUAD) GREATER THAN OR EQUAL TO 3YO PRESERVATIVE FREE IM: ICD-10-PCS | Mod: S$GLB,ICN,, | Performed by: FAMILY MEDICINE

## 2022-10-04 PROCEDURE — 1160F PR REVIEW ALL MEDS BY PRESCRIBER/CLIN PHARMACIST DOCUMENTED: ICD-10-PCS | Mod: CPTII,S$GLB,ICN, | Performed by: FAMILY MEDICINE

## 2022-10-04 PROCEDURE — 71046 X-RAY EXAM CHEST 2 VIEWS: CPT | Mod: TC

## 2022-10-04 PROCEDURE — 3008F BODY MASS INDEX DOCD: CPT | Mod: CPTII,S$GLB,ICN, | Performed by: FAMILY MEDICINE

## 2022-10-04 PROCEDURE — 71046 XR CHEST PA AND LATERAL: ICD-10-PCS | Mod: 26,,, | Performed by: RADIOLOGY

## 2022-10-04 PROCEDURE — 3078F DIAST BP <80 MM HG: CPT | Mod: CPTII,S$GLB,ICN, | Performed by: FAMILY MEDICINE

## 2022-10-04 PROCEDURE — G0008 FLU VACCINE (QUAD) GREATER THAN OR EQUAL TO 3YO PRESERVATIVE FREE IM: ICD-10-PCS | Mod: S$GLB,ICN,, | Performed by: FAMILY MEDICINE

## 2022-10-04 PROCEDURE — 99999 PR PBB SHADOW E&M-EST. PATIENT-LVL IV: CPT | Mod: PBBFAC,,, | Performed by: FAMILY MEDICINE

## 2022-10-04 PROCEDURE — 1159F MED LIST DOCD IN RCRD: CPT | Mod: CPTII,S$GLB,ICN, | Performed by: FAMILY MEDICINE

## 2022-10-04 PROCEDURE — 3078F PR MOST RECENT DIASTOLIC BLOOD PRESSURE < 80 MM HG: ICD-10-PCS | Mod: CPTII,S$GLB,ICN, | Performed by: FAMILY MEDICINE

## 2022-10-04 PROCEDURE — 3074F SYST BP LT 130 MM HG: CPT | Mod: CPTII,S$GLB,ICN, | Performed by: FAMILY MEDICINE

## 2022-10-04 PROCEDURE — 3074F PR MOST RECENT SYSTOLIC BLOOD PRESSURE < 130 MM HG: ICD-10-PCS | Mod: CPTII,S$GLB,ICN, | Performed by: FAMILY MEDICINE

## 2022-10-04 PROCEDURE — 99214 OFFICE O/P EST MOD 30 MIN: CPT | Mod: S$GLB,,, | Performed by: FAMILY MEDICINE

## 2022-10-04 PROCEDURE — 99214 PR OFFICE/OUTPT VISIT, EST, LEVL IV, 30-39 MIN: ICD-10-PCS | Mod: S$GLB,,, | Performed by: FAMILY MEDICINE

## 2022-10-04 PROCEDURE — 3008F PR BODY MASS INDEX (BMI) DOCUMENTED: ICD-10-PCS | Mod: CPTII,S$GLB,ICN, | Performed by: FAMILY MEDICINE

## 2022-10-04 PROCEDURE — G0008 ADMIN INFLUENZA VIRUS VAC: HCPCS | Mod: S$GLB,ICN,, | Performed by: FAMILY MEDICINE

## 2022-10-04 PROCEDURE — 99499 RISK ADDL DX/OHS AUDIT: ICD-10-PCS | Mod: S$GLB,,, | Performed by: FAMILY MEDICINE

## 2022-10-04 PROCEDURE — 1159F PR MEDICATION LIST DOCUMENTED IN MEDICAL RECORD: ICD-10-PCS | Mod: CPTII,S$GLB,ICN, | Performed by: FAMILY MEDICINE

## 2022-10-04 PROCEDURE — 99999 PR PBB SHADOW E&M-EST. PATIENT-LVL IV: ICD-10-PCS | Mod: PBBFAC,,, | Performed by: FAMILY MEDICINE

## 2022-10-04 PROCEDURE — 1160F RVW MEDS BY RX/DR IN RCRD: CPT | Mod: CPTII,S$GLB,ICN, | Performed by: FAMILY MEDICINE

## 2022-10-04 NOTE — PROGRESS NOTES
Subjective:       Patient ID: Lenny Latham Jr. is a 62 y.o. male.    Chief Complaint: Pre-op Exam     62-year-old  male patient with Patient Active Problem List:     GERD (gastroesophageal reflux disease)     Achalasia of esophagus     Atherosclerosis of native coronary artery of native heart with angina pectoris     Mixed anxiety and depressive disorder     Mixed hyperlipidemia     Coronary artery disease of native artery of native heart with stable angina pectoris     Benign prostatic hyperplasia with urinary obstruction     Erectile dysfunction     Obesity (BMI 30.0-34.9)     Fear of needles     Adjustment disorder     Cognitive complaints with normal exam     Insomnia     Centrilobular emphysema  reports that patient has been having chronic right shoulder pain up to 5/10 since having fall at work in January 2022 for which patient has finished physical therapy but still continues to have ongoing pain and is scheduled for arthroscopy but Dr. Jenkins at [facility], date not yet decided  Patient reports occasional tingling and numbness sensation to the right hand but denies any neck pain, upper back pain, chest pain or difficulty breathing  Currently has not been taking any pain medication  Patient has been followed by GI for achalasia  and is scheduled to get Botox injections and has been helpful  Denies any worsening anxiety or depression  Secondary to ongoing sleep disturbances patient has done sleep study test recently    Review of Systems   Constitutional:  Negative for appetite change, fatigue and fever.   Eyes:  Negative for visual disturbance.   Respiratory:  Negative for cough and shortness of breath.    Cardiovascular:  Negative for chest pain, palpitations and leg swelling.   Gastrointestinal:  Negative for abdominal pain, nausea and vomiting.   Genitourinary:  Negative for dysuria.   Musculoskeletal:  Positive for arthralgias. Negative for neck pain.   Skin:  Negative for  "rash.   Neurological:  Negative for numbness and headaches.   Psychiatric/Behavioral:  Positive for sleep disturbance. Negative for dysphoric mood. The patient is not nervous/anxious.        /70 (BP Location: Left arm, Patient Position: Sitting, BP Method: Large (Manual))   Pulse 90   Resp 16   Ht 5' 3" (1.6 m)   Wt 83.9 kg (184 lb 15.5 oz)   SpO2 97%   BMI 32.77 kg/m²   Objective:      Physical Exam  Constitutional:       Appearance: He is well-developed.   HENT:      Head: Normocephalic and atraumatic.   Cardiovascular:      Rate and Rhythm: Normal rate and regular rhythm.      Heart sounds: Normal heart sounds. No murmur heard.  Pulmonary:      Effort: Pulmonary effort is normal.      Breath sounds: Normal breath sounds. No wheezing.   Abdominal:      General: Bowel sounds are normal.      Palpations: Abdomen is soft.      Tenderness: There is no abdominal tenderness.   Musculoskeletal:         General: Tenderness present.      Comments: Positive for tenderness to the right shoulder anteriorly but no restricted range of motion noted on exam today    No paraspinal cervical muscle tenderness noted  Hand  2+ bilaterally   Skin:     General: Skin is warm and dry.      Findings: No rash.   Neurological:      Mental Status: He is alert and oriented to person, place, and time.   Psychiatric:         Mood and Affect: Mood normal.       Lab Visit on 10/04/2022   Component Date Value Ref Range Status    WBC 10/04/2022 7.11  3.90 - 12.70 K/uL Final    RBC 10/04/2022 4.80  4.60 - 6.20 M/uL Final    Hemoglobin 10/04/2022 14.3  14.0 - 18.0 g/dL Final    Hematocrit 10/04/2022 46.0  40.0 - 54.0 % Final    MCV 10/04/2022 96  82 - 98 fL Final    MCH 10/04/2022 29.8  27.0 - 31.0 pg Final    MCHC 10/04/2022 31.1 (L)  32.0 - 36.0 g/dL Final    RDW 10/04/2022 13.6  11.5 - 14.5 % Final    Platelets 10/04/2022 241  150 - 450 K/uL Final    MPV 10/04/2022 9.8  9.2 - 12.9 fL Final    Immature Granulocytes 10/04/2022 0.6 (H) "  0.0 - 0.5 % Final    Gran # (ANC) 10/04/2022 4.2  1.8 - 7.7 K/uL Final    Immature Grans (Abs) 10/04/2022 0.04  0.00 - 0.04 K/uL Final    Comment: Mild elevation in immature granulocytes is non specific and   can be seen in a variety of conditions including stress response,   acute inflammation, trauma and pregnancy. Correlation with other   laboratory and clinical findings is essential.      Lymph # 10/04/2022 2.1  1.0 - 4.8 K/uL Final    Mono # 10/04/2022 0.6  0.3 - 1.0 K/uL Final    Eos # 10/04/2022 0.2  0.0 - 0.5 K/uL Final    Baso # 10/04/2022 0.04  0.00 - 0.20 K/uL Final    nRBC 10/04/2022 0  0 /100 WBC Final    Gran % 10/04/2022 58.4  38.0 - 73.0 % Final    Lymph % 10/04/2022 29.7  18.0 - 48.0 % Final    Mono % 10/04/2022 7.9  4.0 - 15.0 % Final    Eosinophil % 10/04/2022 2.8  0.0 - 8.0 % Final    Basophil % 10/04/2022 0.6  0.0 - 1.9 % Final    Differential Method 10/04/2022 Automated   Final    Sodium 10/04/2022 138  136 - 145 mmol/L Final    Potassium 10/04/2022 4.2  3.5 - 5.1 mmol/L Final    Chloride 10/04/2022 103  95 - 110 mmol/L Final    CO2 10/04/2022 28  23 - 29 mmol/L Final    Glucose 10/04/2022 83  70 - 110 mg/dL Final    BUN 10/04/2022 9  8 - 23 mg/dL Final    Creatinine 10/04/2022 0.9  0.5 - 1.4 mg/dL Final    Calcium 10/04/2022 9.2  8.7 - 10.5 mg/dL Final    Total Protein 10/04/2022 7.0  6.0 - 8.4 g/dL Final    Albumin 10/04/2022 4.0  3.5 - 5.2 g/dL Final    Total Bilirubin 10/04/2022 0.5  0.1 - 1.0 mg/dL Final    Comment: For infants and newborns, interpretation of results should be based  on gestational age, weight and in agreement with clinical  observations.    Premature Infant recommended reference ranges:  Up to 24 hours.............<8.0 mg/dL  Up to 48 hours............<12.0 mg/dL  3-5 days..................<15.0 mg/dL  6-29 days.................<15.0 mg/dL      Alkaline Phosphatase 10/04/2022 97  55 - 135 U/L Final    AST 10/04/2022 26  10 - 40 U/L Final    ALT 10/04/2022 27  10 - 44 U/L  Final    Anion Gap 10/04/2022 7 (L)  8 - 16 mmol/L Final    eGFR 10/04/2022 >60.0  >60 mL/min/1.73 m^2 Final   Lab Visit on 10/04/2022   Component Date Value Ref Range Status    Specimen UA 10/04/2022 Urine, Clean Catch   Final    Color, UA 10/04/2022 Yellow  Yellow, Straw, Pilar Final    Appearance, UA 10/04/2022 Clear  Clear Final    pH, UA 10/04/2022 6.0  5.0 - 8.0 Final    Specific Gravity, UA 10/04/2022 1.025  1.005 - 1.030 Final    Protein, UA 10/04/2022 Negative  Negative Final    Comment: Recommend a 24 hour urine protein or a urine   protein/creatinine ratio if globulin induced proteinuria is  clinically suspected.      Glucose, UA 10/04/2022 Negative  Negative Final    Ketones, UA 10/04/2022 Negative  Negative Final    Bilirubin (UA) 10/04/2022 Negative  Negative Final    Occult Blood UA 10/04/2022 1+ (A)  Negative Final    Nitrite, UA 10/04/2022 Negative  Negative Final    Leukocytes, UA 10/04/2022 Trace (A)  Negative Final    RBC, UA 10/04/2022 1  0 - 4 /hpf Final    WBC, UA 10/04/2022 50 (H)  0 - 5 /hpf Final    Bacteria 10/04/2022 Occasional  None-Occ /hpf Final    Squam Epithel, UA 10/04/2022 0  /hpf Final    Microscopic Comment 10/04/2022 SEE COMMENT   Final    Comment: Other formed elements not mentioned in the report are not   present in the microscopic examination.        X-Ray Chest PA And Lateral  Narrative: EXAMINATION:  XR CHEST PA AND LATERAL    CLINICAL HISTORY:  Pain in right shoulder    TECHNIQUE:  PA and lateral views of the chest were performed.    COMPARISON:  10/13/2020    FINDINGS:  Mild scarring or atelectasis at the left lung base.  No confluent airspace disease.  Descending thoracic aorta is tortuous.  Mild calcification of the aortic knob.  Cardiomediastinal silhouette and osseous structures are stable in appearance.  Impression: As above    Electronically signed by: Alex Bañuelos MD  Date:    10/04/2022  Time:    10:59   Assessment/Plan:   1. Chronic right shoulder pain  - CBC  Auto Differential; Future  - Comprehensive Metabolic Panel; Future  - Urinalysis, Reflex to Urine Culture Urine, Clean Catch; Future  - X-Ray Chest PA And Lateral; Future  2. Pre-operative clearance  - CBC Auto Differential; Future  - Comprehensive Metabolic Panel; Future  - Urinalysis, Reflex to Urine Culture Urine, Clean Catch; Future  - X-Ray Chest PA And Lateral; Future  Patient has been clinically doing well  Will check further labs including chest x-ray and urinalysis  Did not get any preop information from [facility]  Patient was advised to request preop clearance information and will send it to [facility] orthopedic physician when available    Reviewed preop labs showing mild urinary tract infection-Bactrim has been sent to pharmacy  Otherwise stable labs and patient is at low risk for the proposed surgery    3. Coronary artery disease of native artery of native heart with stable angina pectoris  Stable and asymptomatic  Reviewed previous EKG done in the last 6 months which has been stable    4. Gastroesophageal reflux disease without esophagitis  5. Achalasia of esophagus  Clinically doing well on omeprazole 40 mg daily and Botox injections    6. Mixed hyperlipidemia  Currently taking Lipitor 40 mg daily    7. Benign prostatic hyperplasia with urinary obstruction  Stable on Flomax    8. Obesity (BMI 30.0-34.9)  Lifestyle modifications discussed    9. Mixed anxiety and depressive disorder   Clinically doing well on BuSpar 15 mg twice daily and diazepam as needed followed by Psychiatry    Flu shot given today

## 2022-10-06 ENCOUNTER — OFFICE VISIT (OUTPATIENT)
Dept: NEUROLOGY | Facility: CLINIC | Age: 62
End: 2022-10-06
Payer: MEDICARE

## 2022-10-06 ENCOUNTER — PATIENT MESSAGE (OUTPATIENT)
Dept: INTERNAL MEDICINE | Facility: CLINIC | Age: 62
End: 2022-10-06
Payer: MEDICAID

## 2022-10-06 DIAGNOSIS — Z87.820 HISTORY OF CONCUSSION: ICD-10-CM

## 2022-10-06 DIAGNOSIS — F45.1 SOMATIC SYMPTOM DISORDER: Primary | ICD-10-CM

## 2022-10-06 DIAGNOSIS — N30.00 ACUTE CYSTITIS WITHOUT HEMATURIA: Primary | ICD-10-CM

## 2022-10-06 PROCEDURE — 99499 NO LOS: ICD-10-PCS | Mod: S$GLB,,, | Performed by: STUDENT IN AN ORGANIZED HEALTH CARE EDUCATION/TRAINING PROGRAM

## 2022-10-06 PROCEDURE — 99999 PR PBB SHADOW E&M-EST. PATIENT-LVL I: CPT | Mod: PBBFAC,,, | Performed by: STUDENT IN AN ORGANIZED HEALTH CARE EDUCATION/TRAINING PROGRAM

## 2022-10-06 PROCEDURE — 99999 PR PBB SHADOW E&M-EST. PATIENT-LVL I: ICD-10-PCS | Mod: PBBFAC,,, | Performed by: STUDENT IN AN ORGANIZED HEALTH CARE EDUCATION/TRAINING PROGRAM

## 2022-10-06 PROCEDURE — 1159F PR MEDICATION LIST DOCUMENTED IN MEDICAL RECORD: ICD-10-PCS | Mod: CPTII,S$GLB,, | Performed by: STUDENT IN AN ORGANIZED HEALTH CARE EDUCATION/TRAINING PROGRAM

## 2022-10-06 PROCEDURE — 99499 UNLISTED E&M SERVICE: CPT | Mod: S$GLB,,, | Performed by: STUDENT IN AN ORGANIZED HEALTH CARE EDUCATION/TRAINING PROGRAM

## 2022-10-06 PROCEDURE — 1159F MED LIST DOCD IN RCRD: CPT | Mod: CPTII,S$GLB,, | Performed by: STUDENT IN AN ORGANIZED HEALTH CARE EDUCATION/TRAINING PROGRAM

## 2022-10-06 RX ORDER — SULFAMETHOXAZOLE AND TRIMETHOPRIM 400; 80 MG/1; MG/1
1 TABLET ORAL 2 TIMES DAILY
Qty: 14 TABLET | Refills: 0 | Status: SHIPPED | OUTPATIENT
Start: 2022-10-06 | End: 2022-10-13

## 2022-10-06 NOTE — PROGRESS NOTES
Mr. Latham and his girlfriend attended a in-person feedback session to discuss the results from his recent neuropsychological testing (see report dated 09/23/2022). We discussed his test results, diagnoses, and my recommendations. Mr. Latham and his girlfriend voiced their understanding of the information provided, and voiced their intention to follow through on the recommendations provided. All questions were answered to their satisfaction and Mr. Latham was provided with a copy of his report. he was encouraged to contact our office with any future questions or concerns.         _____________________  Robin Wilder, Ph.D.  Neuropsychologist  [facility]  Department of Neurology     Billing Information:   Time spent discussing test results with the patient: 57 minutes  Total time spent for this encounter, including discussion of test results, review of pertinent records, and documentation of this encounter: 60 minutes  [address]30 (1) Billed separately.

## 2022-10-27 ENCOUNTER — PATIENT OUTREACH (OUTPATIENT)
Dept: ADMINISTRATIVE | Facility: OTHER | Age: 62
End: 2022-10-27
Payer: MEDICAID

## 2022-10-27 NOTE — PROGRESS NOTES
CHW - Initial Contact    This Community Health Worker updated the Social Determinant of Health questionnaire with patient via telephone today.    Pt identified barriers of most importance are: Patient stated he needs help with rental assistance and utility bill payment, he's fine on food because he gets [employer] benefits.    Referrals to community agencies completed with patient/caregiver consent outside of [facility] include: yes: [facility].  Referrals were put through [facility] - yes: [facility].  Support and Services: patient has support with his significant other.  Other information discussed the patient needs / wants help with: none at this time.   Follow up required: Yes, update on rental assistance or utility payment or other needs.  No future outreach task assigned

## 2022-11-01 ENCOUNTER — TELEPHONE (OUTPATIENT)
Dept: GASTROENTEROLOGY | Facility: CLINIC | Age: 62
End: 2022-11-01
Payer: MEDICARE

## 2022-11-01 NOTE — TELEPHONE ENCOUNTER
----- Message from Margareth Espino sent at 11/1/2022  9:07 AM CDT -----  Contact: Pt  Pt is requesting a callback from nurse. Pt is requesting instructions for 11/7 procedure. Call asap per pt.     Confirmed contact below:   Contact Name:Lenny Latham  Phone Number: [phone]

## 2022-11-01 NOTE — TELEPHONE ENCOUNTER
Returned patient's call regarding upcoming procedure. Questions answered and prep instructions reviewed. Patient verbalized understanding.   complains of pain/discomfort

## 2022-11-03 ENCOUNTER — TELEPHONE (OUTPATIENT)
Dept: GASTROENTEROLOGY | Facility: CLINIC | Age: 62
End: 2022-11-03
Payer: MEDICARE

## 2022-11-03 ENCOUNTER — TELEPHONE (OUTPATIENT)
Dept: ENDOSCOPY | Facility: HOSPITAL | Age: 62
End: 2022-11-03
Payer: MEDICARE

## 2022-11-03 NOTE — TELEPHONE ENCOUNTER
----- Message from Manuelito Ovalles MA sent at 11/3/2022 10:40 AM CDT -----  Contact: [phone]  Pt is calling to push surgery back that's scheduled 11/07/22. Pt states he is in a lot of pain, and can't walk. Please reach out to pt at [phone]

## 2022-11-17 ENCOUNTER — TELEPHONE (OUTPATIENT)
Dept: ENDOSCOPY | Facility: HOSPITAL | Age: 62
End: 2022-11-17
Payer: MEDICARE

## 2022-11-17 ENCOUNTER — PATIENT MESSAGE (OUTPATIENT)
Dept: ENDOSCOPY | Facility: HOSPITAL | Age: 62
End: 2022-11-17
Payer: MEDICARE

## 2022-11-17 RX ORDER — PROMETHAZINE HYDROCHLORIDE 6.25 MG/5ML
12.5 SYRUP ORAL EVERY 8 HOURS PRN
Qty: 473 ML | Refills: 6 | Status: SHIPPED | OUTPATIENT
Start: 2022-11-17 | End: 2023-04-13 | Stop reason: SDUPTHER

## 2022-11-17 NOTE — TELEPHONE ENCOUNTER
Contacted patient to reschedule procedure. As per our conversation,  patient is rescheduled for EGD with Botox on 1/9/23 at 12:30 pm. Instructions reviewed with patient and informed instructions will be mailed and on patient portal for review.

## 2022-11-17 NOTE — TELEPHONE ENCOUNTER
Patient is scheduled for EGD in January. Patient states that he only has a month left supply of Promethazine and will need a refill prior to EGD.     Thanks,  Jenna Gtz

## 2022-11-22 ENCOUNTER — TELEPHONE (OUTPATIENT)
Dept: UROLOGY | Facility: CLINIC | Age: 62
End: 2022-11-22
Payer: MEDICARE

## 2022-11-30 DIAGNOSIS — F41.8 MIXED ANXIETY AND DEPRESSIVE DISORDER: ICD-10-CM

## 2022-11-30 NOTE — TELEPHONE ENCOUNTER
Patient has and appt for 2/1/23 patient on your wait list for an earlier appt. Having multiple surgeries.

## 2022-12-01 RX ORDER — DIAZEPAM 5 MG/1
5 TABLET ORAL DAILY PRN
Qty: 30 TABLET | Refills: 1 | Status: SHIPPED | OUTPATIENT
Start: 2022-12-01 | End: 2023-02-01 | Stop reason: SDUPTHER

## 2022-12-16 ENCOUNTER — CLINICAL SUPPORT (OUTPATIENT)
Dept: PULMONOLOGY | Facility: CLINIC | Age: 62
End: 2022-12-16
Payer: MEDICARE

## 2022-12-16 ENCOUNTER — OFFICE VISIT (OUTPATIENT)
Dept: PULMONOLOGY | Facility: CLINIC | Age: 62
End: 2022-12-16
Payer: MEDICARE

## 2022-12-16 VITALS
RESPIRATION RATE: 17 BRPM | SYSTOLIC BLOOD PRESSURE: 128 MMHG | OXYGEN SATURATION: 99 % | BODY MASS INDEX: 33.98 KG/M2 | HEIGHT: 63 IN | WEIGHT: 191.81 LBS | DIASTOLIC BLOOD PRESSURE: 78 MMHG | HEART RATE: 87 BPM

## 2022-12-16 VITALS — HEIGHT: 63 IN | BODY MASS INDEX: 33.98 KG/M2 | WEIGHT: 191.81 LBS

## 2022-12-16 DIAGNOSIS — J43.2 CENTRILOBULAR EMPHYSEMA: Primary | ICD-10-CM

## 2022-12-16 DIAGNOSIS — J43.2 CENTRILOBULAR EMPHYSEMA: ICD-10-CM

## 2022-12-16 DIAGNOSIS — G47.00 INSOMNIA, UNSPECIFIED TYPE: ICD-10-CM

## 2022-12-16 DIAGNOSIS — R06.83 SNORING: ICD-10-CM

## 2022-12-16 DIAGNOSIS — G47.30 SLEEP-DISORDERED BREATHING: ICD-10-CM

## 2022-12-16 DIAGNOSIS — R40.0 DAYTIME SLEEPINESS: ICD-10-CM

## 2022-12-16 LAB
BRPFT: NORMAL
DLCO ADJ PRE: 11.59 ML/(MIN*MMHG)
DLCO SINGLE BREATH LLN: 15.93
DLCO SINGLE BREATH PRE REF: 50.7 %
DLCO SINGLE BREATH REF: 22.85
DLCOC SBVA LLN: 2.54
DLCOC SBVA PRE REF: 72.4 %
DLCOC SBVA REF: 4.01
DLCOC SINGLE BREATH LLN: 15.93
DLCOC SINGLE BREATH PRE REF: 50.7 %
DLCOC SINGLE BREATH REF: 22.85
DLCOVA LLN: 2.54
DLCOVA PRE REF: 72.4 %
DLCOVA PRE: 2.9 ML/(MIN*MMHG*L)
DLCOVA REF: 4.01
DLVAADJ PRE: 2.9 ML/(MIN*MMHG*L)
ERV LLN: -16449.02
ERV PRE REF: 96.7 %
ERV REF: 0.98
FEF 25 75 LLN: 0.84
FEF 25 75 PRE REF: 74.3 %
FEF 25 75 REF: 2.07
FEV1 FVC LLN: 67
FEV1 FVC PRE REF: 92 %
FEV1 FVC REF: 79
FEV1 LLN: 1.69
FEV1 PRE REF: 98.2 %
FEV1 REF: 2.36
FRCPLETH LLN: 2.22
FRCPLETH PREREF: 119.3 %
FRCPLETH REF: 3.21
FVC LLN: 2.2
FVC PRE REF: 107 %
FVC REF: 2.98
IVC PRE: 2.39 L
IVC SINGLE BREATH LLN: 2.2
IVC SINGLE BREATH PRE REF: 80.3 %
IVC SINGLE BREATH REF: 2.98
MVV LLN: 88
MVV PRE REF: 64.6 %
MVV REF: 104
PEF LLN: 4.54
PEF PRE REF: 78.3 %
PEF REF: 6.69
PRE DLCO: 11.59 ML/(MIN*MMHG)
PRE ERV: 0.95 L
PRE FEF 25 75: 1.54 L/S
PRE FET 100: 10 SEC
PRE FEV1 FVC: 72.69 %
PRE FEV1: 2.32 L
PRE FRC PL: 3.83 L
PRE FVC: 3.19 L
PRE MVV: 67 L/MIN
PRE PEF: 5.24 L/S
PRE RV: 2.53 L
PRE TLC: 5.86 L
RAW LLN: 3.06
RAW PRE REF: 86.6 %
RAW PRE: 2.65 CMH2O*S/L
RAW REF: 3.06
RV LLN: 1.56
RV PRE REF: 113.6 %
RV REF: 2.23
RVTLC LLN: 29
RVTLC PRE REF: 113.3 %
RVTLC PRE: 43.22 %
RVTLC REF: 38
TLC LLN: 4.55
TLC PRE REF: 102.8 %
TLC REF: 5.7
VA PRE: 4 L
VA SINGLE BREATH LLN: 5.55
VA SINGLE BREATH PRE REF: 71.9 %
VA SINGLE BREATH REF: 5.55
VC LLN: 2.2
VC PRE REF: 111.8 %
VC PRE: 3.33 L
VC REF: 2.98
VTGRAWPRE: 3.83 L

## 2022-12-16 PROCEDURE — 94618 PULMONARY STRESS TESTING: ICD-10-PCS | Mod: S$GLB,,, | Performed by: INTERNAL MEDICINE

## 2022-12-16 PROCEDURE — 94618 PULMONARY STRESS TESTING: CPT | Mod: S$GLB,,, | Performed by: INTERNAL MEDICINE

## 2022-12-16 PROCEDURE — 1159F PR MEDICATION LIST DOCUMENTED IN MEDICAL RECORD: ICD-10-PCS | Mod: CPTII,S$GLB,, | Performed by: NURSE PRACTITIONER

## 2022-12-16 PROCEDURE — 94726 PLETHYSMOGRAPHY LUNG VOLUMES: CPT | Mod: S$GLB,,, | Performed by: INTERNAL MEDICINE

## 2022-12-16 PROCEDURE — 1160F RVW MEDS BY RX/DR IN RCRD: CPT | Mod: CPTII,S$GLB,, | Performed by: NURSE PRACTITIONER

## 2022-12-16 PROCEDURE — 99999 PR PBB SHADOW E&M-EST. PATIENT-LVL III: ICD-10-PCS | Mod: PBBFAC,,, | Performed by: NURSE PRACTITIONER

## 2022-12-16 PROCEDURE — 99999 PR PBB SHADOW E&M-EST. PATIENT-LVL III: CPT | Mod: PBBFAC,,, | Performed by: NURSE PRACTITIONER

## 2022-12-16 PROCEDURE — 1160F PR REVIEW ALL MEDS BY PRESCRIBER/CLIN PHARMACIST DOCUMENTED: ICD-10-PCS | Mod: CPTII,S$GLB,, | Performed by: NURSE PRACTITIONER

## 2022-12-16 PROCEDURE — 94010 BREATHING CAPACITY TEST: CPT | Mod: S$GLB,,, | Performed by: INTERNAL MEDICINE

## 2022-12-16 PROCEDURE — 3008F BODY MASS INDEX DOCD: CPT | Mod: CPTII,S$GLB,, | Performed by: NURSE PRACTITIONER

## 2022-12-16 PROCEDURE — 94729 PR C02/MEMBANE DIFFUSE CAPACITY: ICD-10-PCS | Mod: S$GLB,,, | Performed by: INTERNAL MEDICINE

## 2022-12-16 PROCEDURE — 99499 UNLISTED E&M SERVICE: CPT | Mod: S$GLB,,, | Performed by: NURSE PRACTITIONER

## 2022-12-16 PROCEDURE — 3074F PR MOST RECENT SYSTOLIC BLOOD PRESSURE < 130 MM HG: ICD-10-PCS | Mod: CPTII,S$GLB,, | Performed by: NURSE PRACTITIONER

## 2022-12-16 PROCEDURE — 3078F DIAST BP <80 MM HG: CPT | Mod: CPTII,S$GLB,, | Performed by: NURSE PRACTITIONER

## 2022-12-16 PROCEDURE — 3078F PR MOST RECENT DIASTOLIC BLOOD PRESSURE < 80 MM HG: ICD-10-PCS | Mod: CPTII,S$GLB,, | Performed by: NURSE PRACTITIONER

## 2022-12-16 PROCEDURE — 94729 DIFFUSING CAPACITY: CPT | Mod: S$GLB,,, | Performed by: INTERNAL MEDICINE

## 2022-12-16 PROCEDURE — 94726 PULM FUNCT TST PLETHYSMOGRAP: ICD-10-PCS | Mod: S$GLB,,, | Performed by: INTERNAL MEDICINE

## 2022-12-16 PROCEDURE — 99499 RISK ADDL DX/OHS AUDIT: ICD-10-PCS | Mod: S$GLB,,, | Performed by: NURSE PRACTITIONER

## 2022-12-16 PROCEDURE — 3008F PR BODY MASS INDEX (BMI) DOCUMENTED: ICD-10-PCS | Mod: CPTII,S$GLB,, | Performed by: NURSE PRACTITIONER

## 2022-12-16 PROCEDURE — 3074F SYST BP LT 130 MM HG: CPT | Mod: CPTII,S$GLB,, | Performed by: NURSE PRACTITIONER

## 2022-12-16 PROCEDURE — 99214 OFFICE O/P EST MOD 30 MIN: CPT | Mod: 25,S$GLB,, | Performed by: NURSE PRACTITIONER

## 2022-12-16 PROCEDURE — 94010 BREATHING CAPACITY TEST: ICD-10-PCS | Mod: S$GLB,,, | Performed by: INTERNAL MEDICINE

## 2022-12-16 PROCEDURE — 99214 PR OFFICE/OUTPT VISIT, EST, LEVL IV, 30-39 MIN: ICD-10-PCS | Mod: 25,S$GLB,, | Performed by: NURSE PRACTITIONER

## 2022-12-16 PROCEDURE — 1159F MED LIST DOCD IN RCRD: CPT | Mod: CPTII,S$GLB,, | Performed by: NURSE PRACTITIONER

## 2022-12-16 RX ORDER — HYDROCODONE BITARTRATE AND ACETAMINOPHEN 10; 325 MG/1; MG/1
1 TABLET ORAL EVERY 6 HOURS PRN
COMMUNITY
Start: 2022-12-02 | End: 2023-04-02

## 2022-12-16 NOTE — PROGRESS NOTES
Pulmonary Outpatient Follow Up Visit     Subjective:       Patient ID: Lenny Latham Jr. is a 62 y.o. male.    Chief Complaint: Centrilobular emphysema        HPI      Patient is a 61 y.o. male  presenting for 3 months follow-up review CPFT.   Patient new to me.   See prior by Dr. Ruelas.     Referred initially June 2022 or evaluation of suspected sleep apnea.  Awaiting home sleep study PFT and 6 minute walking test.  Home sleep study scheduled September 2022.    Complains of snoring, witnessed apneic spells, excessive daytime sleepiness and nonrestorative sleep.      Former smoker.  Prior cigar smoker about 1-2 times a week. quit 2020.     Used to work off [employer]. , . Some asbestosis exposure as as  and  1978 - 1983.     10/4/2022 chest xray mild scarring no pleural plaque seen.     10/13/2022 CT chest/abdomen mild emphysematous changes in both lungs.     12/16/2022 CPFT Normal spirometry FEV 98.2%. Lung volumes normal. Moderate reduced Diffusing capacity,diffusing capacity may be underestimated.     Sleep Apnea  Patient has been observed snoring with restless sleep, frequent awakening.   Patient reports non restful' sleep.  He denies morning headache.   He reports day time napping.  Day time napping duration 30 Minutes  He denies recent weight gain.  Cardiovascular risk factors: hyperlipidemia and obesity  Sleep maintenance difficulties related to early morning awakening, frequent night time awakening, difficulty falling asleep, and non-restful sleep  Wake after sleep onset occurs two times a night.  Nocturia occurs two times a night,   Sleep aids : Yes, valium as needed anxiety  Dry mouth : Yes  Sleep walking: No  Sleep talking : No  Sleep eating:No  Vivid Dreams : No  Cataplexy : No    Fossil Sleepiness Scale   EPWORTH SLEEPINESS SCALE 9/6/2022 6/7/2022   Sitting and reading 1 2   Watching TV 1 2   Sitting, inactive in a  public place (e.g. a theatre or a meeting) 0 0   As a passenger in a car for an hour without a break 0 0   Lying down to rest in the afternoon when circumstances permit 0 1   Sitting and talking to someone 0 0   Sitting quietly after a lunch without alcohol 0 2   In a car, while stopped for a few minutes in traffic 0 0   Total score 2 7       Neck circumference is 16.  Mallampati score 4    STOP - BANG Questionnaire:   1. Snoring : Do you snore loudly ?     YES  2. Tired : Do you often feel tired, fatigued, or sleepy during daytime?   YES  3. Observed: Has anyone observed you stop breathing during your sleep?    NO  4. Blood pressure : Do you have or are you being treated for high blood pressure?   NO  5. BMI :BMI more than 35 kg/m2?   NO  6. Age : Age over 50 yr old?    YES  7. Neck circumference: Neck circumference greater than 40 cm?    YES  8. Gender: Gender male?    YES    SCORE: 5    High risk of NATALIE: Yes 5 - 8  Intermediate risk of NATALIE: Yes 3 - 4  Low risk of NATALIE: Yes 0 - 2      Review of Systems   Constitutional:  Positive for fatigue. Negative for fever, chills, activity change and appetite change.   HENT:  Negative for nosebleeds.    Eyes:  Negative for redness.   Respiratory:  Positive for apnea, snoring and somnolence. Negative for cough, sputum production, choking, chest tightness and wheezing.    Cardiovascular:  Negative for chest pain.   Genitourinary:  Negative for hematuria.   Endocrine:  Negative for cold intolerance.    Musculoskeletal:  Positive for arthralgias and back pain.   Skin:  Negative for rash.   Gastrointestinal:  Negative for vomiting.   Neurological:  Negative for syncope.   Hematological:  Negative for adenopathy.   Psychiatric/Behavioral:  Positive for sleep disturbance. Negative for confusion. The patient is nervous/anxious.      Outpatient Encounter Medications as of 12/16/2022   Medication Sig Dispense Refill    aspirin (ECOTRIN) 81 MG EC tablet Take 81 mg by mouth once daily.    "   atorvastatin (LIPITOR) 40 MG tablet TAKE 1 TABLET(40 MG) BY MOUTH EVERY EVENING 90 tablet 3    busPIRone (BUSPAR) 15 MG tablet Take 1 tablet (15 mg total) by mouth 2 (two) times daily. 60 tablet 2    diazePAM (VALIUM) 5 MG tablet Take 1 tablet (5 mg total) by mouth daily as needed for Anxiety. 30 tablet 1    HYDROcodone-acetaminophen (NORCO)  mg per tablet Take 1 tablet by mouth every 6 (six) hours as needed.      omeprazole (PRILOSEC) 40 MG capsule TAKE 1 CAPSULE(40 MG) BY MOUTH EVERY DAY 30 capsule 5    promethazine (PHENERGAN) 6.25 mg/5 mL syrup Take 10 mLs (12.5 mg total) by mouth every 8 (eight) hours as needed for Nausea. 473 mL 6    tamsulosin (FLOMAX) 0.4 mg Cap Take 1 capsule (0.4 mg total) by mouth once daily. 90 capsule 3    tiZANidine (ZANAFLEX) 4 MG tablet Take 4 mg by mouth 3 (three) times daily.       Facility-Administered Encounter Medications as of 12/16/2022   Medication Dose Route Frequency Provider Last Rate Last Admin    0.9%  NaCl infusion   Intravenous Continuous Brenden Pizarro MD   New Bag at 05/23/22 1238    ondansetron injection 4 mg  4 mg Intravenous Q12H PRN Brenden Pizarro MD           Objective:     Vital Signs (Most Recent)  Vital Signs  Pulse: 87  Resp: 17  SpO2: 99 %  BP: 128/78  Height and Weight  Height: 5' 3" (160 cm)  Weight: 87 kg (191 lb 12.8 oz)  BSA (Calculated - sq m): 1.97 sq meters  BMI (Calculated): 34  Weight in (lb) to have BMI = 25: 140.8]  Wt Readings from Last 2 Encounters:   12/16/22 87 kg (191 lb 12.8 oz)   12/16/22 87 kg (191 lb 12.8 oz)       Physical Exam   Constitutional: He is oriented to person, place, and time. He appears well-developed and well-nourished. No distress.   HENT:   Head: Normocephalic.   Right Ear: External ear normal.   Left Ear: External ear normal.   Nose: Nose normal.   Mouth/Throat: Oropharynx is clear and moist. No oropharyngeal exudate. Mallampati Score: IV.   Cardiovascular: Normal rate and regular rhythm. " "  Pulmonary/Chest: Normal expansion and effort normal. No stridor. No respiratory distress. He has decreased breath sounds (posterior bases). He exhibits no tenderness.   Abdominal: He exhibits no distension.   Musculoskeletal:         General: No tenderness.      Cervical back: Neck supple.   Lymphadenopathy:     He has no cervical adenopathy.   Neurological: He is alert and oriented to person, place, and time. Gait normal.   Skin: Skin is warm. No cyanosis. Nails show no clubbing.   Psychiatric: He has a normal mood and affect. His behavior is normal. Judgment and thought content normal.   Nursing note and vitals reviewed.    Laboratory    12/16/2022 CPFT Normal spirometry FEV 98.2%. Lung volumes normal. Moderate reduced Diffusing capacity,diffusing capacity may be underestimated.     12/16/2022 6MWD No desaturations requiring supplemental oxygen at rest or exertion  Ordering Provider: Marguerite Ruelas MD      Interpreting Provider: Marguerite Ruelas MD  Performing nurse/tech/RT: VT, RT  Diagnosis:  (Centrilobular emphysema)  Height: 5' 3" (160 cm)  Weight: 87 kg (191 lb 12.8 oz)  BMI (Calculated): 34     Patient Race:                                                                 Phase Oxygen Assessment Supplemental O2 Heart   Rate Blood Pressure Tae Dyspnea Scale Rating   Resting 99 % Room Air 92 bpm 110/75 0   Exercise             Minute             1 96 % Room Air 110 bpm       2 96 % Room Air 103 bpm       3 96 % Room Air 109 bpm       4 97 % Room Air 112 bpm       5 97 % Room Air 114 bpm       6  97 % Room Air 113 bpm 116/73 4   Recovery             Minute             1 97 % Room Air 99 bpm       2 98 % Room Air 98 bpm       3 98 % Room Air 100 bpm       4 98 % Room Air 104 bpm 114/70 0      Six Minute Walk Summary  6MWT Status: completed without stopping  Patient Reported: Dyspnea, No complaints (knee and should pain)      Interpretation:  Did the patient stop or pause?: No  Total Time " Walked (Calculated): 360 seconds  Final Partial Lap Distance (feet): 0 feet  Total Distance Meters (Calculated): 304.8 meters  Predicted Distance Meters (Calculated): 437.84 meters  Percentage of Predicted (Calculated): 69.61  Peak VO2 (Calculated): 13.12  Mets: 3.75  Has The Patient Had a Previous Six Minute Walk Test?: No     Previous 6MWT Results  Has The Patient Had a Previous Six Minute Walk Test?: No       EXAMINATION:  XR CHEST PA AND LATERAL     CLINICAL HISTORY:  Pain in right shoulder     TECHNIQUE:  PA and lateral views of the chest were performed.     COMPARISON:  10/13/2020     FINDINGS:  Mild scarring or atelectasis at the left lung base.  No confluent airspace disease.  Descending thoracic aorta is tortuous.  Mild calcification of the aortic knob.  Cardiomediastinal silhouette and osseous structures are stable in appearance.     Impression:     As above        Electronically signed by: Alex Bañuelos MD  Date:                                            10/04/2022  Time:                                           10:59    Lab Results   Component Value Date    WBC 7.11 10/04/2022    RBC 4.80 10/04/2022    HGB 14.3 10/04/2022    HCT 46.0 10/04/2022    MCV 96 10/04/2022    MCH 29.8 10/04/2022    MCHC 31.1 (L) 10/04/2022    RDW 13.6 10/04/2022     10/04/2022    MPV 9.8 10/04/2022    GRAN 4.2 10/04/2022    GRAN 58.4 10/04/2022    LYMPH 2.1 10/04/2022    LYMPH 29.7 10/04/2022    MONO 0.6 10/04/2022    MONO 7.9 10/04/2022    EOS 0.2 10/04/2022    BASO 0.04 10/04/2022    EOSINOPHIL 2.8 10/04/2022    BASOPHIL 0.6 10/04/2022       BMP  Lab Results   Component Value Date     10/04/2022    K 4.2 10/04/2022     10/04/2022    CO2 28 10/04/2022    BUN 9 10/04/2022    CREATININE 0.9 10/04/2022    CALCIUM 9.2 10/04/2022    ANIONGAP 7 (L) 10/04/2022    ESTGFRAFRICA >60.0 05/16/2022    EGFRNONAA >60.0 05/16/2022    AST 26 10/04/2022    ALT 27 10/04/2022    PROT 7.0 10/04/2022       Lab Results    Component Value Date    BNP 18 10/13/2020    BNP 17 07/23/2019       Lab Results   Component Value Date    TSH 1.189 08/24/2022       No results found for: SEDRATE    No results found for: CRP  No results found for: IGE     No results found for: ASPERGILLUS  No results found for: AFUMIGATUSCL     No results found for: ACE     Diagnostic Results:  I have personally reviewed today the following studies:    CT chest 10/2020  CLINICAL HISTORY:  dysphagia;     TECHNIQUE:  5 mm contiguous axial images are performed through the chest abdomen and pelvis following administration of IV and oral contrast.  Multiplanar reconstruction is performed     COMPARISON:  01/31/2013     FINDINGS:  CT scan chest:     Lung windows:     There are mild emphysematous changes in both lungs.  The central airways are widely patent.     Mediastinum:     The esophagus is distended with both air and fluid.  A small hiatal hernia is noted.     The heart size is normal there are no pleural effusions.  No mediastinal or hilar lymphadenopathy is seen.     Bone windows appear normal for age.           Assessment/Plan:       Problem List Items Addressed This Visit       Insomnia    Relevant Orders    Polysomnogram (CPAP will be added if patient meets diagnostic criteria.)    Centrilobular emphysema - Primary     Seen on CT imaging 10/13/2022 CTchest/abdomen mild emphysematous changes in both lungs.   Not symptomatic, not on inhalers.  12/16/22 spirometry is normal, no COPD gold findings.  10/4/2022 chest xray mild scarring no pleural plaque seen.   Follow up 6 months review spirometry     12/16/2022 CPFT Normal spirometry FEV 98.2%. Lung volumes normal. Moderate reduced Diffusing capacity,diffusing capacity may be underestimated.             Relevant Orders    Spirometry with/without bronchodilator     Other Visit Diagnoses       Sleep-disordered breathing        Relevant Orders    Polysomnogram (CPAP will be added if patient meets diagnostic  criteria.)    Snoring        Relevant Orders    Polysomnogram (CPAP will be added if patient meets diagnostic criteria.)    Daytime sleepiness        Relevant Orders    Polysomnogram (CPAP will be added if patient meets diagnostic criteria.)            Never completed home sleep study ordered June 2022, he feels he will not understand how to perform Home Sleep Study. He is willing for in lab PSG, patient is not ready to perform PSG at this time, he had right rotator cuff surgery, still healing. He would like to perform PSG in about May 2023.     Quit cigars 2020.     12/16/2022 CPFT Normal spirometry FEV 98.2%. Lung volumes normal. Moderate reduced Diffusing capacity,diffusing capacity may be underestimated.  12/16/2022 6 minute walking test No desaturations requiring supplemental oxygen at rest or exertion.    Prior visit received PCV 20.      Complaining of difficulty falling asleep, takes daily Valium for anxiety, dr Ruelas advised at prior visit for him to take Valium at bedtime. He is to follow up with primary care provider on anxiety.     I spent a total of 37 minutes on the day of the visit.  This includes face to face time and non-face to face time preparing to see the patient (eg, review of tests), obtaining and/or reviewing separately obtained history, documenting clinical information in the electronic or other health record, independently interpreting results and communicating results to the patient/family/caregiver, or care coordinator.        Follow up in about 6 months (around 6/16/2023) for Emphysema review brent. Sleep Study results review.    Discussed diagnosis, its evaluation, treatment and usual course. All questions answered.      Blanca Cao NP

## 2022-12-16 NOTE — PROCEDURES
"[facility]  Six [facility]     SUMMARY     Ordering Provider: Marguerite Ruelas MD   Interpreting Provider: Marguerite Ruelas MD  Performing nurse/tech/RT: VT, RT  Diagnosis:  (Centrilobular emphysema)  Height: 5' 3" (160 cm)  Weight: 87 kg (191 lb 12.8 oz)  BMI (Calculated): 34   Patient Race:             Phase Oxygen Assessment Supplemental O2 Heart   Rate Blood Pressure Tae Dyspnea Scale Rating   Resting 99 % Room Air 92 bpm 110/75 0   Exercise        Minute        1 96 % Room Air 110 bpm     2 96 % Room Air 103 bpm     3 96 % Room Air 109 bpm     4 97 % Room Air 112 bpm     5 97 % Room Air 114 bpm     6  97 % Room Air 113 bpm 116/73 4   Recovery        Minute        1 97 % Room Air 99 bpm     2 98 % Room Air 98 bpm     3 98 % Room Air 100 bpm     4 98 % Room Air 104 bpm 114/70 0     Six Minute Walk Summary  6MWT Status: completed without stopping  Patient Reported: Dyspnea, No complaints (knee and should pain)     Interpretation:  Did the patient stop or pause?: No                                         Total Time Walked (Calculated): 360 seconds  Final Partial Lap Distance (feet): 0 feet  Total Distance Meters (Calculated): 304.8 meters  Predicted Distance Meters (Calculated): 437.84 meters  Percentage of Predicted (Calculated): 69.61  Peak VO2 (Calculated): 13.12  Mets: 3.75  Has The Patient Had a Previous Six Minute Walk Test?: No       Previous 6MWT Results  Has The Patient Had a Previous Six Minute Walk Test?: No      "

## 2022-12-16 NOTE — ASSESSMENT & PLAN NOTE
Seen on CT imaging 10/13/2022 CTchest/abdomen mild emphysematous changes in both lungs.   Not symptomatic, not on inhalers.  12/16/22 spirometry is normal, no COPD gold findings.  10/4/2022 chest xray mild scarring no pleural plaque seen.   Follow up 6 months review spirometry     12/16/2022 CPFT Normal spirometry FEV 98.2%. Lung volumes normal. Moderate reduced Diffusing capacity,diffusing capacity may be underestimated.

## 2022-12-28 ENCOUNTER — HOSPITAL ENCOUNTER (OUTPATIENT)
Dept: SLEEP MEDICINE | Facility: HOSPITAL | Age: 62
Discharge: HOME | End: 2022-12-28
Attending: NURSE PRACTITIONER
Payer: MEDICARE

## 2022-12-28 DIAGNOSIS — G47.30 SLEEP-DISORDERED BREATHING: ICD-10-CM

## 2022-12-28 DIAGNOSIS — G47.00 INSOMNIA, UNSPECIFIED TYPE: ICD-10-CM

## 2022-12-28 DIAGNOSIS — R06.83 SNORING: ICD-10-CM

## 2022-12-28 DIAGNOSIS — G47.33 OSA (OBSTRUCTIVE SLEEP APNEA): Primary | ICD-10-CM

## 2022-12-28 DIAGNOSIS — R40.0 DAYTIME SLEEPINESS: ICD-10-CM

## 2022-12-28 DIAGNOSIS — G47.61 PLMD (PERIODIC LIMB MOVEMENT DISORDER): ICD-10-CM

## 2022-12-28 NOTE — Clinical Note
DIAGNOSIS: Obstructive Sleep Apnea / 327.23, PLMD, Insomnia  RECOMMENDATIONS: 1. CPAP titration or autoPAP trial 2. Sleep hygeine

## 2022-12-30 PROBLEM — G47.30 SLEEP-DISORDERED BREATHING: Status: ACTIVE | Noted: 2022-12-30

## 2022-12-30 PROCEDURE — 95810 POLYSOM 6/> YRS 4/> PARAM: CPT | Mod: 26,,, | Performed by: INTERNAL MEDICINE

## 2022-12-30 PROCEDURE — 95810 PR POLYSOMNOGRAPHY, 4 OR MORE: ICD-10-PCS | Mod: 26,,, | Performed by: INTERNAL MEDICINE

## 2022-12-30 NOTE — PROCEDURES
"PATIENT: Lenny Latham  study Date: 12/28/2022  Referring Physician: Blanca Cao NP    Indications for Polysomnography: The patient is a 53 year year old Male who is 5' 3" and weighs 191.0 lbs.  His BMI equals 33.8.  A full night polysomnogram was performed to evaluate for -.Complains of snoring, witnessed apneic spells, excessive daytime sleepiness and nonrestorative sleep. Billings score 7. STOPBANG score 5.  Medical history: GERD, depression, Hyperlipidemia, coronary artery disease, insomnia, emphysema  Medications: Aspirin, Lipitor, Buspar,Valium,Norco, promethazine, Zanaflex, Flomax    Polysomnogram Data:  A full night polysomnogram recorded the standard physiologic parameters including EEG, EOG, EMG, EKG, nasal and oral airflow.  Respiratory parameters of chest and abdominal movements were recorded with [place]-Crystal motion transducers.  Oxygen saturation was recorded by pulse oximetry.      Sleep Architecture:  The total recording time of the polysomnogram was 467.4 minutes.  The total sleep time was 351.5 minutes.  The patient spent 8.4% of total sleep time in Stage N1, 53.5% in Stage N2, 5.3% in Stages N3 and N, and 32.9% in REM.   Sleep latency was 53.9 minutes.  REM latency was 54.5 minutes.  Sleep Efficiency was 75.2%.  Sleep Maintenance Efficiency was 84.4%.  Total wake time was 116.0 minutes for a total wake percentage of 14.9%.    Respiratory Events:  The polysomnogram revealed a presence of 35 obstructive, - central, and - mixed apneas resulting in an Apnea index of 6.0 events per hour.  There were 92 hypopneas (using 3% desaturation criteria) resulting in a Hypopnea index of 15.7 events per hour.  The combined Apnea/Hypopnea index (using 3% desaturation criteria for Hypopneas) was 21.7 events per hour.    Baseline oxygen saturation was 92.5%.  The lowest oxygen saturation was 79.0%.      LIMB ACTIVITY:  There were 20 limb movements recorded.  Of this total, 20 were classified as PLMs.  Of " the PLMs, 2 were associated with arousals.  The Limb Movement index was 3.4 per hour while the PLM index was 3.4 per hour.    CARDIAC SUMMARY:   The average pulse rate was 74.8 bpm.  The minimum pulse rate was 25.0 bpm while the maximum pulse rate was 105.0 bpm.    CONCLUSION:  Moderate obstructive sleep apnea: AHI was 21.7/hr  Limb movement: very mild: index 3.4/hr   Awake: 22:36 pm to 22:30 pm, 01:56 am to 02:14am, 02:56 am to 03:14 am    DIAGNOSIS: Obstructive Sleep Apnea / 327.23, PLMD, Insomnia    RECOMMENDATIONS:  CPAP titration or autoPAP trial  Sleep hygeine

## 2023-01-02 DIAGNOSIS — G47.33 OBSTRUCTIVE SLEEP APNEA: Primary | ICD-10-CM

## 2023-01-02 NOTE — PROGRESS NOTES
Orders Placed This Encounter   Procedures    BiPAP/CPAP Titration ((Must have dx of NATALIE from previous sleep study)     Standing Status:   Future     Standing Expiration Date:   1/2/2024     Order Specific Question:   Titration Type:     Answer:   CPAP     1. Obstructive sleep apnea  BiPAP/CPAP Titration ((Must have dx of NATALIE from previous sleep study)

## 2023-01-05 ENCOUNTER — PATIENT OUTREACH (OUTPATIENT)
Dept: ADMINISTRATIVE | Facility: OTHER | Age: 63
End: 2023-01-05
Payer: MEDICARE

## 2023-01-05 ENCOUNTER — HOSPITAL ENCOUNTER (OUTPATIENT)
Dept: SLEEP MEDICINE | Facility: HOSPITAL | Age: 63
Discharge: HOME | End: 2023-01-05
Attending: NURSE PRACTITIONER
Payer: MEDICARE

## 2023-01-05 DIAGNOSIS — G47.33 OBSTRUCTIVE SLEEP APNEA: ICD-10-CM

## 2023-01-05 PROCEDURE — 95811 POLYSOM 6/>YRS CPAP 4/> PARM: CPT

## 2023-01-05 NOTE — PROGRESS NOTES
CHW - Follow Up    This Community Health Worker completed a follow up visit with patient via telephone today.  Pt/Caregiver reported: needs rental assistance and utilities assistance.  Community Health Worker provided:  help with programs information in the area that can help Pt from [email or web address].  Follow up required: Yes, to follow up with Pt to check on status of any of the programs that was sent.  Follow-up Outreach - Due: 1/11/2023

## 2023-01-05 NOTE — Clinical Note
CONCLUSION: 1. Optimal titration residual AHI 2.4/hr 2. CPAP 8 cm was well tolerated 3. Mask & Size:  M Airfit F20 4. PLM's with arousal very low: 1.0 minute 5. SpO2 was below 88% for 10.2 minutes 6. Wake after sleep onset was high            DIAGNOSIS: Obstructive sleep apnea, PLMD,   RECOMMENDATIONS: 1. Therapy with CPAP 8 cmwp, mask nasal 2. Follow up overnight oximetry

## 2023-01-09 NOTE — H&P
Short Stay Endoscopy History and Physical    PCP - Herminia Longoria MD     Procedure - EGD w Botox  ASA - per anesthesia  Mallampati - per anesthesia  History of Anesthesia problems - no  Family history Anesthesia problems -  no   Plan of anesthesia - General    HPI:  This is a 62 y.o. male here for evaluation of dysphagia        Medical History:  has a past medical history of Achalasia of esophagus (11/28/2016), Anxiety state, unspecified, Centrilobular emphysema (10/3/2022), Coronary artery disease, Esophageal reflux, Esophageal stricture, Hypertrophy of prostate without urinary obstruction and other lower urinary tract symptoms (LUTS), Screening PSA (prostate specific antigen) (12/7/12), and Unspecified essential hypertension.    Surgical History:  has a past surgical history that includes Back surgery; Esophagogastroduodenoscopy; Colonoscopy; Hernia repair; Upper gastrointestinal endoscopy (04/27/2016); Inguinal hernia repair (Right); CERVICAL EPIDURAL STEROID INJECTION; LUMBAR EPIDURAL STEROID INJECTION; Esophagogastroduodenoscopy (N/A, 8/16/2019); Esophagogastroduodenoscopy (N/A, 5/23/2022); and Esophageal manometry with measurement of impedance (N/A, 5/23/2022).    Family History: family history includes Hypertension in his father; Lung cancer in his mother; Prostate cancer in his father and maternal grandfather; Ulcers in his brother, sister, and sister.. Otherwise no colon cancer, inflammatory bowel disease, or GI malignancies.    Social History:  reports that he quit smoking about 3 years ago. His smoking use included cigars. He started smoking about 7 years ago. He has never used smokeless tobacco. He reports that he does not currently use alcohol. He reports that he does not currently use drugs after having used the following drugs: Marijuana.    Review of patient's allergies indicates:  No Known Allergies    Medications:   No medications prior to admission.       Physical Exam:    Vital Signs: There were  no vitals filed for this visit.    I have explained the risks and benefits of endoscopy procedures to the patient including but not limited to bleeding, perforation, infection, and death.      Madina Hoover MD

## 2023-01-10 PROCEDURE — 95811 POLYSOM 6/>YRS CPAP 4/> PARM: CPT | Mod: 26,,, | Performed by: INTERNAL MEDICINE

## 2023-01-10 PROCEDURE — 95811 PR POLYSOMNOGRAPHY W/CPAP: ICD-10-PCS | Mod: 26,,, | Performed by: INTERNAL MEDICINE

## 2023-01-11 NOTE — PROCEDURES
CONCLUSION:  Optimal titration residual AHI 2.4/hr  CPAP 8 cm was well tolerated  Mask & Size:  M Airfit F20  PLM's with arousal very low: 1.0 minute  SpO2 was below 88% for 10.2 minutes  Wake after sleep onset was high                        DIAGNOSIS: Obstructive sleep apnea, PLMD,     RECOMMENDATIONS:  Therapy with CPAP 8 cmwp, mask nasal  Follow up overnight oximetry

## 2023-01-12 ENCOUNTER — PATIENT OUTREACH (OUTPATIENT)
Dept: ADMINISTRATIVE | Facility: OTHER | Age: 63
End: 2023-01-12
Payer: MEDICARE

## 2023-01-12 ENCOUNTER — PATIENT MESSAGE (OUTPATIENT)
Dept: PULMONOLOGY | Facility: CLINIC | Age: 63
End: 2023-01-12
Payer: MEDICARE

## 2023-01-12 DIAGNOSIS — G47.33 OBSTRUCTIVE SLEEP APNEA: Primary | ICD-10-CM

## 2023-01-12 NOTE — ASSESSMENT & PLAN NOTE
12/28/2023 PSG Moderate obstructive sleep apnea: AHI was 21.7/hr  1/5/2023 cpap 8 cm optimal  1/12/2023 order cpap 8 w/nasal mask, needs over night on cpap 8 after IDL  Follow up 31-90 days from obtaining CPAP for IDL.

## 2023-01-12 NOTE — TELEPHONE ENCOUNTER
Orders Placed This Encounter   Procedures    CPAP FOR HOME USE     12/28/2023 PSG Moderate obstructive sleep apnea: AHI was 21.7/hr     Order Specific Question:   Length of need (1-99 months):     Answer:   99     Order Specific Question:   Type ():     Answer:   CPAP     Order Specific Question:   CPAP setting (cmH20):     Answer:   8     Order Specific Question:   Fulfillment Priority:     Answer:   Level 4:  all others     Order Specific Question:   Humidification ():     Answer:   Heated     Order Specific Question:   Choose ONE mask type and its corresponding cushions and/or pillows:     Answer:    Nasal Mask, 1 per 90 days:  Nasal Cushions, (6 per 90 days):  Nasal Pillows, (6 per 90 days)     Order Specific Question:   Choose EITHER Heated or Non-Heated Tubjing     Answer:    Non-Heated Tubing, 1 per 90 days     Order Specific Question:   Number of Days Needed:     Answer:   99     Order Specific Question:   All other supplies as needed as listed below:     Answer:    Headgear, 1 per 180 days     Order Specific Question:   All other supplies as needed as listed below:     Answer:    Chin Strap, 1 per 180 days     Order Specific Question:   All other supplies as needed as listed below:     Answer:    Disposable Filter, 6 per 90 days     Order Specific Question:   All other supplies as needed as listed below:     Answer:    Non-Disposable Filter, 1 per 180 days     Order Specific Question:   All other supplies as needed as listed below:     Answer:    Humidifier Chamber, 1 per 180 days     1. Obstructive sleep apnea  CPAP FOR HOME USE

## 2023-01-12 NOTE — PROGRESS NOTES
CHW - Outreach Attempt    Community Health Worker left a voicemail message for 1st attempt to contact patient regarding: follow up visit today.

## 2023-01-19 DIAGNOSIS — Z12.11 COLON CANCER SCREENING: Primary | ICD-10-CM

## 2023-01-19 NOTE — PROGRESS NOTES
CHW - Case Closure    This Community Health Worker did not speak to patient via telephone today.   Pt/Caregiver reported: Pt kept hanging up the phone and asking plus hollering why this Community Health Worker is calling, but in mid sentence Pt would hang up.

## 2023-01-24 ENCOUNTER — HOSPITAL ENCOUNTER (OUTPATIENT)
Dept: PREADMISSION TESTING | Facility: HOSPITAL | Age: 63
Discharge: HOME | End: 2023-01-24
Attending: INTERNAL MEDICINE
Payer: MEDICARE

## 2023-01-24 DIAGNOSIS — Z12.11 COLON CANCER SCREENING: ICD-10-CM

## 2023-02-01 ENCOUNTER — OFFICE VISIT (OUTPATIENT)
Dept: PSYCHIATRY | Facility: CLINIC | Age: 63
End: 2023-02-01
Payer: COMMERCIAL

## 2023-02-01 ENCOUNTER — TELEPHONE (OUTPATIENT)
Dept: INTERNAL MEDICINE | Facility: CLINIC | Age: 63
End: 2023-02-01
Payer: MEDICARE

## 2023-02-01 VITALS
HEART RATE: 114 BPM | WEIGHT: 190.06 LBS | SYSTOLIC BLOOD PRESSURE: 126 MMHG | DIASTOLIC BLOOD PRESSURE: 82 MMHG | BODY MASS INDEX: 33.66 KG/M2

## 2023-02-01 DIAGNOSIS — M47.22 OSTEOARTHRITIS OF SPINE WITH RADICULOPATHY, CERVICAL REGION: ICD-10-CM

## 2023-02-01 DIAGNOSIS — F41.8 MIXED ANXIETY AND DEPRESSIVE DISORDER: ICD-10-CM

## 2023-02-01 DIAGNOSIS — G89.29 CHRONIC RIGHT SHOULDER PAIN: Primary | ICD-10-CM

## 2023-02-01 DIAGNOSIS — M25.511 CHRONIC RIGHT SHOULDER PAIN: Primary | ICD-10-CM

## 2023-02-01 PROCEDURE — 3079F PR MOST RECENT DIASTOLIC BLOOD PRESSURE 80-89 MM HG: ICD-10-PCS | Mod: CPTII,S$GLB,, | Performed by: PSYCHIATRY & NEUROLOGY

## 2023-02-01 PROCEDURE — 3008F PR BODY MASS INDEX (BMI) DOCUMENTED: ICD-10-PCS | Mod: CPTII,S$GLB,, | Performed by: PSYCHIATRY & NEUROLOGY

## 2023-02-01 PROCEDURE — 99499 RISK ADDL DX/OHS AUDIT: ICD-10-PCS | Mod: S$GLB,,, | Performed by: PSYCHIATRY & NEUROLOGY

## 2023-02-01 PROCEDURE — 99999 PR PBB SHADOW E&M-EST. PATIENT-LVL II: CPT | Mod: PBBFAC,,, | Performed by: PSYCHIATRY & NEUROLOGY

## 2023-02-01 PROCEDURE — 99214 OFFICE O/P EST MOD 30 MIN: CPT | Mod: S$GLB,,, | Performed by: PSYCHIATRY & NEUROLOGY

## 2023-02-01 PROCEDURE — 3074F PR MOST RECENT SYSTOLIC BLOOD PRESSURE < 130 MM HG: ICD-10-PCS | Mod: CPTII,S$GLB,, | Performed by: PSYCHIATRY & NEUROLOGY

## 2023-02-01 PROCEDURE — 3008F BODY MASS INDEX DOCD: CPT | Mod: CPTII,S$GLB,, | Performed by: PSYCHIATRY & NEUROLOGY

## 2023-02-01 PROCEDURE — 3074F SYST BP LT 130 MM HG: CPT | Mod: CPTII,S$GLB,, | Performed by: PSYCHIATRY & NEUROLOGY

## 2023-02-01 PROCEDURE — 3079F DIAST BP 80-89 MM HG: CPT | Mod: CPTII,S$GLB,, | Performed by: PSYCHIATRY & NEUROLOGY

## 2023-02-01 PROCEDURE — 99999 PR PBB SHADOW E&M-EST. PATIENT-LVL II: ICD-10-PCS | Mod: PBBFAC,,, | Performed by: PSYCHIATRY & NEUROLOGY

## 2023-02-01 PROCEDURE — 99499 UNLISTED E&M SERVICE: CPT | Mod: S$GLB,,, | Performed by: PSYCHIATRY & NEUROLOGY

## 2023-02-01 PROCEDURE — 99214 PR OFFICE/OUTPT VISIT, EST, LEVL IV, 30-39 MIN: ICD-10-PCS | Mod: S$GLB,,, | Performed by: PSYCHIATRY & NEUROLOGY

## 2023-02-01 RX ORDER — DIAZEPAM 5 MG/1
5 TABLET ORAL DAILY PRN
Qty: 30 TABLET | Refills: 2 | Status: SHIPPED | OUTPATIENT
Start: 2023-02-01 | End: 2023-05-03 | Stop reason: SDUPTHER

## 2023-02-01 RX ORDER — QUETIAPINE FUMARATE 200 MG/1
200 TABLET, FILM COATED ORAL NIGHTLY
Qty: 30 TABLET | Refills: 3 | Status: SHIPPED | OUTPATIENT
Start: 2023-02-01 | End: 2023-03-30

## 2023-02-01 RX ORDER — BUSPIRONE HYDROCHLORIDE 15 MG/1
15 TABLET ORAL 2 TIMES DAILY
Qty: 60 TABLET | Refills: 3 | Status: SHIPPED | OUTPATIENT
Start: 2023-02-01 | End: 2023-05-05 | Stop reason: SDUPTHER

## 2023-02-01 NOTE — TELEPHONE ENCOUNTER
----- Message from Cheyanne Balderrama sent at 2/1/2023 12:13 PM CST -----  Contact: don  Patient is requesting a referral for pain management. Please call him back at [phone] or send a message on my [facility].             Thanks  DD
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
## 2023-02-01 NOTE — TELEPHONE ENCOUNTER
S/W pt and informed him that Dr. Longoria put in referral for pain management and someone from that dept will contact him to schedule an appointment.

## 2023-02-16 ENCOUNTER — TELEPHONE (OUTPATIENT)
Dept: PAIN MEDICINE | Facility: CLINIC | Age: 63
End: 2023-02-16
Payer: MEDICARE

## 2023-02-16 NOTE — TELEPHONE ENCOUNTER
----- Message from Riana Renteria sent at 2/16/2023  2:38 PM CST -----  Contact: [phone]  Pt is calling in regards to questions he has about  his referral paperwork. Pt stated that he is needing to know where to pick it up from. Please call pt back at [phone]. Thanks KB

## 2023-02-16 NOTE — TELEPHONE ENCOUNTER
Reached out to patient to inform him that [facility] needs [facility] to get the rest of his paper work.  Pt understand. All questions answered.     Cisco Pike  Medical Assistant

## 2023-02-16 NOTE — TELEPHONE ENCOUNTER
Reached out to patient to from messages to inform him I will print out his last office note and leave it at the  at the [place] location. I also gave him the number medical records to get the rest of his needed information.    Pt understand. All questions answered.     Cisco Pike  Medical Assistant

## 2023-02-16 NOTE — TELEPHONE ENCOUNTER
----- Message from Ning Gonzalez sent at 2/16/2023  2:06 PM CST -----  Contact: [phone]  Pt is requesting paper work(what was done in surgery) , he decided to go to another facility and is needing to come  his paper work . Please call back at [phone] . Thanks.dj

## 2023-03-27 ENCOUNTER — TELEPHONE (OUTPATIENT)
Dept: GASTROENTEROLOGY | Facility: CLINIC | Age: 63
End: 2023-03-27
Payer: MEDICARE

## 2023-03-27 NOTE — TELEPHONE ENCOUNTER
Ma called pt to scheduled follow up   Pt states he didn't complete EGD and now hes having problems   Pt states he would like to proceed with procedure     Please advise

## 2023-03-30 ENCOUNTER — TELEPHONE (OUTPATIENT)
Dept: PSYCHIATRY | Facility: CLINIC | Age: 63
End: 2023-03-30
Payer: MEDICARE

## 2023-03-30 RX ORDER — PALIPERIDONE 3 MG/1
3 TABLET, EXTENDED RELEASE ORAL NIGHTLY
Qty: 30 TABLET | Refills: 2 | Status: SHIPPED | OUTPATIENT
Start: 2023-03-30 | End: 2023-05-17

## 2023-03-30 NOTE — TELEPHONE ENCOUNTER
----- Message from Aly Chapman sent at 3/30/2023  3:47 PM CDT -----  Contact: Patient  Lenny Nathaniel Latham Jr. Would like a call back at [phone], in regards to scheduling an appointment with Dr. Campbell. Pt states he is having bad side effects to a medication he was prescribed. He would like to get an appointment scheduled on tomorrow if possible.

## 2023-03-30 NOTE — TELEPHONE ENCOUNTER
Patient stated he started taking seroquel for 3 weeks.     Started feeling light headed 3 days ago.     Urgent care told him it was a Side effect of the seroquel     Patient stated he doesn't want it anymore.     Wants to try another medication.   Provider made aware

## 2023-03-31 ENCOUNTER — OFFICE VISIT (OUTPATIENT)
Dept: INTERNAL MEDICINE | Facility: CLINIC | Age: 63
End: 2023-03-31
Payer: MEDICARE

## 2023-03-31 VITALS
TEMPERATURE: 98 F | WEIGHT: 179.25 LBS | DIASTOLIC BLOOD PRESSURE: 70 MMHG | OXYGEN SATURATION: 97 % | SYSTOLIC BLOOD PRESSURE: 102 MMHG | HEIGHT: 63 IN | BODY MASS INDEX: 31.76 KG/M2 | HEART RATE: 120 BPM

## 2023-03-31 DIAGNOSIS — K22.0 ACHALASIA OF ESOPHAGUS: ICD-10-CM

## 2023-03-31 DIAGNOSIS — K21.9 GASTROESOPHAGEAL REFLUX DISEASE WITHOUT ESOPHAGITIS: ICD-10-CM

## 2023-03-31 DIAGNOSIS — R00.0 TACHYCARDIA: Primary | ICD-10-CM

## 2023-03-31 PROCEDURE — 99214 OFFICE O/P EST MOD 30 MIN: CPT | Mod: S$GLB,,, | Performed by: NURSE PRACTITIONER

## 2023-03-31 PROCEDURE — 3074F SYST BP LT 130 MM HG: CPT | Mod: CPTII,S$GLB,, | Performed by: NURSE PRACTITIONER

## 2023-03-31 PROCEDURE — 3008F BODY MASS INDEX DOCD: CPT | Mod: CPTII,S$GLB,, | Performed by: NURSE PRACTITIONER

## 2023-03-31 PROCEDURE — 3078F PR MOST RECENT DIASTOLIC BLOOD PRESSURE < 80 MM HG: ICD-10-PCS | Mod: CPTII,S$GLB,, | Performed by: NURSE PRACTITIONER

## 2023-03-31 PROCEDURE — 99999 PR PBB SHADOW E&M-EST. PATIENT-LVL IV: ICD-10-PCS | Mod: PBBFAC,,, | Performed by: NURSE PRACTITIONER

## 2023-03-31 PROCEDURE — 99999 PR PBB SHADOW E&M-EST. PATIENT-LVL IV: CPT | Mod: PBBFAC,,, | Performed by: NURSE PRACTITIONER

## 2023-03-31 PROCEDURE — 3008F PR BODY MASS INDEX (BMI) DOCUMENTED: ICD-10-PCS | Mod: CPTII,S$GLB,, | Performed by: NURSE PRACTITIONER

## 2023-03-31 PROCEDURE — 3078F DIAST BP <80 MM HG: CPT | Mod: CPTII,S$GLB,, | Performed by: NURSE PRACTITIONER

## 2023-03-31 PROCEDURE — 99214 PR OFFICE/OUTPT VISIT, EST, LEVL IV, 30-39 MIN: ICD-10-PCS | Mod: S$GLB,,, | Performed by: NURSE PRACTITIONER

## 2023-03-31 PROCEDURE — 3074F PR MOST RECENT SYSTOLIC BLOOD PRESSURE < 130 MM HG: ICD-10-PCS | Mod: CPTII,S$GLB,, | Performed by: NURSE PRACTITIONER

## 2023-03-31 RX ORDER — OMEPRAZOLE 40 MG/1
40 CAPSULE, DELAYED RELEASE ORAL DAILY
Qty: 30 CAPSULE | Refills: 5 | Status: SHIPPED | OUTPATIENT
Start: 2023-03-31 | End: 2023-09-26

## 2023-03-31 NOTE — PROGRESS NOTES
Subjective:       Patient ID: Lenny Latham Jr. is a 62 y.o. male.    Chief Complaint: Gastroesophageal Reflux    HPI      Missed egd- due to covid- was scheduled 2 mo ago- has esophagreal stricture-feels like food is getting stuck mid chest  HR up, no chest pain   Out of omeprazole x 1 week  Alcohol - drinking  Not eating x 2 days        Past Medical History:   Diagnosis Date    Achalasia of esophagus 11/28/2016    Anxiety state, unspecified     Centrilobular emphysema 10/3/2022    Coronary artery disease     Esophageal reflux     Esophageal stricture     Hypertrophy of prostate without urinary obstruction and other lower urinary tract symptoms (LUTS)     Screening PSA (prostate specific antigen) 12/7/12    0.4    Unspecified essential hypertension      Past Surgical History:   Procedure Laterality Date    BACK SURGERY      CERVICAL EPIDURAL STEROID INJECTION      COLONOSCOPY      ESOPHAGEAL MANOMETRY WITH MEASUREMENT OF IMPEDANCE N/A 5/23/2022    Procedure: MANOMETRY-ESOPHAGEAL-WITH IMPEDANCE;  Surgeon: Madina Hoover MD;  Location: [facility] ([address] [place] [address]);  Service: Endoscopy;  Laterality: N/A;  hold pain medication 24 hours prior to procedure    ESOPHAGOGASTRODUODENOSCOPY      ESOPHAGOGASTRODUODENOSCOPY N/A 8/16/2019    Procedure: EGD (ESOPHAGOGASTRODUODENOSCOPY);  Surgeon: Pawna Schwab MD;  Location: [facility];  Service: Endoscopy;  Laterality: N/A;    ESOPHAGOGASTRODUODENOSCOPY N/A 5/23/2022    Procedure: ESOPHAGOGASTRODUODENOSCOPY (EGD);  Surgeon: Madina Hoover MD;  [address]  Service: Endoscopy;  Laterality: N/A;  Endoflip/Endoscopically placed manometry probe  2nd floor-End stage achalasia  propofol only  full iquid diet x3 days, clear liquid diet x1 day prior to procedure  fully vaccinated, instructions [facility] and [facility]    HERNIA REPAIR      INGUINAL HERNIA REPAIR Right     LUMBAR EPIDURAL STEROID INJECTION      UPPER GASTROINTESTINAL ENDOSCOPY  04/27/2016        Social History     Socioeconomic History    Marital status: Single    Years of education: 12th   Occupational History    Occupation: Disabled   Tobacco Use    Smoking status: Former     Types: Cigars     Start date: 2016     Quit date: 2020     Years since quitting: 3.2    Smokeless tobacco: Never    Tobacco comments:     Cigar 1-2 times a week x about 4 years   Substance and Sexual Activity    Alcohol use: Not Currently     Comment: once or twice a year 1-2 beers    Drug use: Not Currently     Types: Marijuana     Social Determinants of Health     Financial Resource Strain: High Risk    Difficulty of Paying Living Expenses: Hard   Food Insecurity: Food Insecurity Present    Worried About Running Out of Food in the Last Year: Sometimes true    Ran Out of Food in the Last Year: Sometimes true   Transportation Needs: Unmet Transportation Needs    Lack of Transportation (Medical): Yes    Lack of Transportation (Non-Medical): No   Physical Activity: Insufficiently Active    Days of Exercise per Week: 4 days    Minutes of Exercise per Session: 30 min   Stress: Stress Concern Present    Feeling of Stress : Very much   Social Connections: Unknown    Frequency of Communication with Friends and Family: Twice a week    Attends Oriental orthodox Services: More than 4 times per year    Active Member of Clubs or Organizations: No    Attends Club or Organization Meetings: Never    Marital Status:    Housing Stability: High Risk    Unable to Pay for Housing in the Last Year: Yes    Number of Places Lived in the Last Year: 1    Unstable Housing in the Last Year: No     Review of patient's allergies indicates:  No Known Allergies  Current Outpatient Medications   Medication Sig    aspirin (ECOTRIN) 81 MG EC tablet Take 81 mg by mouth once daily.    atorvastatin (LIPITOR) 40 MG tablet TAKE 1 TABLET(40 MG) BY MOUTH EVERY EVENING    busPIRone (BUSPAR) 15 MG tablet Take 1 tablet (15 mg total) by mouth 2 (two) times daily.     diazePAM (VALIUM) 5 MG tablet Take 1 tablet (5 mg total) by mouth daily as needed for Anxiety.    HYDROcodone-acetaminophen (NORCO)  mg per tablet Take 1 tablet by mouth every 6 (six) hours as needed.    omeprazole (PRILOSEC) 40 MG capsule Take 1 capsule (40 mg total) by mouth once daily.    paliperidone (INVEGA) 3 MG TR24 Take 1 tablet (3 mg total) by mouth every evening.    promethazine (PHENERGAN) 6.25 mg/5 mL syrup Take 10 mLs (12.5 mg total) by mouth every 8 (eight) hours as needed for Nausea.    tamsulosin (FLOMAX) 0.4 mg Cap Take 1 capsule (0.4 mg total) by mouth once daily.    tiZANidine (ZANAFLEX) 4 MG tablet Take 4 mg by mouth 3 (three) times daily.     No current facility-administered medications for this visit.     Facility-Administered Medications Ordered in Other Visits   Medication    0.9%  NaCl infusion    ondansetron injection 4 mg           Review of Systems    Objective:      Physical Exam    Assessment:     Vitals:    03/31/23 1435   BP: 102/70   Pulse: (!) 120   Temp: 97.8 °F (36.6 °C)         1. Tachycardia    2. Achalasia of esophagus    3. Gastroesophageal reflux disease without esophagitis        Plan:   Tachycardia    Achalasia of esophagus  -     omeprazole (PRILOSEC) 40 MG capsule; Take 1 capsule (40 mg total) by mouth once daily.  Dispense: 30 capsule; Refill: 5    Gastroesophageal reflux disease without esophagitis  -     omeprazole (PRILOSEC) 40 MG capsule; Take 1 capsule (40 mg total) by mouth once daily.  Dispense: 30 capsule; Refill: 5        Declines labs and ekg-   Start prilosec and see GI

## 2023-04-02 ENCOUNTER — HOSPITAL ENCOUNTER (EMERGENCY)
Facility: HOSPITAL | Age: 63
Discharge: HOME | End: 2023-04-02
Attending: EMERGENCY MEDICINE
Payer: MEDICARE

## 2023-04-02 VITALS
DIASTOLIC BLOOD PRESSURE: 74 MMHG | TEMPERATURE: 98 F | SYSTOLIC BLOOD PRESSURE: 116 MMHG | BODY MASS INDEX: 34.27 KG/M2 | OXYGEN SATURATION: 98 % | WEIGHT: 193.38 LBS | HEART RATE: 95 BPM | HEIGHT: 63 IN | RESPIRATION RATE: 16 BRPM

## 2023-04-02 DIAGNOSIS — K22.0 ACHALASIA, ESOPHAGEAL: ICD-10-CM

## 2023-04-02 DIAGNOSIS — R13.19 ESOPHAGEAL DYSPHAGIA: Primary | ICD-10-CM

## 2023-04-02 DIAGNOSIS — R79.89 ABNORMAL BUN-TO-CREATININE RATIO: ICD-10-CM

## 2023-04-02 DIAGNOSIS — K22.0 ACHALASIA OF ESOPHAGUS: Primary | ICD-10-CM

## 2023-04-02 DIAGNOSIS — E16.2 HYPOGLYCEMIA: ICD-10-CM

## 2023-04-02 DIAGNOSIS — E86.0 DEHYDRATION: ICD-10-CM

## 2023-04-02 LAB
ALBUMIN SERPL BCP-MCNC: 3.8 G/DL (ref 3.5–5.2)
ALP SERPL-CCNC: 80 U/L (ref 55–135)
ALT SERPL W/O P-5'-P-CCNC: 17 U/L (ref 10–44)
ANION GAP SERPL CALC-SCNC: 15 MMOL/L (ref 8–16)
AST SERPL-CCNC: 14 U/L (ref 10–40)
BASOPHILS # BLD AUTO: 0.03 K/UL (ref 0–0.2)
BASOPHILS NFR BLD: 0.3 % (ref 0–1.9)
BILIRUB SERPL-MCNC: 1.1 MG/DL (ref 0.1–1)
BUN SERPL-MCNC: 14 MG/DL (ref 8–23)
CALCIUM SERPL-MCNC: 9.5 MG/DL (ref 8.7–10.5)
CHLORIDE SERPL-SCNC: 102 MMOL/L (ref 95–110)
CO2 SERPL-SCNC: 21 MMOL/L (ref 23–29)
CREAT SERPL-MCNC: 0.7 MG/DL (ref 0.5–1.4)
DIFFERENTIAL METHOD: ABNORMAL
EOSINOPHIL # BLD AUTO: 0 K/UL (ref 0–0.5)
EOSINOPHIL NFR BLD: 0.1 % (ref 0–8)
ERYTHROCYTE [DISTWIDTH] IN BLOOD BY AUTOMATED COUNT: 13.9 % (ref 11.5–14.5)
EST. GFR  (NO RACE VARIABLE): >60 ML/MIN/1.73 M^2
GLUCOSE SERPL-MCNC: 66 MG/DL (ref 70–110)
HCT VFR BLD AUTO: 47.6 % (ref 40–54)
HCV AB SERPL QL IA: NEGATIVE
HEP C VIRUS HOLD SPECIMEN: NORMAL
HGB BLD-MCNC: 15.9 G/DL (ref 14–18)
HIV 1+2 AB+HIV1 P24 AG SERPL QL IA: NEGATIVE
IMM GRANULOCYTES # BLD AUTO: 0.05 K/UL (ref 0–0.04)
IMM GRANULOCYTES NFR BLD AUTO: 0.5 % (ref 0–0.5)
LIPASE SERPL-CCNC: 13 U/L (ref 4–60)
LYMPHOCYTES # BLD AUTO: 2.1 K/UL (ref 1–4.8)
LYMPHOCYTES NFR BLD: 20.1 % (ref 18–48)
MCH RBC QN AUTO: 31.1 PG (ref 27–31)
MCHC RBC AUTO-ENTMCNC: 33.4 G/DL (ref 32–36)
MCV RBC AUTO: 93 FL (ref 82–98)
MONOCYTES # BLD AUTO: 0.7 K/UL (ref 0.3–1)
MONOCYTES NFR BLD: 7.1 % (ref 4–15)
NEUTROPHILS # BLD AUTO: 7.4 K/UL (ref 1.8–7.7)
NEUTROPHILS NFR BLD: 71.9 % (ref 38–73)
NRBC BLD-RTO: 0 /100 WBC
PLATELET # BLD AUTO: 220 K/UL (ref 150–450)
PMV BLD AUTO: 8.8 FL (ref 9.2–12.9)
POCT GLUCOSE: 162 MG/DL (ref 70–110)
POTASSIUM SERPL-SCNC: 4.7 MMOL/L (ref 3.5–5.1)
PROT SERPL-MCNC: 7.6 G/DL (ref 6–8.4)
RBC # BLD AUTO: 5.12 M/UL (ref 4.6–6.2)
SODIUM SERPL-SCNC: 138 MMOL/L (ref 136–145)
WBC # BLD AUTO: 10.33 K/UL (ref 3.9–12.7)

## 2023-04-02 PROCEDURE — 63600175 PHARM REV CODE 636 W HCPCS: Performed by: REGISTERED NURSE

## 2023-04-02 PROCEDURE — 87389 HIV-1 AG W/HIV-1&-2 AB AG IA: CPT | Performed by: EMERGENCY MEDICINE

## 2023-04-02 PROCEDURE — 82962 GLUCOSE BLOOD TEST: CPT

## 2023-04-02 PROCEDURE — 96372 THER/PROPH/DIAG INJ SC/IM: CPT | Performed by: EMERGENCY MEDICINE

## 2023-04-02 PROCEDURE — 96374 THER/PROPH/DIAG INJ IV PUSH: CPT

## 2023-04-02 PROCEDURE — 85025 COMPLETE CBC W/AUTO DIFF WBC: CPT | Performed by: REGISTERED NURSE

## 2023-04-02 PROCEDURE — 25000003 PHARM REV CODE 250: Performed by: REGISTERED NURSE

## 2023-04-02 PROCEDURE — 96360 HYDRATION IV INFUSION INIT: CPT | Mod: 59

## 2023-04-02 PROCEDURE — 83690 ASSAY OF LIPASE: CPT | Performed by: REGISTERED NURSE

## 2023-04-02 PROCEDURE — 25000003 PHARM REV CODE 250: Performed by: EMERGENCY MEDICINE

## 2023-04-02 PROCEDURE — 80053 COMPREHEN METABOLIC PANEL: CPT | Performed by: REGISTERED NURSE

## 2023-04-02 PROCEDURE — 86803 HEPATITIS C AB TEST: CPT | Performed by: EMERGENCY MEDICINE

## 2023-04-02 PROCEDURE — 96361 HYDRATE IV INFUSION ADD-ON: CPT

## 2023-04-02 PROCEDURE — 99284 EMERGENCY DEPT VISIT MOD MDM: CPT | Mod: 25

## 2023-04-02 PROCEDURE — 63600175 PHARM REV CODE 636 W HCPCS: Mod: JZ,TB,JG | Performed by: EMERGENCY MEDICINE

## 2023-04-02 RX ORDER — MELOXICAM 15 MG/1
15 TABLET ORAL 3 TIMES DAILY
COMMUNITY
Start: 2023-03-28 | End: 2023-07-25

## 2023-04-02 RX ORDER — SUCRALFATE 1 G/10ML
1 SUSPENSION ORAL
Qty: 1200 ML | Refills: 0 | Status: SHIPPED | OUTPATIENT
Start: 2023-04-02 | End: 2023-05-02

## 2023-04-02 RX ORDER — HYDROCODONE BITARTRATE AND ACETAMINOPHEN 7.5; 325 MG/15ML; MG/15ML
10 SOLUTION ORAL EVERY 6 HOURS PRN
Qty: 180 ML | Refills: 0 | Status: SHIPPED | OUTPATIENT
Start: 2023-04-02 | End: 2023-04-09

## 2023-04-02 RX ORDER — GLUCAGON 1 MG
1 KIT INJECTION
Status: COMPLETED | OUTPATIENT
Start: 2023-04-02 | End: 2023-04-02

## 2023-04-02 RX ORDER — NAPROXEN SODIUM 220 MG/1
81 TABLET, FILM COATED ORAL DAILY
Qty: 30 TABLET | Refills: 0 | Status: SHIPPED | OUTPATIENT
Start: 2023-04-02 | End: 2023-09-26

## 2023-04-02 RX ORDER — ONDANSETRON 2 MG/ML
4 INJECTION INTRAMUSCULAR; INTRAVENOUS
Status: COMPLETED | OUTPATIENT
Start: 2023-04-02 | End: 2023-04-02

## 2023-04-02 RX ADMIN — GLUCAGON 1 MG: KIT at 11:04

## 2023-04-02 RX ADMIN — DEXTROSE MONOHYDRATE 250 ML: 100 INJECTION, SOLUTION INTRAVENOUS at 12:04

## 2023-04-02 RX ADMIN — SODIUM CHLORIDE 1000 ML: 9 INJECTION, SOLUTION INTRAVENOUS at 11:04

## 2023-04-02 RX ADMIN — ONDANSETRON 4 MG: 2 INJECTION INTRAMUSCULAR; INTRAVENOUS at 11:04

## 2023-04-02 NOTE — ED PROVIDER NOTES
"SCRIBE #1 NOTE: I, Brigette Anton, am scribing for, and in the presence of, Taiwo Erazo MD. I have scribed the entire note.       History     Chief Complaint   Patient presents with    Emesis     Pt reports he has been vomiting and not eating since Tuesday. States his "esophagus is closing up again". Is supposed to have a scope done within the next few weeks. Has not been able to take any of his medications because of this. He also was dx with Covid two weeks ago.       HPI  4/2/2023, 10:59    HPI:  Lenny Latham Jr. is a 62 y.o. male with a PMH of HTN, CAD, and esophageal reflux who presents to the [facility] emergency department for evaluation of emesis which onset Tuesday. Pt states he has not been able to eat or drink to take his medicine without vomiting since Tuesday. Pt describes emesis at "foam and acid" . Pt reports drinking alcohol prior to sxs.  No other complaints or concerns.     Arrival mode: Personal vehicle      External medical record reviewed: Note from Internal medicine clinic on 03/31/2023 shows frequent EGD for esophageal stricture where food is stuck in chest. Pt recently ran out of Omeprazole . Pt has trouble eating and taking medication. Pt missed last EGD due to COVID.      PCP: Herminia Longoria MD    Review of patient's allergies indicates:   Allergen Reactions    Nsaids (non-steroidal anti-inflammatory drug) Nausea And Vomiting      Past Medical History:   Diagnosis Date    Achalasia of esophagus 11/28/2016    Anxiety state, unspecified     Centrilobular emphysema 10/3/2022    Coronary artery disease     Esophageal reflux     Esophageal stricture     Hypertrophy of prostate without urinary obstruction and other lower urinary tract symptoms (LUTS)     Screening PSA (prostate specific antigen) 12/7/12    0.4    Unspecified essential hypertension      Past Surgical History:   Procedure Laterality Date    BACK SURGERY      CERVICAL EPIDURAL STEROID INJECTION      COLONOSCOPY  "     ESOPHAGEAL MANOMETRY WITH MEASUREMENT OF IMPEDANCE N/A 5/23/2022    Procedure: MANOMETRY-ESOPHAGEAL-WITH IMPEDANCE;  Surgeon: Madina Hoover MD;  Location: [facility] TEO (2ND FLR);  Service: Endoscopy;  Laterality: N/A;  hold pain medication 24 hours prior to procedure    ESOPHAGOGASTRODUODENOSCOPY      ESOPHAGOGASTRODUODENOSCOPY N/A 8/16/2019    Procedure: EGD (ESOPHAGOGASTRODUODENOSCOPY);  Surgeon: Pawan Schwab MD;  Location: [facility];  Service: Endoscopy;  Laterality: N/A;    ESOPHAGOGASTRODUODENOSCOPY N/A 5/23/2022    Procedure: ESOPHAGOGASTRODUODENOSCOPY (EGD);  Surgeon: Madina Hoover MD;  Location: [facility] TE[facility] (2ND FLR);  Service: Endoscopy;  Laterality: N/A;  Endoflip/Endoscopically placed manometry probe  2nd floor-End stage achalasia  propofol only  full iquid diet x3 days, clear liquid diet x1 day prior to procedure  fully vaccinated, [facility]    HERNIA REPAIR      INGUINAL HERNIA REPAIR Right     LUMBAR EPIDURAL STEROID INJECTION      UPPER GASTROINTESTINAL ENDOSCOPY  04/27/2016       Family History   Problem Relation Age of Onset    Lung cancer Mother     Hypertension Father     Prostate cancer Father     Ulcers Sister     Ulcers Brother     Prostate cancer Maternal Grandfather     Ulcers Sister     Celiac disease Neg Hx     Colon cancer Neg Hx     Colon polyps Neg Hx     Crohn's disease Neg Hx     Esophageal cancer Neg Hx     Inflammatory bowel disease Neg Hx     Irritable bowel syndrome Neg Hx     Liver cancer Neg Hx     Liver disease Neg Hx     Rectal cancer Neg Hx     Stomach cancer Neg Hx      Social History     Tobacco Use    Smoking status: Former     Types: Cigars     Start date: 2016     Quit date: 2020     Years since quitting: 3.2    Smokeless tobacco: Never    Tobacco comments:     Cigar 1-2 times a week x about 4 years   Substance and Sexual Activity    Alcohol use: Not Currently     Comment: once or twice a year 1-2 beers    Drug use: Not  "Currently     Types: Marijuana    Sexual activity: Not on file      Review of Systems     Review of Systems   Constitutional: Negative.    HENT: Negative.     Eyes: Negative.    Respiratory: Negative.     Cardiovascular: Negative.    Gastrointestinal:  Positive for nausea and vomiting.   Endocrine: Negative.    Genitourinary: Negative.    Musculoskeletal: Negative.    Skin: Negative.    Allergic/Immunologic: Negative.    Neurological: Negative.    Hematological: Negative.    Psychiatric/Behavioral: Negative.          Physical Exam     Initial Vitals [04/02/23 0954]   BP Pulse Resp Temp SpO2   110/85 104 16 97.7 °F (36.5 °C) 98 %      MAP       --          Physical Exam  Nursing notes and vital signs reviewed.  Constitutional: Patient is in no acute distress. Appears fatigued.   Head: Normocephalic. Atraumatic.   Eyes: Conjunctivae are not pale. No scleral icterus.   ENT: Mucous membranes are dry.   Neck: Supple.   Cardiovascular: Regular rate. Regular rhythm.   Pulmonary: No respiratory distress.   Abdominal: Non-distended.   Musculoskeletal: Moves all extremities. No obvious deformities. No edema.   Skin: Warm and dry.   Neurological:  Alert, awake, and appropriate. Normal speech. No acute lateralizing neurologic deficits appreciated.   Psychiatric: Normal affect.       ED Course   Procedures  Vitals:    04/02/23 0954 04/02/23 1055 04/02/23 1114 04/02/23 1134   BP: 110/85  122/74 (!) 153/92   Pulse: 104  92 88   Resp: 16      Temp: 97.7 °F (36.5 °C)      TempSrc: Oral      SpO2: 98%  98% 98%   Weight:  87.7 kg (193 lb 6.4 oz)     Height: 5' 3" (1.6 m)       04/02/23 1144 04/02/23 1204 04/02/23 1214 04/02/23 1232   BP: 131/75 123/70 131/83 115/73   Pulse: 87 91 92 91   Resp:       Temp:       TempSrc:       SpO2: 98% 97% 98% 98%   Weight:       Height:        04/02/23 1244 04/02/23 1301 04/02/23 1333   BP: 119/71 129/75 116/74   Pulse: 90 88 95   Resp:      Temp:      TempSrc:      SpO2: 97% 96% 98%   Weight:    "   Height:        Lab Results Interpreted as Abnormal:  Labs Reviewed   CBC W/ AUTO DIFFERENTIAL - Abnormal; Notable for the following components:       Result Value    MCH 31.1 (*)     MPV 8.8 (*)     Immature Grans (Abs) 0.05 (*)     All other components within normal limits    Narrative:     Release to patient->Immediate   COMPREHENSIVE METABOLIC PANEL - Abnormal; Notable for the following components:    CO2 21 (*)     Glucose 66 (*)     Total Bilirubin 1.1 (*)     All other components within normal limits    Narrative:     Release to patient->Immediate   POCT GLUCOSE - Abnormal; Notable for the following components:    POCT Glucose 162 (*)     All other components within normal limits   HIV 1 / 2 ANTIBODY    Narrative:     Release to patient->Immediate   HEPATITIS C ANTIBODY    Narrative:     Release to patient->Immediate   HEP C VIRUS HOLD SPECIMEN    Narrative:     Release to patient->Immediate   LIPASE    Narrative:     Release to patient->Immediate      All Lab Results:  Results for orders placed or performed during the hospital encounter of 04/02/23   HIV 1/2 Ag/Ab (4th Gen)   Result Value Ref Range    HIV 1/2 Ag/Ab Negative Negative   Hepatitis C Antibody   Result Value Ref Range    Hepatitis C Ab Negative Negative   HCV Virus Hold Specimen   Result Value Ref Range    HEP C Virus Hold Specimen Hold for HCV sendout    CBC auto differential   Result Value Ref Range    WBC 10.33 3.90 - 12.70 K/uL    RBC 5.12 4.60 - 6.20 M/uL    Hemoglobin 15.9 14.0 - 18.0 g/dL    Hematocrit 47.6 40.0 - 54.0 %    MCV 93 82 - 98 fL    MCH 31.1 (H) 27.0 - 31.0 pg    MCHC 33.4 32.0 - 36.0 g/dL    RDW 13.9 11.5 - 14.5 %    Platelets 220 150 - 450 K/uL    MPV 8.8 (L) 9.2 - 12.9 fL    Immature Granulocytes 0.5 0.0 - 0.5 %    Gran # (ANC) 7.4 1.8 - 7.7 K/uL    Immature Grans (Abs) 0.05 (H) 0.00 - 0.04 K/uL    Lymph # 2.1 1.0 - 4.8 K/uL    Mono # 0.7 0.3 - 1.0 K/uL    Eos # 0.0 0.0 - 0.5 K/uL    Baso # 0.03 0.00 - 0.20 K/uL    nRBC 0 0  /100 WBC    Gran % 71.9 38.0 - 73.0 %    Lymph % 20.1 18.0 - 48.0 %    Mono % 7.1 4.0 - 15.0 %    Eosinophil % 0.1 0.0 - 8.0 %    Basophil % 0.3 0.0 - 1.9 %    Differential Method Automated    Comprehensive metabolic panel   Result Value Ref Range    Sodium 138 136 - 145 mmol/L    Potassium 4.7 3.5 - 5.1 mmol/L    Chloride 102 95 - 110 mmol/L    CO2 21 (L) 23 - 29 mmol/L    Glucose 66 (L) 70 - 110 mg/dL    BUN 14 8 - 23 mg/dL    Creatinine 0.7 0.5 - 1.4 mg/dL    Calcium 9.5 8.7 - 10.5 mg/dL    Total Protein 7.6 6.0 - 8.4 g/dL    Albumin 3.8 3.5 - 5.2 g/dL    Total Bilirubin 1.1 (H) 0.1 - 1.0 mg/dL    Alkaline Phosphatase 80 55 - 135 U/L    AST 14 10 - 40 U/L    ALT 17 10 - 44 U/L    Anion Gap 15 8 - 16 mmol/L    eGFR >60 >60 mL/min/1.73 m^2   Lipase   Result Value Ref Range    Lipase 13 4 - 60 U/L   POCT glucose   Result Value Ref Range    POCT Glucose 162 (H) 70 - 110 mg/dL     Imaging Results    None              The emergency physician reviewed the vital signs and test results, which are outlined above.     ED Discussion     12:08 PM: Discussed pt's case with Dr. Villanueva (Gastroenterology) who recommends treating dehydration and hypoglycemia; EGD with Botox outpatient.    12:28 PM: Patient's evaluation in the ED does not suggest any emergent or life-threatening medical conditions requiring immediate intervention beyond what was provided in the ED, and I believe patient is safe for discharge.  Regardless, an unremarkable evaluation in the ED does not preclude the development or presence of a serious or life-threatening condition. As such, patient was given return instructions for any change or worsening of symptoms.     Medical Decision Making:   Clinical Tests:   Lab Tests: Ordered and Reviewed       ED Medication(s):  Medications   sodium chloride 0.9% bolus 1,000 mL 1,000 mL (0 mLs Intravenous Stopped 4/2/23 1221)   ondansetron injection 4 mg (4 mg Intravenous Given 4/2/23 1110)   glucagon (human recombinant)  injection 1 mg (1 mg Intramuscular Given 4/2/23 1123)   dextrose 10% bolus 250 mL 250 mL (0 mLs Intravenous Stopped 4/2/23 1335)     Discharge Medication List as of 4/2/2023 12:21 PM        START taking these medications    Details   aspirin 81 MG Chew Take 1 tablet (81 mg total) by mouth once daily., Starting Sun 4/2/2023, Until Tue 5/2/2023, Print      sucralfate (CARAFATE) 100 mg/mL suspension Take 10 mLs (1 g total) by mouth 4 (four) times daily before meals and nightly., Starting Sun 4/2/2023, Until Tue 5/2/2023, Print           Prescription Management: I performed a review of the patient's current Rx medication list as input by nursing staff.     Follow-up Information       Madina Hoover MD. Call in 1 day.    Specialty: Gastroenterology  Contact information:  [address] [facility] [address]  [phone]               Schedule an appointment as soon as possible for a visit  with Herminia oLngoria MD.    Specialty: Family Medicine  Contact information:  [address]  [place] [id number] [phone]               [facility] - Emergency Dept..    Specialty: Emergency Medicine  Why: As needed, If symptoms worsen  Contact information:  [address] [facility] [address] [phone]                           Scribe Attestation:   Scribe #1: I performed the above scribed service and the documentation accurately describes the services I performed. I attest to the accuracy of the note.     Attending:   Physician Attestation Statement for Scribe #1: I, Taiwo Erazo MD, personally performed the services described in this documentation, as scribed by Brigette Walton, in my presence, and it is both accurate and complete. As with other dictation methods such as dictation software, small errors or inconsistencies may be overlooked due to the goal of spending more face-to-face time with patients.      Clinical Impression       ICD-10-CM ICD-9-CM   1. Esophageal dysphagia  R13.19  787.29   2. Achalasia, esophageal  K22.0 530.0   3. Hypoglycemia  E16.2 251.2   4. Abnormal BUN-to-creatinine ratio  R79.89 790.99   5. Dehydration  E86.0 276.51      ED Disposition Condition    Discharge Stable               Taiwo Erazo MD  04/03/23 8713

## 2023-04-02 NOTE — ED NOTES
Bed: 12  Expected date:   Expected time:   Means of arrival: Personal Transportation  Comments:  When clean

## 2023-04-02 NOTE — FIRST PROVIDER EVALUATION
"Medical screening examination initiated.  I have conducted a focused provider triage encounter, findings are as follows:    Brief history of present illness:  Patient reports vomiting and difficulty swallowing for the last 4 days    Vitals:    04/02/23 0954   BP: 110/85   BP Location: Right arm   Patient Position: Sitting   Pulse: 104   Resp: 16   Temp: 97.7 °F (36.5 °C)   TempSrc: Oral   SpO2: 98%   Weight: (S)   Comment: need bed weight   Height: 5' 3" (1.6 m)       Pertinent physical exam:  No acute distress, vital signs stable, patient alert and oriented    Brief workup plan:  Workup and further evaluation    Preliminary workup initiated; this workup will be continued and followed by the physician or advanced practice provider that is assigned to the patient when roomed.  "

## 2023-04-03 ENCOUNTER — TELEPHONE (OUTPATIENT)
Dept: PAIN MEDICINE | Facility: CLINIC | Age: 63
End: 2023-04-03

## 2023-04-03 ENCOUNTER — TELEPHONE (OUTPATIENT)
Dept: GASTROENTEROLOGY | Facility: CLINIC | Age: 63
End: 2023-04-03
Payer: MEDICARE

## 2023-04-03 NOTE — TELEPHONE ENCOUNTER
Attempt to reach patient to confirmed reschedule appointment. Patient did not answer. No voice box set up.     Cisco Pike  Medical Assistant

## 2023-04-03 NOTE — TELEPHONE ENCOUNTER
Please advise if patient needs to be seen or follow through with urgent EGD   Please see note below

## 2023-04-03 NOTE — TELEPHONE ENCOUNTER
----- Message from Wendy Aguilar MA sent at 4/3/2023  8:22 AM CDT -----  Regarding: FW: Assist patient in scheduling EGD at [facility]  Zoltan.   Do you want to run this by her before thay schedule him again. Not sure if she might want to see him.  Camelia  ----- Message -----  From: Lindsay Stevens RN  Sent: 4/3/2023   7:26 AM CDT  To: [facility] Endo Schedulers  Subject: FW: Assist patient in scheduling EGD at [facility] #    Patient needs urgent EGD with Dr. Hoover.   ----- Message -----  From: Nu Villanueva MD  Sent: 4/2/2023   9:13 PM CDT  To: Virginia Henry LPN, M. Nahomi Elizabeth RN, #  Subject: Assist patient in scheduling EGD at [facility]    Patient is followed by Dr. Hoover, gastroenterologist at [facility] for achlasia. He missed EGD appt 2 months ago due to Covid and has not rescheduled. He started seeing her 09/2022 as a second opinion for his achlasia. He was previously following with GI at [facility].     He presented to ED with complaints of dysphagia. His labs were at baseline. Meds switched by ED provider to sublingual or suspension form. Reviewed records. It appears he contacted Dr. Hoover's office to reschedule EGD but has not been scheduled.    Please assist patient in contacting endo schedulers at [facility] for urgent EGD with Dr. Hoover.

## 2023-04-03 NOTE — CARE UPDATE
Contacted by ED provider at [facility] regarding this patient. He is followed by Dr. Hoover, gastroenterologist at [facility] for achlasia. Hx of heller myotomy with Junior fundoplication performed for 2017 w significant improvement in symptoms. Also EGD w Botox injection/dilation 20mm in 5/2021 with some improvement. He missed EGD appt 2 months ago due to Covid and has not rescheduled. He started seeing Dr. Hoover in 2022 as a second opinion for his achlasia. He was previously following with GI at [facility].     He presented to ED with complaints of dysphagia. His labs were at baseline. Meds switched by ED provider to sublingual or suspension form. Reviewed records. It appears he contacted Dr. Hoover's office last week to reschedule EGD but has not been scheduled.    Please assist patient in contacting endo schedulers at [facility] for urgent EGD with Dr. Hoover.

## 2023-04-04 ENCOUNTER — TELEPHONE (OUTPATIENT)
Dept: GASTROENTEROLOGY | Facility: CLINIC | Age: 63
End: 2023-04-04
Payer: MEDICARE

## 2023-04-04 NOTE — TELEPHONE ENCOUNTER
----- Message from Felipa Anderson sent at 4/4/2023  8:20 AM CDT -----  Contact: pt  Pt requesting call back RE: would like to schedule procedure, would like to have it done between 4/10 4/15      Confirmed contact below:  Contact Name:Lenny Latham  Phone Number: [phone]

## 2023-04-11 ENCOUNTER — HOSPITAL ENCOUNTER (OUTPATIENT)
Dept: PREADMISSION TESTING | Facility: HOSPITAL | Age: 63
Discharge: HOME | End: 2023-04-11
Attending: INTERNAL MEDICINE
Payer: MEDICARE

## 2023-04-11 DIAGNOSIS — K22.0 ACHALASIA OF ESOPHAGUS: ICD-10-CM

## 2023-04-12 ENCOUNTER — TELEPHONE (OUTPATIENT)
Dept: ENDOSCOPY | Facility: HOSPITAL | Age: 63
End: 2023-04-12
Payer: MEDICARE

## 2023-04-12 VITALS — HEIGHT: 63 IN | WEIGHT: 180 LBS | BODY MASS INDEX: 31.89 KG/M2

## 2023-04-12 NOTE — TELEPHONE ENCOUNTER
Contacted patient to reschedule procedure. As per our conversation,  patient is rescheduled for EGD on 4/21/23 at 12:30 pm. Instructions reviewed with patient and informed instructions will be emailed to [email or web address] and on patient portal for review.

## 2023-04-13 ENCOUNTER — TELEPHONE (OUTPATIENT)
Dept: GASTROENTEROLOGY | Facility: CLINIC | Age: 63
End: 2023-04-13
Payer: MEDICARE

## 2023-04-13 NOTE — TELEPHONE ENCOUNTER
----- Message from Maral Abdiard sent at 4/13/2023  3:21 PM CDT -----  Regarding: REFILL SAME DAY REQUEST  Contact: Patient  Type: Patient Call Back         Who called: Patient         What is the request in detail: requesting refill of promethazine (PHENERGAN) 6.25 mg/5 mL syrup; states he also got a botox procedure/EKG sched for next Friday; please advise        Best call back number: [phone]         Additional Information:   [id number] - [place] - [address] AT [facility]  [address] [place] [address]  Phone: [phone] Fax: [phone]             Thank You

## 2023-04-13 NOTE — TELEPHONE ENCOUNTER
Ma spoke with pt   Pt was informed to contact his Gi provider   Dr. Hoover is out of the office until 4/17

## 2023-04-14 ENCOUNTER — TELEPHONE (OUTPATIENT)
Dept: GASTROENTEROLOGY | Facility: CLINIC | Age: 63
End: 2023-04-14
Payer: MEDICARE

## 2023-04-14 RX ORDER — PROMETHAZINE HYDROCHLORIDE 6.25 MG/5ML
12.5 SYRUP ORAL EVERY 8 HOURS PRN
Qty: 473 ML | Refills: 0 | Status: SHIPPED | OUTPATIENT
Start: 2023-04-14 | End: 2023-05-17

## 2023-04-14 NOTE — TELEPHONE ENCOUNTER
----- Message from Carolyn Cole sent at 4/14/2023  1:21 PM CDT -----  Patient called in requesting a refill on promethazine (PHENERGAN) 6.25 mg/5 mL syrup. Please call back [phone]

## 2023-04-14 NOTE — TELEPHONE ENCOUNTER
----- Message from Vasquez Jackson sent at 4/14/2023  3:44 PM CDT -----  Contact: Patient  Type:  Patient Call          Who Called: Patient         Does the patient know what this is regarding?: requesting a call back for a refill on Rx promethazine (PHENERGAN) 6.25 mg/5 mL syrup ;pt is out his medication ; please advise           Would the patient rather a call back or a response via [facility]? Call           Best Call Back Number:[phone]             Additional Information:   [id number] - [place] - [address] AT [facility]  [address] [address]  Phone: [phone] Fax: [phone]

## 2023-04-14 NOTE — TELEPHONE ENCOUNTER
----- Message from Cheyanne Balderrama sent at 4/14/2023  1:37 PM CDT -----  Contact: don  Patient is having surgery on next Friday and he is requesting a refill on promethazine (PHENERGAN) 6.25 mg/5 mL syrup. Please call him back if this request can be done at [phone].        [id number] - [place] - [address] AT [facility]  [address] [address]  Phone: [phone] Fax: [phone]          Thanks  DD

## 2023-04-17 NOTE — TELEPHONE ENCOUNTER
Pharyngitis  Pharyngitis is redness, pain, and swelling (inflammation) of the throat (pharynx). It is a very common cause of sore throat. Pharyngitis can be caused by a bacteria, but it is usually caused by a virus. Most cases of pharyngitis get better on their own without treatment.  What are the causes?  This condition may be caused by:  · Infection by viruses (viral). Viral pharyngitis spreads from person to person (is contagious) through coughing, sneezing, and sharing of personal items or utensils such as cups, forks, spoons, and toothbrushes.  · Infection by bacteria (bacterial). Bacterial pharyngitis may be spread by touching the nose or face after coming in contact with the bacteria, or through more intimate contact, such as kissing.  · Allergies. Allergies can cause buildup of mucus in the throat (post-nasal drip), leading to inflammation and irritation. Allergies can also cause blocked nasal passages, forcing breathing through the mouth, which dries and irritates the throat.    What increases the risk?  You are more likely to develop this condition if:  · You are 5-24 years old.  · You are exposed to crowded environments such as , school, or dormitory living.  · You live in a cold climate.  · You have a weakened disease-fighting (immune) system.    What are the signs or symptoms?  Symptoms of this condition vary by the cause (viral, bacterial, or allergies) and can include:  · Sore throat.  · Fatigue.  · Low-grade fever.  · Headache.  · Joint pain and muscle aches.  · Skin rashes.  · Swollen glands in the throat (lymph nodes).  · Plaque-like film on the throat or tonsils. This is often a symptom of bacterial pharyngitis.  · Vomiting.  · Stuffy nose (nasal congestion).  · Cough.  · Red, itchy eyes (conjunctivitis).  · Loss of appetite.    How is this diagnosed?  This condition is often diagnosed based on your medical history and a physical exam. Your health care provider will ask you questions about  What was this pt calling about? I do not see any details  I only see  the following msg:  Ma spoke with pt   Pt was informed to contact his Gi provider   Dr. Hoover is out of the office until 4/17   your illness and your symptoms. A swab of your throat may be done to check for bacteria (rapid strep test). Other lab tests may also be done, depending on the suspected cause, but these are rare.  How is this treated?  This condition usually gets better in 3-4 days without medicine. Bacterial pharyngitis may be treated with antibiotic medicines.  Follow these instructions at home:  · Take over-the-counter and prescription medicines only as told by your health care provider.  ? If you were prescribed an antibiotic medicine, take it as told by your health care provider. Do not stop taking the antibiotic even if you start to feel better.  ? Do not give children aspirin because of the association with Reye syndrome.  · Drink enough water and fluids to keep your urine clear or pale yellow.  · Get a lot of rest.  · Gargle with a salt-water mixture 3-4 times a day or as needed. To make a salt-water mixture, completely dissolve ½-1 tsp of salt in 1 cup of warm water.  · If your health care provider approves, you may use throat lozenges or sprays to soothe your throat.  Contact a health care provider if:  · You have large, tender lumps in your neck.  · You have a rash.  · You cough up green, yellow-brown, or bloody spit.  Get help right away if:  · Your neck becomes stiff.  · You drool or are unable to swallow liquids.  · You cannot drink or take medicines without vomiting.  · You have severe pain that does not go away, even after you take medicine.  · You have trouble breathing, and it is not caused by a stuffy nose.  · You have new pain and swelling in your joints such as the knees, ankles, wrists, or elbows.  Summary  · Pharyngitis is redness, pain, and swelling (inflammation) of the throat (pharynx).  · While pharyngitis can be caused by a bacteria, the most common causes are viral.  · Most cases of pharyngitis get better on their own without treatment.  · Bacterial pharyngitis is treated with antibiotic medicines.  This  information is not intended to replace advice given to you by your health care provider. Make sure you discuss any questions you have with your health care provider.  Document Released: 12/18/2006 Document Revised: 01/23/2018 Document Reviewed: 01/23/2018  [employer] Interactive Patient Education © 2019 [employer]

## 2023-04-18 ENCOUNTER — TELEPHONE (OUTPATIENT)
Dept: GASTROENTEROLOGY | Facility: CLINIC | Age: 63
End: 2023-04-18
Payer: MEDICARE

## 2023-04-18 ENCOUNTER — TELEPHONE (OUTPATIENT)
Dept: ENDOSCOPY | Facility: HOSPITAL | Age: 63
End: 2023-04-18
Payer: MEDICARE

## 2023-04-18 NOTE — TELEPHONE ENCOUNTER
Called pt and reviewed prep instructions for EGD procedure on Friday. Resent prep instructions to pt e-mail.

## 2023-04-18 NOTE — TELEPHONE ENCOUNTER
----- Message from Bhavna Shafer sent at 4/18/2023 10:43 AM CDT -----  Regarding: Procedure questions  Contact: [phone]  Calling to speak with nurse regarding liquid diet prior to procedure. Please call and discuss in detail with patient.

## 2023-04-18 NOTE — TELEPHONE ENCOUNTER
----- Message from Nu Villanueva MD sent at 4/2/2023  9:09 PM CDT -----  Regarding: Assist patient in scheduling EGD at [facility]  Patient is followed by Dr. Hoover, gastroenterologist at [facility] for achlasia. He missed EGD appt 2 months ago due to Covid and has not rescheduled. He started seeing her 09/2022 as a second opinion for his achlasia. He was previously following with GI at [facility].     He presented to ED with complaints of dysphagia. His labs were at baseline. Meds switched by ED provider to sublingual or suspension form. Reviewed records. It appears he contacted Dr. Hoover's office to reschedule EGD but has not been scheduled.    Please assist patient in contacting endo schedulers at [facility] for urgent EGD with Dr. Hoover.

## 2023-04-19 ENCOUNTER — PATIENT MESSAGE (OUTPATIENT)
Dept: ADMINISTRATIVE | Facility: HOSPITAL | Age: 63
End: 2023-04-19
Payer: MEDICARE

## 2023-04-21 ENCOUNTER — ANESTHESIA EVENT (OUTPATIENT)
Dept: ENDOSCOPY | Facility: HOSPITAL | Age: 63
End: 2023-04-21
Payer: MEDICARE

## 2023-04-21 ENCOUNTER — HOSPITAL ENCOUNTER (OUTPATIENT)
Facility: HOSPITAL | Age: 63
Discharge: HOME | End: 2023-04-21
Attending: INTERNAL MEDICINE | Admitting: INTERNAL MEDICINE
Payer: MEDICARE

## 2023-04-21 ENCOUNTER — ANESTHESIA (OUTPATIENT)
Dept: ENDOSCOPY | Facility: HOSPITAL | Age: 63
End: 2023-04-21
Payer: MEDICARE

## 2023-04-21 VITALS
HEART RATE: 57 BPM | TEMPERATURE: 98 F | HEIGHT: 63 IN | WEIGHT: 178 LBS | OXYGEN SATURATION: 100 % | BODY MASS INDEX: 31.54 KG/M2 | SYSTOLIC BLOOD PRESSURE: 137 MMHG | RESPIRATION RATE: 15 BRPM | DIASTOLIC BLOOD PRESSURE: 84 MMHG

## 2023-04-21 DIAGNOSIS — R13.10 DYSPHAGIA: ICD-10-CM

## 2023-04-21 DIAGNOSIS — K22.0 ACHALASIA OF ESOPHAGUS: Primary | ICD-10-CM

## 2023-04-21 PROCEDURE — 25000003 PHARM REV CODE 250: Performed by: NURSE ANESTHETIST, CERTIFIED REGISTERED

## 2023-04-21 PROCEDURE — 43239 PR EGD, FLEX, W/BIOPSY, SGL/MULTI: ICD-10-PCS | Mod: ,,, | Performed by: INTERNAL MEDICINE

## 2023-04-21 PROCEDURE — 27201028 HC NEEDLE, SCLERO: Performed by: INTERNAL MEDICINE

## 2023-04-21 PROCEDURE — D9220A PRA ANESTHESIA: Mod: ANES,,, | Performed by: ANESTHESIOLOGY

## 2023-04-21 PROCEDURE — 43239 EGD BIOPSY SINGLE/MULTIPLE: CPT | Performed by: INTERNAL MEDICINE

## 2023-04-21 PROCEDURE — 88305 TISSUE EXAM BY PATHOLOGIST: CPT | Mod: 26,,, | Performed by: PATHOLOGY

## 2023-04-21 PROCEDURE — 88342 IMHCHEM/IMCYTCHM 1ST ANTB: CPT | Performed by: PATHOLOGY

## 2023-04-21 PROCEDURE — 88342 CHG IMMUNOCYTOCHEMISTRY: ICD-10-PCS | Mod: 26,,, | Performed by: PATHOLOGY

## 2023-04-21 PROCEDURE — 63600175 PHARM REV CODE 636 W HCPCS: Mod: JZ,JG | Performed by: INTERNAL MEDICINE

## 2023-04-21 PROCEDURE — 43236 PR EGD, FLEX, W/SUBMUC INJECTION(S), ANY SUBSTANCE: ICD-10-PCS | Mod: 59,,, | Performed by: INTERNAL MEDICINE

## 2023-04-21 PROCEDURE — 43239 EGD BIOPSY SINGLE/MULTIPLE: CPT | Mod: ,,, | Performed by: INTERNAL MEDICINE

## 2023-04-21 PROCEDURE — 43236 UPPR GI SCOPE W/SUBMUC INJ: CPT | Mod: 59 | Performed by: INTERNAL MEDICINE

## 2023-04-21 PROCEDURE — 88305 TISSUE EXAM BY PATHOLOGIST: ICD-10-PCS | Mod: 26,,, | Performed by: PATHOLOGY

## 2023-04-21 PROCEDURE — 63600175 PHARM REV CODE 636 W HCPCS: Performed by: NURSE ANESTHETIST, CERTIFIED REGISTERED

## 2023-04-21 PROCEDURE — 37000008 HC ANESTHESIA 1ST 15 MINUTES: Performed by: INTERNAL MEDICINE

## 2023-04-21 PROCEDURE — D9220A PRA ANESTHESIA: ICD-10-PCS | Mod: ANES,,, | Performed by: ANESTHESIOLOGY

## 2023-04-21 PROCEDURE — 88305 TISSUE EXAM BY PATHOLOGIST: CPT | Performed by: PATHOLOGY

## 2023-04-21 PROCEDURE — 88342 IMHCHEM/IMCYTCHM 1ST ANTB: CPT | Mod: 26,,, | Performed by: PATHOLOGY

## 2023-04-21 PROCEDURE — D9220A PRA ANESTHESIA: ICD-10-PCS | Mod: CRNA,,, | Performed by: NURSE ANESTHETIST, CERTIFIED REGISTERED

## 2023-04-21 PROCEDURE — 37000009 HC ANESTHESIA EA ADD 15 MINS: Performed by: INTERNAL MEDICINE

## 2023-04-21 PROCEDURE — 27201012 HC FORCEPS, HOT/COLD, DISP: Performed by: INTERNAL MEDICINE

## 2023-04-21 PROCEDURE — D9220A PRA ANESTHESIA: Mod: CRNA,,, | Performed by: NURSE ANESTHETIST, CERTIFIED REGISTERED

## 2023-04-21 PROCEDURE — 43236 UPPR GI SCOPE W/SUBMUC INJ: CPT | Mod: 59,,, | Performed by: INTERNAL MEDICINE

## 2023-04-21 RX ORDER — ONDANSETRON 2 MG/ML
4 INJECTION INTRAMUSCULAR; INTRAVENOUS DAILY PRN
Status: CANCELLED | OUTPATIENT
Start: 2023-04-21

## 2023-04-21 RX ORDER — SODIUM CHLORIDE 9 MG/ML
INJECTION, SOLUTION INTRAVENOUS CONTINUOUS
Status: DISCONTINUED | OUTPATIENT
Start: 2023-04-21 | End: 2023-12-01

## 2023-04-21 RX ORDER — LIDOCAINE HYDROCHLORIDE 20 MG/ML
INJECTION INTRAVENOUS
Status: DISCONTINUED | OUTPATIENT
Start: 2023-04-21 | End: 2023-04-21

## 2023-04-21 RX ORDER — SODIUM CHLORIDE 0.9 % (FLUSH) 0.9 %
10 SYRINGE (ML) INJECTION
Status: DISCONTINUED | OUTPATIENT
Start: 2023-04-21 | End: 2023-04-21 | Stop reason: HOSPADM

## 2023-04-21 RX ORDER — SODIUM CHLORIDE 9 MG/ML
INJECTION, SOLUTION INTRAVENOUS CONTINUOUS
Status: DISCONTINUED | OUTPATIENT
Start: 2023-04-21 | End: 2023-04-21 | Stop reason: HOSPADM

## 2023-04-21 RX ORDER — PROPOFOL 10 MG/ML
VIAL (ML) INTRAVENOUS CONTINUOUS PRN
Status: DISCONTINUED | OUTPATIENT
Start: 2023-04-21 | End: 2023-04-21

## 2023-04-21 RX ORDER — PROPOFOL 10 MG/ML
VIAL (ML) INTRAVENOUS
Status: DISCONTINUED | OUTPATIENT
Start: 2023-04-21 | End: 2023-04-21

## 2023-04-21 RX ORDER — SODIUM CHLORIDE 0.9 % (FLUSH) 0.9 %
10 SYRINGE (ML) INJECTION
Status: DISCONTINUED | OUTPATIENT
Start: 2023-04-21 | End: 2023-12-01

## 2023-04-21 RX ADMIN — SODIUM CHLORIDE: 0.9 INJECTION, SOLUTION INTRAVENOUS at 11:04

## 2023-04-21 RX ADMIN — PROPOFOL 100 MG: 10 INJECTION, EMULSION INTRAVENOUS at 11:04

## 2023-04-21 RX ADMIN — PROPOFOL 20 MG: 10 INJECTION, EMULSION INTRAVENOUS at 11:04

## 2023-04-21 RX ADMIN — LIDOCAINE HYDROCHLORIDE 100 MG: 20 INJECTION INTRAVENOUS at 11:04

## 2023-04-21 RX ADMIN — Medication 150 MCG/KG/MIN: at 11:04

## 2023-04-21 NOTE — TRANSFER OF CARE
"Anesthesia Transfer of Care Note    Patient: Lenny Latham Jr.    Procedure(s) Performed: Procedure(s) (LRB):  ESOPHAGOGASTRODUODENOSCOPY (EGD) (N/A)    Patient location: [facility]    Anesthesia Type: general    Transport from OR: Transported from OR on 6-10 L/min O2 by face mask with adequate spontaneous ventilation    Post pain: adequate analgesia    Post assessment: no apparent anesthetic complications    Post vital signs: stable    Level of consciousness: sedated    Nausea/Vomiting: no nausea/vomiting    Complications: none    Transfer of care protocol was followed      Last vitals:   Visit Vitals  /60   Pulse 90   Temp 36.7 °C (98.1 °F) (Temporal)   Resp 16   Ht 5' 3" (1.6 m)   Wt 80.7 kg (178 lb)   SpO2 99%   BMI 31.53 kg/m²     "

## 2023-04-21 NOTE — ANESTHESIA POSTPROCEDURE EVALUATION
Anesthesia Post Evaluation    Patient: Lenny Latham Jr.    Procedure(s) Performed: Procedure(s) (LRB):  ESOPHAGOGASTRODUODENOSCOPY (EGD) (N/A)    Final Anesthesia Type: general      Patient location during evaluation: PACU  Patient participation: Yes- Able to Participate  Level of consciousness: awake and alert  Post-procedure vital signs: reviewed and stable  Pain management: adequate  Airway patency: patent    PONV status at discharge: No PONV  Anesthetic complications: no      Cardiovascular status: blood pressure returned to baseline  Respiratory status: unassisted  Hydration status: euvolemic  Follow-up not needed.          Vitals Value Taken Time   /84 04/21/23 1302   Temp  04/21/23 1332   Pulse 54 04/21/23 1308   Resp 15 04/21/23 1308   SpO2 100 % 04/21/23 1308   Vitals shown include unvalidated device data.      Event Time   Out of Recovery 12:25:00         Pain/Laly Score: Laly Score: 10 (4/21/2023 12:30 PM)

## 2023-04-21 NOTE — PROVATION PATIENT INSTRUCTIONS
Discharge Summary/Instructions after an Endoscopic Procedure  Patient Name: Lenny Latham  Patient MRN: [id number]  Patient YOB: 1960 Friday, April 21, 2023  Madina Hoover MD  Dear patient,  As a result of recent federal legislation (The Federal Cures Act), you may   receive lab or pathology results from your procedure in your [facility]   account before your physician is able to contact you. Your physician or   their representative will relay the results to you with their   recommendations at their soonest availability.  Thank you,  RESTRICTIONS:  During your procedure today, you received medications for sedation.  These   medications may affect your judgment, balance and coordination.  Therefore,   for 24 hours, you have the following restrictions:   - DO NOT drive a car, operate machinery, make legal/financial decisions,   sign important papers or drink alcohol.    ACTIVITY:  Today: no heavy lifting, straining or running due to procedural   sedation/anesthesia.  The following day: return to full activity including work.  DIET:  Eat and drink normally unless instructed otherwise.     TREATMENT FOR COMMON SIDE EFFECTS:  - Mild abdominal pain, nausea, belching, bloating or excessive gas:  rest,   eat lightly and use a heating pad.  - Sore Throat: treat with throat lozenges and/or gargle with warm salt   water.  - Because air was used during the procedure, expelling large amounts of air   from your rectum or belching is normal.  - If a bowel prep was taken, you may not have a bowel movement for 1-3 days.    This is normal.  SYMPTOMS TO WATCH FOR AND REPORT TO YOUR PHYSICIAN:  1. Abdominal pain or bloating, other than gas cramps.  2. Chest pain.  3. Back pain.  4. Signs of infection such as: chills or fever occurring within 24 hours   after the procedure.  5. Rectal bleeding, which would show as bright red, maroon, or black stools.   (A tablespoon of blood from the rectum is not serious, especially if    hemorrhoids are present.)  6. Vomiting.  7. Weakness or dizziness.  GO DIRECTLY TO THE NEAREST EMERGENCY ROOM IF YOU HAVE ANY OF THE FOLLOWING:      Difficulty breathing              Chills and/or fever over 101 F   Persistent vomiting and/or vomiting blood   Severe abdominal pain   Severe chest pain   Black, tarry stools   Bleeding- more than one tablespoon   Any other symptom or condition that you feel may need urgent attention  Your doctor recommends these additional instructions:  If any biopsies were taken, your doctors clinic will contact you in 1 to 2   weeks with any results.  - Await pathology results.   - Discharge patient to home (with escort).   - Resume previous diet.   - Continue present medications.   - The findings and recommendations were discussed with the patient.  For questions, problems or results please call your physician - Madina Hoover MD at Work:  [phone].  [facility], EMERGENCY ROOM PHONE NUMBER: [phone]  IF A COMPLICATION OR EMERGENCY SITUATION ARISES AND YOU ARE UNABLE TO REACH   YOUR PHYSICIAN - GO DIRECTLY TO THE EMERGENCY ROOM.  Madina Hoover MD  4/21/2023 11:50:55 AM  This report has been verified and signed electronically.  Dear patient,  As a result of recent federal legislation (The Federal Cures Act), you may   receive lab or pathology results from your procedure in your [facility]   account before your physician is able to contact you. Your physician or   their representative will relay the results to you with their   recommendations at their soonest availability.  Thank you,  PROVATION

## 2023-04-21 NOTE — PLAN OF CARE
Dr. Hoover has spoken to pt's significant other at bedside.  Pt. Voices no compliants.  Juice given and swallow normally.  Preparing for discharge.

## 2023-04-21 NOTE — ANESTHESIA PREPROCEDURE EVALUATION
04/21/2023  Lenny Latham Jr. is a 62 y.o., male with achalasia here for EGD.    Pre-operative evaluation for Procedure(s) (LRB):  ESOPHAGOGASTRODUODENOSCOPY (EGD) (N/A)        Patient Active Problem List   Diagnosis    GERD (gastroesophageal reflux disease)    Achalasia of esophagus    Atherosclerosis of native coronary artery of native heart with angina pectoris    Mixed anxiety and depressive disorder    Mixed hyperlipidemia    Coronary artery disease of native artery of native heart with stable angina pectoris    Benign prostatic hyperplasia with urinary obstruction    Erectile dysfunction    Obesity (BMI 30.0-34.9)    Fear of needles    Adjustment disorder    Cognitive complaints with normal exam    Insomnia    Centrilobular emphysema    Obstructive sleep apnea       Review of patient's allergies indicates:   Allergen Reactions    Nsaids (non-steroidal anti-inflammatory drug) Nausea And Vomiting       Current Facility-Administered Medications on File Prior to Encounter   Medication Dose Route Frequency Provider Last Rate Last Admin    0.9%  NaCl infusion   Intravenous Continuous Brenden Pizarro MD   New Bag at 05/23/22 1238    ondansetron injection 4 mg  4 mg Intravenous Q12H PRN Brenden Pizarro MD         Current Outpatient Medications on File Prior to Encounter   Medication Sig Dispense Refill    tamsulosin (FLOMAX) 0.4 mg Cap Take 1 capsule (0.4 mg total) by mouth once daily. 90 capsule 3       Past Surgical History:   Procedure Laterality Date    BACK SURGERY      CERVICAL EPIDURAL STEROID INJECTION      COLONOSCOPY      ESOPHAGEAL MANOMETRY WITH MEASUREMENT OF IMPEDANCE N/A 5/23/2022    Procedure: MANOMETRY-ESOPHAGEAL-WITH IMPEDANCE;  Surgeon: Madina Hoover MD;  Location: [facility] ([address] [place] [address]);  Service: Endoscopy;  Laterality: N/A;  hold pain medication 24  hours prior to procedure    ESOPHAGOGASTRODUODENOSCOPY      ESOPHAGOGASTRODUODENOSCOPY N/A 8/16/2019    Procedure: EGD (ESOPHAGOGASTRODUODENOSCOPY);  Surgeon: Pawan Schwab MD;  Location: [facility];  Service: Endoscopy;  Laterality: N/A;    ESOPHAGOGASTRODUODENOSCOPY N/A 5/23/2022    Procedure: ESOPHAGOGASTRODUODENOSCOPY (EGD);  Surgeon: Madina Hoover MD;  Location: [facility] (2ND FLR);  Service: Endoscopy;  Laterality: N/A;  Endoflip/Endoscopically placed manometry probe  2nd floor-End stage achalasia  propofol only  full iquid diet x3 days, clear liquid diet x1 day prior to procedure  fully vaccinated, instructions sent to [facility] and [facility]    HERNIA REPAIR      INGUINAL HERNIA REPAIR Right     LUMBAR EPIDURAL STEROID INJECTION      UPPER GASTROINTESTINAL ENDOSCOPY  04/27/2016       Social History     Socioeconomic History    Marital status: Single    Years of education: 12th   Occupational History    Occupation: Disabled   Tobacco Use    Smoking status: Former     Types: Cigars     Start date: 2016     Quit date: 2020     Years since quitting: 3.3    Smokeless tobacco: Never    Tobacco comments:     Cigar 1-2 times a week x about 4 years   Substance and Sexual Activity    Alcohol use: Not Currently     Comment: once or twice a year 1-2 beers    Drug use: Not Currently     Types: Marijuana     Social Determinants of Health     Financial Resource Strain: High Risk    Difficulty of Paying Living Expenses: Hard   Food Insecurity: Food Insecurity Present    Worried About Running Out of Food in the Last Year: Sometimes true    Ran Out of Food in the Last Year: Sometimes true   Transportation Needs: Unmet Transportation Needs    Lack of Transportation (Medical): Yes    Lack of Transportation (Non-Medical): No   Physical Activity: Insufficiently Active    Days of Exercise per Week: 4 days    Minutes of Exercise per Session: 30 min   Stress: Stress Concern Present    Feeling of Stress :  Very much   Social Connections: Unknown    Frequency of Communication with Friends and Family: Twice a week    Attends Shinto Services: More than 4 times per year    Active Member of Clubs or Organizations: No    Attends Club or Organization Meetings: Never    Marital Status:    Housing Stability: High Risk    Unable to Pay for Housing in the Last Year: Yes    Number of Places Lived in the Last Year: 1    Unstable Housing in the Last Year: No         CBC: No results for input(s): WBC, RBC, HGB, HCT, PLT, MCV, MCH, MCHC in the last 72 hours.    CMP: No results for input(s): NA, K, CL, CO2, BUN, CREATININE, GLU, MG, PHOS, CALCIUM, ALBUMIN, PROT, ALKPHOS, ALT, AST, BILITOT in the last 72 hours.    INR  No results for input(s): PT, INR, PROTIME, APTT in the last 72 hours.            2D Echo:  No results found for this or any previous visit.        Pre-op Assessment    I have reviewed the Patient Summary Reports.     I have reviewed the Nursing Notes.    I have reviewed the Medications.     Review of Systems  Anesthesia Hx:  No problems with previous Anesthesia    Hematology/Oncology:  Hematology Normal   Oncology Normal     EENT/Dental:EENT/Dental Normal   Cardiovascular:   Hypertension CAD    Denies Angina.    Pulmonary:  Pulmonary Normal    Renal/:  Renal/ Normal     Hepatic/GI:   GERD    Musculoskeletal:  Musculoskeletal Normal    Neurological:  Neurology Normal    Endocrine:  Endocrine Normal    Dermatological:  Skin Normal    Psych:  Psychiatric Normal           Physical Exam  General: Well nourished    Airway:  Mallampati: II   Mouth Opening: Normal  TM Distance: Normal  Tongue: Normal  Neck ROM: Normal ROM    Dental:  Intact    Chest/Lungs:  Clear to auscultation, Normal Respiratory Rate    Heart:  Rate: Normal  Rhythm: Regular Rhythm  Sounds: Normal        Anesthesia Plan  Type of Anesthesia, risks & benefits discussed:    Anesthesia Type: Gen Natural Airway  Intra-op Monitoring Plan:  Standard ASA Monitors  Post Op Pain Control Plan: multimodal analgesia  Induction:  IV  Informed Consent: Informed consent signed with the Patient and all parties understand the risks and agree with anesthesia plan.  All questions answered.   ASA Score: 3    Ready For Surgery From Anesthesia Perspective.     .

## 2023-04-27 LAB
COMMENT: NORMAL
FINAL PATHOLOGIC DIAGNOSIS: NORMAL
GROSS: NORMAL
Lab: NORMAL

## 2023-04-28 ENCOUNTER — TELEPHONE (OUTPATIENT)
Dept: GASTROENTEROLOGY | Facility: CLINIC | Age: 63
End: 2023-04-28
Payer: MEDICARE

## 2023-04-28 NOTE — TELEPHONE ENCOUNTER
----- Message from Madina Hoover MD sent at 4/28/2023 11:50 AM CDT -----  Pls schedule next available fu apt   I have received the biopsy results from your recent endoscopic procedure(s). They include the following:      Intestinal metaplasia of the stomach (early pre-cancerous changes in the stomach that may transform into cancer)     Granuloma (inflammatory cells) in stomach      I recommend repeat EGD in 1 year     EGD or colonoscopy is not a perfect exam. Rarely a significant finding can be missed. Do not ignore symptoms GI such as blood with your bowel movements or abdominal pain. Report these symptoms to your doctor if they occur.      Please schedule next available appointment with me next available      Sincerely,   Dr. Hoover

## 2023-04-28 NOTE — TELEPHONE ENCOUNTER
----- Message from Wendy Aguilar MA sent at 4/28/2023 11:47 AM CDT -----    ----- Message -----  From: Madina Hoover MD  Sent: 4/28/2023  11:13 AM CDT  To: Herminia Longoria MD, Kiko CASTORENA Staff    Pls schedule next available fu apt   I have received the biopsy results from your recent endoscopic procedure(s). They include the following:     Intestinal metaplasia of the stomach (early pre-cancerous changes in the stomach that may transform into cancer)    Granuloma (inflammatory cells) in stomach     I recommend repeat EGD in 1 year    EGD or colonoscopy is not a perfect exam. Rarely a significant finding can be missed. Do not ignore symptoms GI such as blood with your bowel movements or abdominal pain. Report these symptoms to your doctor if they occur.     Please schedule next available appointment with me next available     Sincerely,   Dr. Hoover

## 2023-05-03 DIAGNOSIS — F41.8 MIXED ANXIETY AND DEPRESSIVE DISORDER: ICD-10-CM

## 2023-05-03 RX ORDER — DIAZEPAM 5 MG/1
5 TABLET ORAL DAILY PRN
Qty: 30 TABLET | Refills: 0 | Status: SHIPPED | OUTPATIENT
Start: 2023-05-03 | End: 2023-05-05 | Stop reason: SDUPTHER

## 2023-05-04 RX ORDER — PROMETHAZINE HYDROCHLORIDE 6.25 MG/5ML
SYRUP ORAL
Qty: 473 ML | Refills: 0 | OUTPATIENT
Start: 2023-05-04

## 2023-05-04 NOTE — TELEPHONE ENCOUNTER
Refused Prescriptions     Name from pharmacy: PROMETHAZINE 6.25MG/5ML SYRUP         Will file in chart as: promethazine (PHENERGAN) 6.25 mg/5 mL syrup    Sig: TAKE 10 ML(12.5 MG) BY MOUTH EVERY 8 HOURS AS NEEDED FOR NAUSEA    Disp:  473 mL    Refills:  0 (Pharmacy requested: Not specified)    Start: 5/4/2023    Class: Normal    Refused by: Izzy Zurita NP    Refusal reason: Patient needs an appointment        [id number] [place] [place] - [address] [facility]

## 2023-05-05 ENCOUNTER — OFFICE VISIT (OUTPATIENT)
Dept: PSYCHIATRY | Facility: CLINIC | Age: 63
End: 2023-05-05
Payer: COMMERCIAL

## 2023-05-05 VITALS
HEART RATE: 102 BPM | BODY MASS INDEX: 32.1 KG/M2 | DIASTOLIC BLOOD PRESSURE: 94 MMHG | WEIGHT: 181.19 LBS | SYSTOLIC BLOOD PRESSURE: 135 MMHG

## 2023-05-05 DIAGNOSIS — F63.81 INTERMITTENT EXPLOSIVE DISORDER: Primary | ICD-10-CM

## 2023-05-05 DIAGNOSIS — F41.8 MIXED ANXIETY AND DEPRESSIVE DISORDER: ICD-10-CM

## 2023-05-05 PROCEDURE — 99499 UNLISTED E&M SERVICE: CPT | Mod: S$GLB,,, | Performed by: PSYCHIATRY & NEUROLOGY

## 2023-05-05 PROCEDURE — 99999 PR PBB SHADOW E&M-EST. PATIENT-LVL II: ICD-10-PCS | Mod: PBBFAC,,, | Performed by: PSYCHIATRY & NEUROLOGY

## 2023-05-05 PROCEDURE — 3008F BODY MASS INDEX DOCD: CPT | Mod: CPTII,S$GLB,, | Performed by: PSYCHIATRY & NEUROLOGY

## 2023-05-05 PROCEDURE — 99499 RISK ADDL DX/OHS AUDIT: ICD-10-PCS | Mod: S$GLB,,, | Performed by: PSYCHIATRY & NEUROLOGY

## 2023-05-05 PROCEDURE — 3008F PR BODY MASS INDEX (BMI) DOCUMENTED: ICD-10-PCS | Mod: CPTII,S$GLB,, | Performed by: PSYCHIATRY & NEUROLOGY

## 2023-05-05 PROCEDURE — 99214 OFFICE O/P EST MOD 30 MIN: CPT | Mod: S$GLB,,, | Performed by: PSYCHIATRY & NEUROLOGY

## 2023-05-05 PROCEDURE — 99214 PR OFFICE/OUTPT VISIT, EST, LEVL IV, 30-39 MIN: ICD-10-PCS | Mod: S$GLB,,, | Performed by: PSYCHIATRY & NEUROLOGY

## 2023-05-05 PROCEDURE — 3075F PR MOST RECENT SYSTOLIC BLOOD PRESS GE 130-139MM HG: ICD-10-PCS | Mod: CPTII,S$GLB,, | Performed by: PSYCHIATRY & NEUROLOGY

## 2023-05-05 PROCEDURE — 3075F SYST BP GE 130 - 139MM HG: CPT | Mod: CPTII,S$GLB,, | Performed by: PSYCHIATRY & NEUROLOGY

## 2023-05-05 PROCEDURE — 99999 PR PBB SHADOW E&M-EST. PATIENT-LVL II: CPT | Mod: PBBFAC,,, | Performed by: PSYCHIATRY & NEUROLOGY

## 2023-05-05 PROCEDURE — 3080F PR MOST RECENT DIASTOLIC BLOOD PRESSURE >= 90 MM HG: ICD-10-PCS | Mod: CPTII,S$GLB,, | Performed by: PSYCHIATRY & NEUROLOGY

## 2023-05-05 PROCEDURE — 3080F DIAST BP >= 90 MM HG: CPT | Mod: CPTII,S$GLB,, | Performed by: PSYCHIATRY & NEUROLOGY

## 2023-05-05 RX ORDER — BUSPIRONE HYDROCHLORIDE 15 MG/1
15 TABLET ORAL 2 TIMES DAILY
Qty: 60 TABLET | Refills: 3 | Status: SHIPPED | OUTPATIENT
Start: 2023-05-05 | End: 2023-05-17

## 2023-05-05 RX ORDER — ALPRAZOLAM 1 MG/1
1 TABLET ORAL NIGHTLY PRN
Qty: 60 TABLET | Refills: 3 | Status: SHIPPED | OUTPATIENT
Start: 2023-05-05 | End: 2023-09-27 | Stop reason: SDUPTHER

## 2023-05-05 RX ORDER — DIAZEPAM 5 MG/1
5 TABLET ORAL DAILY PRN
Qty: 30 TABLET | Refills: 3 | Status: SHIPPED | OUTPATIENT
Start: 2023-05-05 | End: 2023-08-07
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
## 2023-05-17 ENCOUNTER — TELEPHONE (OUTPATIENT)
Dept: INTERNAL MEDICINE | Facility: CLINIC | Age: 63
End: 2023-05-17
Payer: MEDICARE

## 2023-05-17 ENCOUNTER — OFFICE VISIT (OUTPATIENT)
Dept: INTERNAL MEDICINE | Facility: CLINIC | Age: 63
End: 2023-05-17
Payer: MEDICARE

## 2023-05-17 VITALS
OXYGEN SATURATION: 97 % | HEART RATE: 93 BPM | WEIGHT: 182.13 LBS | HEIGHT: 63 IN | DIASTOLIC BLOOD PRESSURE: 64 MMHG | SYSTOLIC BLOOD PRESSURE: 102 MMHG | BODY MASS INDEX: 32.27 KG/M2 | TEMPERATURE: 98 F

## 2023-05-17 DIAGNOSIS — N40.1 BENIGN PROSTATIC HYPERPLASIA WITH URINARY OBSTRUCTION: ICD-10-CM

## 2023-05-17 DIAGNOSIS — G47.00 INSOMNIA, UNSPECIFIED TYPE: ICD-10-CM

## 2023-05-17 DIAGNOSIS — I25.118 CORONARY ARTERY DISEASE OF NATIVE ARTERY OF NATIVE HEART WITH STABLE ANGINA PECTORIS: ICD-10-CM

## 2023-05-17 DIAGNOSIS — F41.8 MIXED ANXIETY AND DEPRESSIVE DISORDER: ICD-10-CM

## 2023-05-17 DIAGNOSIS — E78.2 MIXED HYPERLIPIDEMIA: ICD-10-CM

## 2023-05-17 DIAGNOSIS — G47.33 OBSTRUCTIVE SLEEP APNEA: ICD-10-CM

## 2023-05-17 DIAGNOSIS — I25.119 ATHEROSCLEROSIS OF NATIVE CORONARY ARTERY OF NATIVE HEART WITH ANGINA PECTORIS: ICD-10-CM

## 2023-05-17 DIAGNOSIS — M25.561 ACUTE PAIN OF RIGHT KNEE: ICD-10-CM

## 2023-05-17 DIAGNOSIS — K22.0 ACHALASIA OF ESOPHAGUS: ICD-10-CM

## 2023-05-17 DIAGNOSIS — E66.9 OBESITY (BMI 30.0-34.9): ICD-10-CM

## 2023-05-17 DIAGNOSIS — K21.9 GASTROESOPHAGEAL REFLUX DISEASE WITHOUT ESOPHAGITIS: ICD-10-CM

## 2023-05-17 DIAGNOSIS — N13.8 BENIGN PROSTATIC HYPERPLASIA WITH URINARY OBSTRUCTION: ICD-10-CM

## 2023-05-17 DIAGNOSIS — J43.2 CENTRILOBULAR EMPHYSEMA: ICD-10-CM

## 2023-05-17 DIAGNOSIS — Z01.818 PRE-OP EXAM: Primary | ICD-10-CM

## 2023-05-17 PROCEDURE — 3008F PR BODY MASS INDEX (BMI) DOCUMENTED: ICD-10-PCS | Mod: CPTII,,, | Performed by: NURSE PRACTITIONER

## 2023-05-17 PROCEDURE — 3074F SYST BP LT 130 MM HG: CPT | Mod: CPTII,,, | Performed by: NURSE PRACTITIONER

## 2023-05-17 PROCEDURE — 99214 OFFICE O/P EST MOD 30 MIN: CPT | Mod: ,,, | Performed by: NURSE PRACTITIONER

## 2023-05-17 PROCEDURE — 1159F PR MEDICATION LIST DOCUMENTED IN MEDICAL RECORD: ICD-10-PCS | Mod: CPTII,,, | Performed by: NURSE PRACTITIONER

## 2023-05-17 PROCEDURE — 99999 PR PBB SHADOW E&M-EST. PATIENT-LVL III: CPT | Mod: PBBFAC,,, | Performed by: NURSE PRACTITIONER

## 2023-05-17 PROCEDURE — 99214 PR OFFICE/OUTPT VISIT, EST, LEVL IV, 30-39 MIN: ICD-10-PCS | Mod: ,,, | Performed by: NURSE PRACTITIONER

## 2023-05-17 PROCEDURE — 3074F PR MOST RECENT SYSTOLIC BLOOD PRESSURE < 130 MM HG: ICD-10-PCS | Mod: CPTII,,, | Performed by: NURSE PRACTITIONER

## 2023-05-17 PROCEDURE — 3078F PR MOST RECENT DIASTOLIC BLOOD PRESSURE < 80 MM HG: ICD-10-PCS | Mod: CPTII,,, | Performed by: NURSE PRACTITIONER

## 2023-05-17 PROCEDURE — 3008F BODY MASS INDEX DOCD: CPT | Mod: CPTII,,, | Performed by: NURSE PRACTITIONER

## 2023-05-17 PROCEDURE — 3078F DIAST BP <80 MM HG: CPT | Mod: CPTII,,, | Performed by: NURSE PRACTITIONER

## 2023-05-17 PROCEDURE — 1159F MED LIST DOCD IN RCRD: CPT | Mod: CPTII,,, | Performed by: NURSE PRACTITIONER

## 2023-05-17 PROCEDURE — 99213 OFFICE O/P EST LOW 20 MIN: CPT | Performed by: NURSE PRACTITIONER

## 2023-05-17 PROCEDURE — 99999 PR PBB SHADOW E&M-EST. PATIENT-LVL III: ICD-10-PCS | Mod: PBBFAC,,, | Performed by: NURSE PRACTITIONER

## 2023-05-17 NOTE — PROGRESS NOTES
Subjective:       Patient ID: Lenny Latham Jr. is a 62 y.o. male.    Chief Complaint: Pre-op Exam    [facility] [place]    5/29- Dr. Maximus Park- knee replacement R. - had pre op work up done at [facility] a couple of weeks ago    No issues with anesthesia in the past  No acute issues  Currently not taking any pain meds    NATALIE- not using CPAP-has upcoming appt with pulm to discuss  GERD- stable.   Not taking ASA currently     Past Medical History:   Diagnosis Date    Achalasia of esophagus 11/28/2016    Anxiety state, unspecified     Centrilobular emphysema 10/3/2022    Coronary artery disease     Esophageal reflux     Esophageal stricture     Hypertrophy of prostate without urinary obstruction and other lower urinary tract symptoms (LUTS)     Screening PSA (prostate specific antigen) 12/7/12    0.4    Unspecified essential hypertension        Past Surgical History:   Procedure Laterality Date    BACK SURGERY      CERVICAL EPIDURAL STEROID INJECTION      COLONOSCOPY      ESOPHAGEAL MANOMETRY WITH MEASUREMENT OF IMPEDANCE N/A 5/23/2022    Procedure: MANOMETRY-ESOPHAGEAL-WITH IMPEDANCE;  Surgeon: Madina Hoover MD;  Location: [facility] ([address] [place] [address]);  Service: Endoscopy;  Laterality: N/A;  hold pain medication 24 hours prior to procedure    ESOPHAGOGASTRODUODENOSCOPY      ESOPHAGOGASTRODUODENOSCOPY N/A 8/16/2019    Procedure: EGD (ESOPHAGOGASTRODUODENOSCOPY);  Surgeon: Pawan Schwab MD;  Location: [facility];  Service: Endoscopy;  Laterality: N/A;    ESOPHAGOGASTRODUODENOSCOPY N/A 5/23/2022    Procedure: ESOPHAGOGASTRODUODENOSCOPY (EGD);  Surgeon: Madina Hoover MD;  Location: [address]);  Service: Endoscopy;  Laterality: N/A;  Endoflip/Endoscopically placed manometry probe  2nd floor-End stage achalasia  propofol only  full iquid diet x3 days, clear liquid diet x1 day prior to procedure  fully vaccinated, instructions [facility] to [facility] and mailed-[facility]    ESOPHAGOGASTRODUODENOSCOPY N/A 4/21/2023     Procedure: ESOPHAGOGASTRODUODENOSCOPY (EGD);  Surgeon: Madina Hoover MD;  Location: [facility] ([address] [place] [address]);  Service: Endoscopy;  Laterality: N/A;  w/ Botox  2nd floor-End Stage Achalasia  full liquid diet x3 days, clear liquid diet x1 day prior to procedure  instructions sent to [facility] and emailed to [email or web address]    4/17/23 - Pt confirmed apt. Pt moved up to the 11am slot, to    HERNIA REPAIR      INGUINAL HERNIA REPAIR Right     LUMBAR EPIDURAL STEROID INJECTION      UPPER GASTROINTESTINAL ENDOSCOPY  04/27/2016     Social History     Socioeconomic History    Marital status: Single    Years of education: 12th   Occupational History    Occupation: Disabled   Tobacco Use    Smoking status: Former     Types: Cigars     Start date: 2016     Quit date: 2020     Years since quitting: 3.3    Smokeless tobacco: Never    Tobacco comments:     Cigar 1-2 times a week x about 4 years   Substance and Sexual Activity    Alcohol use: Not Currently     Comment: once or twice a year 1-2 beers    Drug use: Not Currently     Types: Marijuana     Social Determinants of Health     Financial Resource Strain: High Risk    Difficulty of Paying Living Expenses: Hard   Food Insecurity: Food Insecurity Present    Worried About Running Out of Food in the Last Year: Sometimes true    Ran Out of Food in the Last Year: Sometimes true   Transportation Needs: Unmet Transportation Needs    Lack of Transportation (Medical): Yes    Lack of Transportation (Non-Medical): No   Physical Activity: Insufficiently Active    Days of Exercise per Week: 4 days    Minutes of Exercise per Session: 30 min   Stress: Stress Concern Present    Feeling of Stress : Very much   Social Connections: Unknown    Frequency of Communication with Friends and Family: Twice a week    Attends [employer] Services: More than 4 times per year    Active Member of Clubs or Organizations: No    Attends Club or Organization Meetings: Never    Marital Status:     Housing Stability: High Risk    Unable to Pay for Housing in the Last Year: Yes    Number of Places Lived in the Last Year: 1    Unstable Housing in the Last Year: No     Review of patient's allergies indicates:   Allergen Reactions    Nsaids (non-steroidal anti-inflammatory drug) Nausea And Vomiting     Current Outpatient Medications   Medication Sig    ALPRAZolam (XANAX) 1 MG tablet Take 1 tablet (1 mg total) by mouth nightly as needed for Anxiety.    atorvastatin (LIPITOR) 40 MG tablet TAKE 1 TABLET(40 MG) BY MOUTH EVERY EVENING    diazePAM (VALIUM) 5 MG tablet Take 1 tablet (5 mg total) by mouth daily as needed for Anxiety.    omeprazole (PRILOSEC) 40 MG capsule Take 1 capsule (40 mg total) by mouth once daily.    tamsulosin (FLOMAX) 0.4 mg Cap Take 1 capsule (0.4 mg total) by mouth once daily.    aspirin 81 MG Chew Take 1 tablet (81 mg total) by mouth once daily.    meloxicam (MOBIC) 15 MG tablet Take 15 mg by mouth 3 (three) times daily.     No current facility-administered medications for this visit.     Facility-Administered Medications Ordered in Other Visits   Medication    0.9%  NaCl infusion    0.9%  NaCl infusion    ondansetron injection 4 mg    sodium chloride 0.9% flush 10 mL           Review of Systems   Constitutional:  Negative for activity change, appetite change, chills, diaphoresis, fatigue, fever and unexpected weight change.   HENT:  Negative for congestion, ear pain, postnasal drip, rhinorrhea, sinus pressure, sinus pain, sneezing, sore throat, tinnitus, trouble swallowing and voice change.    Eyes:  Negative for photophobia, pain and visual disturbance.   Respiratory:  Negative for cough, chest tightness, shortness of breath and wheezing.    Cardiovascular:  Negative for chest pain, palpitations and leg swelling.   Gastrointestinal:  Negative for abdominal distention, abdominal pain, constipation, diarrhea, nausea and vomiting.   Genitourinary:  Negative for decreased urine  volume, difficulty urinating, dysuria, flank pain, frequency, hematuria and urgency.   Musculoskeletal:  Positive for arthralgias. Negative for back pain, joint swelling, neck pain and neck stiffness.   Allergic/Immunologic: Negative for immunocompromised state.   Neurological:  Negative for dizziness, tremors, seizures, syncope, facial asymmetry, speech difficulty, weakness, light-headedness, numbness and headaches.   Hematological:  Negative for adenopathy. Does not bruise/bleed easily.   Psychiatric/Behavioral:  Negative for confusion and sleep disturbance.      Objective:      Physical Exam  Constitutional:       General: He is not in acute distress.  HENT:      Head: Normocephalic and atraumatic.      Right Ear: Tympanic membrane normal.      Left Ear: Tympanic membrane normal.      Nose: Mucosal edema and rhinorrhea present.      Mouth/Throat:      Mouth: No oral lesions.      Pharynx: Posterior oropharyngeal erythema present. No oropharyngeal exudate or uvula swelling.   Eyes:      Conjunctiva/sclera: Conjunctivae normal.      Pupils: Pupils are equal, round, and reactive to light.   Cardiovascular:      Rate and Rhythm: Normal rate and regular rhythm.      Heart sounds: Normal heart sounds. No murmur heard.    No friction rub. No gallop.   Pulmonary:      Effort: Pulmonary effort is normal. No respiratory distress.      Breath sounds: Normal breath sounds. No wheezing or rales.   Abdominal:      General: Bowel sounds are normal.      Palpations: Abdomen is soft.   Musculoskeletal:      Cervical back: Normal range of motion and neck supple.      Right knee: Swelling present. Decreased range of motion.   Skin:     General: Skin is warm and dry.   Neurological:      Mental Status: He is alert and oriented to person, place, and time.       Assessment:     Vitals:    05/17/23 1120   BP: 102/64   Pulse: 93   Temp: 97.7 °F (36.5 °C)         1. Pre-op exam    2. Acute pain of right knee    3. Gastroesophageal reflux  disease without esophagitis    4. Atherosclerosis of native coronary artery of native heart with angina pectoris    5. Coronary artery disease of native artery of native heart with stable angina pectoris    6. Mixed hyperlipidemia    7. Benign prostatic hyperplasia with urinary obstruction    8. Obesity (BMI 30.0-34.9)    9. Obstructive sleep apnea    10. Mixed anxiety and depressive disorder    11. Achalasia of esophagus    12. Insomnia, unspecified type    13. Centrilobular emphysema        Plan:   Pre-op exam    Acute pain of right knee    Gastroesophageal reflux disease without esophagitis    Atherosclerosis of native coronary artery of native heart with angina pectoris    Coronary artery disease of native artery of native heart with stable angina pectoris    Mixed hyperlipidemia    Benign prostatic hyperplasia with urinary obstruction    Obesity (BMI 30.0-34.9)    Obstructive sleep apnea    Mixed anxiety and depressive disorder    Achalasia of esophagus    Insomnia, unspecified type    Centrilobular emphysema      5/2/23 labs reviewed- need EKG result prior to clearance

## 2023-05-17 NOTE — TELEPHONE ENCOUNTER
Called [facility] left a message and fax #, requesting pre-op form, labs, and chest x-ray./CS

## 2023-05-24 DIAGNOSIS — K22.0 ACHALASIA OF ESOPHAGUS: ICD-10-CM

## 2023-05-24 DIAGNOSIS — K21.9 GASTROESOPHAGEAL REFLUX DISEASE WITHOUT ESOPHAGITIS: ICD-10-CM

## 2023-05-24 RX ORDER — OMEPRAZOLE 40 MG/1
40 CAPSULE, DELAYED RELEASE ORAL DAILY
Qty: 30 CAPSULE | Refills: 5 | OUTPATIENT
Start: 2023-05-24

## 2023-05-24 NOTE — TELEPHONE ENCOUNTER
Please advise I don't see the Promethazine (Phenergan)6.25mg/5 mL syrup in Pts Chart .,but he is requesting that one as well

## 2023-05-24 NOTE — TELEPHONE ENCOUNTER
Please advise pt is also requesting another medication as well., but it is not listed he mentioned that he was told it would be refilled today @ 4 but its not he has called 3 times today.

## 2023-05-25 ENCOUNTER — TELEPHONE (OUTPATIENT)
Dept: GASTROENTEROLOGY | Facility: CLINIC | Age: 63
End: 2023-05-25
Payer: MEDICARE

## 2023-05-25 DIAGNOSIS — R11.0 CHRONIC NAUSEA: Primary | ICD-10-CM

## 2023-05-25 NOTE — TELEPHONE ENCOUNTER
----- Message from Susana Norton sent at 5/25/2023  1:56 PM CDT -----  Contact: Patient, [phone]  Please call him regarding Rx promethazine (PHENERGAN) 6.25 mg/5 mL syrup [Pharmacy Med Name: PROMETHAZINE 6.25MG/5ML SYRUP]. Thanks.

## 2023-05-26 RX ORDER — PROMETHAZINE HYDROCHLORIDE 6.25 MG/5ML
25 SYRUP ORAL EVERY 6 HOURS PRN
Qty: 473 ML | Refills: 2 | Status: SHIPPED | OUTPATIENT
Start: 2023-05-26 | End: 2023-06-23

## 2023-05-31 ENCOUNTER — TELEPHONE (OUTPATIENT)
Dept: INTERNAL MEDICINE | Facility: CLINIC | Age: 63
End: 2023-05-31
Payer: MEDICARE

## 2023-05-31 NOTE — TELEPHONE ENCOUNTER
----- Message from Herminia Longoria MD sent at 5/31/2023  4:42 PM CDT -----  Regarding: RE: Pain Management  When did the patient have knee replacement surgery?  Reviewing his outside medications looks like patient has been prescribed Norco Percocet and tramadol-patient should discuss with orthopedic surgeon and until then it is okay to take meloxicam/ibuprofen as needed for pain if it is not have any other pain medication as listed above    Dr. Longoria  ----- Message -----  From: Pita Morales MA  Sent: 5/31/2023   4:15 PM CDT  To: Herminia Longoria MD  Subject: Pain Management                                  Pt states that he is in excruciating pain at this time and was inform by he surgeons nurse to have PCP call in some pain medication until patient is able to see him on Tuesday. Pt is begging and in tears at the moment. Please Advise  ----- Message -----  From: Nikhil Nguyen  Sent: 5/31/2023   3:25 PM CDT  To: [facility] Staff    Lenny is still waiting on a call back in regards to some pain medication . Please send  Rx over to   [id number] - [place] - [address] AT [facility]  [address] [address]  Phone: [phone] Fax: [phone]  Please call him back at [phone]    Thanks  CF

## 2023-06-16 ENCOUNTER — OFFICE VISIT (OUTPATIENT)
Dept: PULMONOLOGY | Facility: CLINIC | Age: 63
End: 2023-06-16
Payer: MEDICARE

## 2023-06-16 ENCOUNTER — CLINICAL SUPPORT (OUTPATIENT)
Dept: PULMONOLOGY | Facility: CLINIC | Age: 63
End: 2023-06-16
Payer: MEDICARE

## 2023-06-16 VITALS
HEIGHT: 63 IN | DIASTOLIC BLOOD PRESSURE: 78 MMHG | WEIGHT: 176.56 LBS | SYSTOLIC BLOOD PRESSURE: 112 MMHG | HEART RATE: 111 BPM | OXYGEN SATURATION: 94 % | RESPIRATION RATE: 18 BRPM | BODY MASS INDEX: 31.29 KG/M2

## 2023-06-16 DIAGNOSIS — F41.8 MIXED ANXIETY AND DEPRESSIVE DISORDER: ICD-10-CM

## 2023-06-16 DIAGNOSIS — J43.2 CENTRILOBULAR EMPHYSEMA: ICD-10-CM

## 2023-06-16 DIAGNOSIS — E66.9 OBESITY (BMI 30.0-34.9): ICD-10-CM

## 2023-06-16 DIAGNOSIS — I25.119 ATHEROSCLEROSIS OF NATIVE CORONARY ARTERY OF NATIVE HEART WITH ANGINA PECTORIS: ICD-10-CM

## 2023-06-16 DIAGNOSIS — G47.33 OBSTRUCTIVE SLEEP APNEA: ICD-10-CM

## 2023-06-16 DIAGNOSIS — G47.00 INSOMNIA, UNSPECIFIED TYPE: ICD-10-CM

## 2023-06-16 LAB
BRPFT: NORMAL
FEF 25 75 CHG: 15.8 %
FEF 25 75 LLN: 0.83
FEF 25 75 POST REF: 83.5 %
FEF 25 75 PRE REF: 72.1 %
FEF 25 75 REF: 2.05
FET100 CHG: -23.5 %
FEV1 CHG: -9.5 %
FEV1 FVC CHG: 4.3 %
FEV1 FVC LLN: 67
FEV1 FVC POST REF: 94.6 %
FEV1 FVC PRE REF: 90.8 %
FEV1 FVC REF: 79
FEV1 LLN: 1.68
FEV1 POST REF: 89.4 %
FEV1 PRE REF: 98.7 %
FEV1 REF: 2.35
FVC CHG: -13.1 %
FVC LLN: 2.18
FVC POST REF: 94.7 %
FVC PRE REF: 109 %
FVC REF: 2.96
PEF CHG: -14 %
PEF LLN: 4.5
PEF POST REF: 59.7 %
PEF PRE REF: 69.4 %
PEF REF: 6.65
POST FEF 25 75: 1.71 L/S
POST FET 100: 8 SEC
POST FEV1 FVC: 74.71 %
POST FEV1: 2.1 L
POST FVC: 2.81 L
POST PEF: 3.97 L/S
PRE FEF 25 75: 1.48 L/S
PRE FET 100: 10.46 SEC
PRE FEV1 FVC: 71.66 %
PRE FEV1: 2.32 L
PRE FVC: 3.23 L
PRE PEF: 4.62 L/S

## 2023-06-16 PROCEDURE — 1159F MED LIST DOCD IN RCRD: CPT | Mod: CPTII,S$GLB,, | Performed by: NURSE PRACTITIONER

## 2023-06-16 PROCEDURE — 3008F BODY MASS INDEX DOCD: CPT | Mod: CPTII,S$GLB,, | Performed by: NURSE PRACTITIONER

## 2023-06-16 PROCEDURE — 3078F DIAST BP <80 MM HG: CPT | Mod: CPTII,S$GLB,, | Performed by: NURSE PRACTITIONER

## 2023-06-16 PROCEDURE — 1160F PR REVIEW ALL MEDS BY PRESCRIBER/CLIN PHARMACIST DOCUMENTED: ICD-10-PCS | Mod: CPTII,S$GLB,, | Performed by: NURSE PRACTITIONER

## 2023-06-16 PROCEDURE — 99214 PR OFFICE/OUTPT VISIT, EST, LEVL IV, 30-39 MIN: ICD-10-PCS | Mod: 25,S$GLB,, | Performed by: NURSE PRACTITIONER

## 2023-06-16 PROCEDURE — 3008F PR BODY MASS INDEX (BMI) DOCUMENTED: ICD-10-PCS | Mod: CPTII,S$GLB,, | Performed by: NURSE PRACTITIONER

## 2023-06-16 PROCEDURE — 1159F PR MEDICATION LIST DOCUMENTED IN MEDICAL RECORD: ICD-10-PCS | Mod: CPTII,S$GLB,, | Performed by: NURSE PRACTITIONER

## 2023-06-16 PROCEDURE — 3074F PR MOST RECENT SYSTOLIC BLOOD PRESSURE < 130 MM HG: ICD-10-PCS | Mod: CPTII,S$GLB,, | Performed by: NURSE PRACTITIONER

## 2023-06-16 PROCEDURE — 3078F PR MOST RECENT DIASTOLIC BLOOD PRESSURE < 80 MM HG: ICD-10-PCS | Mod: CPTII,S$GLB,, | Performed by: NURSE PRACTITIONER

## 2023-06-16 PROCEDURE — 94060 PR EVAL OF BRONCHOSPASM: ICD-10-PCS | Mod: S$GLB,,, | Performed by: INTERNAL MEDICINE

## 2023-06-16 PROCEDURE — 99999 PR PBB SHADOW E&M-EST. PATIENT-LVL IV: ICD-10-PCS | Mod: PBBFAC,,, | Performed by: NURSE PRACTITIONER

## 2023-06-16 PROCEDURE — 3074F SYST BP LT 130 MM HG: CPT | Mod: CPTII,S$GLB,, | Performed by: NURSE PRACTITIONER

## 2023-06-16 PROCEDURE — 99999 PR PBB SHADOW E&M-EST. PATIENT-LVL IV: CPT | Mod: PBBFAC,,, | Performed by: NURSE PRACTITIONER

## 2023-06-16 PROCEDURE — 99214 OFFICE O/P EST MOD 30 MIN: CPT | Mod: 25,S$GLB,, | Performed by: NURSE PRACTITIONER

## 2023-06-16 PROCEDURE — 94060 EVALUATION OF WHEEZING: CPT | Mod: S$GLB,,, | Performed by: INTERNAL MEDICINE

## 2023-06-16 PROCEDURE — 1160F RVW MEDS BY RX/DR IN RCRD: CPT | Mod: CPTII,S$GLB,, | Performed by: NURSE PRACTITIONER

## 2023-06-16 RX ORDER — OXYCODONE AND ACETAMINOPHEN 10; 325 MG/1; MG/1
TABLET ORAL
COMMUNITY
Start: 2023-06-07 | End: 2023-09-26

## 2023-06-16 RX ORDER — CELECOXIB 100 MG/1
CAPSULE ORAL
COMMUNITY
Start: 2023-04-12 | End: 2023-07-25

## 2023-06-16 RX ORDER — HYDROCODONE BITARTRATE AND ACETAMINOPHEN 5; 325 MG/1; MG/1
1 TABLET ORAL EVERY 4 HOURS PRN
COMMUNITY
Start: 2023-05-17 | End: 2023-07-25

## 2023-06-16 NOTE — ASSESSMENT & PLAN NOTE
Encouraged calorie reduction and 30 minutes of exercise daily. Discussed impact of obesity on general health.  Wt Readings from Last 9 Encounters:   06/16/23 80.1 kg (176 lb 9.4 oz)   05/17/23 82.6 kg (182 lb 1.6 oz)   04/21/23 80.7 kg (178 lb)   04/12/23 81.6 kg (180 lb)   04/02/23 87.7 kg (193 lb 6.4 oz)   03/31/23 81.3 kg (179 lb 3.7 oz)   12/16/22 87 kg (191 lb 12.8 oz)   12/16/22 87 kg (191 lb 12.8 oz)   10/04/22 83.9 kg (184 lb 15.5 oz)   Body mass index is 31.28 kg/m².

## 2023-06-16 NOTE — PROGRESS NOTES
Pulmonary Outpatient Follow Up Visit     Subjective:       Patient ID: Lenny Latham Jr. is a 63 y.o. male.    Chief Complaint: Sleep Apnea and centrilobular emphysema (Rev test)        HPI      Patient is a 61 y.o. male  presenting for 6 months follow-up review errol.      Referred initially June 2022 or evaluation of suspected sleep apnea.      6/16/2023 Spirometry is normal. No post bronchodilator response.    12/28/2023 PSG Moderate obstructive sleep apnea: AHI was 21.7/hr  1/5/2023 cpap 8 cm optimal  1/12/2023 order cpap 8 w/nasal mask, needs over night on cpap 8 after IDL  6/16/2023 Never obtained CPAP after ordered, missed 3 appointments with [facility] to  machine. He had other medical problems/surgeries that affected him picking up CPAP machine.         Former cigar smoker. Never smoked cigarettes.  Prior cigar smoker about 1-2 times a week. quit 2020.     Prior work off [employer]. , . Some asbestosis exposure as as  and  1978 - 1983.     10/4/2022 chest xray mild scarring no pleural plaque seen.     10/13/2020 CT chest/abdomen mild emphysematous changes in both lungs.     12/16/2022 CPFT Normal spirometry FEV 98.2%. Lung volumes normal. Moderate reduced Diffusing capacity,diffusing capacity may be underestimated.     [place] Sleepiness Scale   EPWORTH SLEEPINESS SCALE 6/16/2023 9/6/2022 6/7/2022   Sitting and reading 0 1 2   Watching TV 1 1 2   Sitting, inactive in a public place (e.g. a theatre or a meeting) 0 0 0   As a passenger in a car for an hour without a break 0 0 0   Lying down to rest in the afternoon when circumstances permit 0 0 1   Sitting and talking to someone 0 0 0   Sitting quietly after a lunch without alcohol 0 0 2   In a car, while stopped for a few minutes in traffic 0 0 0   Total score 1 2 7     Review of Systems   Constitutional:  Positive for fatigue. Negative for fever, chills, activity  change and appetite change.   HENT:  Negative for nosebleeds.    Eyes:  Negative for redness.   Respiratory:  Positive for apnea, snoring and somnolence. Negative for cough, sputum production, choking, chest tightness and wheezing.    Cardiovascular:  Negative for chest pain.   Genitourinary:  Negative for hematuria.   Endocrine:  Negative for cold intolerance.    Musculoskeletal:  Positive for arthralgias and back pain.   Skin:  Negative for rash.   Gastrointestinal:  Negative for vomiting.   Neurological:  Negative for syncope.   Hematological:  Negative for adenopathy.   Psychiatric/Behavioral:  Positive for sleep disturbance. Negative for confusion. The patient is nervous/anxious.      Outpatient Encounter Medications as of 6/16/2023   Medication Sig Dispense Refill    atorvastatin (LIPITOR) 40 MG tablet TAKE 1 TABLET(40 MG) BY MOUTH EVERY EVENING 90 tablet 3    omeprazole (PRILOSEC) 40 MG capsule Take 1 capsule (40 mg total) by mouth once daily. 30 capsule 5    oxyCODONE-acetaminophen (PERCOCET)  mg per tablet Take by mouth.      promethazine (PHENERGAN) 6.25 mg/5 mL syrup Take 20 mLs (25 mg total) by mouth every 6 (six) hours as needed for Nausea. 473 mL 2    tamsulosin (FLOMAX) 0.4 mg Cap Take 1 capsule (0.4 mg total) by mouth once daily. 90 capsule 3    ALPRAZolam (XANAX) 1 MG tablet Take 1 tablet (1 mg total) by mouth nightly as needed for Anxiety. 60 tablet 3    aspirin 81 MG Chew Take 1 tablet (81 mg total) by mouth once daily. 30 tablet 0    celecoxib (CELEBREX) 100 MG capsule TAKE 1 CAPSULE BY MOUTH ONCE A DAY WITH MEALS      diazePAM (VALIUM) 5 MG tablet Take 1 tablet (5 mg total) by mouth daily as needed for Anxiety. 30 tablet 3    HYDROcodone-acetaminophen (NORCO) 5-325 mg per tablet Take 1 tablet by mouth every 4 (four) hours as needed.      meloxicam (MOBIC) 15 MG tablet Take 15 mg by mouth 3 (three) times daily.       Facility-Administered Encounter Medications as of 6/16/2023   Medication  "Dose Route Frequency Provider Last Rate Last Admin    0.9%  NaCl infusion   Intravenous Continuous Brenden Pizarro MD   New Bag at 05/23/22 1238    0.9%  NaCl infusion   Intravenous Continuous Madina Hoover MD        ondansetron injection 4 mg  4 mg Intravenous Q12H PRN Brenden Pizarro MD        sodium chloride 0.9% flush 10 mL  10 mL Intravenous PRN Madina Hoover MD           Objective:     Vital Signs (Most Recent)  Vital Signs  Pulse: (!) 111  Resp: 18  SpO2: (!) 94 %  BP: 112/78  Pain Score: 10-Worst pain ever  Pain Loc: Knee  Height and Weight  Height: 5' 3" (160 cm)  Weight: 80.1 kg (176 lb 9.4 oz)  BSA (Calculated - sq m): 1.89 sq meters  BMI (Calculated): 31.3  Weight in (lb) to have BMI = 25: 140.8]  Wt Readings from Last 2 Encounters:   06/16/23 80.1 kg (176 lb 9.4 oz)   05/17/23 82.6 kg (182 lb 1.6 oz)       Physical Exam   Constitutional: He is oriented to person, place, and time. He appears well-developed and well-nourished. He is cooperative.  Non-toxic appearance. No distress.   HENT:   Right Ear: External ear normal.   Left Ear: External ear normal.   Nose: Nose normal. No mucosal edema.   Mouth/Throat: Oropharynx is clear and moist. No oropharyngeal exudate.   Cardiovascular: Normal rate, regular rhythm, normal heart sounds and intact distal pulses.   Pulmonary/Chest: Effort normal and breath sounds normal.   Abdominal: Soft.   Musculoskeletal:         General: No edema.      Cervical back: Normal range of motion.   Lymphadenopathy: No supraclavicular adenopathy is present.     He has no cervical adenopathy.   Neurological: He is alert and oriented to person, place, and time.   Skin: Skin is dry.   Psychiatric: He has a normal mood and affect. His behavior is normal. Judgment and thought content normal.   Vitals reviewed.    Clinic data reviewed in clinic with patient     6/16/2023 Spirometry is normal. No post bronchodilator response.    12/16/2022 CPFT Normal spirometry FEV 98.2%. " "Lung volumes normal. Moderate reduced Diffusing capacity,diffusing capacity may be underestimated.     12/16/2022 6MWD No desaturations requiring supplemental oxygen at rest or exertion  Ordering Provider: Marguerite Ruelas MD      Interpreting Provider: Marguerite Ruelas MD  Performing nurse/tech/RT: VT, RT  Diagnosis:  (Centrilobular emphysema)  Height: 5' 3" (160 cm)  Weight: 87 kg (191 lb 12.8 oz)  BMI (Calculated): 34     Patient Race:                                                                 Phase Oxygen Assessment Supplemental O2 Heart   Rate Blood Pressure Tae Dyspnea Scale Rating   Resting 99 % Room Air 92 bpm 110/75 0   Exercise             Minute             1 96 % Room Air 110 bpm       2 96 % Room Air 103 bpm       3 96 % Room Air 109 bpm       4 97 % Room Air 112 bpm       5 97 % Room Air 114 bpm       6  97 % Room Air 113 bpm 116/73 4   Recovery             Minute             1 97 % Room Air 99 bpm       2 98 % Room Air 98 bpm       3 98 % Room Air 100 bpm       4 98 % Room Air 104 bpm 114/70 0      Six Minute Walk Summary  6MWT Status: completed without stopping  Patient Reported: Dyspnea, No complaints (knee and should pain)      Interpretation:  Did the patient stop or pause?: No  Total Time Walked (Calculated): 360 seconds  Final Partial Lap Distance (feet): 0 feet  Total Distance Meters (Calculated): 304.8 meters  Predicted Distance Meters (Calculated): 437.84 meters  Percentage of Predicted (Calculated): 69.61  Peak VO2 (Calculated): 13.12  Mets: 3.75  Has The Patient Had a Previous Six Minute Walk Test?: No     Previous 6MWT Results  Has The Patient Had a Previous Six Minute Walk Test?: No       EXAMINATION:  XR CHEST PA AND LATERAL     CLINICAL HISTORY:  Pain in right shoulder     TECHNIQUE:  PA and lateral views of the chest were performed.     COMPARISON:  10/13/2020     FINDINGS:  Mild scarring or atelectasis at the left lung base.  No confluent airspace disease.  " Descending thoracic aorta is tortuous.  Mild calcification of the aortic knob.  Cardiomediastinal silhouette and osseous structures are stable in appearance.     Impression:     As above        Electronically signed by: Alex Bañuelos MD  Date:                                            10/04/2022  Time:                                           10:59    Lab Results   Component Value Date    WBC 10.33 04/02/2023    RBC 5.12 04/02/2023    HGB 15.9 04/02/2023    HCT 47.6 04/02/2023    MCV 93 04/02/2023    MCH 31.1 (H) 04/02/2023    MCHC 33.4 04/02/2023    RDW 13.9 04/02/2023     04/02/2023    MPV 8.8 (L) 04/02/2023    GRAN 7.4 04/02/2023    GRAN 71.9 04/02/2023    LYMPH 2.1 04/02/2023    LYMPH 20.1 04/02/2023    MONO 0.7 04/02/2023    MONO 7.1 04/02/2023    EOS 0.0 04/02/2023    BASO 0.03 04/02/2023    EOSINOPHIL 0.1 04/02/2023    BASOPHIL 0.3 04/02/2023       BMP  Lab Results   Component Value Date     05/02/2023    K 4.0 05/02/2023     05/02/2023    CO2 31 05/02/2023    BUN 7 05/02/2023    CREATININE 0.80 05/02/2023    CALCIUM 9.9 05/02/2023    ANIONGAP 7 (L) 05/02/2023    ESTGFRAFRICA 100 05/02/2023    EGFRNONAA >60.0 05/16/2022    AST 14 04/02/2023    ALT 17 04/02/2023    PROT 7.6 04/02/2023       Lab Results   Component Value Date    BNP 18 10/13/2020    BNP 17 07/23/2019       Lab Results   Component Value Date    TSH 1.189 08/24/2022       No results found for: SEDRATE    No results found for: CRP  No results found for: IGE     No results found for: ASPERGILLUS  No results found for: AFUMIGATUSCL     No results found for: ACE     Diagnostic Results:  I have personally reviewed today the following studies:    CT chest 10/2020  CLINICAL HISTORY:  dysphagia;     TECHNIQUE:  5 mm contiguous axial images are performed through the chest abdomen and pelvis following administration of IV and oral contrast.  Multiplanar reconstruction is performed     COMPARISON:  01/31/2013     FINDINGS:  CT scan  chest:     Lung windows:     There are mild emphysematous changes in both lungs.  The central airways are widely patent.     Mediastinum:     The esophagus is distended with both air and fluid.  A small hiatal hernia is noted.     The heart size is normal there are no pleural effusions.  No mediastinal or hilar lymphadenopathy is seen.     Bone windows appear normal for age.           Assessment/Plan:       Problem List Items Addressed This Visit       Obstructive sleep apnea     12/28/2023 PSG Moderate obstructive sleep apnea: AHI was 21.7/hr  1/5/2023 cpap 8 cm optimal  1/12/2023 order cpap 8 w/nasal mask, needs over night on cpap 8 after IDL  Never obtained CPAP after ordered, [facility]. He had other medical problems/surgeries that affected him picking up CPAP machine.    Follow up 31-90 days from obtaining CPAP for IDL.            Obesity (BMI 30.0-34.9)     Encouraged calorie reduction and 30 minutes of exercise daily. Discussed impact of obesity on general health.  Wt Readings from Last 9 Encounters:   06/16/23 80.1 kg (176 lb 9.4 oz)   05/17/23 82.6 kg (182 lb 1.6 oz)   04/21/23 80.7 kg (178 lb)   04/12/23 81.6 kg (180 lb)   04/02/23 87.7 kg (193 lb 6.4 oz)   03/31/23 81.3 kg (179 lb 3.7 oz)   12/16/22 87 kg (191 lb 12.8 oz)   12/16/22 87 kg (191 lb 12.8 oz)   10/04/22 83.9 kg (184 lb 15.5 oz)   Body mass index is 31.28 kg/m².           Mixed anxiety and depressive disorder     Stable, managed by psychiatry with [facility]          Insomnia     Stable on xanax 1 mg nightly, helps calm his anxiety that affects sleep. Continued management with psychiatry.          Centrilobular emphysema     Centrilobular emphysema seen on CT imaging 10/13/2020 CTchest/abdomen mild emphysematous changes in both lungs.   Not symptomatic, not on inhalers.  6/16/2023 spirometry is normal. No COPD gold findings.  12/16/22 spirometry is normal, no COPD gold findings.  10/4/2022 chest xray  mild scarring no pleural plaque seen.   12/16/2022 CPFT Normal spirometry FEV 98.2%. Lung volumes normal. Moderate reduced Diffusing capacity,diffusing capacity may be underestimated.             Atherosclerosis of native coronary artery of native heart with angina pectoris     Follow heart healthy diet. regular exercise.              Follow up in about 10 weeks (around 8/25/2023) for CPAP compliance download after initial set up.    Discussed diagnosis, its evaluation, treatment and usual course. All questions answered.      Blanca Cao, NP

## 2023-06-16 NOTE — ASSESSMENT & PLAN NOTE
Stable on xanax 1 mg nightly, helps calm his anxiety that affects sleep. Continued management with psychiatry.

## 2023-06-16 NOTE — ASSESSMENT & PLAN NOTE
Centrilobular emphysema seen on CT imaging 10/13/2020 CTchest/abdomen mild emphysematous changes in both lungs.   Not symptomatic, not on inhalers.  6/16/2023 spirometry is normal. No COPD gold findings.  12/16/22 spirometry is normal, no COPD gold findings.  10/4/2022 chest xray mild scarring no pleural plaque seen.   12/16/2022 CPFT Normal spirometry FEV 98.2%. Lung volumes normal. Moderate reduced Diffusing capacity,diffusing capacity may be underestimated.

## 2023-06-16 NOTE — ASSESSMENT & PLAN NOTE
12/28/2023 PSG Moderate obstructive sleep apnea: AHI was 21.7/hr  1/5/2023 cpap 8 cm optimal  1/12/2023 order cpap 8 w/nasal mask, needs over night on cpap 8 after IDL  Never obtained CPAP after ordered, missed 3 appointments with [facility] to  machine. He had other medical problems/surgeries that affected him picking up CPAP machine.    Follow up 31-90 days from obtaining CPAP for IDL.

## 2023-06-23 ENCOUNTER — NURSE TRIAGE (OUTPATIENT)
Dept: ADMINISTRATIVE | Facility: CLINIC | Age: 63
End: 2023-06-23
Payer: MEDICARE

## 2023-06-23 ENCOUNTER — OFFICE VISIT (OUTPATIENT)
Dept: GASTROENTEROLOGY | Facility: CLINIC | Age: 63
End: 2023-06-23
Payer: MEDICARE

## 2023-06-23 VITALS
HEART RATE: 92 BPM | BODY MASS INDEX: 25.75 KG/M2 | SYSTOLIC BLOOD PRESSURE: 118 MMHG | OXYGEN SATURATION: 97 % | WEIGHT: 179.88 LBS | DIASTOLIC BLOOD PRESSURE: 84 MMHG | HEIGHT: 70 IN

## 2023-06-23 DIAGNOSIS — K22.0 ACHALASIA: Primary | ICD-10-CM

## 2023-06-23 DIAGNOSIS — K21.9 GASTROESOPHAGEAL REFLUX DISEASE WITHOUT ESOPHAGITIS: ICD-10-CM

## 2023-06-23 DIAGNOSIS — R13.10 DYSPHAGIA, UNSPECIFIED TYPE: ICD-10-CM

## 2023-06-23 PROCEDURE — 99214 OFFICE O/P EST MOD 30 MIN: CPT | Mod: S$GLB,,, | Performed by: INTERNAL MEDICINE

## 2023-06-23 PROCEDURE — 99999 PR PBB SHADOW E&M-EST. PATIENT-LVL IV: ICD-10-PCS | Mod: PBBFAC,,, | Performed by: INTERNAL MEDICINE

## 2023-06-23 PROCEDURE — 99214 PR OFFICE/OUTPT VISIT, EST, LEVL IV, 30-39 MIN: ICD-10-PCS | Mod: S$GLB,,, | Performed by: INTERNAL MEDICINE

## 2023-06-23 PROCEDURE — 3074F PR MOST RECENT SYSTOLIC BLOOD PRESSURE < 130 MM HG: ICD-10-PCS | Mod: CPTII,S$GLB,, | Performed by: INTERNAL MEDICINE

## 2023-06-23 PROCEDURE — 3008F PR BODY MASS INDEX (BMI) DOCUMENTED: ICD-10-PCS | Mod: CPTII,S$GLB,, | Performed by: INTERNAL MEDICINE

## 2023-06-23 PROCEDURE — 3079F DIAST BP 80-89 MM HG: CPT | Mod: CPTII,S$GLB,, | Performed by: INTERNAL MEDICINE

## 2023-06-23 PROCEDURE — 3074F SYST BP LT 130 MM HG: CPT | Mod: CPTII,S$GLB,, | Performed by: INTERNAL MEDICINE

## 2023-06-23 PROCEDURE — 1159F PR MEDICATION LIST DOCUMENTED IN MEDICAL RECORD: ICD-10-PCS | Mod: CPTII,S$GLB,, | Performed by: INTERNAL MEDICINE

## 2023-06-23 PROCEDURE — 3079F PR MOST RECENT DIASTOLIC BLOOD PRESSURE 80-89 MM HG: ICD-10-PCS | Mod: CPTII,S$GLB,, | Performed by: INTERNAL MEDICINE

## 2023-06-23 PROCEDURE — 99999 PR PBB SHADOW E&M-EST. PATIENT-LVL IV: CPT | Mod: PBBFAC,,, | Performed by: INTERNAL MEDICINE

## 2023-06-23 PROCEDURE — 3008F BODY MASS INDEX DOCD: CPT | Mod: CPTII,S$GLB,, | Performed by: INTERNAL MEDICINE

## 2023-06-23 PROCEDURE — 1159F MED LIST DOCD IN RCRD: CPT | Mod: CPTII,S$GLB,, | Performed by: INTERNAL MEDICINE

## 2023-06-23 RX ORDER — PROMETHAZINE HYDROCHLORIDE 25 MG/1
25 TABLET ORAL EVERY 6 HOURS PRN
Qty: 90 TABLET | Refills: 3 | Status: SHIPPED | OUTPATIENT
Start: 2023-06-23 | End: 2023-06-26

## 2023-06-23 RX ORDER — PROMETHAZINE HYDROCHLORIDE 25 MG/1
25 TABLET ORAL EVERY 6 HOURS PRN
Qty: 90 TABLET | Refills: 3 | Status: SHIPPED | OUTPATIENT
Start: 2023-06-23 | End: 2023-06-23 | Stop reason: SDUPTHER

## 2023-06-23 RX ORDER — PROMETHAZINE HYDROCHLORIDE 6.25 MG/5ML
25 SYRUP ORAL EVERY 6 HOURS PRN
Qty: 473 ML | Refills: 0 | Status: SHIPPED | OUTPATIENT
Start: 2023-06-23 | End: 2023-06-26 | Stop reason: SDUPTHER

## 2023-06-23 NOTE — TELEPHONE ENCOUNTER
Pt reports he was seen in office today by Dr. ILYA Hoover and was prescribed phenergan 25 mg tablets to take every 6 hours PRN nausea, but he needs it to be in liquid form instead. Secure chat sent to [facility] on call MD, Dr. PHILIP Thakur, who sent a different prescription in for the pt. Pt informed and encouraged to call back with any worsening symptoms or questions. Pt verbalized understanding.    Reason for Disposition   [1] Caller has URGENT medicine question about med that PCP or specialist prescribed AND [2] triager unable to answer question    Protocols used: Medication Question Call-A-

## 2023-06-23 NOTE — PROGRESS NOTES
[facility] Consultation Note    Reason for Consult:    Chief Complaint   Patient presents with    Nausea    Emesis           PCP:   Herminia Longoria       Referring MD:  [place] Zur[place]  [id number] [place] [address]    HPI:  Lenny Latham Jr. is a 63 y.o. male referred to motility clinic for second opinion regarding the following problems:    GERD.   Retrosternal pyrosis:  none   Regurgitation: rare    MEDs:   Well controlled w omeprazole 40 daily    Dysphagia.   Problems with solids: none   Location: mid to lower esophagus    Chest pain/spasm: none     Regurgitation: none     Eckardt Symptom Score  Dysphagia: 0  Regurgitation: 0  Retrosternal pain: 0  Weight Loss: 0  Total: 0    Heller myotomy with Junior fundoplication performed for 2017 w significant improvement in symptoms  EGD w Botox injection/dilation 20mm  5/2021 w some improvement.    Early satiety.  Daily   Nausea.    No vomiting     MEDs:   narcotics  Promethanize prn w improvement    Zofran - did not work   DIET: regular diet     GES delayed     Weight loss:   Stable at  179 from 185b   No shakes    Denies vomiting, abd pain, bloating, diarrhea, constipation, BRBPR, melena, weight loss.       Previous Studies:   EGD 04/21/2023: Dilation in the entire esophagus.  Clear fluid in lower 3rd of the esophagus.  Diverticulum 35-40 cm.  Puckering resistance and no pop at GE junction.  Injected 100 units of Botox.  Junior fundoplication at GE junction.  Possibly loose.  Atrophic mucosa in the incisura (multifocal intestinal metaplasia.   Mucosal granuloma focally present).  Normal duodenum  GES: delayed.  35% retention   Manometry 05/27/2022:  No Nelson classification abnormality.  Normal LES pressure with complete relaxation.  Esophageal pressurization.  Incomplete bolus clearance.  Hiatal hernia versus myotomy with diverticulum.  Abnormal multiple rapid swallow test.  No significant difference with  provocative maneuvers.  Evidence of residual at the end of 200 cc bolus.  EGD 05/23/2022:  Small amount of fluid in the esophagus.  Dilation in the entire esophagus.  Atrophic mucosa in the esophagus (-).  Z-line 40 cm.  Diverticulum in distal esophagus (35-40 cm) mild esophageal stenosis at 35 cm proximal to diverticulum.  Normal GE junction.  No puckering resistance or pop.  Flip placed with endoscopic guidance.  Diagnostic of normal esophageal distensibility and absent contractility.  Continuous pressure zone proximal to GE junction.  See pictures.  Erythema in the stomach (-).  Normal duodenum.  Manometry catheter placed with endoscopic guidance  Timed barium swallow 05/05/2022:  13 mm tablet passed easily into the stomach.  Patulous esophagus.  Saccular outpouching of the distal esophagus just proximal to GE junction.  Incomplete relaxation of the lower esophageal sphincter.  Esophageal mucosa was with within normal limits.  Esophageal dysmotility characterized by proximal escape in tertiary contractions.) Position there was significant stasis of contrast throughout the esophagus 1 cm at 0 minutes.  Trace residual at 1 minute.    CT abdomen 08/18/2021:  Trace right-sided pleural effusion.  Small hiatal hernia with GERD.  Tiny 6 mm hypodensity in the right hepatic lobe likely small cysts.  Diverticulosis.  Constipation.  EGD 05/05/2021:  Sigmoid esophagus with retained food consistent with diagnosis of achalasia.  Resent tonically close GE junction with significant dilation around the area consistent with achalasia.  Currently otherwise normal stomach and duodenum the some fluid retention.  Gastric emptying delay is seen in the proximally to slightly less than 20% of patients with achalasia.  Successful injection of 100 units botulin toxin in 4 different aliquots at SCJ.  Successful dilation of GE junction to 20 mm.  Referral to Dr. Ariel Mcclendon to consider poem.    CT of abdomen 05/04/2021:  Normal CT of  abdomen spleen is quite small.  Prostate enlarged.  Post distention of distal esophagus containing food and debris.  Consider achalasia versus esophageal paraesophageal diverticulum.  Esophagram 10/13/2020:  Moderate size hiatal hernia.  Significant area of persistent narrowing of proximal stomach just distal to the hiatal hernia.  Contrast material this passed through this narrowing slowly.  The esophagus is abnormally dilated with significant associated esophageal dysmotility.  Limited exam secondary to patient's inability to tolerate further imaging.  CT CT chest 10/13/2020:  Radha fluid distention of esophagus with evidence of small hiatal hernia otherwise no significant abnormality.  Esophagram 04/22/2016:  Narrowing of posterior mid cervical esophagus, significant dysmotility of the esophagus with continuing narrowing of the esophagus at junction and mid and proximal 3rd of the thoracic esophagus, multiple tertiary contractions and persistent narrowing in the distal esophagus at GE junction with contrast retention.  Incomplete filled stomach with irregularity and prominent folds suggestive of region of body and proximal stomach being of uncertain significance in inflammatory or infectious disease or even neoplastic involvement cannot be ruled out.    Manometry 05/09/2016:  Achalasia type 2.  IRP 30.9.  Pan esophageal pressurization 100%.  Colonoscopy  >3 years ago     Relevant Surgical History:    Heller myotomy with Junior fundoplication performed for 2017      ROS:  ROS   Complete ROS negative except as above    Medical History:   Past Medical History:   Diagnosis Date    Achalasia of esophagus 11/28/2016    Anxiety state, unspecified     Centrilobular emphysema 10/3/2022    Coronary artery disease     Esophageal reflux     Esophageal stricture     Hypertrophy of prostate without urinary obstruction and other lower urinary tract symptoms (LUTS)     Screening PSA (prostate specific antigen) 12/7/12    0.4     Unspecified essential hypertension         Surgical History:   Past Surgical History:   Procedure Laterality Date    BACK SURGERY      CERVICAL EPIDURAL STEROID INJECTION      COLONOSCOPY      ESOPHAGEAL MANOMETRY WITH MEASUREMENT OF IMPEDANCE N/A 05/23/2022    Procedure: MANOMETRY-ESOPHAGEAL-WITH IMPEDANCE;  Surgeon: Madina Hoover MD;  Location: [facility] ([address] [place] [address]);  Service: Endoscopy;  Laterality: N/A;  hold pain medication 24 hours prior to procedure    ESOPHAGOGASTRODUODENOSCOPY      ESOPHAGOGASTRODUODENOSCOPY N/A 08/16/2019    Procedure: EGD (ESOPHAGOGASTRODUODENOSCOPY);  Surgeon: Pawan Schwab MD;  Location: [facility];  Service: Endoscopy;  Laterality: N/A;    ESOPHAGOGASTRODUODENOSCOPY N/A 05/23/2022    Procedure: ESOPHAGOGASTRODUODENOSCOPY (EGD);  Surgeon: Madina Hoover MD;  Location: [facility] ([address] [place] [address]);  Service: Endoscopy;  Laterality: N/A;  Endoflip/Endoscopically placed manometry probe  2nd floor-End stage achalasia  propofol only  full iquid diet x3 days, clear liquid diet x1 day prior to procedure  fully vaccinated, [facility]    ESOPHAGOGASTRODUODENOSCOPY N/A 04/21/2023    Procedure: ESOPHAGOGASTRODUODENOSCOPY (EGD);  Surgeon: Madina Hoover MD;  [facility]  Service: Endoscopy;  Laterality: N/A;  w/ Botox  2nd floor-End Stage Achalasia  full liquid diet x3 days, clear liquid diet x1 day prior to procedure  instructions sent to [email or web address] and emailed to [email or web address]    4/17/23 - Pt confirmed apt. Pt moved up to the 11am slot, to    HERNIA REPAIR      INGUINAL HERNIA REPAIR Right     LUMBAR EPIDURAL STEROID INJECTION      TOTAL KNEE ARTHROPLASTY Right 05/25/2023    UPPER GASTROINTESTINAL ENDOSCOPY  04/27/2016        Family History:   Family History   Problem Relation Age of Onset    Lung cancer Mother     Hypertension Father     Prostate cancer Father     Ulcers Sister     Ulcers Brother     Prostate cancer Maternal  Grandfather     Ulcers Sister     Celiac disease Neg Hx     Colon cancer Neg Hx     Colon polyps Neg Hx     Crohn's disease Neg Hx     Esophageal cancer Neg Hx     Inflammatory bowel disease Neg Hx     Irritable bowel syndrome Neg Hx     Liver cancer Neg Hx     Liver disease Neg Hx     Rectal cancer Neg Hx     Stomach cancer Neg Hx         Social History:   Social History     Socioeconomic History    Marital status: Single    Years of education: 12th   Occupational History    Occupation: Disabled   Tobacco Use    Smoking status: Former     Types: Cigars     Start date: 2016     Quit date: 2020     Years since quitting: 3.4    Smokeless tobacco: Never    Tobacco comments:     Cigar 1-2 times a week x about 4 years   Substance and Sexual Activity    Alcohol use: Not Currently     Comment: once or twice a year 1-2 beers    Drug use: Not Currently     Types: Marijuana     Social Determinants of Health     Financial Resource Strain: High Risk    Difficulty of Paying Living Expenses: Hard   Food Insecurity: Food Insecurity Present    Worried About Running Out of Food in the Last Year: Sometimes true    Ran Out of Food in the Last Year: Sometimes true   Transportation Needs: Unmet Transportation Needs    Lack of Transportation (Medical): Yes    Lack of Transportation (Non-Medical): No   Physical Activity: Insufficiently Active    Days of Exercise per Week: 4 days    Minutes of Exercise per Session: 30 min   Stress: Stress Concern Present    Feeling of Stress : Very much   Social Connections: Unknown    Frequency of Communication with Friends and Family: Twice a week    Attends Mosque Services: More than 4 times per year    Active Member of Clubs or Organizations: No    Attends Club or Organization Meetings: Never    Marital Status:    Housing Stability: High Risk    Unable to Pay for Housing in the Last Year: Yes    Number of Places Lived in the Last Year: 1    Unstable Housing in the Last Year: No        Review  "of patient's allergies indicates:   Allergen Reactions    Nsaids (non-steroidal anti-inflammatory drug) Nausea And Vomiting       Current Outpatient Medications   Medication Sig Dispense Refill    atorvastatin (LIPITOR) 40 MG tablet TAKE 1 TABLET(40 MG) BY MOUTH EVERY EVENING 90 tablet 3    omeprazole (PRILOSEC) 40 MG capsule Take 1 capsule (40 mg total) by mouth once daily. 30 capsule 5    oxyCODONE-acetaminophen (PERCOCET)  mg per tablet Take by mouth.      tamsulosin (FLOMAX) 0.4 mg Cap Take 1 capsule (0.4 mg total) by mouth once daily. 90 capsule 3    ALPRAZolam (XANAX) 1 MG tablet Take 1 tablet (1 mg total) by mouth nightly as needed for Anxiety. 60 tablet 3    aspirin 81 MG Chew Take 1 tablet (81 mg total) by mouth once daily. 30 tablet 0    celecoxib (CELEBREX) 100 MG capsule TAKE 1 CAPSULE BY MOUTH ONCE A DAY WITH MEALS      diazePAM (VALIUM) 5 MG tablet Take 1 tablet (5 mg total) by mouth daily as needed for Anxiety. 30 tablet 3    HYDROcodone-acetaminophen (NORCO) 5-325 mg per tablet Take 1 tablet by mouth every 4 (four) hours as needed.      meloxicam (MOBIC) 15 MG tablet Take 15 mg by mouth 3 (three) times daily.      promethazine (PHENERGAN) 25 MG tablet Take 1 tablet (25 mg total) by mouth every 6 (six) hours as needed for Nausea. 90 tablet 3     No current facility-administered medications for this visit.     Facility-Administered Medications Ordered in Other Visits   Medication Dose Route Frequency Provider Last Rate Last Admin    0.9%  NaCl infusion   Intravenous Continuous Brenden Pizarro MD   New Bag at 05/23/22 1238    0.9%  NaCl infusion   Intravenous Continuous Madina Hoover MD        ondansetron injection 4 mg  4 mg Intravenous Q12H PRN Brenden Pizarro MD        sodium chloride 0.9% flush 10 mL  10 mL Intravenous PRN Madina Hoover MD            Objective Findings:  Vital Signs:  /84   Pulse 92   Ht 5' 10" (1.778 m)   Wt 81.6 kg (179 lb 14.3 oz)   SpO2 97%   BMI " 25.81 kg/m²   Body mass index is 25.81 kg/m².    Physical Exam:  General appearance: alert, cooperative, no distress  HENT: Normocephalic, atraumatic, neck symmetrical, no nasal discharge  Eyes: conjunctivae/corneas clear, PERRL, EOM's intact  Lungs: clear to auscultation bilaterally, no dullness to percussion bilaterally  Heart: regular rate and rhythm without rub; no displacement of the PMI  Abdomen: soft, non-tender; bowel sounds normoactive; no organomegaly  Extremities: extremities symmetric; no clubbing, cyanosis, or edema  Integument: Skin color, texture, turgor normal; no rashes; hair distrubution normal  Neurologic: Alert and oriented X 3, normal strength, normal coordination and gait  Psychiatric: no pressured speech; normal affect; no evidence of impaired cognition      Labs:   Reviewed in Epic/record      Assessment and Plan:  Lenny Latham Jr. is a 63 y.o. male with referred to [facility] for 2nd opinion regarding following problems:    GERD.   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  On norco BID   On zanaflex   On sidenafil for ED  TBS w patulous esophagus, epiphrenic diverticulum, trace at 1min, cleared by 2 min, 13 mm tablet passed easily into the stomach.   EGD w fluid filled, dilated, atrophic esophagus, stenosis at 35cm, TIC 35-40cm, patent GEJ (no puckering, resistance, pop)  Flip nl distensibility and and absent contractility, double pressure zone    Manometry with nl LES w complete relaxation, pressurization, incomplete clearance and residual w 200cc bolus.  HH vs myotomy w diverticulum   Seen by   Kita.   Case discuss in Swallowing Conference.  Imaging concerning for incomplete myotomy or scar related narrowing proximal to diverticulum. Surgical intervention carries high risk because narrowing is proximal.  Thoracic approach may be necessary. Recommended EGD w Botox or PD  -EGD w Botox prn     Early satiety. Rare nausea   GES w delay  -GP diet    -See Gi dietitian    Weight loss.  Stable   -Shakes BID     GI metaplasia  Gastric granuloma   -Check H. Pylori   -EGD 4/2024    CRC screening   -colonoscopy w local GI     Arthritis   On norco BID   On Zanaflex     Anxiety   -On lexapro   -On Zyprexa       Discussed with PT that I am leaving [facility]. Pt will return to Izzy Zurita NP for long term GI care.   Pt verbalized understanding.      No follow-ups on file.    1. Achalasia    2. Gastroesophageal reflux disease without esophagitis    3. Dysphagia, unspecified type            Order summary:  Orders Placed This Encounter    promethazine (PHENERGAN) 25 MG tablet         Discussed with PT that I act as a consult service and do not accept patients to be their primary GI provider. Discussed that the goal of our visits is to address relevant motility problems while deferring other GI problems as well as screening and surveillance to his/her primary GI provider.   Discussed that he/she needs to continue to follow with his local primary GI provider.  Discussed that we will complete his/her workup, clarify diagnosis and attempt to optimize his/her symptoms with intention of him/her returning to referring GI provider for long term GI care.   Pt verbalized understanding.        Thank you so much for allowing me to participate in the care of Lenny Latham Jr.      Madina Hoover MD      This note was created using voice recognition software, and may contain some unrecognized transcriptional errors.

## 2023-06-23 NOTE — PATIENT INSTRUCTIONS
-Drink high protein shakes three times per day   -See dietitian to discuss gastroparesis and dysphagia diet   -Follow up with Izzy Zurita, NP for management of your chronic Gi problems   -Ask Izzy to arrange screening colonoscopy in [facility].  -If your symptoms get worse, she can refer to Dr. Elizondo and Dr. East   -Repeat EGD in 4/2024 to check on intestinal metaplasia  -Check stool H.pylori test.  Hold omeprazole for 2 weeks prior to the test

## 2023-06-26 RX ORDER — PROMETHAZINE HYDROCHLORIDE 6.25 MG/5ML
25 SYRUP ORAL EVERY 6 HOURS PRN
Qty: 473 ML | Refills: 2 | Status: SHIPPED | OUTPATIENT
Start: 2023-06-26 | End: 2023-10-23 | Stop reason: SDUPTHER

## 2023-06-26 NOTE — TELEPHONE ENCOUNTER
Called pt and scheduled NP appointment for 4/7/22 @ 10 am    Discussed with pt that Dr. Hoover is a consultant that does not accept patients as a primary GI provider.  Discussed that she will send them back to their primary GI provider once their symptoms improved or the plan of care is established.     Pt was told the logistics of the appointment (arrival time, length of visit, total time spent at facility).     Pt was also told that Dr. Hoover will review their previous workup but may order additional test and perform her own procedures and that this may require an overnight stay at a local hot to complete the workup. Patient agreed and verbalized understanding.   
Patent

## 2023-07-07 ENCOUNTER — PATIENT MESSAGE (OUTPATIENT)
Dept: INFECTIOUS DISEASES | Facility: CLINIC | Age: 63
End: 2023-07-07
Payer: MEDICARE

## 2023-07-25 ENCOUNTER — OFFICE VISIT (OUTPATIENT)
Dept: GASTROENTEROLOGY | Facility: CLINIC | Age: 63
End: 2023-07-25
Payer: MEDICARE

## 2023-07-25 DIAGNOSIS — K22.0 ACHALASIA: Primary | ICD-10-CM

## 2023-07-25 DIAGNOSIS — Z12.11 COLON CANCER SCREENING: ICD-10-CM

## 2023-07-25 PROCEDURE — 99213 PR OFFICE/OUTPT VISIT, EST, LEVL III, 20-29 MIN: ICD-10-PCS | Mod: 95,,, | Performed by: NURSE PRACTITIONER

## 2023-07-25 PROCEDURE — 99213 OFFICE O/P EST LOW 20 MIN: CPT | Mod: 95,,, | Performed by: NURSE PRACTITIONER

## 2023-07-25 PROCEDURE — 1159F MED LIST DOCD IN RCRD: CPT | Mod: CPTII,95,, | Performed by: NURSE PRACTITIONER

## 2023-07-25 PROCEDURE — 1160F RVW MEDS BY RX/DR IN RCRD: CPT | Mod: CPTII,95,, | Performed by: NURSE PRACTITIONER

## 2023-07-25 PROCEDURE — 1160F PR REVIEW ALL MEDS BY PRESCRIBER/CLIN PHARMACIST DOCUMENTED: ICD-10-PCS | Mod: CPTII,95,, | Performed by: NURSE PRACTITIONER

## 2023-07-25 PROCEDURE — 1159F PR MEDICATION LIST DOCUMENTED IN MEDICAL RECORD: ICD-10-PCS | Mod: CPTII,95,, | Performed by: NURSE PRACTITIONER

## 2023-07-25 RX ORDER — SODIUM, POTASSIUM,MAG SULFATES 17.5-3.13G
SOLUTION, RECONSTITUTED, ORAL ORAL
Qty: 354 ML | Refills: 0 | Status: SHIPPED | OUTPATIENT
Start: 2023-07-25 | End: 2023-09-26

## 2023-07-25 NOTE — PROGRESS NOTES
Clinic Follow Up:  [facility] Follow Up Note    Reason for Follow Up:  The primary encounter diagnosis was Achalasia. A diagnosis of Colon cancer screening was also pertinent to this visit.    PCP: Herminia Longoria       The patient location is: [place]   The chief complaint leading to consultation is: colon cancer screening     Visit type: audiovisual    Face to Face time with patient: 10 minutes   15 minutes of total time spent on the encounter, which includes face to face time and non-face to face time preparing to see the patient (eg, review of tests), Obtaining and/or reviewing separately obtained history, Documenting clinical information in the electronic or other health record, Independently interpreting results (not separately reported) and communicating results to the patient/family/caregiver, or Care coordination (not separately reported).         Each patient to whom he or she provides medical services by telemedicine is:  (1) informed of the relationship between the physician and patient and the respective role of any other health care provider with respect to management of the patient; and (2) notified that he or she may decline to receive medical services by telemedicine and may withdraw from such care at any time.    Notes:         HPI:  This is a 63 y.o. male here for follow up.   He has been seeing motility clinic for achalasia. He had an  EGD in April 2023 with botox. He has been feeling very well since then. He is not having any dysphagia.     Prior colonoscopy?: yes  Date of last colonoscopy: over 3 years   History of colon polyps: unknown   Recall: 2-3 years  Family history of colon cancer: no    Review of Systems   Constitutional:  Negative for activity change and appetite change.        As per interval history above   Respiratory:  Negative for cough and shortness of breath.    Cardiovascular:  Negative for chest pain.   Gastrointestinal:  Negative for abdominal pain, anal  bleeding, blood in stool, constipation, diarrhea, nausea, rectal pain and vomiting.   Skin:  Negative for color change and rash.     Medical History:  Past Medical History:   Diagnosis Date    Achalasia of esophagus 11/28/2016    Anxiety state, unspecified     Centrilobular emphysema 10/3/2022    Coronary artery disease     Esophageal reflux     Esophageal stricture     Hypertrophy of prostate without urinary obstruction and other lower urinary tract symptoms (LUTS)     Screening PSA (prostate specific antigen) 12/7/12    0.4    Unspecified essential hypertension        Surgical History:   Past Surgical History:   Procedure Laterality Date    BACK SURGERY      CERVICAL EPIDURAL STEROID INJECTION      COLONOSCOPY      ESOPHAGEAL MANOMETRY WITH MEASUREMENT OF IMPEDANCE N/A 05/23/2022    Procedure: MANOMETRY-ESOPHAGEAL-WITH IMPEDANCE;  Surgeon: Madina Hoover MD;  Location: [facility] ([address] [place] [address]);  Service: Endoscopy;  Laterality: N/A;  hold pain medication 24 hours prior to procedure    ESOPHAGOGASTRODUODENOSCOPY      ESOPHAGOGASTRODUODENOSCOPY N/A 08/16/2019    Procedure: EGD (ESOPHAGOGASTRODUODENOSCOPY);  Surgeon: Pawan Schwab MD;  Location: [facility];  Service: Endoscopy;  Laterality: N/A;    ESOPHAGOGASTRODUODENOSCOPY N/A 05/23/2022    Procedure: ESOPHAGOGASTRODUODENOSCOPY (EGD);  Surgeon: Madina Hoover MD;  Location: [facility] ([address] [place] [address]);  Service: Endoscopy;  Laterality: N/A;  Endoflip/Endoscopically placed manometry probe  2nd floor-End stage achalasia  propofol only  full iquid diet x3 days, clear liquid diet x1 day prior to procedure  fully vaccinated, [facility]    ESOPHAGOGASTRODUODENOSCOPY N/A 04/21/2023    Procedure: ESOPHAGOGASTRODUODENOSCOPY (EGD);  Surgeon: Madina Hoover MD;  Location: [facility] ([address] [place] [address]);  Service: Endoscopy;  Laterality: N/A;  w/ Botox  2nd floor-End Stage Achalasia  full liquid diet x3 days, clear liquid diet x1 day prior to  procedure  instructions [facility] and emailed to [email or web address]    4/17/23 - Pt confirmed apt. Pt moved up to the 11am slot, to    HERNIA REPAIR      INGUINAL HERNIA REPAIR Right     LUMBAR EPIDURAL STEROID INJECTION      TOTAL KNEE ARTHROPLASTY Right 05/25/2023    UPPER GASTROINTESTINAL ENDOSCOPY  04/27/2016       Family History:   Family History   Problem Relation Age of Onset    Lung cancer Mother     Hypertension Father     Prostate cancer Father     Ulcers Sister     Ulcers Brother     Prostate cancer Maternal Grandfather     Ulcers Sister     Celiac disease Neg Hx     Colon cancer Neg Hx     Colon polyps Neg Hx     Crohn's disease Neg Hx     Esophageal cancer Neg Hx     Inflammatory bowel disease Neg Hx     Irritable bowel syndrome Neg Hx     Liver cancer Neg Hx     Liver disease Neg Hx     Rectal cancer Neg Hx     Stomach cancer Neg Hx        Social History:   Social History     Tobacco Use    Smoking status: Former     Types: Cigars     Start date: 2016     Quit date: 2020     Years since quitting: 3.5    Smokeless tobacco: Never    Tobacco comments:     Cigar 1-2 times a week x about 4 years   Substance Use Topics    Alcohol use: Not Currently     Comment: once or twice a year 1-2 beers    Drug use: Not Currently     Types: Marijuana       Allergies:   Review of patient's allergies indicates:   Allergen Reactions    Nsaids (non-steroidal anti-inflammatory drug) Nausea And Vomiting       Home Medications:  Current Outpatient Medications on File Prior to Visit   Medication Sig Dispense Refill    ALPRAZolam (XANAX) 1 MG tablet Take 1 tablet (1 mg total) by mouth nightly as needed for Anxiety. 60 tablet 3    aspirin 81 MG Chew Take 1 tablet (81 mg total) by mouth once daily. 30 tablet 0    atorvastatin (LIPITOR) 40 MG tablet TAKE 1 TABLET(40 MG) BY MOUTH EVERY EVENING 90 tablet 3    celecoxib (CELEBREX) 100 MG capsule TAKE 1 CAPSULE BY MOUTH ONCE A DAY WITH MEALS      diazePAM (VALIUM)  5 MG tablet Take 1 tablet (5 mg total) by mouth daily as needed for Anxiety. 30 tablet 3    HYDROcodone-acetaminophen (NORCO) 5-325 mg per tablet Take 1 tablet by mouth every 4 (four) hours as needed.      meloxicam (MOBIC) 15 MG tablet Take 15 mg by mouth 3 (three) times daily.      omeprazole (PRILOSEC) 40 MG capsule Take 1 capsule (40 mg total) by mouth once daily. 30 capsule 5    oxyCODONE-acetaminophen (PERCOCET)  mg per tablet Take by mouth.      promethazine (PHENERGAN) 6.25 mg/5 mL syrup Take 20 mLs (25 mg total) by mouth every 6 (six) hours as needed for Nausea. 473 mL 2    tamsulosin (FLOMAX) 0.4 mg Cap Take 1 capsule (0.4 mg total) by mouth once daily. 90 capsule 3     Current Facility-Administered Medications on File Prior to Visit   Medication Dose Route Frequency Provider Last Rate Last Admin    0.9%  NaCl infusion   Intravenous Continuous Brenden Pizarro MD   New Bag at 05/23/22 1238    0.9%  NaCl infusion   Intravenous Continuous Madina Hoover MD        ondansetron injection 4 mg  4 mg Intravenous Q12H PRN Brenden Pizarro MD        sodium chloride 0.9% flush 10 mL  10 mL Intravenous PRN Madina Hoover MD           There were no vitals taken for this visit.  There is no height or weight on file to calculate BMI.  Physical Exam  Constitutional:       General: He is not in acute distress.  HENT:      Head: Normocephalic.   Neurological:      General: No focal deficit present.      Mental Status: He is alert.   Psychiatric:         Mood and Affect: Mood normal.         Judgment: Judgment normal.       Labs: Pertinent labs reviewed.  CRC Screening: due     Assessment:   1. Achalasia - improved.    2. Colon cancer screening - due      Recommendations:   - colonoscopy  - A referral has been placed for endoscopy procedure scheduling and pre-admission testing (PAT) appointment has been scheduled.      Achalasia    Colon cancer screening  -     Ambulatory referral/consult to Endo Procedure  ; Future; Expected date: 07/26/2023  -     Case Request Endoscopy: COLONOSCOPY  -     sodium,potassium,mag sulfates (SUPREP BOWEL PREP KIT) 17.5-3.13-1.6 gram SolR; Use as directed  Dispense: 354 mL; Refill: 0    Return to Clinic:  Follow up to be determined after results/ procedure(s).     Thank you for the opportunity to participate in the care of this patient.  RANULFO Bryant

## 2023-07-27 ENCOUNTER — HOSPITAL ENCOUNTER (OUTPATIENT)
Dept: PREADMISSION TESTING | Facility: HOSPITAL | Age: 63
Discharge: HOME | End: 2023-07-27
Attending: INTERNAL MEDICINE
Payer: MEDICARE

## 2023-07-27 DIAGNOSIS — Z12.11 COLON CANCER SCREENING: ICD-10-CM

## 2023-08-03 RX ORDER — BUSPIRONE HYDROCHLORIDE 15 MG/1
15 TABLET ORAL 2 TIMES DAILY
COMMUNITY
Start: 2023-07-05 | End: 2023-09-26

## 2023-08-03 RX ORDER — TRAMADOL HYDROCHLORIDE 50 MG/1
50 TABLET ORAL EVERY 4 HOURS PRN
COMMUNITY
Start: 2023-05-17 | End: 2023-09-26

## 2023-08-03 RX ORDER — ENOXAPARIN SODIUM 100 MG/ML
INJECTION SUBCUTANEOUS
COMMUNITY
Start: 2023-05-26 | End: 2023-09-26

## 2023-08-03 RX ORDER — OXYCODONE HYDROCHLORIDE 10 MG/1
TABLET ORAL
COMMUNITY
Start: 2023-07-19 | End: 2023-09-26

## 2023-08-05 DIAGNOSIS — F41.8 MIXED ANXIETY AND DEPRESSIVE DISORDER: ICD-10-CM

## 2023-08-07 DIAGNOSIS — F41.8 MIXED ANXIETY AND DEPRESSIVE DISORDER: ICD-10-CM

## 2023-08-07 RX ORDER — DIAZEPAM 5 MG/1
TABLET ORAL
Qty: 30 TABLET | Refills: 1 | Status: SHIPPED | OUTPATIENT
Start: 2023-08-07 | End: 2023-09-27 | Stop reason: SDUPTHER

## 2023-08-07 RX ORDER — DIAZEPAM 5 MG/1
TABLET ORAL
Qty: 30 TABLET | Refills: 1 | OUTPATIENT
Start: 2023-08-07

## 2023-08-14 ENCOUNTER — TELEPHONE (OUTPATIENT)
Dept: PREADMISSION TESTING | Facility: HOSPITAL | Age: 63
End: 2023-08-14
Payer: MEDICARE

## 2023-08-14 NOTE — TELEPHONE ENCOUNTER
Spoke with pt and went over his instructions for bowel prep for his procedure on Thursday 8/17/2023.

## 2023-08-14 NOTE — TELEPHONE ENCOUNTER
----- Message from Yvonne Bedoya sent at 8/14/2023  2:13 PM CDT -----  Contact: [phone]  Pt has confusion re what he is suppose to do re his colonoscopy prep. Can you please call him to discuss? Thanks

## 2023-08-15 ENCOUNTER — ANESTHESIA EVENT (OUTPATIENT)
Dept: ENDOSCOPY | Facility: HOSPITAL | Age: 63
End: 2023-08-15
Payer: MEDICARE

## 2023-08-15 NOTE — ANESTHESIA PREPROCEDURE EVALUATION
08/15/2023  Lenny Latham Jr. is a 63 y.o., male.  Echo 4/8/21  Conclusion         Normal myocardial perfusion scan. There is no evidence of myocardial ischemia or infarction.    The gated perfusion images showed an ejection fraction of 54% at rest. The gated perfusion images showed an ejection fraction of 65% post stress. Normal ejection fraction is greater than 53%.    The EKG portion of this study is negative for ischemia.    The patient reported no chest pain during the stress test.    There were no arrhythmias during stress.       Normal sinus rhythm   Normal ECG   When compared with ECG of 11-MAR-2022 13:37,   No significant change was found   Confirmed by THAIS HERBERT, [facility] ([id number]) on 5/16/2022 11:33:57 PM   Past Medical History:   Diagnosis Date    Achalasia of esophagus 11/28/2016    Anxiety state, unspecified     Centrilobular emphysema 10/3/2022    Coronary artery disease     Esophageal reflux     Esophageal stricture     Hypertrophy of prostate without urinary obstruction and other lower urinary tract symptoms (LUTS)     Screening PSA (prostate specific antigen) 12/7/12    0.4    Unspecified essential hypertension      Past Surgical History:   Procedure Laterality Date    BACK SURGERY      CERVICAL EPIDURAL STEROID INJECTION      COLONOSCOPY      ESOPHAGEAL MANOMETRY WITH MEASUREMENT OF IMPEDANCE N/A 05/23/2022    Procedure: MANOMETRY-ESOPHAGEAL-WITH IMPEDANCE;  Surgeon: Madina Hoover MD;  Location: [facility] ([address] [place] [address]);  Service: Endoscopy;  Laterality: N/A;  hold pain medication 24 hours prior to procedure    ESOPHAGOGASTRODUODENOSCOPY      ESOPHAGOGASTRODUODENOSCOPY N/A 08/16/2019    Procedure: EGD (ESOPHAGOGASTRODUODENOSCOPY);  Surgeon: Pawan Schwab MD;  Location: [facility];  Service: Endoscopy;  Laterality: N/A;    ESOPHAGOGASTRODUODENOSCOPY N/A 05/23/2022     Procedure: ESOPHAGOGASTRODUODENOSCOPY (EGD);  Surgeon: Madina Hoover MD;  [facility]  Service: Endoscopy;  Laterality: N/A;  Endoflip/Endoscopically placed manometry probe  2nd floor-End stage achalasia  propofol only  full iquid diet x3 days, clear liquid diet x1 day prior to procedure  fully vaccinated, [facility]    ESOPHAGOGASTRODUODENOSCOPY N/A 04/21/2023    Procedure: ESOPHAGOGASTRODUODENOSCOPY (EGD);  Surgeon: Madina Hoover MD;  [facility]  Service: Endoscopy;  Laterality: N/A;  w/ Botox  2nd floor-End Stage Achalasia  full liquid diet x3 days, clear liquid diet x1 day prior to procedure  instructions [facility] and emailed to [email or web address]    4/17/23 - Pt confirmed apt. Pt moved up to the 11am slot, to    HERNIA REPAIR      INGUINAL HERNIA REPAIR Right     LUMBAR EPIDURAL STEROID INJECTION      TOTAL KNEE ARTHROPLASTY Right 05/25/2023    UPPER GASTROINTESTINAL ENDOSCOPY  04/27/2016   No current facility-administered medications for this encounter.    Current Outpatient Medications:     ALPRAZolam (XANAX) 1 MG tablet, Take 1 tablet (1 mg total) by mouth nightly as needed for Anxiety., Disp: 60 tablet, Rfl: 3    aspirin 81 MG Chew, Take 1 tablet (81 mg total) by mouth once daily., Disp: 30 tablet, Rfl: 0    atorvastatin (LIPITOR) 40 MG tablet, TAKE 1 TABLET(40 MG) BY MOUTH EVERY EVENING, Disp: 90 tablet, Rfl: 3    busPIRone (BUSPAR) 15 MG tablet, Take 15 mg by mouth 2 (two) times daily., Disp: , Rfl:     diazePAM (VALIUM) 5 MG tablet, TAKE 1 TABLET(5 MG) BY MOUTH DAILY AS NEEDED FOR ANXIETY, Disp: 30 tablet, Rfl: 1    enoxaparin (LOVENOX) 40 mg/0.4 mL Syrg, Inject into the skin., Disp: , Rfl:     omeprazole (PRILOSEC) 40 MG capsule, Take 1 capsule (40 mg total) by mouth once daily., Disp: 30 capsule, Rfl: 5    oxyCODONE (ROXICODONE) 10 mg Tab immediate release tablet, Take by mouth., Disp: ,  Rfl:     oxyCODONE-acetaminophen (PERCOCET)  mg per tablet, Take by mouth., Disp: , Rfl:     promethazine (PHENERGAN) 6.25 mg/5 mL syrup, Take 20 mLs (25 mg total) by mouth every 6 (six) hours as needed for Nausea., Disp: 473 mL, Rfl: 2    sodium,potassium,mag sulfates (SUPREP BOWEL PREP KIT) 17.5-3.13-1.6 gram SolR, Use as directed, Disp: 354 mL, Rfl: 0    tamsulosin (FLOMAX) 0.4 mg Cap, Take 1 capsule (0.4 mg total) by mouth once daily., Disp: 90 capsule, Rfl: 3    traMADoL (ULTRAM) 50 mg tablet, Take 50 mg by mouth every 4 (four) hours as needed., Disp: , Rfl:     Facility-Administered Medications Ordered in Other Encounters:     0.9%  NaCl infusion, , Intravenous, Continuous, Brenden Pizarro MD, New Bag at 05/23/22 1238    0.9%  NaCl infusion, , Intravenous, Continuous, Madina Hoover MD    ondansetron injection 4 mg, 4 mg, Intravenous, Q12H PRN, Brenden Pizarro MD    sodium chloride 0.9% flush 10 mL, 10 mL, Intravenous, PRN, Madina Hoover MD        Pre-op Assessment    I have reviewed the Patient Summary Reports.     I have reviewed the Nursing Notes. I have reviewed the NPO Status.   I have reviewed the Medications.     Review of Systems  Anesthesia Hx:  No problems with previous Anesthesia  Neg history of prior surgery. Denies Family Hx of Anesthesia complications.   Denies Personal Hx of Anesthesia complications.   Social:  Non-Smoker, Social Alcohol Use    Cardiovascular:   Hypertension CAD   Angina    Pulmonary:   COPD Sleep Apnea    Hepatic/GI:   GERD    Psych:   Psychiatric History          Physical Exam  General: Well nourished    Airway:  Mallampati: I / I  Mouth Opening: Normal  TM Distance: Normal  Tongue: Normal  Neck ROM: Normal ROM    Dental:  Intact        Anesthesia Plan  Type of Anesthesia, risks & benefits discussed:    Anesthesia Type: Gen Natural Airway  Intra-op Monitoring Plan: Standard ASA Monitors  Post Op Pain Control Plan: multimodal  analgesia  Induction:  IV  Informed Consent: Informed consent signed with the Patient and all parties understand the risks and agree with anesthesia plan.  All questions answered.   ASA Score: 3  Day of Surgery Review of History & Physical: H&P Update referred to the surgeon/provider.    Ready For Surgery From Anesthesia Perspective.     .

## 2023-08-17 ENCOUNTER — ANESTHESIA (OUTPATIENT)
Dept: ENDOSCOPY | Facility: HOSPITAL | Age: 63
End: 2023-08-17
Payer: MEDICARE

## 2023-08-17 ENCOUNTER — HOSPITAL ENCOUNTER (OUTPATIENT)
Facility: HOSPITAL | Age: 63
Discharge: HOME | End: 2023-08-17
Attending: INTERNAL MEDICINE | Admitting: INTERNAL MEDICINE
Payer: MEDICARE

## 2023-08-17 VITALS
RESPIRATION RATE: 19 BRPM | HEART RATE: 87 BPM | TEMPERATURE: 97 F | SYSTOLIC BLOOD PRESSURE: 122 MMHG | BODY MASS INDEX: 24.69 KG/M2 | WEIGHT: 172.5 LBS | OXYGEN SATURATION: 99 % | HEIGHT: 70 IN | DIASTOLIC BLOOD PRESSURE: 82 MMHG

## 2023-08-17 DIAGNOSIS — Z12.11 COLON CANCER SCREENING: Primary | ICD-10-CM

## 2023-08-17 PROCEDURE — D9220A PRA ANESTHESIA: Mod: PT,CRNA,, | Performed by: NURSE ANESTHETIST, CERTIFIED REGISTERED

## 2023-08-17 PROCEDURE — 45380 PR COLONOSCOPY,BIOPSY: ICD-10-PCS | Mod: PT,,, | Performed by: INTERNAL MEDICINE

## 2023-08-17 PROCEDURE — 37000008 HC ANESTHESIA 1ST 15 MINUTES: Performed by: INTERNAL MEDICINE

## 2023-08-17 PROCEDURE — 63600175 PHARM REV CODE 636 W HCPCS: Performed by: INTERNAL MEDICINE

## 2023-08-17 PROCEDURE — 63600175 PHARM REV CODE 636 W HCPCS: Performed by: NURSE ANESTHETIST, CERTIFIED REGISTERED

## 2023-08-17 PROCEDURE — 88305 TISSUE EXAM BY PATHOLOGIST: ICD-10-PCS | Mod: 26,,, | Performed by: PATHOLOGY

## 2023-08-17 PROCEDURE — 45380 COLONOSCOPY AND BIOPSY: CPT | Mod: PT | Performed by: INTERNAL MEDICINE

## 2023-08-17 PROCEDURE — 27201012 HC FORCEPS, HOT/COLD, DISP: Performed by: INTERNAL MEDICINE

## 2023-08-17 PROCEDURE — 25000003 PHARM REV CODE 250: Performed by: INTERNAL MEDICINE

## 2023-08-17 PROCEDURE — 88305 TISSUE EXAM BY PATHOLOGIST: CPT | Performed by: PATHOLOGY

## 2023-08-17 PROCEDURE — D9220A PRA ANESTHESIA: ICD-10-PCS | Mod: PT,CRNA,, | Performed by: NURSE ANESTHETIST, CERTIFIED REGISTERED

## 2023-08-17 PROCEDURE — 88305 TISSUE EXAM BY PATHOLOGIST: CPT | Mod: 26,,, | Performed by: PATHOLOGY

## 2023-08-17 PROCEDURE — D9220A PRA ANESTHESIA: Mod: PT,ANES,, | Performed by: ANESTHESIOLOGY

## 2023-08-17 PROCEDURE — 25000003 PHARM REV CODE 250: Performed by: NURSE ANESTHETIST, CERTIFIED REGISTERED

## 2023-08-17 PROCEDURE — 45380 COLONOSCOPY AND BIOPSY: CPT | Mod: PT,,, | Performed by: INTERNAL MEDICINE

## 2023-08-17 PROCEDURE — D9220A PRA ANESTHESIA: ICD-10-PCS | Mod: PT,ANES,, | Performed by: ANESTHESIOLOGY

## 2023-08-17 PROCEDURE — 37000009 HC ANESTHESIA EA ADD 15 MINS: Performed by: INTERNAL MEDICINE

## 2023-08-17 RX ORDER — SODIUM CHLORIDE, SODIUM LACTATE, POTASSIUM CHLORIDE, CALCIUM CHLORIDE 600; 310; 30; 20 MG/100ML; MG/100ML; MG/100ML; MG/100ML
INJECTION, SOLUTION INTRAVENOUS CONTINUOUS
Status: DISCONTINUED | OUTPATIENT
Start: 2023-08-17 | End: 2023-08-17 | Stop reason: HOSPADM

## 2023-08-17 RX ORDER — PROPOFOL 10 MG/ML
INJECTION, EMULSION INTRAVENOUS
Status: DISCONTINUED | OUTPATIENT
Start: 2023-08-17 | End: 2023-08-17

## 2023-08-17 RX ORDER — DEXTROMETHORPHAN/PSEUDOEPHED 2.5-7.5/.8
DROPS ORAL
Status: DISCONTINUED | OUTPATIENT
Start: 2023-08-17 | End: 2023-08-17 | Stop reason: HOSPADM

## 2023-08-17 RX ORDER — LIDOCAINE HYDROCHLORIDE 20 MG/ML
INJECTION INTRAVENOUS
Status: DISCONTINUED | OUTPATIENT
Start: 2023-08-17 | End: 2023-08-17

## 2023-08-17 RX ADMIN — PROPOFOL 150 MG: 10 INJECTION, EMULSION INTRAVENOUS at 11:08

## 2023-08-17 RX ADMIN — PROPOFOL 20 MG: 10 INJECTION, EMULSION INTRAVENOUS at 12:08

## 2023-08-17 RX ADMIN — SODIUM CHLORIDE, SODIUM LACTATE, POTASSIUM CHLORIDE, AND CALCIUM CHLORIDE: 600; 310; 30; 20 INJECTION, SOLUTION INTRAVENOUS at 11:08

## 2023-08-17 RX ADMIN — PROPOFOL 20 MG: 10 INJECTION, EMULSION INTRAVENOUS at 11:08

## 2023-08-17 RX ADMIN — LIDOCAINE HYDROCHLORIDE 50 MG: 20 INJECTION INTRAVENOUS at 11:08

## 2023-08-17 NOTE — TRANSFER OF CARE
"Anesthesia Transfer of Care Note    Patient: Lenny Latham Jr.    Procedure(s) Performed: Procedure(s) (LRB):  COLONOSCOPY (N/A)    Patient location: PACU    Anesthesia Type: general    Transport from OR: Transported from OR on room air with adequate spontaneous ventilation    Post pain: adequate analgesia    Post assessment: no apparent anesthetic complications and tolerated procedure well    Post vital signs: stable    Level of consciousness: awake    Nausea/Vomiting: no nausea/vomiting    Complications: none    Transfer of care protocol was followed      Last vitals:   Visit Vitals  /72   Pulse 79   Temp 35.9 °C (96.7 °F)   Resp 14   Ht 5' 10" (1.778 m)   Wt 78.3 kg (172 lb 8.2 oz)   SpO2 95%   BMI 24.75 kg/m²     "

## 2023-08-17 NOTE — ANESTHESIA POSTPROCEDURE EVALUATION
Anesthesia Post Evaluation    Patient: Lenny Latham Jr.    Procedure(s) Performed: Procedure(s) (LRB):  COLONOSCOPY (N/A)    Final Anesthesia Type: general      Patient location during evaluation: PACU  Patient participation: Yes- Able to Participate  Level of consciousness: awake  Post-procedure vital signs: reviewed and stable  Pain management: adequate  Airway patency: patent    PONV status at discharge: No PONV  Anesthetic complications: no      Cardiovascular status: stable  Respiratory status: unassisted  Hydration status: euvolemic  Follow-up not needed.          Vitals Value Taken Time   /86 08/17/23 1225   Temp 35.9 °C (96.7 °F) 08/17/23 1210   Pulse 86 08/17/23 1227   Resp 23 08/17/23 1227   SpO2 99 % 08/17/23 1227   Vitals shown include unvalidated device data.      No case tracking events are documented in the log.      Pain/Laly Score: Laly Score: 10 (8/17/2023 12:20 PM)

## 2023-08-17 NOTE — [employer] PATIENT INSTRUCTIONS
Discharge Summary/Instructions after an Endoscopic Procedure  Patient Name: Lenny Latham  Patient MRN: [id number]  Patient YOB: 1960 Thursday, August 17, 2023  Windy León MD  Dear patient,  As a result of recent federal legislation (The Federal Cures Act), you may   receive lab or pathology results from your procedure in your [facility]   account before your physician is able to contact you. Your physician or   their representative will relay the results to you with their   recommendations at their soonest availability.  Thank you,  RESTRICTIONS:  During your procedure today, you received medications for sedation.  These   medications may affect your judgment, balance and coordination.  Therefore,   for 24 hours, you have the following restrictions:   - DO NOT drive a car, operate machinery, make legal/financial decisions,   sign important papers or drink alcohol.    ACTIVITY:  Today: no heavy lifting, straining or running due to procedural   sedation/anesthesia.  The following day: return to full activity including work.  DIET:  Eat and drink normally unless instructed otherwise.     TREATMENT FOR COMMON SIDE EFFECTS:  - Mild abdominal pain, nausea, belching, bloating or excessive gas:  rest,   eat lightly and use a heating pad.  - Sore Throat: treat with throat lozenges and/or gargle with warm salt   water.  - Because air was used during the procedure, expelling large amounts of air   from your rectum or belching is normal.  - If a bowel prep was taken, you may not have a bowel movement for 1-3 days.    This is normal.  SYMPTOMS TO WATCH FOR AND REPORT TO YOUR PHYSICIAN:  1. Abdominal pain or bloating, other than gas cramps.  2. Chest pain.  3. Back pain.  4. Signs of infection such as: chills or fever occurring within 24 hours   after the procedure.  5. Rectal bleeding, which would show as bright red, maroon, or black stools.   (A tablespoon of blood from the rectum is not serious,  especially if   hemorrhoids are present.)  6. Vomiting.  7. Weakness or dizziness.  GO DIRECTLY TO THE NEAREST EMERGENCY ROOM IF YOU HAVE ANY OF THE FOLLOWING:      Difficulty breathing              Chills and/or fever over 101 F   Persistent vomiting and/or vomiting blood   Severe abdominal pain   Severe chest pain   Black, tarry stools   Bleeding- more than one tablespoon   Any other symptom or condition that you feel may need urgent attention  Your doctor recommends these additional instructions:  If any biopsies were taken, your doctors clinic will contact you in 1 to 2   weeks with any results.  - Discharge patient to home.   - Resume previous diet.   - Continue present medications.   - Await pathology results.   - Repeat colonoscopy in 5 years for surveillance.   - Return to referring physician.   - Patient has a contact number available for emergencies.  The signs and   symptoms of potential delayed complications were discussed with the   patient.  Return to normal activities tomorrow.  Written discharge   instructions were provided to the patient.  For questions, problems or results please call your physician Windy León MD at Work:  [phone]  If you have any questions about the above instructions, call the GI   department at [phone] or call the endoscopy unit at [phone]   from 7am until 3 pm.  [facility], EMERGENCY ROOM PHONE NUMBER:   [phone]  IF A COMPLICATION OR EMERGENCY SITUATION ARISES AND YOU ARE UNABLE TO REACH   YOUR PHYSICIAN - GO DIRECTLY TO THE EMERGENCY ROOM.  I have read or have had read to me these discharge instructions for my   procedure and have received a written copy.  I understand these   instructions and will follow-up with my physician if I have any questions.     __________________________________       _____________________________________  Nurse Signature                                          Patient/Designated    Responsible Party Signature  Windy León MD  8/17/2023 12:37:22 PM  This report has been verified and signed electronically.  Dear patient,  As a result of recent federal legislation (The Federal Cures Act), you may   receive lab or pathology results from your procedure in your [facility]   account before your physician is able to contact you. Your physician or   their representative will relay the results to you with their   recommendations at their soonest availability.  Thank you,  [employer]

## 2023-08-17 NOTE — H&P
PRE PROCEDURE H&P    Patient Name: Lenny Latham Jr.  MRN: [id number]  : 1960  Date of Procedure:  2023  Referring Physician: Izzy Zurita NP  Primary Physician: Herminia Longoria MD  Procedure Physician: Windy León MD       Planned Procedure: Colonoscopy  Diagnosis: previous adenomatous polyp  Chief Complaint: Same as above    HPI: Patient is an 63 y.o. male is here for the above.     Last colonoscopy: Last colon was 3 years ago   Family history: grandfather with colon cancer   Anticoagulation: None     Past Medical History:   Past Medical History:   Diagnosis Date    Achalasia of esophagus 2016    Anxiety state, unspecified     Centrilobular emphysema 10/3/2022    Coronary artery disease     Esophageal reflux     Esophageal stricture     Hypertrophy of prostate without urinary obstruction and other lower urinary tract symptoms (LUTS)     Screening PSA (prostate specific antigen) 12    0.4    Unspecified essential hypertension         Past Surgical History:  Past Surgical History:   Procedure Laterality Date    BACK SURGERY      CERVICAL EPIDURAL STEROID INJECTION      COLONOSCOPY      ESOPHAGEAL MANOMETRY WITH MEASUREMENT OF IMPEDANCE N/A 2022    Procedure: MANOMETRY-ESOPHAGEAL-WITH IMPEDANCE;  Surgeon: Madina Hooevr MD;  Location: [address]);  Service: Endoscopy;  Laterality: N/A;  hold pain medication 24 hours prior to procedure    ESOPHAGOGASTRODUODENOSCOPY      ESOPHAGOGASTRODUODENOSCOPY N/A 2019    Procedure: EGD (ESOPHAGOGASTRODUODENOSCOPY);  Surgeon: Pawan Schwab MD;  Location: [facility];  Service: Endoscopy;  Laterality: N/A;    ESOPHAGOGASTRODUODENOSCOPY N/A 2022    Procedure: ESOPHAGOGASTRODUODENOSCOPY (EGD);  Surgeon: Madina Hoover MD;  Location: [address]);  Service: Endoscopy;  Laterality: N/A;  Endoflip/Endoscopically placed manometry probe  2nd floor-End stage achalasia  propofol only  full iquid diet x3 days, clear  liquid diet x1 day prior to procedure  fully vaccinated, instructions sent to [facility] and mailed-[facility]    ESOPHAGOGASTRODUODENOSCOPY N/A 04/21/2023    Procedure: ESOPHAGOGASTRODUODENOSCOPY (EGD);  Surgeon: Madina Hoover MD;  Location: [facility] ([address] [place] [address]);  Service: Endoscopy;  Laterality: N/A;  w/ Botox  2nd floor-End Stage Achalasia  full liquid diet x3 days, clear liquid diet x1 day prior to procedure  instructions sent to [facility] and emailed to [email or web address]    4/17/23 - Pt confirmed apt. Pt moved up to the 11am slot, to    HERNIA REPAIR      INGUINAL HERNIA REPAIR Right     LUMBAR EPIDURAL STEROID INJECTION      TOTAL KNEE ARTHROPLASTY Right 05/25/2023    UPPER GASTROINTESTINAL ENDOSCOPY  04/27/2016        Home Medications:  Prior to Admission medications    Medication Sig Start Date End Date Taking? Authorizing Provider   atorvastatin (LIPITOR) 40 MG tablet TAKE 1 TABLET(40 MG) BY MOUTH EVERY EVENING 11/7/22  Yes Herminia Longoria MD   busPIRone (BUSPAR) 15 MG tablet Take 15 mg by mouth 2 (two) times daily. 7/5/23  Yes Provider, Historical   omeprazole (PRILOSEC) 40 MG capsule Take 1 capsule (40 mg total) by mouth once daily. 3/31/23  Yes Cat Vernon NP   ALPRAZolam (XANAX) 1 MG tablet Take 1 tablet (1 mg total) by mouth nightly as needed for Anxiety. 5/5/23 6/4/23  Manoj Campbell MD   aspirin 81 MG Chew Take 1 tablet (81 mg total) by mouth once daily. 4/2/23 5/2/23  Taiwo Erazo MD   diazePAM (VALIUM) 5 MG tablet TAKE 1 TABLET(5 MG) BY MOUTH DAILY AS NEEDED FOR ANXIETY 8/7/23   Manoj Campbell MD   enoxaparin (LOVENOX) 40 mg/0.4 mL Syrg Inject into the skin. 5/26/23   Provider, Historical   oxyCODONE (ROXICODONE) 10 mg Tab immediate release tablet Take by mouth. 7/19/23   Provider, Historical   oxyCODONE-acetaminophen (PERCOCET)  mg per tablet Take by mouth. 6/7/23   Provider, Historical   promethazine (PHENERGAN) 6.25 mg/5 mL syrup Take 20 mLs  (25 mg total) by mouth every 6 (six) hours as needed for Nausea. 6/26/23   Madina Hoover MD   sodium,potassium,mag sulfates (SUPREP BOWEL PREP KIT) 17.5-3.13-1.6 gram SolR Use as directed 7/25/23   Izzy Zurtia NP   tamsulosin (FLOMAX) 0.4 mg Cap Take 1 capsule (0.4 mg total) by mouth once daily. 8/1/22 8/1/23  Manuel Alvares MD   traMADoL (ULTRAM) 50 mg tablet Take 50 mg by mouth every 4 (four) hours as needed. 5/17/23   Provider, Historical        Allergies:  Review of patient's allergies indicates:   Allergen Reactions    Nsaids (non-steroidal anti-inflammatory drug) Nausea And Vomiting        Social History:   Social History     Socioeconomic History    Marital status: Single    Years of education: 12th   Occupational History    Occupation: Disabled   Tobacco Use    Smoking status: Former     Current packs/day: 0.00     Types: Cigars     Start date: 2016     Quit date: 2020     Years since quitting: 3.6    Smokeless tobacco: Never    Tobacco comments:     Cigar 1-2 times a week x about 4 years   Substance and Sexual Activity    Alcohol use: Not Currently     Comment: once or twice a year 1-2 beers    Drug use: Not Currently     Types: Marijuana     Social Determinants of Health     Financial Resource Strain: High Risk (10/3/2022)    Overall Financial Resource Strain (CARDIA)     Difficulty of Paying Living Expenses: Hard   Food Insecurity: Food Insecurity Present (10/3/2022)    Hunger Vital Sign     Worried About Running Out of Food in the Last Year: Sometimes true     Ran Out of Food in the Last Year: Sometimes true   Transportation Needs: Unmet Transportation Needs (10/3/2022)    PRAPARE - Transportation     Lack of Transportation (Medical): Yes     Lack of Transportation (Non-Medical): No   Physical Activity: Insufficiently Active (10/3/2022)    Exercise Vital Sign     Days of Exercise per Week: 4 days     Minutes of Exercise per Session: 30 min   Stress: Stress Concern Present (10/3/2022)  "   [employer] [place] [employer] - Occupational Stress Questionnaire     Feeling of Stress : Very much   Social Connections: Unknown (10/3/2022)    Social Connection and Isolation Panel [NHANES]     Frequency of Communication with Friends and Family: Twice a week     Attends Yarsani Services: More than 4 times per year     Active Member of Clubs or Organizations: No     Attends Club or Organization Meetings: Never     Marital Status:    Housing Stability: High Risk (10/3/2022)    Housing Stability Vital Sign     Unable to Pay for Housing in the Last Year: Yes     Number of Places Lived in the Last Year: 1     Unstable Housing in the Last Year: No       Family History:  Family History   Problem Relation Age of Onset    Lung cancer Mother     Hypertension Father     Prostate cancer Father     Ulcers Sister     Ulcers Brother     Prostate cancer Maternal Grandfather     Ulcers Sister     Celiac disease Neg Hx     Colon cancer Neg Hx     Colon polyps Neg Hx     Crohn's disease Neg Hx     Esophageal cancer Neg Hx     Inflammatory bowel disease Neg Hx     Irritable bowel syndrome Neg Hx     Liver cancer Neg Hx     Liver disease Neg Hx     Rectal cancer Neg Hx     Stomach cancer Neg Hx        ROS: No acute cardiac events, no acute respiratory complaints.     Physical Exam (all patients):    BP (!) 144/81 (BP Location: Right arm, Patient Position: Sitting)   Pulse 93   Temp 97 °F (36.1 °C) (Temporal)   Resp 16   Ht 5' 10" (1.778 m)   Wt 78.3 kg (172 lb 8.2 oz)   SpO2 97%   BMI 24.75 kg/m²   Lungs: Clear to auscultation bilaterally, respirations unlabored  Heart: Regular rate and rhythm, S1 and S2 normal, no obvious murmurs  Abdomen:         Soft, non-tender, bowel sounds normal, no masses, no organomegaly    Lab Results   Component Value Date    WBC 10.33 04/02/2023    MCV 93 04/02/2023    RDW 13.9 04/02/2023     04/02/2023    INR 1.0 05/02/2023    GLU 66 (L) 04/02/2023    HGBA1C 5.6 " 11/28/2016    BUN 7 05/02/2023     05/02/2023    K 4.0 05/02/2023     05/02/2023        SEDATION PLAN: per anesthesia      History reviewed, vital signs satisfactory, cardiopulmonary status satisfactory, sedation options, risks and plans have been discussed with the patient  All their questions were answered and the patient agrees to the sedation procedures as planned and the patient is deemed an appropriate candidate for the sedation as planned.    Procedure explained to patient, informed consent obtained and placed in chart.    Windy León  8/17/2023  11:28 AM

## 2023-08-22 LAB
FINAL PATHOLOGIC DIAGNOSIS: NORMAL
Lab: NORMAL

## 2023-09-15 ENCOUNTER — TELEPHONE (OUTPATIENT)
Dept: PSYCHIATRY | Facility: CLINIC | Age: 63
End: 2023-09-15
Payer: MEDICARE

## 2023-09-26 ENCOUNTER — OFFICE VISIT (OUTPATIENT)
Dept: PULMONOLOGY | Facility: CLINIC | Age: 63
End: 2023-09-26
Payer: MEDICARE

## 2023-09-26 VITALS
HEIGHT: 70 IN | RESPIRATION RATE: 20 BRPM | SYSTOLIC BLOOD PRESSURE: 120 MMHG | WEIGHT: 177 LBS | DIASTOLIC BLOOD PRESSURE: 80 MMHG | OXYGEN SATURATION: 99 % | BODY MASS INDEX: 25.34 KG/M2 | HEART RATE: 75 BPM

## 2023-09-26 DIAGNOSIS — G47.33 OSA ON CPAP: Primary | ICD-10-CM

## 2023-09-26 DIAGNOSIS — I25.119 ATHEROSCLEROSIS OF NATIVE CORONARY ARTERY OF NATIVE HEART WITH ANGINA PECTORIS: ICD-10-CM

## 2023-09-26 DIAGNOSIS — J43.2 CENTRILOBULAR EMPHYSEMA: ICD-10-CM

## 2023-09-26 DIAGNOSIS — E66.9 OBESITY (BMI 30.0-34.9): ICD-10-CM

## 2023-09-26 DIAGNOSIS — G47.00 INSOMNIA, UNSPECIFIED TYPE: ICD-10-CM

## 2023-09-26 PROCEDURE — 1160F PR REVIEW ALL MEDS BY PRESCRIBER/CLIN PHARMACIST DOCUMENTED: ICD-10-PCS | Mod: CPTII,S$GLB,, | Performed by: NURSE PRACTITIONER

## 2023-09-26 PROCEDURE — 3008F PR BODY MASS INDEX (BMI) DOCUMENTED: ICD-10-PCS | Mod: CPTII,S$GLB,, | Performed by: NURSE PRACTITIONER

## 2023-09-26 PROCEDURE — 99214 OFFICE O/P EST MOD 30 MIN: CPT | Mod: PBBFAC | Performed by: NURSE PRACTITIONER

## 2023-09-26 PROCEDURE — 99999 PR PBB SHADOW E&M-EST. PATIENT-LVL IV: CPT | Mod: PBBFAC,,, | Performed by: NURSE PRACTITIONER

## 2023-09-26 PROCEDURE — 3074F PR MOST RECENT SYSTOLIC BLOOD PRESSURE < 130 MM HG: ICD-10-PCS | Mod: CPTII,S$GLB,, | Performed by: NURSE PRACTITIONER

## 2023-09-26 PROCEDURE — 1159F PR MEDICATION LIST DOCUMENTED IN MEDICAL RECORD: ICD-10-PCS | Mod: CPTII,S$GLB,, | Performed by: NURSE PRACTITIONER

## 2023-09-26 PROCEDURE — 3079F PR MOST RECENT DIASTOLIC BLOOD PRESSURE 80-89 MM HG: ICD-10-PCS | Mod: CPTII,S$GLB,, | Performed by: NURSE PRACTITIONER

## 2023-09-26 PROCEDURE — 3008F BODY MASS INDEX DOCD: CPT | Mod: CPTII,S$GLB,, | Performed by: NURSE PRACTITIONER

## 2023-09-26 PROCEDURE — 99214 OFFICE O/P EST MOD 30 MIN: CPT | Mod: S$GLB,,, | Performed by: NURSE PRACTITIONER

## 2023-09-26 PROCEDURE — 3074F SYST BP LT 130 MM HG: CPT | Mod: CPTII,S$GLB,, | Performed by: NURSE PRACTITIONER

## 2023-09-26 PROCEDURE — 3079F DIAST BP 80-89 MM HG: CPT | Mod: CPTII,S$GLB,, | Performed by: NURSE PRACTITIONER

## 2023-09-26 PROCEDURE — 1160F RVW MEDS BY RX/DR IN RCRD: CPT | Mod: CPTII,S$GLB,, | Performed by: NURSE PRACTITIONER

## 2023-09-26 PROCEDURE — 99214 PR OFFICE/OUTPT VISIT, EST, LEVL IV, 30-39 MIN: ICD-10-PCS | Mod: S$GLB,,, | Performed by: NURSE PRACTITIONER

## 2023-09-26 PROCEDURE — 99999 PR PBB SHADOW E&M-EST. PATIENT-LVL IV: ICD-10-PCS | Mod: PBBFAC,,, | Performed by: NURSE PRACTITIONER

## 2023-09-26 PROCEDURE — 1159F MED LIST DOCD IN RCRD: CPT | Mod: CPTII,S$GLB,, | Performed by: NURSE PRACTITIONER

## 2023-09-26 RX ORDER — HYDROCODONE BITARTRATE AND ACETAMINOPHEN 5; 325 MG/1; MG/1
TABLET ORAL
COMMUNITY
Start: 2023-09-11 | End: 2023-11-22

## 2023-09-26 NOTE — ASSESSMENT & PLAN NOTE
12/28/2023 PSG Moderate obstructive sleep apnea: AHI was 21.7/hr  1/5/2023 cpap 8 cm optimal  1/12/2023 order cpap 8 w/nasal mask, needs over night on cpap 8 after IDL  Never obtained CPAP after ordered, missed 3 appointments with [facility] to  machine. He had other medical problems/surgeries that affected him picking up CPAP machine.    9/26/2023 order for CPAP 8 cm   Full face mask requested   [facility]: [facility]   Follow up 31-90 days from obtaining PAP therapy for IDL.

## 2023-09-26 NOTE — PROGRESS NOTES
Pulmonary Outpatient Follow Up Visit     Subjective:       Patient ID: Lenny Latham Jr. is a 63 y.o. male.    Chief Complaint   Patient presents with    Sleep Apnea         HPI      9/26/2023 Nash NP   Lenny Latham Jr. Here with his Hailee thao. Here for obstructive sleep apnea.  He never obtained CPAP related to had Rt TKA May 25, 2023.   He is ready to begin treatment obstructive sleep apnea.   Spoke with Tali, since order is from Jan 2, 2023 over 6 months old.   Remains symptomatic for obstructive sleep apnea day time sleepiness, frequent awakening, witnessed apnea by master.   He is working with psychiatry for insomnia and anxiety.   He has poor sleep hygiene, with if not falling asleep then will turn TV on.  He is willing to try improve sleep hygiene and begin CPAP for obstructive sleep apnea.  9/26/2023 order CPAP 8 cm Full face mask.       12/16/2022 Nash NP  Patient is a 61 y.o. male  presenting for 6 months follow-up review [facility].    Referred initially June 2022 or evaluation of suspected sleep apnea.      6/16/2023 Spirometry is normal. No post bronchodilator response.    12/28/2023 PSG Moderate obstructive sleep apnea: AHI was 21.7/hr  1/5/2023 cpap 8 cm optimal  1/12/2023 order cpap 8 w/nasal mask, needs over night on cpap 8 after IDL  6/16/2023 Never obtained CPAP after ordered, [facility]. He had other medical problems/surgeries that affected him picking up CPAP machine.         Former cigar smoker. Never smoked cigarettes.  Prior cigar smoker about 1-2 times a week. quit 2020.     Prior work off [employer]. , . Some asbestosis exposure as as  and  1978 - 1983.     10/4/2022 chest xray mild scarring no pleural plaque seen.     10/13/2020 CT chest/abdomen mild emphysematous changes in both lungs.     12/16/2022 CPFT Normal spirometry FEV 98.2%. Lung volumes  normal. Moderate reduced Diffusing capacity,diffusing capacity may be underestimated.     [place] Sleepiness Scale       9/26/2023     3:06 PM 6/16/2023    10:05 AM 9/6/2022     1:00 PM 6/7/2022     2:00 PM   EPWORTH SLEEPINESS SCALE   Sitting and reading 1 0 1 2   Watching TV 0 1 1 2   Sitting, inactive in a public place (e.g. a theatre or a meeting) 0 0 0 0   As a passenger in a car for an hour without a break 0 0 0 0   Lying down to rest in the afternoon when circumstances permit 0 0 0 1   Sitting and talking to someone 0 0 0 0   Sitting quietly after a lunch without alcohol 0 0 0 2   In a car, while stopped for a few minutes in traffic 0 0 0 0   Total score 1 1 2 7     Review of Systems   Constitutional:  Positive for fatigue. Negative for fever, chills, activity change and appetite change.   HENT:  Negative for nosebleeds.    Eyes:  Negative for redness.   Respiratory:  Positive for apnea, snoring and somnolence. Negative for cough, sputum production, choking, chest tightness and wheezing.    Cardiovascular:  Negative for chest pain.   Genitourinary:  Negative for hematuria.   Endocrine:  Negative for cold intolerance.    Musculoskeletal:  Positive for arthralgias and back pain.   Skin:  Negative for rash.   Gastrointestinal:  Negative for vomiting.   Neurological:  Negative for syncope.   Hematological:  Negative for adenopathy.   Psychiatric/Behavioral:  Positive for sleep disturbance. Negative for confusion. The patient is nervous/anxious.        Outpatient Encounter Medications as of 9/26/2023   Medication Sig Dispense Refill    ALPRAZolam (XANAX) 1 MG tablet Take 1 tablet (1 mg total) by mouth nightly as needed for Anxiety. 60 tablet 3    atorvastatin (LIPITOR) 40 MG tablet TAKE 1 TABLET(40 MG) BY MOUTH EVERY EVENING 90 tablet 3    omeprazole (PRILOSEC) 40 MG capsule TAKE 1 CAPSULE(40 MG) BY MOUTH EVERY DAY 90 capsule 0    promethazine (PHENERGAN) 6.25 mg/5 mL syrup Take 20 mLs (25 mg total) by mouth every  "6 (six) hours as needed for Nausea. 473 mL 2    tamsulosin (FLOMAX) 0.4 mg Cap Take 1 capsule (0.4 mg total) by mouth once daily. 90 capsule 3    aspirin 81 MG Chew Take 1 tablet (81 mg total) by mouth once daily. 30 tablet 0    busPIRone (BUSPAR) 15 MG tablet Take 15 mg by mouth 2 (two) times daily.      diazePAM (VALIUM) 5 MG tablet TAKE 1 TABLET(5 MG) BY MOUTH DAILY AS NEEDED FOR ANXIETY 30 tablet 1    enoxaparin (LOVENOX) 40 mg/0.4 mL Syrg Inject into the skin.      HYDROcodone-acetaminophen (NORCO) 5-325 mg per tablet Take by mouth.      oxyCODONE (ROXICODONE) 10 mg Tab immediate release tablet Take by mouth.      oxyCODONE-acetaminophen (PERCOCET)  mg per tablet Take by mouth.      traMADoL (ULTRAM) 50 mg tablet Take 50 mg by mouth every 4 (four) hours as needed.      [DISCONTINUED] omeprazole (PRILOSEC) 40 MG capsule Take 1 capsule (40 mg total) by mouth once daily. 30 capsule 5    [DISCONTINUED] sodium,potassium,mag sulfates (SUPREP BOWEL PREP KIT) 17.5-3.13-1.6 gram SolR Use as directed 354 mL 0     Facility-Administered Encounter Medications as of 9/26/2023   Medication Dose Route Frequency Provider Last Rate Last Admin    0.9%  NaCl infusion   Intravenous Continuous Brenden Pizarro MD   New Bag at 05/23/22 1238    0.9%  NaCl infusion   Intravenous Continuous Madina Tracey MD        ondansetron injection 4 mg  4 mg Intravenous Q12H PRN Brenden Pizarro MD        sodium chloride 0.9% flush 10 mL  10 mL Intravenous PRN Madina Tracey MD           Objective:     Vital Signs (Most Recent)  Vital Signs  Pulse: 75  Resp: 20  SpO2: 99 %  BP: 120/80  Pain Score: 10-Worst pain ever  Pain Loc: Knee  Height and Weight  Height: 5' 10" (177.8 cm)  Weight: 80.3 kg (177 lb 0.5 oz)  BSA (Calculated - sq m): 1.99 sq meters  BMI (Calculated): 25.4  Weight in (lb) to have BMI = 25: 173.9]  Wt Readings from Last 2 Encounters:   09/26/23 80.3 kg (177 lb 0.5 oz)   08/17/23 78.3 kg (172 lb 8.2 oz) " "      Physical Exam   Constitutional: He is oriented to person, place, and time. He appears well-developed and well-nourished. He is cooperative.  Non-toxic appearance. No distress.   HENT:   Right Ear: External ear normal.   Left Ear: External ear normal.   Nose: Nose normal. No mucosal edema.   Mouth/Throat: Oropharynx is clear and moist. No oropharyngeal exudate.   Cardiovascular: Normal rate, regular rhythm, normal heart sounds and intact distal pulses.   Pulmonary/Chest: Effort normal and breath sounds normal.   Abdominal: Soft.   Musculoskeletal:         General: No edema.      Cervical back: Normal range of motion.   Lymphadenopathy: No supraclavicular adenopathy is present.     He has no cervical adenopathy.   Neurological: He is alert and oriented to person, place, and time.   Skin: Skin is dry.   Psychiatric: He has a normal mood and affect. His behavior is normal. Judgment and thought content normal.   Vitals reviewed.      Clinic data reviewed in clinic with patient     6/16/2023 Spirometry is normal. No post bronchodilator response.    12/16/2022 CPFT Normal spirometry FEV 98.2%. Lung volumes normal. Moderate reduced Diffusing capacity,diffusing capacity may be underestimated.     12/16/2022 6MWD No desaturations requiring supplemental oxygen at rest or exertion  Ordering Provider: Marguerite Ruelas MD      Interpreting Provider: Marguerite Ruelas MD  Performing nurse/tech/RT: VT, RT  Diagnosis:  (Centrilobular emphysema)  Height: 5' 3" (160 cm)  Weight: 87 kg (191 lb 12.8 oz)  BMI (Calculated): 34     Patient Race:                                                                 Phase Oxygen Assessment Supplemental O2 Heart   Rate Blood Pressure Tae Dyspnea Scale Rating   Resting 99 % Room Air 92 bpm 110/75 0   Exercise             Minute             1 96 % Room Air 110 bpm       2 96 % Room Air 103 bpm       3 96 % Room Air 109 bpm       4 97 % Room Air 112 bpm       5 97 % Room Air " 114 bpm       6  97 % Room Air 113 bpm 116/73 4   Recovery             Minute             1 97 % Room Air 99 bpm       2 98 % Room Air 98 bpm       3 98 % Room Air 100 bpm       4 98 % Room Air 104 bpm 114/70 0      Six Minute Walk Summary  6MWT Status: completed without stopping  Patient Reported: Dyspnea, No complaints (knee and should pain)      Interpretation:  Did the patient stop or pause?: No  Total Time Walked (Calculated): 360 seconds  Final Partial Lap Distance (feet): 0 feet  Total Distance Meters (Calculated): 304.8 meters  Predicted Distance Meters (Calculated): 437.84 meters  Percentage of Predicted (Calculated): 69.61  Peak VO2 (Calculated): 13.12  Mets: 3.75  Has The Patient Had a Previous Six Minute Walk Test?: No     Previous 6MWT Results  Has The Patient Had a Previous Six Minute Walk Test?: No       EXAMINATION:  XR CHEST PA AND LATERAL     CLINICAL HISTORY:  Pain in right shoulder     TECHNIQUE:  PA and lateral views of the chest were performed.     COMPARISON:  10/13/2020     FINDINGS:  Mild scarring or atelectasis at the left lung base.  No confluent airspace disease.  Descending thoracic aorta is tortuous.  Mild calcification of the aortic knob.  Cardiomediastinal silhouette and osseous structures are stable in appearance.     Impression:     As above        Electronically signed by: Alex Bañuelos MD  Date:                                            10/04/2022  Time:                                           10:59    Lab Results   Component Value Date    WBC 10.33 04/02/2023    RBC 5.12 04/02/2023    HGB 15.9 04/02/2023    HCT 47.6 04/02/2023    MCV 93 04/02/2023    MCH 31.1 (H) 04/02/2023    MCHC 33.4 04/02/2023    RDW 13.9 04/02/2023     04/02/2023    MPV 8.8 (L) 04/02/2023    GRAN 7.4 04/02/2023    GRAN 71.9 04/02/2023    LYMPH 2.1 04/02/2023    LYMPH 20.1 04/02/2023    MONO 0.7 04/02/2023    MONO 7.1 04/02/2023    EOS 0.0 04/02/2023    BASO 0.03 04/02/2023    EOSINOPHIL 0.1  "04/02/2023    BASOPHIL 0.3 04/02/2023       BMP  Lab Results   Component Value Date     05/02/2023    K 4.0 05/02/2023     05/02/2023    CO2 31 05/02/2023    BUN 7 05/02/2023    CREATININE 0.80 05/02/2023    CALCIUM 9.9 05/02/2023    ANIONGAP 7 (L) 05/02/2023    ESTGFRAFRICA 100 05/02/2023    EGFRNONAA >60.0 05/16/2022    AST 14 04/02/2023    ALT 17 04/02/2023    PROT 7.6 04/02/2023       Lab Results   Component Value Date    BNP 18 10/13/2020    BNP 17 07/23/2019       Lab Results   Component Value Date    TSH 1.189 08/24/2022       No results found for: "SEDRATE"    No results found for: "CRP"  No results found for: "IGE"     No results found for: "ASPERGILLUS"  No results found for: "AFUMIGATUSCL"     No results found for: "ACE"     Diagnostic Results:  I have personally reviewed today the following studies:    CT chest 10/2020  CLINICAL HISTORY:  dysphagia;     TECHNIQUE:  5 mm contiguous axial images are performed through the chest abdomen and pelvis following administration of IV and oral contrast.  Multiplanar reconstruction is performed     COMPARISON:  01/31/2013     FINDINGS:  CT scan chest:     Lung windows:     There are mild emphysematous changes in both lungs.  The central airways are widely patent.     Mediastinum:     The esophagus is distended with both air and fluid.  A small hiatal hernia is noted.     The heart size is normal there are no pleural effusions.  No mediastinal or hilar lymphadenopathy is seen.     Bone windows appear normal for age.           Assessment/Plan:       Problem List Items Addressed This Visit       NATALIE on CPAP - Primary     12/28/2023 PSG Moderate obstructive sleep apnea: AHI was 21.7/hr  1/5/2023 cpap 8 cm optimal  1/12/2023 order cpap 8 w/nasal mask, needs over night on cpap 8 after IDL  Never obtained CPAP after ordered, missed 3 appointments with [facility] to  machine. He had other medical problems/surgeries that affected him picking up CPAP " machine.    9/26/2023 order for CPAP 8 cm   Full face mask requested   HME: [facility]   Follow up 31-90 days from obtaining PAP therapy for IDL.                Relevant Orders    CPAP FOR HOME USE    Obesity (BMI 30.0-34.9)     Encouraged calorie reduction and 30 minutes of exercise daily. Discussed impact of obesity on general health.  Wt Readings from Last 9 Encounters:   09/26/23 80.3 kg (177 lb 0.5 oz)   08/17/23 78.3 kg (172 lb 8.2 oz)   06/23/23 81.6 kg (179 lb 14.3 oz)   06/16/23 80.1 kg (176 lb 9.4 oz)   05/17/23 82.6 kg (182 lb 1.6 oz)   04/21/23 80.7 kg (178 lb)   04/12/23 81.6 kg (180 lb)   04/02/23 87.7 kg (193 lb 6.4 oz)   03/31/23 81.3 kg (179 lb 3.7 oz)   Body mass index is 25.4 kg/m².           Insomnia     Stable on xanax 1 mg nightly, helps calm his anxiety that affects sleep. Continued management with psychiatry.          Centrilobular emphysema     Centrilobular emphysema seen on CT imaging 10/13/2020 CTchest/abdomen mild emphysematous changes in both lungs.   Not symptomatic, not on inhalers.  6/16/2023 spirometry is normal. No COPD gold findings.  12/16/22 spirometry is normal, no COPD gold findings.  10/4/2022 chest xray mild scarring no pleural plaque seen.   12/16/2022 CPFT Normal spirometry FEV 98.2%. Lung volumes normal. Moderate reduced Diffusing capacity,diffusing capacity may be underestimated.             Atherosclerosis of native coronary artery of native heart with angina pectoris     Follow heart healthy diet. regular exercise.              Follow up in about 10 weeks (around 12/5/2023) for CPAP compliance download after initial set up.    Discussed diagnosis, its evaluation, treatment and usual course. All questions answered.      Blanca Cao NP

## 2023-09-26 NOTE — ASSESSMENT & PLAN NOTE
Encouraged calorie reduction and 30 minutes of exercise daily. Discussed impact of obesity on general health.  Wt Readings from Last 9 Encounters:   09/26/23 80.3 kg (177 lb 0.5 oz)   08/17/23 78.3 kg (172 lb 8.2 oz)   06/23/23 81.6 kg (179 lb 14.3 oz)   06/16/23 80.1 kg (176 lb 9.4 oz)   05/17/23 82.6 kg (182 lb 1.6 oz)   04/21/23 80.7 kg (178 lb)   04/12/23 81.6 kg (180 lb)   04/02/23 87.7 kg (193 lb 6.4 oz)   03/31/23 81.3 kg (179 lb 3.7 oz)   Body mass index is 25.4 kg/m².

## 2023-09-27 ENCOUNTER — OFFICE VISIT (OUTPATIENT)
Dept: PSYCHIATRY | Facility: CLINIC | Age: 63
End: 2023-09-27
Payer: MEDICARE

## 2023-09-27 VITALS
BODY MASS INDEX: 24.89 KG/M2 | HEART RATE: 96 BPM | DIASTOLIC BLOOD PRESSURE: 84 MMHG | SYSTOLIC BLOOD PRESSURE: 118 MMHG | WEIGHT: 173.5 LBS

## 2023-09-27 DIAGNOSIS — F41.8 MIXED ANXIETY AND DEPRESSIVE DISORDER: ICD-10-CM

## 2023-09-27 DIAGNOSIS — F63.81 INTERMITTENT EXPLOSIVE DISORDER: Primary | ICD-10-CM

## 2023-09-27 PROCEDURE — 3008F BODY MASS INDEX DOCD: CPT | Mod: CPTII,S$GLB,, | Performed by: PSYCHIATRY & NEUROLOGY

## 2023-09-27 PROCEDURE — 3079F DIAST BP 80-89 MM HG: CPT | Mod: CPTII,S$GLB,, | Performed by: PSYCHIATRY & NEUROLOGY

## 2023-09-27 PROCEDURE — 3008F PR BODY MASS INDEX (BMI) DOCUMENTED: ICD-10-PCS | Mod: CPTII,S$GLB,, | Performed by: PSYCHIATRY & NEUROLOGY

## 2023-09-27 PROCEDURE — 99999 PR PBB SHADOW E&M-EST. PATIENT-LVL II: ICD-10-PCS | Mod: PBBFAC,,, | Performed by: PSYCHIATRY & NEUROLOGY

## 2023-09-27 PROCEDURE — 99999 PR PBB SHADOW E&M-EST. PATIENT-LVL II: CPT | Mod: PBBFAC,,, | Performed by: PSYCHIATRY & NEUROLOGY

## 2023-09-27 PROCEDURE — 99214 PR OFFICE/OUTPT VISIT, EST, LEVL IV, 30-39 MIN: ICD-10-PCS | Mod: S$GLB,,, | Performed by: PSYCHIATRY & NEUROLOGY

## 2023-09-27 PROCEDURE — 3074F SYST BP LT 130 MM HG: CPT | Mod: CPTII,S$GLB,, | Performed by: PSYCHIATRY & NEUROLOGY

## 2023-09-27 PROCEDURE — 3074F PR MOST RECENT SYSTOLIC BLOOD PRESSURE < 130 MM HG: ICD-10-PCS | Mod: CPTII,S$GLB,, | Performed by: PSYCHIATRY & NEUROLOGY

## 2023-09-27 PROCEDURE — 3079F PR MOST RECENT DIASTOLIC BLOOD PRESSURE 80-89 MM HG: ICD-10-PCS | Mod: CPTII,S$GLB,, | Performed by: PSYCHIATRY & NEUROLOGY

## 2023-09-27 PROCEDURE — 99214 OFFICE O/P EST MOD 30 MIN: CPT | Mod: S$GLB,,, | Performed by: PSYCHIATRY & NEUROLOGY

## 2023-09-27 RX ORDER — ALPRAZOLAM 1 MG/1
1 TABLET ORAL NIGHTLY PRN
Qty: 30 TABLET | Refills: 3 | Status: SHIPPED | OUTPATIENT
Start: 2023-09-27 | End: 2024-01-22 | Stop reason: SDUPTHER

## 2023-09-27 RX ORDER — HYDROXYZINE HYDROCHLORIDE 50 MG/1
TABLET, FILM COATED ORAL
Qty: 30 TABLET | Refills: 3 | Status: SHIPPED | OUTPATIENT
Start: 2023-09-27 | End: 2024-01-29 | Stop reason: SDUPTHER

## 2023-09-27 RX ORDER — DIAZEPAM 5 MG/1
TABLET ORAL
Qty: 30 TABLET | Refills: 3 | Status: ON HOLD | OUTPATIENT
Start: 2023-09-27 | End: 2024-01-11 | Stop reason: HOSPADM
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
## 2023-10-20 ENCOUNTER — TELEPHONE (OUTPATIENT)
Dept: GASTROENTEROLOGY | Facility: CLINIC | Age: 63
End: 2023-10-20
Payer: MEDICARE

## 2023-10-20 NOTE — TELEPHONE ENCOUNTER
Incomplete Note  Patient is calling requesting a Phenegan  refill. He stated he is vomiting a lot and needs  medication.

## 2023-10-20 NOTE — TELEPHONE ENCOUNTER
----- Message from Diamone Speed sent at 10/20/2023 12:14 PM CDT -----  Regarding: self  Type: Patient Call Back       Who called: self        What is the request in detail: pt stated he was on the phone with MARY Simon and would like to get a call back from her        Can the clinic reply by [facility]? Yes       Would the patient rather a call back or a response via [facility]? Call back       Best call back number: [phone]

## 2023-10-20 NOTE — TELEPHONE ENCOUNTER
----- Message from Srinivasa Good sent at 10/20/2023  9:00 AM CDT -----  Contact: Lenny  Patient a calling to get a refill on promethazine (PHENERGAN) 6.25 mg/5 mL syrup. Preferred pharmacy listed below. Reports being in pain and can't sleep due to throwing up.Please give patient a call at [phone]       [id number] - [place] - [address] AT [facility]  [address] [address]  Phone: [phone] Fax: [phone]

## 2023-10-23 RX ORDER — TAMSULOSIN HYDROCHLORIDE 0.4 MG/1
0.4 CAPSULE ORAL
Qty: 90 CAPSULE | Refills: 3 | Status: SHIPPED | OUTPATIENT
Start: 2023-10-23

## 2023-10-23 RX ORDER — PROMETHAZINE HYDROCHLORIDE 6.25 MG/5ML
25 SYRUP ORAL EVERY 6 HOURS PRN
Qty: 473 ML | Refills: 0 | Status: SHIPPED | OUTPATIENT
Start: 2023-10-23 | End: 2023-11-09 | Stop reason: SDUPTHER

## 2023-10-23 NOTE — TELEPHONE ENCOUNTER
contacted via phone and informed that his phenegan has been refilled per Narendra. pt verbalized understanding

## 2023-11-08 NOTE — TELEPHONE ENCOUNTER
----- Message from Immanuel Gaitan sent at 11/8/2023  4:23 PM CST -----  Regarding: refill request  Contact: pt [phone]  RX REQUEST    Is this a Refill or New Rx: refill    Rx Name & Strength: promethazine (PHENERGAN) 6.25 mg/5 mL syrup    Preferred Pharmacy with phone number:  [id number] - [place] - [address] AT [facility] [place] [id number] [place] [address]  Phone: [phone] Fax: [phone]    Add'l Info: pt states that the nausea is bad that he hasn't eaten since Monday, please send ASAP. This is his second time calling today

## 2023-11-08 NOTE — TELEPHONE ENCOUNTER
Returned call to pt who stated he has only been able to keep small foods down since Monday. Pt stated he is able to keep most fluids down but is still nauseous and vomiting. He is requesting a refill of phenergan right away. Pt was advised to go to the ER if symptoms worsened and a refill request will be sent to the Provider on call. Pt verbalized understanding.

## 2023-11-09 ENCOUNTER — TELEPHONE (OUTPATIENT)
Dept: GASTROENTEROLOGY | Facility: CLINIC | Age: 63
End: 2023-11-09
Payer: MEDICARE

## 2023-11-09 ENCOUNTER — NURSE TRIAGE (OUTPATIENT)
Dept: ADMINISTRATIVE | Facility: CLINIC | Age: 63
End: 2023-11-09
Payer: MEDICARE

## 2023-11-09 RX ORDER — PROMETHAZINE HYDROCHLORIDE 6.25 MG/5ML
25 SYRUP ORAL EVERY 6 HOURS PRN
Qty: 473 ML | Refills: 0 | Status: SHIPPED | OUTPATIENT
Start: 2023-11-09 | End: 2023-12-01 | Stop reason: SDUPTHER

## 2023-11-09 RX ORDER — PROMETHAZINE HYDROCHLORIDE 6.25 MG/5ML
25 SYRUP ORAL EVERY 6 HOURS PRN
Qty: 473 ML | Refills: 0 | OUTPATIENT
Start: 2023-11-09

## 2023-11-09 RX ORDER — PROMETHAZINE HYDROCHLORIDE 6.25 MG/5ML
25 SYRUP ORAL EVERY 6 HOURS PRN
Qty: 473 ML | Refills: 0 | Status: SHIPPED | OUTPATIENT
Start: 2023-11-09 | End: 2023-11-09 | Stop reason: SDUPTHER

## 2023-11-09 NOTE — TELEPHONE ENCOUNTER
Called pt and informed pt that medication has been sent to pharmacy. Pt verbalized understanding.

## 2023-11-10 NOTE — TELEPHONE ENCOUNTER
[place]    PCP:  Dr. Herminia Longoria    Pt is calling re: Promethazine ordered today.  He reports pharmacy received prescription but they didn't receive the quantity.  Per protocol, care advised is  now.  Advised pt that NT will contact the pharmacy first to see what the pharmacy needs then will contact the OCP if needed and will call him back.  NT was unable to contact the pharmacy.  NT contacted the OCP and she will resend the prescription but if it doesn't go through again then he will need to address this issue during regular office hours.  Pt contacted and notified of OCP recommendations.  Pt VU.  Advised to call for worsening/questions/concerns.  VU.     Reason for Disposition   [1] Prescription not at pharmacy AND [2] was prescribed by PCP recently  (Exception: Triager has access to EMR and prescription is recorded there. Go to Home Care and confirm for pharmacy.)    Additional Information   Negative: [1] Prescription refill request for ESSENTIAL medicine (i.e., likelihood of harm to patient if not taken) AND [2] triager unable to refill per department policy    Protocols used: Medication Refill and Renewal Call-A-

## 2023-11-21 RX ORDER — PROMETHAZINE HYDROCHLORIDE 6.25 MG/5ML
25 SYRUP ORAL EVERY 6 HOURS PRN
Qty: 473 ML | Refills: 0 | OUTPATIENT
Start: 2023-11-21

## 2023-11-21 NOTE — TELEPHONE ENCOUNTER
Spoke with pt. Informed pt prescription was forwarded to provider this morning for approval and awaiting response. Verbalized understanding.//ddw

## 2023-11-21 NOTE — TELEPHONE ENCOUNTER
----- Message from Sydnee Haynes sent at 11/21/2023  8:22 AM CST -----  Contact: self  ..Type:  RX Refill Request    Who Called: .Lenny Latham Jr.  Refill or New Rx: Refill   RX Name and Strength: promethazine (PHENERGAN) 6.25 mg/5 mL syrup  How is the patient currently taking it? (ex. 1XDay):  Is this a 30 day or 90 day RX:  Preferred Pharmacy with phone number:.  [id number] - [place] - [address] AT [facility]  [address]   [facility] [place] [address]  Phone: [phone] Fax: [phone]  Local or Mail Order:local   Ordering Provider:  Would the patient rather a call back or a response via [facility]? Call back   Best Call Back Number: .[phone]   Additional Information: pt states since he has been taking medication he has an appetite

## 2023-11-21 NOTE — TELEPHONE ENCOUNTER
No care due was identified.  [facility] Embedded Care Due Messages. Reference number: [id number].   11/21/2023 8:30:23 AM CST

## 2023-11-21 NOTE — TELEPHONE ENCOUNTER
----- Message from Carolyn Cole sent at 11/21/2023 12:09 PM CST -----  Contact: self  [phone]  Patient called in this morning requesting a refill on the following medication promethazine (PHENERGAN) 6.25 mg/5 mL syrup. Please call back  [phone]. Thanks [id number] - [place] - [address] AT [facility]  [address] [address]  Phone: [phone] Fax: [phone]

## 2023-11-22 ENCOUNTER — OFFICE VISIT (OUTPATIENT)
Dept: INTERNAL MEDICINE | Facility: CLINIC | Age: 63
End: 2023-11-22
Payer: MEDICARE

## 2023-11-22 ENCOUNTER — TELEPHONE (OUTPATIENT)
Dept: INTERNAL MEDICINE | Facility: CLINIC | Age: 63
End: 2023-11-22
Payer: MEDICARE

## 2023-11-22 ENCOUNTER — LAB VISIT (OUTPATIENT)
Dept: LAB | Facility: HOSPITAL | Age: 63
End: 2023-11-22
Attending: FAMILY MEDICINE
Payer: MEDICARE

## 2023-11-22 VITALS
SYSTOLIC BLOOD PRESSURE: 126 MMHG | WEIGHT: 177.69 LBS | HEIGHT: 70 IN | BODY MASS INDEX: 25.44 KG/M2 | TEMPERATURE: 98 F | RESPIRATION RATE: 20 BRPM | DIASTOLIC BLOOD PRESSURE: 78 MMHG | HEART RATE: 99 BPM | OXYGEN SATURATION: 99 %

## 2023-11-22 DIAGNOSIS — E78.2 MIXED HYPERLIPIDEMIA: ICD-10-CM

## 2023-11-22 DIAGNOSIS — E66.9 OBESITY (BMI 30.0-34.9): ICD-10-CM

## 2023-11-22 DIAGNOSIS — K21.9 GASTROESOPHAGEAL REFLUX DISEASE WITHOUT ESOPHAGITIS: ICD-10-CM

## 2023-11-22 DIAGNOSIS — I25.118 CORONARY ARTERY DISEASE OF NATIVE ARTERY OF NATIVE HEART WITH STABLE ANGINA PECTORIS: Primary | ICD-10-CM

## 2023-11-22 DIAGNOSIS — Z79.899 OTHER LONG TERM (CURRENT) DRUG THERAPY: ICD-10-CM

## 2023-11-22 DIAGNOSIS — N40.1 BENIGN PROSTATIC HYPERPLASIA WITH URINARY OBSTRUCTION: ICD-10-CM

## 2023-11-22 DIAGNOSIS — N13.8 BENIGN PROSTATIC HYPERPLASIA WITH URINARY OBSTRUCTION: ICD-10-CM

## 2023-11-22 DIAGNOSIS — J43.2 CENTRILOBULAR EMPHYSEMA: ICD-10-CM

## 2023-11-22 DIAGNOSIS — Z00.00 ROUTINE GENERAL MEDICAL EXAMINATION AT A HEALTH CARE FACILITY: ICD-10-CM

## 2023-11-22 DIAGNOSIS — K22.0 ACHALASIA OF ESOPHAGUS: ICD-10-CM

## 2023-11-22 DIAGNOSIS — I25.119 ATHEROSCLEROSIS OF NATIVE CORONARY ARTERY OF NATIVE HEART WITH ANGINA PECTORIS: ICD-10-CM

## 2023-11-22 DIAGNOSIS — G47.33 OSA ON CPAP: ICD-10-CM

## 2023-11-22 LAB
ALBUMIN SERPL BCP-MCNC: 3.9 G/DL (ref 3.5–5.2)
ALP SERPL-CCNC: 102 U/L (ref 55–135)
ALT SERPL W/O P-5'-P-CCNC: 16 U/L (ref 10–44)
ANION GAP SERPL CALC-SCNC: 12 MMOL/L (ref 8–16)
AST SERPL-CCNC: 17 U/L (ref 10–40)
BASOPHILS # BLD AUTO: 0.02 K/UL (ref 0–0.2)
BASOPHILS NFR BLD: 0.3 % (ref 0–1.9)
BILIRUB SERPL-MCNC: 0.5 MG/DL (ref 0.1–1)
BUN SERPL-MCNC: 13 MG/DL (ref 8–23)
CALCIUM SERPL-MCNC: 9.6 MG/DL (ref 8.7–10.5)
CHLORIDE SERPL-SCNC: 104 MMOL/L (ref 95–110)
CHOLEST SERPL-MCNC: 165 MG/DL (ref 120–199)
CHOLEST/HDLC SERPL: 2.8 {RATIO} (ref 2–5)
CO2 SERPL-SCNC: 26 MMOL/L (ref 23–29)
CREAT SERPL-MCNC: 1 MG/DL (ref 0.5–1.4)
DIFFERENTIAL METHOD: ABNORMAL
EOSINOPHIL # BLD AUTO: 0.1 K/UL (ref 0–0.5)
EOSINOPHIL NFR BLD: 1.5 % (ref 0–8)
ERYTHROCYTE [DISTWIDTH] IN BLOOD BY AUTOMATED COUNT: 15.4 % (ref 11.5–14.5)
EST. GFR  (NO RACE VARIABLE): >60 ML/MIN/1.73 M^2
ESTIMATED AVG GLUCOSE: 117 MG/DL (ref 68–131)
GLUCOSE SERPL-MCNC: 94 MG/DL (ref 70–110)
HBA1C MFR BLD: 5.7 % (ref 4–5.6)
HCT VFR BLD AUTO: 45.1 % (ref 40–54)
HDLC SERPL-MCNC: 60 MG/DL (ref 40–75)
HDLC SERPL: 36.4 % (ref 20–50)
HGB BLD-MCNC: 13.9 G/DL (ref 14–18)
IMM GRANULOCYTES # BLD AUTO: 0.02 K/UL (ref 0–0.04)
IMM GRANULOCYTES NFR BLD AUTO: 0.3 % (ref 0–0.5)
LDLC SERPL CALC-MCNC: 89.2 MG/DL (ref 63–159)
LYMPHOCYTES # BLD AUTO: 2.2 K/UL (ref 1–4.8)
LYMPHOCYTES NFR BLD: 32.8 % (ref 18–48)
MCH RBC QN AUTO: 28.7 PG (ref 27–31)
MCHC RBC AUTO-ENTMCNC: 30.8 G/DL (ref 32–36)
MCV RBC AUTO: 93 FL (ref 82–98)
MONOCYTES # BLD AUTO: 0.5 K/UL (ref 0.3–1)
MONOCYTES NFR BLD: 7.9 % (ref 4–15)
NEUTROPHILS # BLD AUTO: 3.8 K/UL (ref 1.8–7.7)
NEUTROPHILS NFR BLD: 57.2 % (ref 38–73)
NONHDLC SERPL-MCNC: 105 MG/DL
NRBC BLD-RTO: 0 /100 WBC
PLATELET # BLD AUTO: 260 K/UL (ref 150–450)
PMV BLD AUTO: 9.4 FL (ref 9.2–12.9)
POTASSIUM SERPL-SCNC: 4.3 MMOL/L (ref 3.5–5.1)
PROT SERPL-MCNC: 7.6 G/DL (ref 6–8.4)
RBC # BLD AUTO: 4.84 M/UL (ref 4.6–6.2)
SODIUM SERPL-SCNC: 142 MMOL/L (ref 136–145)
TRIGL SERPL-MCNC: 79 MG/DL (ref 30–150)
TSH SERPL DL<=0.005 MIU/L-ACNC: 0.99 UIU/ML (ref 0.4–4)
WBC # BLD AUTO: 6.7 K/UL (ref 3.9–12.7)

## 2023-11-22 PROCEDURE — 3074F SYST BP LT 130 MM HG: CPT | Mod: CPTII,S$GLB,, | Performed by: FAMILY MEDICINE

## 2023-11-22 PROCEDURE — 1159F PR MEDICATION LIST DOCUMENTED IN MEDICAL RECORD: ICD-10-PCS | Mod: CPTII,S$GLB,, | Performed by: FAMILY MEDICINE

## 2023-11-22 PROCEDURE — 99999 PR PBB SHADOW E&M-EST. PATIENT-LVL IV: ICD-10-PCS | Mod: PBBFAC,,, | Performed by: FAMILY MEDICINE

## 2023-11-22 PROCEDURE — 3074F PR MOST RECENT SYSTOLIC BLOOD PRESSURE < 130 MM HG: ICD-10-PCS | Mod: CPTII,S$GLB,, | Performed by: FAMILY MEDICINE

## 2023-11-22 PROCEDURE — 3008F BODY MASS INDEX DOCD: CPT | Mod: CPTII,S$GLB,, | Performed by: FAMILY MEDICINE

## 2023-11-22 PROCEDURE — 99214 PR OFFICE/OUTPT VISIT, EST, LEVL IV, 30-39 MIN: ICD-10-PCS | Mod: S$GLB,,, | Performed by: FAMILY MEDICINE

## 2023-11-22 PROCEDURE — 36415 COLL VENOUS BLD VENIPUNCTURE: CPT | Performed by: FAMILY MEDICINE

## 2023-11-22 PROCEDURE — 3078F PR MOST RECENT DIASTOLIC BLOOD PRESSURE < 80 MM HG: ICD-10-PCS | Mod: CPTII,S$GLB,, | Performed by: FAMILY MEDICINE

## 2023-11-22 PROCEDURE — 85025 COMPLETE CBC W/AUTO DIFF WBC: CPT | Performed by: FAMILY MEDICINE

## 2023-11-22 PROCEDURE — 80061 LIPID PANEL: CPT | Performed by: FAMILY MEDICINE

## 2023-11-22 PROCEDURE — 99999 PR PBB SHADOW E&M-EST. PATIENT-LVL IV: CPT | Mod: PBBFAC,,, | Performed by: FAMILY MEDICINE

## 2023-11-22 PROCEDURE — 83036 HEMOGLOBIN GLYCOSYLATED A1C: CPT | Performed by: FAMILY MEDICINE

## 2023-11-22 PROCEDURE — 80053 COMPREHEN METABOLIC PANEL: CPT | Performed by: FAMILY MEDICINE

## 2023-11-22 PROCEDURE — 1160F PR REVIEW ALL MEDS BY PRESCRIBER/CLIN PHARMACIST DOCUMENTED: ICD-10-PCS | Mod: CPTII,S$GLB,, | Performed by: FAMILY MEDICINE

## 2023-11-22 PROCEDURE — 1160F RVW MEDS BY RX/DR IN RCRD: CPT | Mod: CPTII,S$GLB,, | Performed by: FAMILY MEDICINE

## 2023-11-22 PROCEDURE — 3078F DIAST BP <80 MM HG: CPT | Mod: CPTII,S$GLB,, | Performed by: FAMILY MEDICINE

## 2023-11-22 PROCEDURE — 84443 ASSAY THYROID STIM HORMONE: CPT | Performed by: FAMILY MEDICINE

## 2023-11-22 PROCEDURE — 1159F MED LIST DOCD IN RCRD: CPT | Mod: CPTII,S$GLB,, | Performed by: FAMILY MEDICINE

## 2023-11-22 PROCEDURE — 99214 OFFICE O/P EST MOD 30 MIN: CPT | Mod: S$GLB,,, | Performed by: FAMILY MEDICINE

## 2023-11-22 PROCEDURE — 3008F PR BODY MASS INDEX (BMI) DOCUMENTED: ICD-10-PCS | Mod: CPTII,S$GLB,, | Performed by: FAMILY MEDICINE

## 2023-11-22 RX ORDER — CYCLOBENZAPRINE HCL 5 MG
5 TABLET ORAL NIGHTLY
COMMUNITY
Start: 2023-11-14 | End: 2024-01-29

## 2023-11-22 NOTE — TELEPHONE ENCOUNTER
----- Message from Mahogany Hunter sent at 11/22/2023 12:12 PM CST -----  Type:  RX Refill Request    Who Called: pt  Refill or New Rx:refill  RX Name and Strength:Promethazine  How is the patient currently taking it? (ex. 1XDay):  Is this a 30 day or 90 day RX:30  Preferred Pharmacy with phone number:[facility] [address]  Local or Mail Order:local  Ordering Provider:  Would the patient rather a call back or a response via [facility]? call  Best Call Back Number:[phone]  Additional Information:

## 2023-11-22 NOTE — TELEPHONE ENCOUNTER
Pt called to check on status of promethazine cough syrup refill. Informed pt medication has been sent to provider for approval. Pt states coughing uncontrollably and would like to have relief soon. Please advise.//ddw

## 2023-11-22 NOTE — TELEPHONE ENCOUNTER
----- Message from Roseann Gonzalez sent at 11/22/2023  8:57 AM CST -----  Contact: Lenny Gottlieb is calling in regards to refill on promethazine (PHENERGAN) 6.25 mg/5 mL syrup. Please call back at  [phone].      .  [id number] [place] - [address] AT [facility]  [address] [address]  Phone: [phone] Fax: [phone]    Thanks  KAIDEN

## 2023-11-22 NOTE — PROGRESS NOTES
"Chief Complaint  right nipple pain     History of Present Illness  Americo Reyes presents today for weight and right nipple pain follow up. Patient is currently taking gabapentin. He states it has provided minimal relief. Patient wants to discuss other treatment options as he is still having pain symptoms in his right nipple. Pain is intermittent, random and patient is unaware of there being any aggravating factors.     Sleeping well, working at [employer].     Patient states he did see Dr. Flaherty, and had labs yesterday. Follow up in a month. Other testing is being ordered but patient cannot elaborate, likely chest CT.     [facility] 7[facility]14/22, have not had follow up    Notation in previous records  \" Adenocarcinoma of the esophagus clinical Stage IIIA diagnosed November 2014.  · He claimed not to have been symptomatic at all, but was due for a colonoscopy. He saw his  family physician, Rebeka Clark M.D., and an EGD was performed also as he has a history of  gastroesophageal reflux disease. The patient agreed and was referred to Nik Mcnally M.D.   On 11/12/2014, an EGD and colonoscopy with biopsy were performed by Dr. Mcnally, revealing a 5 cm  distal esophageal malignant appearing mass, three polyps were removed at the time of colonoscopy,  repeat colonoscopy in five years. Pathology revealed moderately differentiated adenocarcinoma in  the esophageal mass and the cecal polyp had lymphoid aggregate and ascending colon polyp \"      Current Outpatient Medications on File Prior to Visit   Medication Sig   • aspirin 81 MG tablet ASPIRIN 81 MG ORAL TABLET   • [DISCONTINUED] gabapentin (NEURONTIN) 300 MG capsule Take 1 capsule by mouth Every Night. May increase to 2 at hs if needed for pain   • atorvastatin (LIPITOR) 20 MG tablet TK 1 T PO QHS   • calcium carbonate (TUMS) 500 MG chewable tablet Chew 1 tablet Daily.   • niacin (NIACIN SR,NIASPAN ER) 500 MG CR capsule Daily.   • " Subjective:       Patient ID: Lenny Latham Jr. is a 63 y.o. male.    Chief Complaint: Annual Exam    63-year-old  male patient with Patient Active Problem List:     GERD (gastroesophageal reflux disease)     Achalasia of esophagus     Atherosclerosis of native coronary artery of native heart with angina pectoris     Mixed anxiety and depressive disorder     Mixed hyperlipidemia     Coronary artery disease of native artery of native heart with stable angina pectoris     Benign prostatic hyperplasia with urinary obstruction     Erectile dysfunction     Obesity (BMI 30.0-34.9)     Fear of needles     Adjustment disorder     Cognitive complaints with normal exam     Insomnia     Centrilobular emphysema     NATALIE on CPAP  Here for routine annual physicals  Patient has been taking his medications regularly and denies any chest pain or difficulty breathing with palpitations  Reports that he has been taking Phenergan to help him eat as he has decreased appetite and continues to have chronic pain in spite of having rotator cuff repair to the right shoulder as well as right knee replacement and is working to get an appointment with pain management  Patient has tried taking Tylenol extra-strength 2 tablets in the evening with no relief  Has been followed by Dr. Jame Chavis      Review of Systems   Constitutional:  Positive for appetite change. Negative for fatigue and unexpected weight change.   Eyes:  Negative for visual disturbance.   Respiratory:  Negative for shortness of breath.    Cardiovascular:  Negative for chest pain, palpitations and leg swelling.   Gastrointestinal:  Negative for abdominal pain, nausea and vomiting.   Musculoskeletal:  Positive for arthralgias and back pain. Negative for myalgias.   Skin:  Negative for rash.   Neurological:  Negative for headaches.   Psychiatric/Behavioral:  Negative for sleep disturbance.          /78 (BP Location: Left arm, Patient Position: Sitting,  "BP Method: Large (Manual))   Pulse 99   Temp 97.7 °F (36.5 °C) (Tympanic)   Resp 20   Ht 5' 10" (1.778 m)   Wt 80.6 kg (177 lb 11.1 oz)   SpO2 99%   BMI 25.50 kg/m²   Objective:      Physical Exam  Constitutional:       Appearance: He is well-developed.   HENT:      Head: Normocephalic and atraumatic.   Cardiovascular:      Rate and Rhythm: Normal rate and regular rhythm.      Heart sounds: Normal heart sounds. No murmur heard.  Pulmonary:      Effort: Pulmonary effort is normal.      Breath sounds: Normal breath sounds. No wheezing.   Abdominal:      General: Bowel sounds are normal.      Palpations: Abdomen is soft.      Tenderness: There is no abdominal tenderness.   Musculoskeletal:         General: Tenderness present.      Comments: Positive for tenderness to the right knee status post knee replacement   Skin:     General: Skin is warm and dry.      Findings: No rash.   Neurological:      Mental Status: He is alert and oriented to person, place, and time.   Psychiatric:         Mood and Affect: Mood normal.           Assessment/Plan:   1. Coronary artery disease of native artery of native heart with stable angina pectoris  -     Urinalysis, Reflex to Urine Culture Urine, Clean Catch; Future; Expected date: 11/22/2023  Followed by Cardiology clinically doing well on Lipitor    2. Routine general medical examination at a health care facility  Will check complete fasting labs    3. Mixed hyperlipidemia  -     TSH; Future; Expected date: 11/22/2023  -     Lipid Panel; Future; Expected date: 11/22/2023  Currently on Lipitor 40 mg daily    4. Atherosclerosis of native coronary artery of native heart with angina pectoris  Followed by Cardiology clinically doing well    5. Gastroesophageal reflux disease without esophagitis  -     TSH; Future; Expected date: 11/22/2023  -     Comprehensive Metabolic Panel; Future; Expected date: 11/22/2023  6. Achalasia of esophagus  Currently taking omeprazole 40 mg daily and " "[DISCONTINUED] omeprazole (priLOSEC) 40 MG capsule TK 1 C PO ONCE DAILY     No current facility-administered medications on file prior to visit.       Objective   Vital Signs:   /78   Pulse 84   Temp 98 °F (36.7 °C)   Resp 18   Ht 184 cm (72.44\")   Wt 87.3 kg (192 lb 6.4 oz)   SpO2 99%   BMI 25.78 kg/m²       Physical Exam  Vitals and nursing note reviewed.   Constitutional:       General: He is not in acute distress.     Appearance: Normal appearance. He is well-developed.   HENT:      Head: Normocephalic and atraumatic.   Eyes:      Conjunctiva/sclera: Conjunctivae normal.      Pupils: Pupils are equal, round, and reactive to light.   Neck:      Thyroid: No thyromegaly.      Vascular: No JVD.   Cardiovascular:      Rate and Rhythm: Normal rate and regular rhythm.      Heart sounds: Normal heart sounds. No murmur heard.  Pulmonary:      Breath sounds: Normal breath sounds. No wheezing or rales.   Musculoskeletal:         General: Normal range of motion.      Cervical back: Normal range of motion.   Lymphadenopathy:      Cervical: No cervical adenopathy.   Skin:     General: Skin is warm and dry.      Findings: No rash.   Neurological:      Mental Status: He is alert and oriented to person, place, and time.   Psychiatric:         Mood and Affect: Mood normal.         Behavior: Behavior normal.      Comments: Memory confused            No visits with results within 1 Day(s) from this visit.   Latest known visit with results is:   Office Visit on 09/19/2019   Component Date Value Ref Range Status   • Glucose 09/19/2019 57 (A) 65 - 99 mg/dL Final   • BUN 09/19/2019 19  8 - 20 mg/dL Final   • Creatinine 09/19/2019 1.00  0.70 - 1.20 mg/dL Final   • Sodium 09/19/2019 138  136 - 144 mmol/L Final   • Potassium 09/19/2019 4.2  3.6 - 5.1 mmol/L Final   • Chloride 09/19/2019 102  101 - 111 mmol/L Final   • CO2 09/19/2019 23.0  22.0 - 32.0 mmol/L Final   • Calcium 09/19/2019 9.1  8.9 - 10.3 mg/dL Final   • Total " Protein 09/19/2019 6.6  6.1 - 7.9 g/dL Final   • Albumin 09/19/2019 3.90  3.50 - 4.80 g/dL Final   • ALT (SGPT) 09/19/2019 15 (A) 17 - 63 U/L Final   • AST (SGOT) 09/19/2019 22  15 - 41 U/L Final   • Alkaline Phosphatase 09/19/2019 72  32 - 91 U/L Final   • Total Bilirubin 09/19/2019 0.7  0.3 - 1.2 mg/dL Final   • eGFR Non African Amer 09/19/2019 77  >60 mL/min/1.73 Final   • Globulin 09/19/2019 2.7  2.5 - 3.8 gm/dL Final   • A/G Ratio 09/19/2019 1.4  1.0 - 1.7 g/dL Final   • BUN/Creatinine Ratio 09/19/2019 19.0  6.2 - 20.3 Final   • Anion Gap 09/19/2019 17.2 (A) 5.0 - 15.0 mmol/L Final   • WBC 09/19/2019 4.26  3.40 - 10.80 10*3/mm3 Final   • RBC 09/19/2019 5.40  4.14 - 5.80 10*6/mm3 Final   • Hemoglobin 09/19/2019 15.7  13.0 - 17.7 g/dL Final   • Hematocrit 09/19/2019 46.4  37.5 - 51.0 % Final   • MCV 09/19/2019 85.9  79.0 - 97.0 fL Final   • MCH 09/19/2019 29.1  26.6 - 33.0 pg Final   • MCHC 09/19/2019 33.8  31.5 - 35.7 g/dL Final   • RDW 09/19/2019 14.3  12.3 - 15.4 % Final   • RDW-SD 09/19/2019 44.5  37.0 - 54.0 fl Final   • MPV 09/19/2019 9.4  6.0 - 12.0 fL Final   • Platelets 09/19/2019 199  140 - 450 10*3/mm3 Final   • Neutrophil % 09/19/2019 55.6  42.7 - 76.0 % Final   • Lymphocyte % 09/19/2019 22.1  19.6 - 45.3 % Final   • Monocyte % 09/19/2019 16.7 (A) 5.0 - 12.0 % Final   • Eosinophil % 09/19/2019 4.7  0.3 - 6.2 % Final   • Basophil % 09/19/2019 0.9  0.0 - 1.5 % Final   • Neutrophils, Absolute 09/19/2019 2.37  1.70 - 7.00 10*3/mm3 Final   • Lymphocytes, Absolute 09/19/2019 0.94  0.70 - 3.10 10*3/mm3 Final   • Monocytes, Absolute 09/19/2019 0.71  0.10 - 0.90 10*3/mm3 Final   • Eosinophils, Absolute 09/19/2019 0.20  0.00 - 0.40 10*3/mm3 Final   • Basophils, Absolute 09/19/2019 0.04  0.00 - 0.20 10*3/mm3 Final             No results found for: HGBA1C             Assessment and Plan    Diagnoses and all orders for this visit:    1. Neuropathic pain of chest (Primary)  -     Ambulatory Referral to Pain  advised to take Phenergan only as needed for nausea but not do increased appetite  Patient to follow-up with cholesterol    7. Benign prostatic hyperplasia with urinary obstruction  Stable on Flomax    8. Centrilobular emphysema  Overview:  Centrilobular emphysema seen on CT imaging 10/13/2020 CTchest/abdomen mild emphysematous changes in both lungs.   Not symptomatic, not on inhalers.  6/16/2023 spirometry is normal. No COPD gold findings.  12/16/22 spirometry is normal, no COPD gold findings.  10/4/2022 chest xray mild scarring no pleural plaque seen.   12/16/2022 CPFT Normal spirometry FEV 98.2%. Lung volumes normal. Moderate reduced Diffusing capacity,diffusing capacity may be underestimated.        Orders:  -     Comprehensive Metabolic Panel; Future; Expected date: 11/22/2023  -     CBC Auto Differential; Future; Expected date: 11/22/2023    Stable and asymptomatic    9. NATALIE on CPAP  Overview:  12/28/2023 PSG Moderate obstructive sleep apnea: AHI was 21.7/hr  1/5/2023 cpap 8 cm optimal  1/12/2023 order cpap 8 w/nasal mask, needs over night on cpap 8 after IDL  Never obtained CPAP after ordered, missed 3 appointments with [facility] to  machine. He had other medical problems/surgeries that affected him picking up CPAP machine.    9/26/2023 order for CPAP 8 cm   Full face mask requested   HME: [facility]   Follow up 31-90 days from obtaining PAP therapy for IDL.     Stable        10. Obesity (BMI 30.0-34.9)  -     Hemoglobin A1C; Future; Expected date: 11/22/2023  Lifestyle modifications discussed to lose weight with BMI 25    11. Other long term (current) drug therapy  -     Hemoglobin A1C; Future; Expected date: 11/22/2023              Management  -     gabapentin (NEURONTIN) 300 MG capsule; Take 1 capsule by mouth Every Night. One in am and  2 at hs for pain  Dispense: 90 capsule; Refill: 1    2. Overweight (BMI 25.0-29.9)    3. History of esophageal cancer  -     Ambulatory Referral to Gastroenterology    4. Gastroesophageal reflux disease, unspecified whether esophagitis present  -     Ambulatory Referral to Gastroenterology  -     omeprazole (priLOSEC) 40 MG capsule; Take 1 capsule by mouth Daily.  Dispense: 90 capsule; Refill: 1    5. Adenocarcinoma distal esophagus stage IIIA  -     Ambulatory Referral to Gastroenterology    6. Memory difficulties      Patient presented today by himself and exhibits significant memory impairment and is vague with most responses.  Encour[facility] as he previously intended for further evaluation of memory impairment.  Patient has a history of esophageal cancer, he has reestablished with Dr. Flaherty, we are calling to get those records and labs that were performed there.  He did not get labs as I had ordered that included a fasting lipid panel, reminded he still needs the fasting labs and I will try to cancel any duplicate orders once Dr. Flaherty's records are obtained.  Patient has had continued weight loss, he does have some symptoms of reflux, restarting omeprazole.  Outside records indicate he was supposed to follow-up with Dr. Mcnally for 5-year repeat colonoscopy in 2019 but this was not done.  Referring back to Dr. Mcnally for further evaluation.  Chronic neuropathic pain following thoracic surgery, gabapentin may be offering some improvement we will continue 600 mg at night and adding in a 300 mg daytime dose.  Advised this may be sedating, he is currently carpooling to work with his daughter who typically drives in the mornings.    Medications Discontinued During This Encounter   Medication Reason   • gabapentin (NEURONTIN) 300 MG capsule Reorder   • omeprazole  (priLOSEC) 40 MG capsule Reorder         Follow Up     Return in about 4 weeks (around 8/26/2022) for Annual physical.    Patient was given instructions and counseling regarding his condition or for health maintenance advice. Please see specific information pulled into the AVS if appropriate.

## 2023-11-26 ENCOUNTER — NURSE TRIAGE (OUTPATIENT)
Dept: ADMINISTRATIVE | Facility: CLINIC | Age: 63
End: 2023-11-26
Payer: MEDICARE

## 2023-11-26 NOTE — TELEPHONE ENCOUNTER
"Reason for Disposition   [1] SEVERE vomiting (e.g., 6 or more times/day) AND [2] present > 8 hours (Exception: Patient sounds well, is drinking liquids, does not sound dehydrated, and vomiting has lasted less than 24 hours.)    Additional Information   Negative: Shock suspected (e.g., cold/pale/clammy skin, too weak to stand, low BP, rapid pulse)   Negative: Difficult to awaken or acting confused (e.g., disoriented, slurred speech)   Negative: Sounds like a life-threatening emergency to the triager   Negative: [1] Vomiting AND [2] contains red blood or black ("coffee ground") material  (Exception: Few red streaks in vomit that only happened once.)   Negative: Severe pain in one eye   Negative: Recent head injury (within last 3 days)   Negative: Recent abdominal injury (within last 3 days)   Negative: [1] Insulin-dependent diabetes (Type I) AND [2] glucose > 400 mg/dl (22 mmol/l)   Negative: [1] Vomiting AND [2] hernia is more painful or swollen than usual    Protocols used: Vomiting-A-AH   Pt called re needs rx for Vx2 days. pt reports he didn't eat yest. pt asking for promethazine refill. Tried brody crackers and that stayed down. vx6-8 in 24 hours. Dx1. No blood. afeb. pt still c/o having nausea. linnea water. last uop= this am. mouth dry. pt reporting he sees dr correia. Has appt 12/1. Rec ED or option to get in touch with . Spoke with dr paez re above. rec ED. Pt notified. pt states he will go to [facility]/ED. pt states he has been taking promethazine TID so he can eat but ran out of med and sx are back.   Pharm: [facility]  "

## 2023-11-27 ENCOUNTER — TELEPHONE (OUTPATIENT)
Dept: INTERNAL MEDICINE | Facility: CLINIC | Age: 63
End: 2023-11-27
Payer: MEDICARE

## 2023-11-27 NOTE — TELEPHONE ENCOUNTER
Spoke with pt. States vomiting x 3 days. Per pt he is taking zofran but no relief. Pt is requesting promethazine to be sent in to the pharmacy. [facility]. Pt has appt with SARAH Zurita NP on 12/1/2023. Please advise.//ddw

## 2023-11-28 ENCOUNTER — TELEPHONE (OUTPATIENT)
Dept: GASTROENTEROLOGY | Facility: CLINIC | Age: 63
End: 2023-11-28
Payer: MEDICARE

## 2023-11-28 NOTE — TELEPHONE ENCOUNTER
----- Message from Izzy Zurita NP sent at 11/27/2023  9:13 AM CST -----  Regarding: RE: Scheduled with you 12/1  [place], could you get him scheduled to see me in clinic. He was seen about 4 months ago and was doing well at that time. I would like to tease some of his vomiting out and schedule appropriate procedures.   Thank you, Dr. Villanueva.   ----- Message -----  From: Nu Villanueva MD  Sent: 11/26/2023  12:35 PM CST  To: Tali Salas RN; [place] Henry LPN; #  Subject: Scheduled with you 12/1                          Izzy    This patient contacted on [facility] endo nurse and reports he has been vomiting. I saw that he contacted you and a prescription was sent to his pharmacy. He was calling again for more// Reports vomiting for 2 days.     He was previously evaluated by Dr. Hoover but with her departure he has scheduled with you. He has a old manometry from 05/2022 that showed:    No [place] Classification abnormality   Normal LES pressure with complete relaxation   Panesophageal pressurization   Incomplete bolus clearance   Hiatal hernia (vs myotomy with diverticulum)   Abnormal multiple rapid swallows test   No significant difference with provocative maneuvers   Evidence of residual at the end of 200cc bolus     She then did a EGD on him 04/2023 that showed:     - Dilation in the entire esophagus.                          - Clear fluid in the lower third of the esophagus.                          - Z-line regular, 40 cm from the incisors.                          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                          - Puckering, resistance, no pop at GEJ. Injected                          with 100U botulinum toxin.                          - Junior fundoplication at GEJ. Possibly loose.                          - Atrophic mucosa in the incisura. Biopsied.                          - Normal examined duodenum.       She's treating him like a  achalasia patient. I recommended he go to the ED. He needs likely repeat EGD and manometry. If you order a EGD it will nee to be with Botox and 1 hour procedure in room 1.     Thanks  AN

## 2023-12-01 ENCOUNTER — OFFICE VISIT (OUTPATIENT)
Dept: GASTROENTEROLOGY | Facility: CLINIC | Age: 63
End: 2023-12-01
Payer: MEDICARE

## 2023-12-01 ENCOUNTER — TELEPHONE (OUTPATIENT)
Dept: GASTROENTEROLOGY | Facility: CLINIC | Age: 63
End: 2023-12-01

## 2023-12-01 VITALS
DIASTOLIC BLOOD PRESSURE: 62 MMHG | HEART RATE: 90 BPM | HEIGHT: 70 IN | BODY MASS INDEX: 25.15 KG/M2 | SYSTOLIC BLOOD PRESSURE: 112 MMHG | WEIGHT: 175.69 LBS

## 2023-12-01 DIAGNOSIS — K22.0 ACHALASIA: Primary | ICD-10-CM

## 2023-12-01 DIAGNOSIS — R11.0 CHRONIC NAUSEA: ICD-10-CM

## 2023-12-01 PROCEDURE — 3044F HG A1C LEVEL LT 7.0%: CPT | Mod: CPTII,S$GLB,, | Performed by: NURSE PRACTITIONER

## 2023-12-01 PROCEDURE — 3044F PR MOST RECENT HEMOGLOBIN A1C LEVEL <7.0%: ICD-10-PCS | Mod: CPTII,S$GLB,, | Performed by: NURSE PRACTITIONER

## 2023-12-01 PROCEDURE — 99999 PR PBB SHADOW E&M-EST. PATIENT-LVL III: ICD-10-PCS | Mod: PBBFAC,,, | Performed by: NURSE PRACTITIONER

## 2023-12-01 PROCEDURE — 1160F PR REVIEW ALL MEDS BY PRESCRIBER/CLIN PHARMACIST DOCUMENTED: ICD-10-PCS | Mod: CPTII,S$GLB,, | Performed by: NURSE PRACTITIONER

## 2023-12-01 PROCEDURE — 1159F MED LIST DOCD IN RCRD: CPT | Mod: CPTII,S$GLB,, | Performed by: NURSE PRACTITIONER

## 2023-12-01 PROCEDURE — 3074F SYST BP LT 130 MM HG: CPT | Mod: CPTII,S$GLB,, | Performed by: NURSE PRACTITIONER

## 2023-12-01 PROCEDURE — 1160F RVW MEDS BY RX/DR IN RCRD: CPT | Mod: CPTII,S$GLB,, | Performed by: NURSE PRACTITIONER

## 2023-12-01 PROCEDURE — 3078F PR MOST RECENT DIASTOLIC BLOOD PRESSURE < 80 MM HG: ICD-10-PCS | Mod: CPTII,S$GLB,, | Performed by: NURSE PRACTITIONER

## 2023-12-01 PROCEDURE — 3008F BODY MASS INDEX DOCD: CPT | Mod: CPTII,S$GLB,, | Performed by: NURSE PRACTITIONER

## 2023-12-01 PROCEDURE — 99214 PR OFFICE/OUTPT VISIT, EST, LEVL IV, 30-39 MIN: ICD-10-PCS | Mod: S$GLB,,, | Performed by: NURSE PRACTITIONER

## 2023-12-01 PROCEDURE — 99214 OFFICE O/P EST MOD 30 MIN: CPT | Mod: S$GLB,,, | Performed by: NURSE PRACTITIONER

## 2023-12-01 PROCEDURE — 3008F PR BODY MASS INDEX (BMI) DOCUMENTED: ICD-10-PCS | Mod: CPTII,S$GLB,, | Performed by: NURSE PRACTITIONER

## 2023-12-01 PROCEDURE — 1159F PR MEDICATION LIST DOCUMENTED IN MEDICAL RECORD: ICD-10-PCS | Mod: CPTII,S$GLB,, | Performed by: NURSE PRACTITIONER

## 2023-12-01 PROCEDURE — 3074F PR MOST RECENT SYSTOLIC BLOOD PRESSURE < 130 MM HG: ICD-10-PCS | Mod: CPTII,S$GLB,, | Performed by: NURSE PRACTITIONER

## 2023-12-01 PROCEDURE — 3078F DIAST BP <80 MM HG: CPT | Mod: CPTII,S$GLB,, | Performed by: NURSE PRACTITIONER

## 2023-12-01 PROCEDURE — 99999 PR PBB SHADOW E&M-EST. PATIENT-LVL III: CPT | Mod: PBBFAC,,, | Performed by: NURSE PRACTITIONER

## 2023-12-01 RX ORDER — HYDROCODONE BITARTRATE AND ACETAMINOPHEN 5; 325 MG/1; MG/1
1 TABLET ORAL 2 TIMES DAILY
COMMUNITY
Start: 2023-11-27

## 2023-12-01 RX ORDER — PROMETHAZINE HYDROCHLORIDE 6.25 MG/5ML
25 SYRUP ORAL EVERY 6 HOURS PRN
Qty: 473 ML | Refills: 0 | Status: SHIPPED | OUTPATIENT
Start: 2023-12-01 | End: 2023-12-28 | Stop reason: SDUPTHER

## 2023-12-01 NOTE — PROGRESS NOTES
Clinic Follow Up:  [facility] Follow Up Note    Reason for Follow Up:  The primary encounter diagnosis was Achalasia. A diagnosis of Chronic nausea was also pertinent to this visit.    PCP: Herminia Longoria       HPI:  This is a 63 y.o. male here for follow up.   He continues with dysphagia. Was previously seeing Dr. Hoover.   Last EGD in April with botox. Had gastric intestinal metaplasia. Recommendations were to repeat EGD in 1 year.   Continues with dysphagia. Also with nausea. Uses phenergan syrup.     Review of Systems   Constitutional:  Negative for activity change and appetite change.        As per interval history above   Respiratory:  Negative for cough and shortness of breath.    Cardiovascular:  Negative for chest pain.   Gastrointestinal:  Positive for nausea. Negative for blood in stool, constipation and vomiting.        Dysphagia    Skin:  Negative for color change and rash.       Medical History:  Past Medical History:   Diagnosis Date    Achalasia of esophagus 11/28/2016    Anxiety state, unspecified     Centrilobular emphysema 10/3/2022    Coronary artery disease     Esophageal reflux     Esophageal stricture     Hypertrophy of prostate without urinary obstruction and other lower urinary tract symptoms (LUTS)     Screening PSA (prostate specific antigen) 12/7/12    0.4    Unspecified essential hypertension        Surgical History:   Past Surgical History:   Procedure Laterality Date    BACK SURGERY      CERVICAL EPIDURAL STEROID INJECTION      COLONOSCOPY      COLONOSCOPY N/A 8/17/2023    Procedure: COLONOSCOPY;  Surgeon: Windy León MD;  Location: [facility];  Service: Endoscopy;  Laterality: N/A;    ESOPHAGEAL MANOMETRY WITH MEASUREMENT OF IMPEDANCE N/A 05/23/2022    Procedure: MANOMETRY-ESOPHAGEAL-WITH IMPEDANCE;  Surgeon: Madina Hoover MD;  Location: [address];  Service: Endoscopy;  Laterality: N/A;  hold pain medication 24 hours prior to procedure     ESOPHAGOGASTRODUODENOSCOPY      ESOPHAGOGASTRODUODENOSCOPY N/A 08/16/2019    Procedure: EGD (ESOPHAGOGASTRODUODENOSCOPY);  Surgeon: Pawan Schwab MD;  Location: [facility];  Service: Endoscopy;  Laterality: N/A;    ESOPHAGOGASTRODUODENOSCOPY N/A 05/23/2022    Procedure: ESOPHAGOGASTRODUODENOSCOPY (EGD);  Surgeon: Madina Hoover MD;  Location: [facility] ([facility]);  Service: Endoscopy;  Laterality: N/A;  Endoflip/Endoscopically placed manometry probe  2nd floor-End stage achalasia  propofol only  full iquid diet x3 days, clear liquid diet x1 day prior to procedure  fully vaccinated, instructions sent to obimarylu and mailed-[facility]    ESOPHAGOGASTRODUODENOSCOPY N/A 04/21/2023    Procedure: ESOPHAGOGASTRODUODENOSCOPY (EGD);  Surgeon: Madina Hoover MD;  [facility]  Service: Endoscopy;  Laterality: N/A;  w/ Botox  2nd floor-End Stage Achalasia  full liquid diet x3 days, clear liquid diet x1 day prior to procedure  instructions sent to [facility] and emailed to [email or web address] [email or web address]    4/17/23 - Pt confirmed apt. Pt moved up to the 11am slot, to    HERNIA REPAIR      INGUINAL HERNIA REPAIR Right     LUMBAR EPIDURAL STEROID INJECTION      TOTAL KNEE ARTHROPLASTY Right 05/25/2023    UPPER GASTROINTESTINAL ENDOSCOPY  04/27/2016       Family History:   Family History   Problem Relation Age of Onset    Lung cancer Mother     Hypertension Father     Prostate cancer Father     Ulcers Sister     Ulcers Brother     Prostate cancer Maternal Grandfather     Ulcers Sister     Celiac disease Neg Hx     Colon cancer Neg Hx     Colon polyps Neg Hx     Crohn's disease Neg Hx     Esophageal cancer Neg Hx     Inflammatory bowel disease Neg Hx     Irritable bowel syndrome Neg Hx     Liver cancer Neg Hx     Liver disease Neg Hx     Rectal cancer Neg Hx     Stomach cancer Neg Hx        Social History:   Social History     Tobacco Use    Smoking status: Former     Types: Cigars     Start date: 2016     Quit  date: 2020     Years since quitting: 3.9    Smokeless tobacco: Never    Tobacco comments:     Cigar 1-2 times a week x about 4 years   Substance Use Topics    Alcohol use: Not Currently     Comment: once or twice a year 1-2 beers    Drug use: Not Currently     Types: Marijuana       Allergies:   Review of patient's allergies indicates:   Allergen Reactions    Nsaids (non-steroidal anti-inflammatory drug) Nausea And Vomiting       Home Medications:  Current Outpatient Medications on File Prior to Visit   Medication Sig Dispense Refill    atorvastatin (LIPITOR) 40 MG tablet TAKE 1 TABLET(40 MG) BY MOUTH EVERY EVENING 90 tablet 3    HYDROcodone-acetaminophen (NORCO) 5-325 mg per tablet Take 1 tablet by mouth 2 (two) times daily.      hydrOXYzine (ATARAX) 50 MG tablet Take 1 tablet at bedtime as needed for sleep. 30 tablet 3    omeprazole (PRILOSEC) 40 MG capsule TAKE 1 CAPSULE(40 MG) BY MOUTH EVERY DAY 90 capsule 0    tamsulosin (FLOMAX) 0.4 mg Cap TAKE 1 CAPSULE(0.4 MG) BY MOUTH EVERY DAY 90 capsule 3    [DISCONTINUED] promethazine (PHENERGAN) 6.25 mg/5 mL syrup Take 20 mLs (25 mg total) by mouth every 6 (six) hours as needed for Nausea. 473 mL 0    ALPRAZolam (XANAX) 1 MG tablet Take 1 tablet (1 mg total) by mouth nightly as needed for Anxiety. 30 tablet 3    cyclobenzaprine (FLEXERIL) 5 MG tablet Take 5 mg by mouth every evening.      diazePAM (VALIUM) 5 MG tablet TAKE 1 TABLET(5 MG) BY MOUTH DAILY AS NEEDED FOR ANXIETY (Patient not taking: Reported on 12/1/2023) 30 tablet 3     Current Facility-Administered Medications on File Prior to Visit   Medication Dose Route Frequency Provider Last Rate Last Admin    [DISCONTINUED] 0.9%  NaCl infusion   Intravenous Continuous Brenden Pizarro MD   New Bag at 05/23/22 1238    [DISCONTINUED] 0.9%  NaCl infusion   Intravenous Continuous Madina Tracey MD        [DISCONTINUED] ondansetron injection 4 mg  4 mg Intravenous Q12H PRN Brenden Pizarro MD         "[DISCONTINUED] sodium chloride 0.9% flush 10 mL  10 mL Intravenous PRN Madina Tracey MD           /62 (BP Location: Right arm, Patient Position: Sitting, BP Method: Medium (Manual))   Pulse 90   Ht 5' 10" (1.778 m)   Wt 79.7 kg (175 lb 11.3 oz)   BMI 25.21 kg/m²   Body mass index is 25.21 kg/m².  Physical Exam  Constitutional:       General: He is not in acute distress.  HENT:      Head: Normocephalic.   Neurological:      General: No focal deficit present.      Mental Status: He is alert.   Psychiatric:         Mood and Affect: Mood normal.         Judgment: Judgment normal.         Labs: Pertinent labs reviewed.  CRC Screening:     Assessment:   1. Achalasia    2. Chronic nausea        Recommendations:   - refilled phenergan  - will have him re-establish with motility clinic  - message sent to their staff to schedule.     Achalasia    Chronic nausea  -     promethazine (PHENERGAN) 6.25 mg/5 mL syrup; Take 20 mLs (25 mg total) by mouth every 6 (six) hours as needed for Nausea.  Dispense: 473 mL; Refill: 0    Return to Clinic:  Follow up if symptoms worsen or fail to improve.    Thank you for the opportunity to participate in the care of this patient.  RANULFO Bryant      "

## 2023-12-01 NOTE — TELEPHONE ENCOUNTER
Call returned to , pt [facility] that [facility] as was informed that medication could not be filled to an incomplete script.    [facility] contact 3X, connected to pharmacist Ivan, requesting verification on why pt is currently taking medication, pharmacist informed that per pt script he is currently taking medication to address ongoing nausea.  confirmed understanding.    informed that prescription is being prepared for pickup. Pt verbalized understanding.

## 2023-12-01 NOTE — TELEPHONE ENCOUNTER
----- Message from Xander Gonzalez sent at 12/1/2023  2:24 PM CST -----  Contact: Lenny Galvez is needing a call back in regards to his 6.25 Promethazine medication.  Please give him a call back at [phone]

## 2023-12-20 ENCOUNTER — TELEPHONE (OUTPATIENT)
Dept: GASTROENTEROLOGY | Facility: CLINIC | Age: 63
End: 2023-12-20
Payer: MEDICARE

## 2023-12-20 DIAGNOSIS — K22.0 ACHALASIA OF ESOPHAGUS: Primary | ICD-10-CM

## 2023-12-20 DIAGNOSIS — R13.10 DYSPHAGIA, UNSPECIFIED TYPE: ICD-10-CM

## 2023-12-20 NOTE — TELEPHONE ENCOUNTER
I just refilled less than one month ago. Will not refill this early. Does need repeat EGD with botox to help with this. Please schedule PAT

## 2023-12-20 NOTE — TELEPHONE ENCOUNTER
----- Message from Amanda Gregg sent at 12/20/2023  9:24 AM CST -----  Contact: don  Type:  RX Refill Request  Who Called: Don  Refill or New Rx:refill  RX Name and Strength:promethazine (PHENERGAN) 6.25 mg/5 mL syrup  How is the patient currently taking it? (ex. 1XDay):every 6 hours  Is this a 30 day or 90 day RX:  Preferred Pharmacy with phone number:  [id number] - [place] - [address] AT [facility]  [address] [place] [address]  Phone: [phone] Fax: [phone]  Local or Mail Order:local  Ordering Provider:SARAH Zurita  Would the patient rather a call back or a response via [facility]? Call back   Best Call Back Number:[phone]  Additional Information:

## 2023-12-21 ENCOUNTER — HOSPITAL ENCOUNTER (OUTPATIENT)
Dept: PREADMISSION TESTING | Facility: HOSPITAL | Age: 63
Discharge: HOME | End: 2023-12-21
Attending: FAMILY MEDICINE
Payer: MEDICARE

## 2023-12-21 DIAGNOSIS — R13.10 DYSPHAGIA, UNSPECIFIED TYPE: ICD-10-CM

## 2023-12-21 DIAGNOSIS — K22.0 ACHALASIA OF ESOPHAGUS: ICD-10-CM

## 2023-12-28 DIAGNOSIS — R11.0 CHRONIC NAUSEA: ICD-10-CM

## 2023-12-29 RX ORDER — PROMETHAZINE HYDROCHLORIDE 6.25 MG/5ML
25 SYRUP ORAL EVERY 6 HOURS PRN
Qty: 473 ML | Refills: 0 | Status: SHIPPED | OUTPATIENT
Start: 2023-12-29

## 2024-01-01 NOTE — H&P
Assessment: 36w0d AGA male born via  at [facility] requiring CPAP at 45 MOL, transferred for escalation of care. Mother O+/antibody negative, baby O+/MAHESH negative. G6PD pending. Mother with late PNC, 26 weeks, with lay midwife, no prenatal labs drawn or ultrasound during this pregnant.     Plan:  24 hours of life labs tomorrow  TsB q12h  Maternal prenatal screening labs to be drawn from [facility] per delivering OB  Hepatitis B vaccination refused at delivery hospital, follow maternal labs to determine if HBIG necessary  Update and support family  Discharge planning     Short Stay Endoscopy History and Physical    PCP - Herminia Longoria MD     Procedure - EGD w Botox  ASA - per anesthesia  Mallampati - per anesthesia  History of Anesthesia problems - no  Family history Anesthesia problems -  no   Plan of anesthesia - General    HPI:  This is a 62 y.o. male here for evaluation of dysphagia:        Medical History:  has a past medical history of Achalasia of esophagus (11/28/2016), Anxiety state, unspecified, Centrilobular emphysema (10/3/2022), Coronary artery disease, Esophageal reflux, Esophageal stricture, Hypertrophy of prostate without urinary obstruction and other lower urinary tract symptoms (LUTS), Screening PSA (prostate specific antigen) (12/7/12), and Unspecified essential hypertension.    Surgical History:  has a past surgical history that includes Back surgery; Esophagogastroduodenoscopy; Colonoscopy; Hernia repair; Upper gastrointestinal endoscopy (04/27/2016); Inguinal hernia repair (Right); CERVICAL EPIDURAL STEROID INJECTION; LUMBAR EPIDURAL STEROID INJECTION; Esophagogastroduodenoscopy (N/A, 8/16/2019); Esophagogastroduodenoscopy (N/A, 5/23/2022); and Esophageal manometry with measurement of impedance (N/A, 5/23/2022).    Family History: family history includes Hypertension in his father; Lung cancer in his mother; Prostate cancer in his father and maternal grandfather; Ulcers in his brother, sister, and sister.. Otherwise no colon cancer, inflammatory bowel disease, or GI malignancies.    Social History:  reports that he quit smoking about 3 years ago. His smoking use included cigars. He started smoking about 7 years ago. He has never used smokeless tobacco. He reports that he does not currently use alcohol. He reports that he does not currently use drugs after having used the following drugs: Marijuana.    Review of patient's allergies indicates:   Allergen Reactions    Nsaids (non-steroidal anti-inflammatory drug) Nausea And Vomiting       Medications:   No  medications prior to admission.       Physical Exam:    Vital Signs: There were no vitals filed for this visit.    I have explained the risks and benefits of endoscopy procedures to the patient including but not limited to bleeding, perforation, infection, and death.      Madina Hoover MD

## 2024-01-03 ENCOUNTER — TELEPHONE (OUTPATIENT)
Dept: UROLOGY | Facility: CLINIC | Age: 64
End: 2024-01-03
Payer: MEDICARE

## 2024-01-03 NOTE — TELEPHONE ENCOUNTER
1/3/24 1:46pm - Outgoing call placed to patient, patient verified identity using 2 identifiers, confirmed patient wanted to schedule an appointment with Dr. Alvares, patient confirmed and notified of what was available, patient agreed/confirmed to schedule appointment [facility] 1[facility]/24 [facility].    Kyle Nolan RN     ----- Message from Clau Dinh sent at 12/29/2023  1:02 PM CST -----  Contact: Lenny -[phone]    Patient: Lenny Latham-    Reason: The patient is requesting a call back from the nurse to get assistance with scheduling an     appointment for annual.     Comments: Please call the patient back to advise.

## 2024-01-05 DIAGNOSIS — K21.9 GASTROESOPHAGEAL REFLUX DISEASE WITHOUT ESOPHAGITIS: ICD-10-CM

## 2024-01-05 DIAGNOSIS — K22.0 ACHALASIA OF ESOPHAGUS: ICD-10-CM

## 2024-01-05 RX ORDER — OMEPRAZOLE 40 MG/1
40 CAPSULE, DELAYED RELEASE ORAL
Qty: 90 CAPSULE | Refills: 0 | Status: SHIPPED | OUTPATIENT
Start: 2024-01-05 | End: 2024-01-19 | Stop reason: SDUPTHER

## 2024-01-05 NOTE — TELEPHONE ENCOUNTER
Refill Routing Note   Medication(s) are not appropriate for processing by [facility] for the following reason(s):        New or recently adjusted medication    [facility] action(s):  Defer               Appointments  past 12m or future 3m with PCP    Date Provider   Last Visit   11/22/2023 Herminia Longoria MD   Next Visit   Visit date not found Herminia Longoria MD   ED visits in past 90 days: 0        Note composed:10:55 AM 01/05/2024

## 2024-01-05 NOTE — TELEPHONE ENCOUNTER
No care due was identified.  [facility] Embedded Care Due Messages. Reference number: [id number].   1/05/2024 8:45:39 AM CST

## 2024-01-11 ENCOUNTER — HOSPITAL ENCOUNTER (OUTPATIENT)
Facility: HOSPITAL | Age: 64
Discharge: HOME | End: 2024-01-11
Attending: INTERNAL MEDICINE | Admitting: INTERNAL MEDICINE
Payer: MEDICARE

## 2024-01-11 ENCOUNTER — ANESTHESIA EVENT (OUTPATIENT)
Dept: ENDOSCOPY | Facility: HOSPITAL | Age: 64
End: 2024-01-11
Payer: MEDICARE

## 2024-01-11 ENCOUNTER — ANESTHESIA (OUTPATIENT)
Dept: ENDOSCOPY | Facility: HOSPITAL | Age: 64
End: 2024-01-11
Payer: MEDICARE

## 2024-01-11 DIAGNOSIS — K22.0 ACHALASIA: Primary | ICD-10-CM

## 2024-01-11 PROCEDURE — 27201028 HC NEEDLE, SCLERO: Performed by: INTERNAL MEDICINE

## 2024-01-11 PROCEDURE — 37000009 HC ANESTHESIA EA ADD 15 MINS: Performed by: INTERNAL MEDICINE

## 2024-01-11 PROCEDURE — 37000008 HC ANESTHESIA 1ST 15 MINUTES: Performed by: INTERNAL MEDICINE

## 2024-01-11 PROCEDURE — 63600175 PHARM REV CODE 636 W HCPCS: Performed by: NURSE ANESTHETIST, CERTIFIED REGISTERED

## 2024-01-11 PROCEDURE — 43236 UPPR GI SCOPE W/SUBMUC INJ: CPT | Performed by: INTERNAL MEDICINE

## 2024-01-11 PROCEDURE — 63600175 PHARM REV CODE 636 W HCPCS: Mod: JZ,JG | Performed by: INTERNAL MEDICINE

## 2024-01-11 PROCEDURE — 43236 UPPR GI SCOPE W/SUBMUC INJ: CPT | Mod: ,,, | Performed by: INTERNAL MEDICINE

## 2024-01-11 PROCEDURE — 25000003 PHARM REV CODE 250: Performed by: NURSE ANESTHETIST, CERTIFIED REGISTERED

## 2024-01-11 RX ORDER — LIDOCAINE HYDROCHLORIDE 10 MG/ML
INJECTION, SOLUTION EPIDURAL; INFILTRATION; INTRACAUDAL; PERINEURAL
Status: DISCONTINUED | OUTPATIENT
Start: 2024-01-11 | End: 2024-01-11

## 2024-01-11 RX ORDER — PROPOFOL 10 MG/ML
VIAL (ML) INTRAVENOUS
Status: DISCONTINUED | OUTPATIENT
Start: 2024-01-11 | End: 2024-01-11

## 2024-01-11 RX ADMIN — PROPOFOL 50 MG: 10 INJECTION, EMULSION INTRAVENOUS at 10:01

## 2024-01-11 RX ADMIN — SODIUM CHLORIDE, SODIUM LACTATE, POTASSIUM CHLORIDE, AND CALCIUM CHLORIDE: .6; .31; .03; .02 INJECTION, SOLUTION INTRAVENOUS at 10:01

## 2024-01-11 RX ADMIN — LIDOCAINE HYDROCHLORIDE 50 MG: 10 SOLUTION INTRAVENOUS at 10:01

## 2024-01-11 RX ADMIN — PROPOFOL 100 MG: 10 INJECTION, EMULSION INTRAVENOUS at 10:01

## 2024-01-11 NOTE — [employer]ATION PATIENT INSTRUCTIONS
Discharge Summary/Instructions after an Endoscopic Procedure  Patient Name: Lenny Latham  Patient MRN: [id number]  Patient YOB: 1960 Thursday, January 11, 2024 Nu Villanueva MD  Dear patient,  As a result of recent federal legislation (The Federal Cures Act), you may   receive lab or pathology results from your procedure in your [facility]   account before your physician is able to contact you. Your physician or   their representative will relay the results to you with their   recommendations at their soonest availability.  Thank you,  RESTRICTIONS:  During your procedure today, you received medications for sedation.  These   medications may affect your judgment, balance and coordination.  Therefore,   for 24 hours, you have the following restrictions:   - DO NOT drive a car, operate machinery, make legal/financial decisions,   sign important papers or drink alcohol.    ACTIVITY:  Today: no heavy lifting, straining or running due to procedural   sedation/anesthesia.  The following day: return to full activity including work.  DIET:  Eat and drink normally unless instructed otherwise.     TREATMENT FOR COMMON SIDE EFFECTS:  - Mild abdominal pain, nausea, belching, bloating or excessive gas:  rest,   eat lightly and use a heating pad.  - Sore Throat: treat with throat lozenges and/or gargle with warm salt   water.  - Because air was used during the procedure, expelling large amounts of air   from your rectum or belching is normal.  - If a bowel prep was taken, you may not have a bowel movement for 1-3 days.    This is normal.  SYMPTOMS TO WATCH FOR AND REPORT TO YOUR PHYSICIAN:  1. Abdominal pain or bloating, other than gas cramps.  2. Chest pain.  3. Back pain.  4. Signs of infection such as: chills or fever occurring within 24 hours   after the procedure.  5. Rectal bleeding, which would show as bright red, maroon, or black stools.   (A tablespoon of blood from the rectum is not serious, especially  if   hemorrhoids are present.)  6. Vomiting.  7. Weakness or dizziness.  GO DIRECTLY TO THE NEAREST EMERGENCY ROOM IF YOU HAVE ANY OF THE FOLLOWING:      Difficulty breathing              Chills and/or fever over 101 F   Persistent vomiting and/or vomiting blood   Severe abdominal pain   Severe chest pain   Black, tarry stools   Bleeding- more than one tablespoon   Any other symptom or condition that you feel may need urgent attention  Your doctor recommends these additional instructions:  If any biopsies were taken, your doctors clinic will contact you in 1 to 2   weeks with any results.  - Discharge patient to home (with escort).   - Resume previous diet.   - Continue present medications.   - Observe patient's clinical course.   - Repeat upper endoscopy in 3 months for retreatment.   - Return to GI clinic with DINESH Garcia.  For questions, problems or results please call your physician Nu Villanueva MD at Work:  [phone]  If you have any questions about the above instructions, call the GI   department at [phone] or call the endoscopy unit at [phone]   from 7am until 3 pm.  [facility] [place] [facility], EMERGENCY ROOM PHONE NUMBER:   [phone]  IF A COMPLICATION OR EMERGENCY SITUATION ARISES AND YOU ARE UNABLE TO REACH   YOUR PHYSICIAN - GO DIRECTLY TO THE EMERGENCY ROOM.  I have read or have had read to me these discharge instructions for my   procedure and have received a written copy.  I understand these   instructions and will follow-up with my physician if I have any questions.     __________________________________       _____________________________________  Nurse Signature                                          Patient/Designated   Responsible Party Signature  MD Nu Powell MD  1/11/2024 10:44:54 AM  This report has been verified and signed electronically.  Dear patient,  As a result of recent federal legislation (The Federal Cures Act),  you may   receive lab or pathology results from your procedure in your [facility]   account before your physician is able to contact you. Your physician or   their representative will relay the results to you with their   recommendations at their soonest availability.  Thank you,  [employer]ATION

## 2024-01-11 NOTE — H&P
PRE PROCEDURE H&P    Patient Name: Lenny Latham Jr.  MRN: [id number]  : 1960  Date of Procedure:  2024  Referring Physician: Izzy Zurita NP  Primary Physician: Herminia Longoria MD  Procedure Physician: Nu Villanueva MD       Planned Procedure: EGD  Diagnosis:   achalasia, dysphagia    Chief Complaint: Same as above    HPI: Patient is an 63 y.o. male is here for the above. Hx achalasia s/p heller myotomy with Junior fundoplication performed for 2017 w significant improvement in symptoms. Post surgery his achlalasia has been treated with Botox injection and dilation with 20mm balloon. He was previously followed by Dr. Hoover at [facility] before her departure. Last EGD 2023. Diverticulum seen in distal esophagus. Puckering and resistance at GE juunction but no pop. This area was injected with Botox 100U. Biopsies of the incisura was notable for focal intestinal metaplasia. No dysplasia or H. Pylori.    Returns   Past Medical History:   Past Medical History:   Diagnosis Date    Achalasia of esophagus 2016    Anxiety state, unspecified     Centrilobular emphysema 10/3/2022    Coronary artery disease     Esophageal reflux     Esophageal stricture     Hypertrophy of prostate without urinary obstruction and other lower urinary tract symptoms (LUTS)     Screening PSA (prostate specific antigen) 12    0.4    Unspecified essential hypertension         Past Surgical History:  Past Surgical History:   Procedure Laterality Date    BACK SURGERY      CERVICAL EPIDURAL STEROID INJECTION      COLONOSCOPY      COLONOSCOPY N/A 2023    Procedure: COLONOSCOPY;  Surgeon: Windy León MD;  Location: [facility];  Service: Endoscopy;  Laterality: N/A;    ESOPHAGEAL MANOMETRY WITH MEASUREMENT OF IMPEDANCE N/A 2022    Procedure: MANOMETRY-ESOPHAGEAL-WITH IMPEDANCE;  Surgeon: Madina Hoover MD;  Location: [facility] ENDO ([address] [place] [address]);  Service: Endoscopy;  Laterality: N/A;  hold pain  medication 24 hours prior to procedure    ESOPHAGOGASTRODUODENOSCOPY      ESOPHAGOGASTRODUODENOSCOPY N/A 08/16/2019    Procedure: EGD (ESOPHAGOGASTRODUODENOSCOPY);  Surgeon: Pawan Schwab MD;  Location: [facility];  Service: Endoscopy;  Laterality: N/A;    ESOPHAGOGASTRODUODENOSCOPY N/A 05/23/2022    Procedure: ESOPHAGOGASTRODUODENOSCOPY (EGD);  Surgeon: Madina Hoover MD;  Location: [facility] (2ND FLR);  Service: Endoscopy;  Laterality: N/A;  Endoflip/Endoscopically placed manometry probe  2nd floor-End stage achalasia  propofol only  full iquid diet x3 days, clear liquid diet x1 day prior to procedure  fully vaccinated, instructions sent to ob[facility]lu and mailed-[facility]    ESOPHAGOGASTRODUODENOSCOPY N/A 04/21/2023    Procedure: ESOPHAGOGASTRODUODENOSCOPY (EGD);  Surgeon: Madina Hoover MD;  [facility]  Service: Endoscopy;  Laterality: N/A;  w/ Botox  2nd floor-End Stage Achalasia  full liquid diet x3 days, clear liquid diet x1 day prior to procedure  instructions sent to [facility] and emailed to [email or web address]    4/17/23 - Pt confirmed apt. Pt moved up to the 11am slot, to    HERNIA REPAIR      INGUINAL HERNIA REPAIR Right     LUMBAR EPIDURAL STEROID INJECTION      TOTAL KNEE ARTHROPLASTY Right 05/25/2023    UPPER GASTROINTESTINAL ENDOSCOPY  04/27/2016        Home Medications:  Prior to Admission medications    Medication Sig Start Date End Date Taking? Authorizing Provider   ALPRAZolam (XANAX) 1 MG tablet Take 1 tablet (1 mg total) by mouth nightly as needed for Anxiety. 9/27/23 1/25/24 Yes Manoj Campbell MD   atorvastatin (LIPITOR) 40 MG tablet TAKE 1 TABLET(40 MG) BY MOUTH EVERY EVENING 11/7/22  Yes Herminia Longoria MD   cyclobenzaprine (FLEXERIL) 5 MG tablet Take 5 mg by mouth every evening. 11/14/23  Yes Provider, Historical   HYDROcodone-acetaminophen (NORCO) 5-325 mg per tablet Take 1 tablet by mouth 2 (two) times daily. 11/27/23  Yes Provider, Historical    hydrOXYzine (ATARAX) 50 MG tablet Take 1 tablet at bedtime as needed for sleep. 23  Yes Manoj Campbell MD   omeprazole (PRILOSEC) 40 MG capsule TAKE 1 CAPSULE(40 MG) BY MOUTH EVERY DAY 24  Yes Herminia Longoria MD   promethazine (PHENERGAN) 6.25 mg/5 mL syrup Take 20 mLs (25 mg total) by mouth every 6 (six) hours as needed for Nausea. 23  Yes Izzy Zurita NP   tamsulosin (FLOMAX) 0.4 mg Cap TAKE 1 CAPSULE(0.4 MG) BY MOUTH EVERY DAY 10/23/23  Yes Manuel Alvares MD   diazePAM (VALIUM) 5 MG tablet TAKE 1 TABLET(5 MG) BY MOUTH DAILY AS NEEDED FOR ANXIETY  Patient not taking: Reported on 2023   Manoj Campbell MD        Allergies:  Review of patient's allergies indicates:   Allergen Reactions    Nsaids (non-steroidal anti-inflammatory drug) Nausea And Vomiting        Social History:   Social History     Socioeconomic History    Marital status: Single    Years of education: 12th   Occupational History    Occupation: Disabled   Tobacco Use    Smoking status: Former     Types: Cigars     Start date:      Quit date: 2020     Years since quittin.0    Smokeless tobacco: Never    Tobacco comments:     Cigar 1-2 times a week x about 4 years   Substance and Sexual Activity    Alcohol use: Not Currently     Comment: once or twice a year 1-2 beers    Drug use: Yes     Types: Marijuana     Social Determinants of Health     Financial Resource Strain: High Risk (10/3/2022)    Overall Financial Resource Strain (CARDIA)     Difficulty of Paying Living Expenses: Hard   Food Insecurity: Food Insecurity Present (10/3/2022)    Hunger Vital Sign     Worried About Running Out of Food in the Last Year: Sometimes true     Ran Out of Food in the Last Year: Sometimes true   Transportation Needs: Unmet Transportation Needs (10/3/2022)    PRAPARE - Transportation     Lack of Transportation (Medical): Yes     Lack of Transportation (Non-Medical): No   Physical Activity:  "Insufficiently Active (10/3/2022)    Exercise Vital Sign     Days of Exercise per Week: 4 days     Minutes of Exercise per Session: 30 min   Stress: Stress Concern Present (10/3/2022)    [employer] [place] [employer] - Occupational Stress Questionnaire     Feeling of Stress : Very much   Social Connections: Unknown (10/3/2022)    Social Connection and Isolation Panel [NHANES]     Frequency of Communication with Friends and Family: Twice a week     Attends Jainism Services: More than 4 times per year     Active Member of Clubs or Organizations: No     Attends Club or Organization Meetings: Never     Marital Status:    Housing Stability: High Risk (10/3/2022)    Housing Stability Vital Sign     Unable to Pay for Housing in the Last Year: Yes     Number of Places Lived in the Last Year: 1     Unstable Housing in the Last Year: No       Family History:  Family History   Problem Relation Age of Onset    Lung cancer Mother     Hypertension Father     Prostate cancer Father     Ulcers Sister     Ulcers Brother     Prostate cancer Maternal Grandfather     Ulcers Sister     Celiac disease Neg Hx     Colon cancer Neg Hx     Colon polyps Neg Hx     Crohn's disease Neg Hx     Esophageal cancer Neg Hx     Inflammatory bowel disease Neg Hx     Irritable bowel syndrome Neg Hx     Liver cancer Neg Hx     Liver disease Neg Hx     Rectal cancer Neg Hx     Stomach cancer Neg Hx        ROS: No acute cardiac events, no acute respiratory complaints.     Physical Exam (all patients):    /71 (BP Location: Left arm, Patient Position: Lying)   Pulse 88   Temp 98.6 °F (37 °C) (Temporal)   Resp 18   Ht 5' 10" (1.778 m)   Wt 78.5 kg (173 lb)   SpO2 99%   BMI 24.82 kg/m²   Lungs: Clear to auscultation bilaterally, respirations unlabored  Heart: Regular rate and rhythm, S1 and S2 normal, no obvious murmurs  Abdomen:         Soft, non-tender, bowel sounds normal, no masses, no organomegaly    Lab Results "   Component Value Date    WBC 6.70 11/22/2023    MCV 93 11/22/2023    RDW 15.4 (H) 11/22/2023     11/22/2023    INR 1.0 05/02/2023    GLU 94 11/22/2023    HGBA1C 5.7 (H) 11/22/2023    BUN 13 11/22/2023     11/22/2023    K 4.3 11/22/2023     11/22/2023        SEDATION PLAN: per anesthesia      History reviewed, vital signs satisfactory, cardiopulmonary status satisfactory, sedation options, risks and plans have been discussed with the patient  All their questions were answered and the patient agrees to the sedation procedures as planned and the patient is deemed an appropriate candidate for the sedation as planned.    The risks, benefits and alternatives of the procedure were discussed with the patient in detail. This discussion was had in the presence of endoscopy staff. The risks include, risks of adverse reaction to sedation requiring the use of reversal agents, bleeding requiring blood transfusion, perforation requiring surgical intervention and technical failure. Other risks include aspiration leading to respiratory distress and respiratory failure resulting in endotracheal intubation and mechanical ventilation including death. If anesthesia is being utilized for this procedure, it is up to the anesthesiologist to determine airway safety including elective endotracheal intubation. Questions were answered, they agree to proceed. There was no language barriers.      Procedure explained to patient, informed consent obtained and placed in chart.    Nu Villanueva  1/11/2024  10:13 AM

## 2024-01-11 NOTE — PLAN OF CARE
Dr. Villanueva at  to discuss findings. VSS. No  Pain, no GI bleeding. Pt to be discharged from unit.

## 2024-01-11 NOTE — ANESTHESIA POSTPROCEDURE EVALUATION
Anesthesia Post Evaluation    Patient: Lenny Latham JrDonavan    Procedure(s) Performed: Procedure(s) (LRB):  EGD with BOTOX (N/A)    Final Anesthesia Type: MAC      Patient location during evaluation: GI PACU  Patient participation: Yes- Able to Participate  Level of consciousness: awake and alert  Post-procedure vital signs: reviewed and stable  Pain management: adequate  Airway patency: patent    PONV status at discharge: No PONV  Anesthetic complications: no      Cardiovascular status: blood pressure returned to baseline  Respiratory status: unassisted and spontaneous ventilation  Hydration status: euvolemic  Follow-up not needed.          Vitals Value Taken Time   /80 01/11/24 1048   Temp 36.6 °C (97.9 °F) 01/11/24 1038   Pulse 88 01/11/24 1048   Resp 18 01/11/24 1048   SpO2 97 % 01/11/24 1048         Event Time   Out of Recovery 10:56:13         Pain/Laly Score: Laly Score: 10 (1/11/2024 10:48 AM)

## 2024-01-11 NOTE — TRANSFER OF CARE
"Anesthesia Transfer of Care Note    Patient: Lenny Latham Jr.    Procedure(s) Performed: Procedure(s) (LRB):  EGD with BOTOX (N/A)    Patient location: GI    Anesthesia Type: MAC    Transport from OR: Transported from OR on room air with adequate spontaneous ventilation    Post pain: adequate analgesia    Post assessment: no apparent anesthetic complications    Post vital signs: stable    Level of consciousness: sedated    Nausea/Vomiting: no nausea/vomiting    Complications: none    Transfer of care protocol was followed      Last vitals: Visit Vitals  /77 (BP Location: Left arm, Patient Position: Lying)   Pulse 88   Temp 36.6 °C (97.9 °F) (Temporal)   Resp 13   Ht 5' 10" (1.778 m)   Wt 78.5 kg (173 lb)   SpO2 95%   BMI 24.82 kg/m²     "

## 2024-01-11 NOTE — ANESTHESIA PREPROCEDURE EVALUATION
01/11/2024  Lenny Latham Jr. is a 63 y.o., male.      Pre-op Assessment    I have reviewed the Patient Summary Reports.    I have reviewed the NPO Status.   I have reviewed the Medications.     Review of Systems  Anesthesia Hx:  No problems with previous Anesthesia                Social:  Former Smoker, Recreational Drugs       Cardiovascular:     Hypertension, well controlled   CAD              ECG has been reviewed. Normal sinus rhythm   Normal ECG   When compared with ECG of 11-MAR-2022 13:37,   No significant change was found   Confirmed by THAIS HERBERT, ELIZABETH ([id number]) on 5/16/2022 11:33:57 PM                            Pulmonary:   COPD     Sleep Apnea, CPAP                Hepatic/GI:     GERD, well controlled   Achalasia of esophagus          Musculoskeletal:         Spine Disorders:  Chronic Pain           Psych:  Psychiatric History anxiety               Physical Exam  General: Well nourished    Airway:  Mallampati: II   Mouth Opening: Normal  TM Distance: Normal  Tongue: Normal  Neck ROM: Normal ROM    Dental:  Intact    Chest/Lungs:  Normal Respiratory Rate    Heart:  Rate: Normal  Rhythm: Regular Rhythm    Anesthesia Plan  Type of Anesthesia, risks & benefits discussed:    Anesthesia Type: MAC  Intra-op Monitoring Plan: Standard ASA Monitors  Post Op Pain Control Plan: multimodal analgesia  Induction:  IV  Informed Consent: Informed consent signed with the Patient and all parties understand the risks and agree with anesthesia plan.  All questions answered.   ASA Score: 3  Day of Surgery Review of History & Physical: H&P Update referred to the surgeon/provider.    Ready For Surgery From Anesthesia Perspective.   .

## 2024-01-12 ENCOUNTER — TELEPHONE (OUTPATIENT)
Dept: HEMATOLOGY/ONCOLOGY | Facility: CLINIC | Age: 64
End: 2024-01-12
Payer: MEDICARE

## 2024-01-12 VITALS
BODY MASS INDEX: 24.77 KG/M2 | DIASTOLIC BLOOD PRESSURE: 80 MMHG | RESPIRATION RATE: 18 BRPM | TEMPERATURE: 98 F | HEIGHT: 70 IN | OXYGEN SATURATION: 97 % | HEART RATE: 88 BPM | WEIGHT: 173 LBS | SYSTOLIC BLOOD PRESSURE: 114 MMHG

## 2024-01-16 DIAGNOSIS — E78.2 MIXED HYPERLIPIDEMIA: ICD-10-CM

## 2024-01-16 NOTE — TELEPHONE ENCOUNTER
No care due was identified.  [facility] Embedded Care Due Messages. Reference number: [id number].   1/16/2024 2:14:40 PM CST

## 2024-01-16 NOTE — TELEPHONE ENCOUNTER
----- Message from Susana Norton sent at 1/16/2024 12:31 PM CST -----  Contact: Patient, [phone]  Requesting an RX refill or new RX.  Is this a refill or new RX: Refill  RX name and strength (copy/paste from chart):  atorvastatin (LIPITOR) 40 MG tablet  Is this a 30 day or 90 day RX: 90  Pharmacy name and phone # (copy/paste from chart):    [id number] - [place] - [address] AT [facility]  [address] [address]  Phone: [phone] Fax: [phone]  The doctors have asked that we provide their patients with the following 2 reminders -- prescription refills can take up to 72 hours, and a friendly reminder that in the future you can use your [facility] account to request refills: Yes

## 2024-01-17 RX ORDER — ATORVASTATIN CALCIUM 40 MG/1
40 TABLET, FILM COATED ORAL NIGHTLY
Qty: 90 TABLET | Refills: 3 | Status: SHIPPED | OUTPATIENT
Start: 2024-01-17 | End: 2024-05-09

## 2024-01-17 NOTE — TELEPHONE ENCOUNTER
Refill Decision Note   Lenny Latham  is requesting a refill authorization.  Brief Assessment and Rationale for Refill:  Approve     Medication Therapy Plan:         Comments:     Note composed:12:00 PM 01/17/2024

## 2024-01-19 DIAGNOSIS — K21.9 GASTROESOPHAGEAL REFLUX DISEASE WITHOUT ESOPHAGITIS: ICD-10-CM

## 2024-01-19 DIAGNOSIS — K22.0 ACHALASIA OF ESOPHAGUS: ICD-10-CM

## 2024-01-20 NOTE — TELEPHONE ENCOUNTER
No care due was identified.  [facility] Embedded Care Due Messages. Reference number: [id number].   1/19/2024 9:15:42 PM CST

## 2024-01-22 RX ORDER — OMEPRAZOLE 40 MG/1
40 CAPSULE, DELAYED RELEASE ORAL EVERY MORNING
Qty: 90 CAPSULE | Refills: 0 | Status: SHIPPED | OUTPATIENT
Start: 2024-01-22 | End: 2024-04-25

## 2024-01-22 RX ORDER — ALPRAZOLAM 1 MG/1
1 TABLET ORAL NIGHTLY PRN
Qty: 30 TABLET | Refills: 0 | Status: SHIPPED | OUTPATIENT
Start: 2024-01-22 | End: 2024-01-29 | Stop reason: SDUPTHER

## 2024-01-25 ENCOUNTER — TELEPHONE (OUTPATIENT)
Dept: PSYCHIATRY | Facility: CLINIC | Age: 64
End: 2024-01-25
Payer: MEDICARE

## 2024-01-29 ENCOUNTER — OFFICE VISIT (OUTPATIENT)
Dept: OPHTHALMOLOGY | Facility: CLINIC | Age: 64
End: 2024-01-29
Payer: MEDICARE

## 2024-01-29 ENCOUNTER — TELEPHONE (OUTPATIENT)
Dept: GASTROENTEROLOGY | Facility: CLINIC | Age: 64
End: 2024-01-29
Payer: MEDICARE

## 2024-01-29 ENCOUNTER — OFFICE VISIT (OUTPATIENT)
Dept: PSYCHIATRY | Facility: CLINIC | Age: 64
End: 2024-01-29
Payer: COMMERCIAL

## 2024-01-29 VITALS
WEIGHT: 177.69 LBS | DIASTOLIC BLOOD PRESSURE: 82 MMHG | SYSTOLIC BLOOD PRESSURE: 119 MMHG | BODY MASS INDEX: 25.5 KG/M2 | HEART RATE: 105 BPM

## 2024-01-29 DIAGNOSIS — F41.9 ANXIETY: ICD-10-CM

## 2024-01-29 DIAGNOSIS — H04.123 DRY EYE SYNDROME, BILATERAL: ICD-10-CM

## 2024-01-29 DIAGNOSIS — H40.039 ANATOMICAL NARROW ANGLE GLAUCOMA: Primary | ICD-10-CM

## 2024-01-29 DIAGNOSIS — R11.0 CHRONIC NAUSEA: Primary | ICD-10-CM

## 2024-01-29 DIAGNOSIS — R11.0 CHRONIC NAUSEA: ICD-10-CM

## 2024-01-29 DIAGNOSIS — F63.81 INTERMITTENT EXPLOSIVE DISORDER: Primary | ICD-10-CM

## 2024-01-29 PROCEDURE — 3074F SYST BP LT 130 MM HG: CPT | Mod: CPTII,S$GLB,, | Performed by: PSYCHIATRY & NEUROLOGY

## 2024-01-29 PROCEDURE — 92020 GONIOSCOPY: CPT | Mod: S$GLB,,, | Performed by: OPTOMETRIST

## 2024-01-29 PROCEDURE — 99214 OFFICE O/P EST MOD 30 MIN: CPT | Mod: S$GLB,,, | Performed by: PSYCHIATRY & NEUROLOGY

## 2024-01-29 PROCEDURE — 99999 PR PBB SHADOW E&M-EST. PATIENT-LVL II: CPT | Mod: PBBFAC,,, | Performed by: PSYCHIATRY & NEUROLOGY

## 2024-01-29 PROCEDURE — 99203 OFFICE O/P NEW LOW 30 MIN: CPT | Mod: S$GLB,,, | Performed by: OPTOMETRIST

## 2024-01-29 PROCEDURE — 99999 PR PBB SHADOW E&M-EST. PATIENT-LVL II: CPT | Mod: PBBFAC,,, | Performed by: OPTOMETRIST

## 2024-01-29 PROCEDURE — 3008F BODY MASS INDEX DOCD: CPT | Mod: CPTII,S$GLB,, | Performed by: PSYCHIATRY & NEUROLOGY

## 2024-01-29 PROCEDURE — 3079F DIAST BP 80-89 MM HG: CPT | Mod: CPTII,S$GLB,, | Performed by: PSYCHIATRY & NEUROLOGY

## 2024-01-29 RX ORDER — PROMETHAZINE HYDROCHLORIDE 6.25 MG/5ML
25 SYRUP ORAL EVERY 6 HOURS PRN
Qty: 473 ML | Refills: 0 | OUTPATIENT
Start: 2024-01-29

## 2024-01-29 RX ORDER — ALPRAZOLAM 1 MG/1
1 TABLET ORAL NIGHTLY PRN
Qty: 30 TABLET | Refills: 3 | Status: SHIPPED | OUTPATIENT
Start: 2024-01-29 | End: 2024-05-31 | Stop reason: SDUPTHER

## 2024-01-29 RX ORDER — HYDROXYZINE HYDROCHLORIDE 50 MG/1
TABLET, FILM COATED ORAL
Qty: 30 TABLET | Refills: 3 | Status: SHIPPED | OUTPATIENT
Start: 2024-01-29 | End: 2024-05-31 | Stop reason: SDUPTHER

## 2024-01-29 RX ORDER — DIAZEPAM 5 MG/1
5 TABLET ORAL EVERY 6 HOURS PRN
Qty: 30 TABLET | Refills: 3 | Status: SHIPPED | OUTPATIENT
Start: 2024-01-29 | End: 2024-03-26 | Stop reason: SDUPTHER

## 2024-01-29 RX ORDER — ONDANSETRON 4 MG/1
4 TABLET, ORALLY DISINTEGRATING ORAL EVERY 6 HOURS PRN
Qty: 30 TABLET | Refills: 5 | Status: SHIPPED | OUTPATIENT
Start: 2024-01-29 | End: 2024-06-12

## 2024-01-29 NOTE — TELEPHONE ENCOUNTER
Zofran sent.   Will not send referral to motility clinic as previously recommended as patient declines to re-establish care with them.

## 2024-01-29 NOTE — PROGRESS NOTES
HPI     Eye Problem            Comments: Patient here today for tearing and burning eyes          Comments    Pain Scale:  0  Onset:   1 year  OD, OS, OU:   OU  Discharge:   Yes  A.M. Matting:  No  Itch:   Yes  Redness:   No  Photophobia:   No  Foreign body sensation:   No  Deep pain:   No  Previous occurrence:   Reoccurring   Drops:   Dry Eye drops  1. Narrow Angle OU            Last edited by Lorena Llanes, PCT on 1/29/2024  8:49 AM.            Assessment /Plan     For exam results, see Encounter Report.    Anatomical narrow angle glaucoma  -     Ambulatory referral/consult to Ophthalmology; Future; Expected date: 02/05/2024  Previously seen Dr Leon, lost to f/u. RTC with Dr WILLIS for repeat gonio eval.     Dry eye syndrome, bilateral  Discussed Ivizia 1gtt ticatherine. Consider punctal plugs if no improvement.     RTC with Dr WILLIS next available for angle consult

## 2024-01-29 NOTE — TELEPHONE ENCOUNTER
----- Message from Keya Hutchins sent at 1/29/2024  2:02 PM CST -----  Regarding: Refill  Contact: Lenny  Type:  RX Refill Request    Who Called: Lenny   Refill or New Rx: refill   RX Name and Strength:promethazine (PHENERGAN) 6.25 mg/5 mL syrup     How is the patient currently taking it? (ex. 1XDay):Take 20 mLs (25 mg total) by mouth every 6 (six) hours as needed for Nausea. - Oral  Is this a 30 day or 90 day RX:    Preferred Pharmacy with phone number:   [id number] -  [address]  [address] [facility] [address]  Phone: [phone] Fax: [phone]     Local or Mail Order: local   Ordering Provider: SAURABH Longoria   Would the patient rather a call back or a response via [facility]? call  Best Call Back Number:     [phone]  Additional Information:  Lenny is requesting a refill please. Please call the patient.
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
## 2024-01-29 NOTE — TELEPHONE ENCOUNTER
----- Message from Keya Hutchins sent at 1/29/2024  2:02 PM CST -----  Regarding: Refill  Contact: Lenny  Type:  RX Refill Request    Who Called: Lenny   Refill or New Rx: refill   RX Name and Strength:promethazine (PHENERGAN) 6.25 mg/5 mL syrup     How is the patient currently taking it? (ex. 1XDay):Take 20 mLs (25 mg total) by mouth every 6 (six) hours as needed for Nausea. - Oral  Is this a 30 day or 90 day RX:    Preferred Pharmacy with phone number:   [id number] -  [place] [address]  [address] [address]  Phone: [phone] Fax: [phone]     Local or Mail Order: local   Ordering Provider: SAURABH Longoria   Would the patient rather a call back or a response via [facility]? call  Best Call Back Number:     [phone]  Additional Information:  Lenny is requesting a refill please. Please call the patient.

## 2024-01-29 NOTE — TELEPHONE ENCOUNTER
contacted and informed that no refills will be forwarded per Narendra for the phenergan; pt informed that Narendra will send zofran to assist with nausea until he can obtain an appointment with the motility clinic in [place]; pt has denied receiving  zofran stating that he currently has this prescription and it doesn't assist with nausea, pt is also opposed to reporting to [place] to the motility clinic

## 2024-01-29 NOTE — Clinical Note
Please call to schedule lost to follow up for Anatomical narrow angle glaucoma OU. Referral in [facility].  Thanks, DT

## 2024-01-29 NOTE — TELEPHONE ENCOUNTER
----- Message from Sirena Garvin sent at 1/29/2024 10:32 AM CST -----  Lenny Latham calling regarding Refills for promethazine (PHENERGAN) 6.25 mg/5 mL syrup      [id number] - [place] - [address] AT [facility]  [address] [address]  Phone: [phone] Fax: [phone]

## 2024-01-29 NOTE — TELEPHONE ENCOUNTER
is requesting refills on phenergan 6.25 mg/ 5 mL q6h  prn nausea; last refill given on 12/29/23 with 0 refills; last office visit on 12/01/23

## 2024-01-30 ENCOUNTER — TELEPHONE (OUTPATIENT)
Dept: GASTROENTEROLOGY | Facility: CLINIC | Age: 64
End: 2024-01-30
Payer: MEDICARE

## 2024-01-30 ENCOUNTER — TELEPHONE (OUTPATIENT)
Dept: PSYCHIATRY | Facility: CLINIC | Age: 64
End: 2024-01-30
Payer: MEDICARE

## 2024-01-30 DIAGNOSIS — R11.0 CHRONIC NAUSEA: ICD-10-CM

## 2024-01-30 RX ORDER — PROMETHAZINE HYDROCHLORIDE 6.25 MG/5ML
25 SYRUP ORAL EVERY 6 HOURS PRN
Qty: 473 ML | Refills: 0 | OUTPATIENT
Start: 2024-01-30

## 2024-01-30 NOTE — TELEPHONE ENCOUNTER
No care due was identified.  [facility] Embedded Care Due Messages. Reference number: [id number].   1/30/2024 2:24:30 PM CST

## 2024-01-30 NOTE — TELEPHONE ENCOUNTER
----- Message from Aly Chapman sent at 1/30/2024  8:57 AM CST -----  Contact: Patient  Lenny Latham Jr. Would like a call back at [phone], in regards to checking on the status of his Xanax prescription.

## 2024-01-30 NOTE — TELEPHONE ENCOUNTER
Returned call to pt informed him medication was sent in 1/22 from pcp. To pharmacy. Pt did not answer but a vm was left to check pharmacy for medication.

## 2024-02-09 ENCOUNTER — TELEPHONE (OUTPATIENT)
Dept: GASTROENTEROLOGY | Facility: CLINIC | Age: 64
End: 2024-02-09
Payer: MEDICARE

## 2024-02-09 DIAGNOSIS — K22.0 ACHALASIA: Primary | ICD-10-CM

## 2024-02-09 DIAGNOSIS — R13.10 DYSPHAGIA, UNSPECIFIED TYPE: ICD-10-CM

## 2024-02-09 DIAGNOSIS — K22.0 ACHALASIA OF ESOPHAGUS: ICD-10-CM

## 2024-02-09 NOTE — TELEPHONE ENCOUNTER
----- Message from Maryellen Sen sent at 2/9/2024 10:43 AM CST -----  .Type:  RX Refill Request    Who Called: .Lenny Latham Jr.   Refill or New Rx:refill  RX Name and Strength:promethazine (PHENERGAN) 6.25 mg/5 mL syrup  How is the patient currently taking it? (ex. 1XDay):daily  Is this a 30 day or 90 day RX:    Preferred Pharmacy with phone number:.  [id number] - [place] - [address] [facility]  [address] [place] [address]  Phone: [phone] Fax: [phone]     Local or Mail Order:local  Ordering Provider:Yudith  Would the patient rather a call back or a response via [facility]? Call back  Best Call Back Number:.[phone]  Additional Information:  please call the patient if there is a problem.

## 2024-02-16 ENCOUNTER — TELEPHONE (OUTPATIENT)
Dept: GASTROENTEROLOGY | Facility: CLINIC | Age: 64
End: 2024-02-16
Payer: MEDICARE

## 2024-02-16 NOTE — TELEPHONE ENCOUNTER
----- Message from Pascaul Rossi sent at 2/16/2024  2:19 PM CST -----  Contact: Nathaniel/ [facility]  Nathaniel/ [facility] is calling regarding needing clarification on promethazine and ondansetron. Please call back at [phone].             Thanks

## 2024-02-27 ENCOUNTER — OFFICE VISIT (OUTPATIENT)
Dept: OPHTHALMOLOGY | Facility: CLINIC | Age: 64
End: 2024-02-27
Payer: MEDICARE

## 2024-02-27 ENCOUNTER — TELEPHONE (OUTPATIENT)
Dept: GASTROENTEROLOGY | Facility: CLINIC | Age: 64
End: 2024-02-27
Payer: MEDICARE

## 2024-02-27 DIAGNOSIS — H40.033 ANATOMICAL NARROW ANGLE OF BOTH EYES: ICD-10-CM

## 2024-02-27 DIAGNOSIS — H25.12 NUCLEAR SCLEROSIS OF LEFT EYE: ICD-10-CM

## 2024-02-27 DIAGNOSIS — H40.039 ANATOMICAL NARROW ANGLE GLAUCOMA: Primary | ICD-10-CM

## 2024-02-27 DIAGNOSIS — M35.01 KERATOCONJUNCTIVITIS SICCA: ICD-10-CM

## 2024-02-27 DIAGNOSIS — H25.11 NUCLEAR SCLEROSIS OF RIGHT EYE: ICD-10-CM

## 2024-02-27 PROCEDURE — 99214 OFFICE O/P EST MOD 30 MIN: CPT | Mod: 25,S$GLB,, | Performed by: STUDENT IN AN ORGANIZED HEALTH CARE EDUCATION/TRAINING PROGRAM

## 2024-02-27 PROCEDURE — 68761 CLOSE TEAR DUCT OPENING: CPT | Mod: 50,S$GLB,, | Performed by: STUDENT IN AN ORGANIZED HEALTH CARE EDUCATION/TRAINING PROGRAM

## 2024-02-27 PROCEDURE — 99999 PR PBB SHADOW E&M-EST. PATIENT-LVL III: CPT | Mod: PBBFAC,,, | Performed by: STUDENT IN AN ORGANIZED HEALTH CARE EDUCATION/TRAINING PROGRAM

## 2024-02-27 RX ORDER — KETOTIFEN FUMARATE 0.35 MG/ML
1 SOLUTION/ DROPS OPHTHALMIC 2 TIMES DAILY
Qty: 5 ML | Refills: 1 | Status: SHIPPED | OUTPATIENT
Start: 2024-02-27 | End: 2025-02-26

## 2024-02-27 NOTE — TELEPHONE ENCOUNTER
----- Message from Rosario Dash sent at 2/27/2024 10:14 AM CST -----  Who Called: Pt    What is the request in detail: Requesting call back to discuss procedure done in January. Please advise    Can the clinic reply by [facility]? no    Best Call Back Number: [phone]      Additional Information:

## 2024-03-06 ENCOUNTER — TELEPHONE (OUTPATIENT)
Dept: GASTROENTEROLOGY | Facility: CLINIC | Age: 64
End: 2024-03-06
Payer: MEDICARE

## 2024-03-06 NOTE — TELEPHONE ENCOUNTER
----- Message from Izzy Zurita NP sent at 3/6/2024 10:54 AM CST -----  Contact: Lenny  Needs follow up with Dr. Elizondo with the motility clinic.   ----- Message -----  From: Rivera Terrell MA  Sent: 3/6/2024   9:59 AM CST  To: Izzy Zurita NP    Please advise do I need to schedule with you or does the patient need to schedule with  [facility]  ----- Message -----  From: Chel Dangelo  Sent: 3/6/2024   9:46 AM CST  To: Yudith Peterson    Lenny needs a call back at [phone], Regards to his procedure he did in January he is ready to discuss the results  and next treatment option.    Thanks  Td

## 2024-03-12 ENCOUNTER — DOCUMENTATION ONLY (OUTPATIENT)
Dept: OPHTHALMOLOGY | Facility: CLINIC | Age: 64
End: 2024-03-12

## 2024-03-12 ENCOUNTER — OFFICE VISIT (OUTPATIENT)
Dept: OPHTHALMOLOGY | Facility: CLINIC | Age: 64
End: 2024-03-12
Payer: MEDICARE

## 2024-03-12 ENCOUNTER — PATIENT MESSAGE (OUTPATIENT)
Dept: GASTROENTEROLOGY | Facility: CLINIC | Age: 64
End: 2024-03-12
Payer: MEDICARE

## 2024-03-12 DIAGNOSIS — M35.01 KERATOCONJUNCTIVITIS SICCA: ICD-10-CM

## 2024-03-12 DIAGNOSIS — H25.12 NUCLEAR SCLEROSIS OF LEFT EYE: ICD-10-CM

## 2024-03-12 DIAGNOSIS — H25.11 NUCLEAR SCLEROSIS OF RIGHT EYE: ICD-10-CM

## 2024-03-12 DIAGNOSIS — H40.039 ANATOMICAL NARROW ANGLE GLAUCOMA: Primary | ICD-10-CM

## 2024-03-12 PROCEDURE — 92136 OPHTHALMIC BIOMETRY: CPT | Mod: RT,S$GLB,, | Performed by: STUDENT IN AN ORGANIZED HEALTH CARE EDUCATION/TRAINING PROGRAM

## 2024-03-12 PROCEDURE — 99214 OFFICE O/P EST MOD 30 MIN: CPT | Mod: S$GLB,,, | Performed by: STUDENT IN AN ORGANIZED HEALTH CARE EDUCATION/TRAINING PROGRAM

## 2024-03-12 PROCEDURE — 99999 PR PBB SHADOW E&M-EST. PATIENT-LVL III: CPT | Mod: PBBFAC,,, | Performed by: STUDENT IN AN ORGANIZED HEALTH CARE EDUCATION/TRAINING PROGRAM

## 2024-03-12 RX ORDER — PREDNISOLONE ACETATE 10 MG/ML
1 SUSPENSION/ DROPS OPHTHALMIC 4 TIMES DAILY
Qty: 5 ML | Refills: 1 | Status: SHIPPED | OUTPATIENT
Start: 2024-03-12 | End: 2024-04-03 | Stop reason: SDUPTHER

## 2024-03-12 NOTE — PROGRESS NOTES
HPI     Cataract     Additional comments: C/o seeing the television over the past several   months    Which eye is most affected? OU    Activities of daily living impacted: reading,driving at night and bright   lights.    Do you have difficulty, even with glasses, with the following activities?    Reading small print such as labels on medicine bottles, a telephone book,   or food labels.   Yes  Reading a newspaper or book?  Yes                 Comments    1. Narrow Angle OU  2. NSC OU *retrobulbar and lid block OU             Last edited by Bekah Segal on 3/12/2024  8:49 AM.            Assessment /Plan     For exam results, see Encounter Report.    Anatomical narrow angle glaucoma  IOP normal. Angles narrow on gonioscopy but open with dynamic gonioscopy. Explained r/b/a to observation vs. LPI vs. CEIOL. Patient elects for CEIOL.  - Plan for CEIOL OD then OS    Nuclear sclerosis of right eye- Visually Significant Cataract OD  Patient reports decreased vision consistent with the clinical amount of lenticular opacity, which reaches the level of visual significance and affects activities of daily living including reading and glare. Risks, benefits, and alternatives to cataract surgery were discussed.  Discussion of risks included possibility of infection as well as permanent vision loss.The pt expressed a desire to proceed with surgery with the potential for some reasonable degree of visual improvement. Recommended regular use of artificial tears and good lid hygiene to optimize surgical outcome.     Discussed IOL options and refractive outcomes for this patient.    Phaco right eye,   Topical  Will aim for Monofocal - Distance IOL    Intraop Kenalog 40: No    Post op gtts:   PF      Discussed that vision may be limited by:  Severe KEITH and Astigmatism >1D    Dilation: good  Alpha Blockers: none    SS record completed, IOL master reviewed, external referral completed.   Referral to [facility] for  Ophthalmic surgery  Prescriptions sent for preoperative medications  Explained that patient may need glasses after surgery.    RTC for POD#1      Nuclear sclerosis of left eye  Follow     Keratoconjunctivitis sicca  ATs QID and lid hygiene w/ baby shampoo  Omega 3 Fish Oils

## 2024-03-12 NOTE — PROGRESS NOTES
Short Stay Record    Diagnosis: Nuclear Sclerotic Cataract right and Anatomically Narrow Angle right    CC: Blurry Vision     HPI:  Lenny Latham Jr. is a 63 y.o. male who presents for evaluation prior to ophthalmic surgery. No current complaints.     Past Medical History:   Diagnosis Date    Achalasia of esophagus 11/28/2016    Anxiety state, unspecified     Centrilobular emphysema 10/3/2022    Coronary artery disease     Esophageal reflux     Esophageal stricture     Hypertrophy of prostate without urinary obstruction and other lower urinary tract symptoms (LUTS)     Screening PSA (prostate specific antigen) 12/7/12    0.4    Unspecified essential hypertension      Past Surgical History:   Procedure Laterality Date    BACK SURGERY      CERVICAL EPIDURAL STEROID INJECTION      COLONOSCOPY      COLONOSCOPY N/A 8/17/2023    Procedure: COLONOSCOPY;  Surgeon: Windy León MD;  Location: [facility];  Service: Endoscopy;  Laterality: N/A;    ESOPHAGEAL MANOMETRY WITH MEASUREMENT OF IMPEDANCE N/A 05/23/2022    Procedure: MANOMETRY-ESOPHAGEAL-WITH IMPEDANCE;  Surgeon: Madina Hoover MD;  Location: [facility] ([address] [place] [address]);  Service: Endoscopy;  Laterality: N/A;  hold pain medication 24 hours prior to procedure    ESOPHAGOGASTRODUODENOSCOPY      ESOPHAGOGASTRODUODENOSCOPY N/A 08/16/2019    Procedure: EGD (ESOPHAGOGASTRODUODENOSCOPY);  Surgeon: Pawan Schwab MD;  Location: [facility];  Service: Endoscopy;  Laterality: N/A;    ESOPHAGOGASTRODUODENOSCOPY N/A 05/23/2022    Procedure: ESOPHAGOGASTRODUODENOSCOPY (EGD);  Surgeon: Madina Hoover MD;  Location: [address]);  Service: Endoscopy;  Laterality: N/A;  Endoflip/Endoscopically placed manometry probe  2nd floor-End stage achalasia  propofol only  full iquid diet x3 days, clear liquid diet x1 day prior to procedure  fully vaccinated, instructions sent to [facility] and mailed-[facility]    ESOPHAGOGASTRODUODENOSCOPY N/A 04/21/2023    Procedure:  ESOPHAGOGASTRODUODENOSCOPY (EGD);  Surgeon: Madina Hoover MD;  Location: [facility] ([address] [place] [address]);  Service: Endoscopy;  Laterality: N/A;  w/ Botox  2nd floor-End Stage Achalasia  full liquid diet x3 days, clear liquid diet x1 day prior to procedure  instructions sent to [facility] and emailed to [email or web address]    23 - Pt confirmed apt. Pt moved up to the 11am slot, to    ESOPHAGOGASTRODUODENOSCOPY N/A 2024    Procedure: EGD with BOTOX;  Surgeon: Nu Villanueva MD;  Location: [facility];  Service: Endoscopy;  Laterality: N/A;    HERNIA REPAIR      INGUINAL HERNIA REPAIR Right     LUMBAR EPIDURAL STEROID INJECTION      TOTAL KNEE ARTHROPLASTY Right 2023    UPPER GASTROINTESTINAL ENDOSCOPY  2016     Social History     Tobacco Use    Smoking status: Former     Types: Cigars     Start date:      Quit date:      Years since quittin.1    Smokeless tobacco: Never    Tobacco comments:     Cigar 1-2 times a week x about 4 years   Substance Use Topics    Alcohol use: Not Currently     Comment: once or twice a year 1-2 beers     Family History   Problem Relation Age of Onset    Lung cancer Mother     Hypertension Father     Prostate cancer Father     Ulcers Sister     Ulcers Brother     Prostate cancer Maternal Grandfather     Ulcers Sister     Celiac disease Neg Hx     Colon cancer Neg Hx     Colon polyps Neg Hx     Crohn's disease Neg Hx     Esophageal cancer Neg Hx     Inflammatory bowel disease Neg Hx     Irritable bowel syndrome Neg Hx     Liver cancer Neg Hx     Liver disease Neg Hx     Rectal cancer Neg Hx     Stomach cancer Neg Hx      Review of patient's allergies indicates:   Allergen Reactions    Nsaids (non-steroidal anti-inflammatory drug) Nausea And Vomiting         Current Outpatient Medications:     ALPRAZolam (XANAX) 1 MG tablet, Take 1 tablet (1 mg total) by mouth nightly as needed for Anxiety., Disp: 30 tablet, Rfl: 3    atorvastatin (LIPITOR) 40 MG tablet,  Take 1 tablet (40 mg total) by mouth every evening., Disp: 90 tablet, Rfl: 3    diazePAM (VALIUM) 5 MG tablet, Take 1 tablet (5 mg total) by mouth every 6 (six) hours as needed for Anxiety., Disp: 30 tablet, Rfl: 3    HYDROcodone-acetaminophen (NORCO) 5-325 mg per tablet, Take 1 tablet by mouth 2 (two) times daily., Disp: , Rfl:     hydrOXYzine (ATARAX) 50 MG tablet, Take 1 tablet at bedtime as needed for sleep., Disp: 30 tablet, Rfl: 3    ketotifen (ZADITOR) 0.025 % (0.035 %) ophthalmic solution, Place 1 drop into both eyes 2 (two) times daily., Disp: 5 mL, Rfl: 1    omeprazole (PRILOSEC) 40 MG capsule, Take 1 capsule (40 mg total) by mouth every morning., Disp: 90 capsule, Rfl: 0    ondansetron (ZOFRAN-ODT) 4 MG TbDL, Take 1 tablet (4 mg total) by mouth every 6 (six) hours as needed (nausea and vomiting)., Disp: 30 tablet, Rfl: 5    prednisoLONE acetate (PRED FORTE) 1 % DrpS, Place 1 drop into the right eye 4 (four) times daily., Disp: 5 mL, Rfl: 1    promethazine (PHENERGAN) 6.25 mg/5 mL syrup, Take 20 mLs (25 mg total) by mouth every 6 (six) hours as needed for Nausea., Disp: 473 mL, Rfl: 0    tamsulosin (FLOMAX) 0.4 mg Cap, TAKE 1 CAPSULE(0.4 MG) BY MOUTH EVERY DAY, Disp: 90 capsule, Rfl: 3    Review of Systems:  10 Pt ROS negative except as stated in HPI    Physical Exam:  General Appearance:    A&Ox3, no distress, appears stated age   Head:    Normocephalic, without obvious abnormality, atraumatic   Eyes:    PERRL, EOM's intact   Back:     Symmetric, no curvature   Lungs:     respirations unlabored   Chest Wall:    No tenderness or deformity    Heart:  Abdomen:  Extremities:  Skin:    S1 and S2 present    Soft, non-tender    Extremities normal, atraumatic    Skin color, texture, turgor normal     Patient is stable for ophthalmic surgery under local and MAC.       _

## 2024-03-15 RX ORDER — PALIPERIDONE 3 MG/1
TABLET, EXTENDED RELEASE ORAL
Qty: 30 TABLET | Refills: 2 | Status: SHIPPED | OUTPATIENT
Start: 2024-03-15 | End: 2024-05-31 | Stop reason: SDUPTHER

## 2024-03-26 RX ORDER — DIAZEPAM 5 MG/1
5 TABLET ORAL DAILY PRN
Qty: 30 TABLET | Refills: 2 | Status: SHIPPED | OUTPATIENT
Start: 2024-03-26 | End: 2024-05-28 | Stop reason: SDUPTHER

## 2024-04-03 ENCOUNTER — PATIENT MESSAGE (OUTPATIENT)
Dept: PULMONOLOGY | Facility: CLINIC | Age: 64
End: 2024-04-03
Payer: MEDICARE

## 2024-04-03 ENCOUNTER — OUTSIDE PLACE OF SERVICE (OUTPATIENT)
Dept: OPHTHALMOLOGY | Facility: CLINIC | Age: 64
End: 2024-04-03
Payer: MEDICARE

## 2024-04-03 ENCOUNTER — TELEPHONE (OUTPATIENT)
Dept: PSYCHIATRY | Facility: CLINIC | Age: 64
End: 2024-04-03
Payer: MEDICARE

## 2024-04-03 DIAGNOSIS — H40.039 ANATOMICAL NARROW ANGLE GLAUCOMA: ICD-10-CM

## 2024-04-03 DIAGNOSIS — H25.11 NUCLEAR SCLEROSIS OF RIGHT EYE: ICD-10-CM

## 2024-04-03 PROCEDURE — 66984 XCAPSL CTRC RMVL W/O ECP: CPT | Mod: RT,,, | Performed by: STUDENT IN AN ORGANIZED HEALTH CARE EDUCATION/TRAINING PROGRAM

## 2024-04-03 RX ORDER — PREDNISOLONE ACETATE 10 MG/ML
1 SUSPENSION/ DROPS OPHTHALMIC 4 TIMES DAILY
Qty: 5 ML | Refills: 1 | Status: SHIPPED | OUTPATIENT
Start: 2024-04-03

## 2024-04-03 NOTE — TELEPHONE ENCOUNTER
Ret call and spoke with pt.  He stated that every month when it is time for his xanax refill his pharmacy says they need the dr's office to call and authorize it and it will be due to be filled on 4/6.  I tried calling his pharmacy 3 times and held on the recording and then I could hear someone  the line and hang up right away.  I called the pt back to explain and he will go by there in person tomorrow and call again if he needs assistance.

## 2024-04-03 NOTE — TELEPHONE ENCOUNTER
----- Message from Riana Renteria sent at 4/3/2024  3:57 PM CDT -----  Contact: [phone]  Patient needs a refill on ALPRAZolam (XANAX) 1 MG tablet called into [facility] pharmacy at [phone].  Please call patient at [phone]  if you have any questions.          [id number] - [place] - [address] AT [facility]  [address] [address]  Phone: [phone] Fax: [phone]           Thanks KB

## 2024-04-04 ENCOUNTER — OFFICE VISIT (OUTPATIENT)
Dept: OPHTHALMOLOGY | Facility: CLINIC | Age: 64
End: 2024-04-04
Payer: MEDICARE

## 2024-04-04 DIAGNOSIS — H04.123 DRY EYE SYNDROME, BILATERAL: ICD-10-CM

## 2024-04-04 DIAGNOSIS — M35.01 KERATOCONJUNCTIVITIS SICCA: ICD-10-CM

## 2024-04-04 DIAGNOSIS — Z98.890 POST-OPERATIVE STATE: Primary | ICD-10-CM

## 2024-04-04 DIAGNOSIS — Z98.41 CATARACT EXTRACTION STATUS OF EYE, RIGHT: ICD-10-CM

## 2024-04-04 DIAGNOSIS — H40.039 ANATOMICAL NARROW ANGLE GLAUCOMA: ICD-10-CM

## 2024-04-04 PROCEDURE — 1159F MED LIST DOCD IN RCRD: CPT | Mod: CPTII,S$GLB,, | Performed by: STUDENT IN AN ORGANIZED HEALTH CARE EDUCATION/TRAINING PROGRAM

## 2024-04-04 PROCEDURE — 99999 PR PBB SHADOW E&M-EST. PATIENT-LVL III: CPT | Mod: PBBFAC,,, | Performed by: STUDENT IN AN ORGANIZED HEALTH CARE EDUCATION/TRAINING PROGRAM

## 2024-04-04 PROCEDURE — 1160F RVW MEDS BY RX/DR IN RCRD: CPT | Mod: CPTII,S$GLB,, | Performed by: STUDENT IN AN ORGANIZED HEALTH CARE EDUCATION/TRAINING PROGRAM

## 2024-04-04 PROCEDURE — 99024 POSTOP FOLLOW-UP VISIT: CPT | Mod: S$GLB,,, | Performed by: STUDENT IN AN ORGANIZED HEALTH CARE EDUCATION/TRAINING PROGRAM

## 2024-04-04 NOTE — PROGRESS NOTES
HPI    POD#1 s/p CEIOL OD. Has received appropriate post-op drops. Denies any   discomfort. No new ocular complaints.      1. Narrow Angle OU  2. PCIOL OD   NSC OS *retrobulbar and lid block OU    Pred QID OD     Last edited by Izzy Carlos on 4/4/2024  2:49 PM.            Assessment /Plan     For exam results, see Encounter Report.    POD#1 S/P CEIOL OD Doing well. Mild edema    Continue gtts to operative eye:  PF QID      Reinstructed in importance of absolute compliance with Post-OP instructions including medications, shield at bedtime, protective glasses during the day, and limitation of activities. Follow up appointments in approximately one and six weeks or call immediately for increased pain, redness or vision loss.     RTC 1 week. MOCT if PH worse than 20/25      Dry eye syndrome, bilateral  Keratoconjunctivitis sicca  ATs QID and lid hygiene w/ baby shampoo  Omega 3 Fish Oils

## 2024-04-05 ENCOUNTER — TELEPHONE (OUTPATIENT)
Dept: PSYCHIATRY | Facility: CLINIC | Age: 64
End: 2024-04-05
Payer: MEDICARE

## 2024-04-05 NOTE — TELEPHONE ENCOUNTER
1) called pt to check and see if he had been able to reach his pharmacy and he was not.    2) had to try a few times until I got through but did finally reach them and the xanax is due for refill today so they will process it and it will be ready for  today after 2pm    3) called pt back with the information - he was so pleased!!

## 2024-04-11 ENCOUNTER — OFFICE VISIT (OUTPATIENT)
Dept: OPHTHALMOLOGY | Facility: CLINIC | Age: 64
End: 2024-04-11
Payer: MEDICARE

## 2024-04-11 ENCOUNTER — DOCUMENTATION ONLY (OUTPATIENT)
Dept: OPHTHALMOLOGY | Facility: CLINIC | Age: 64
End: 2024-04-11

## 2024-04-11 DIAGNOSIS — Z98.41 CATARACT EXTRACTION STATUS OF EYE, RIGHT: ICD-10-CM

## 2024-04-11 DIAGNOSIS — H25.12 NUCLEAR SCLEROSIS OF LEFT EYE: Primary | ICD-10-CM

## 2024-04-11 DIAGNOSIS — Z98.890 POST-OPERATIVE STATE: ICD-10-CM

## 2024-04-11 DIAGNOSIS — H40.033 ANATOMICAL NARROW ANGLE OF BOTH EYES: ICD-10-CM

## 2024-04-11 PROCEDURE — 1159F MED LIST DOCD IN RCRD: CPT | Mod: CPTII,S$GLB,, | Performed by: STUDENT IN AN ORGANIZED HEALTH CARE EDUCATION/TRAINING PROGRAM

## 2024-04-11 PROCEDURE — 1160F RVW MEDS BY RX/DR IN RCRD: CPT | Mod: CPTII,S$GLB,, | Performed by: STUDENT IN AN ORGANIZED HEALTH CARE EDUCATION/TRAINING PROGRAM

## 2024-04-11 PROCEDURE — 99024 POSTOP FOLLOW-UP VISIT: CPT | Mod: S$GLB,,, | Performed by: STUDENT IN AN ORGANIZED HEALTH CARE EDUCATION/TRAINING PROGRAM

## 2024-04-11 PROCEDURE — 99999 PR PBB SHADOW E&M-EST. PATIENT-LVL III: CPT | Mod: PBBFAC,,, | Performed by: STUDENT IN AN ORGANIZED HEALTH CARE EDUCATION/TRAINING PROGRAM

## 2024-04-11 PROCEDURE — 92136 OPHTHALMIC BIOMETRY: CPT | Mod: 26,LT,S$GLB, | Performed by: STUDENT IN AN ORGANIZED HEALTH CARE EDUCATION/TRAINING PROGRAM

## 2024-04-11 RX ORDER — PREDNISOLONE ACETATE 10 MG/ML
1 SUSPENSION/ DROPS OPHTHALMIC EVERY 4 HOURS
Qty: 5 ML | Refills: 1 | Status: SHIPPED | OUTPATIENT
Start: 2024-04-11 | End: 2025-04-11

## 2024-04-11 NOTE — PROGRESS NOTES
HPI     Post-op Evaluation     Additional comments: Patient here today for POW#1 visit s/p CEIOL of the   right eye. Patient states vision is doing well and is using drops. Denies   any pain or discomfort.  No other ocular complaints            Comments    1. Narrow Angle OU  2. PCIOL OD   NSC OS *retrobulbar and lid block OU    Pred QID OD             Last edited by Bekah Segal on 4/11/2024  1:35 PM.            Assessment /Plan     For exam results, see Encounter Report.    Cataract extraction status of eye, right    Post-operative state    Anatomical narrow angle of both eyes      Impression/Plan  POW#1 S/P CEIOL OD : Doing well with no evidence of infection.     Continue gtts to operative eye:   Trace Cell. PF Taper 4-3-2-1 then stop    Pt given and instructed in one week postop instructions. Can resume normal activitites and d/c eye shield. OTC reading glasses can be used until evaluated for final MR.     NS OS:  Patient reports decreased vision in the fellow eye consistent with the clinical amount of lenticular opacity, which reaches the level of visual significance and affects activities of daily living including reading and glare. Risks, benefits, and alternatives to cataract surgery were discussed and pt desired to schedule cataract surgery. Pt was consented and the biometry and lens options were reviewed.     Phaco left eye,   Topical with Lid and Retrobulbar Block  Will aim for Monofocal - Distance IOL    Intraop Kenalog 40: No    Post op gtts:   PF      Discussed that vision may be limited by:  Severe KEITH and Astigmatism >1D    Dilation: good  Alpha Blockers: none    SS record completed, IOL master reviewed, external referral completed.   Referral to [facility] for Ophthalmic surgery  Prescriptions sent for preoperative medications  Explained that patient may need glasses after surgery.    RTC for POD#1

## 2024-04-11 NOTE — PROGRESS NOTES
Short Stay Record    Diagnosis: Nuclear Sclerotic Cataract left    CC: Blurry Vision     HPI:  Lenny Latham Jr. is a 63 y.o. male who presents for evaluation prior to ophthalmic surgery. No current complaints.     Past Medical History:   Diagnosis Date    Achalasia of esophagus 11/28/2016    Anxiety state, unspecified     Cataract     Centrilobular emphysema 10/03/2022    Coronary artery disease     Esophageal reflux     Esophageal stricture     Hypertrophy of prostate without urinary obstruction and other lower urinary tract symptoms (LUTS)     Screening PSA (prostate specific antigen) 12/07/2012    0.4    Unspecified essential hypertension      Past Surgical History:   Procedure Laterality Date    BACK SURGERY      CATARACT EXTRACTION      CERVICAL EPIDURAL STEROID INJECTION      COLONOSCOPY      COLONOSCOPY N/A 08/17/2023    Procedure: COLONOSCOPY;  Surgeon: Windy León MD;  Location: [facility];  Service: Endoscopy;  Laterality: N/A;    ESOPHAGEAL MANOMETRY WITH MEASUREMENT OF IMPEDANCE N/A 05/23/2022    Procedure: MANOMETRY-ESOPHAGEAL-WITH IMPEDANCE;  Surgeon: Madina Hoover MD;  Location: [facility] ([address] [place] [address]);  Service: Endoscopy;  Laterality: N/A;  hold pain medication 24 hours prior to procedure    ESOPHAGOGASTRODUODENOSCOPY      ESOPHAGOGASTRODUODENOSCOPY N/A 08/16/2019    Procedure: EGD (ESOPHAGOGASTRODUODENOSCOPY);  Surgeon: Pawan Schwab MD;  Location: [facility];  Service: Endoscopy;  Laterality: N/A;    ESOPHAGOGASTRODUODENOSCOPY N/A 05/23/2022    Procedure: ESOPHAGOGASTRODUODENOSCOPY (EGD);  Surgeon: Madina Hoover MD;  Location: [address]);  Service: Endoscopy;  Laterality: N/A;  Endoflip/Endoscopically placed manometry probe  2nd floor-End stage achalasia  propofol only  full iquid diet x3 days, clear liquid diet x1 day prior to procedure  fully vaccinated, instructions sent to [facility] and [facility]    ESOPHAGOGASTRODUODENOSCOPY N/A 04/21/2023    Procedure:  ESOPHAGOGASTRODUODENOSCOPY (EGD);  Surgeon: Madina Hoover MD;  [facility]  Service: Endoscopy;  Laterality: N/A;  w/ Botox  2nd floor-End Stage Achalasia  full liquid diet x3 days, clear liquid diet x1 day prior to procedure  instructions sent to [facility] and emailed to [email or web address]    23 - Pt confirmed apt. Pt moved up to the 11am slot, to    ESOPHAGOGASTRODUODENOSCOPY N/A 2024    Procedure: EGD with BOTOX;  Surgeon: Nu Villanueva MD;  Location: [facility];  Service: Endoscopy;  Laterality: N/A;    HERNIA REPAIR      INGUINAL HERNIA REPAIR Right     LUMBAR EPIDURAL STEROID INJECTION      TOTAL KNEE ARTHROPLASTY Right 2023    UPPER GASTROINTESTINAL ENDOSCOPY  2016     Social History     Tobacco Use    Smoking status: Former     Types: Cigars     Start date:      Quit date:      Years since quittin.2    Smokeless tobacco: Never    Tobacco comments:     Cigar 1-2 times a week x about 4 years   Substance Use Topics    Alcohol use: Not Currently     Comment: once or twice a year 1-2 beers     Family History   Problem Relation Age of Onset    Lung cancer Mother     Hypertension Father     Prostate cancer Father     Ulcers Sister     Ulcers Brother     Prostate cancer Maternal Grandfather     Ulcers Sister     Celiac disease Neg Hx     Colon cancer Neg Hx     Colon polyps Neg Hx     Crohn's disease Neg Hx     Esophageal cancer Neg Hx     Inflammatory bowel disease Neg Hx     Irritable bowel syndrome Neg Hx     Liver cancer Neg Hx     Liver disease Neg Hx     Rectal cancer Neg Hx     Stomach cancer Neg Hx      Review of patient's allergies indicates:   Allergen Reactions    Nsaids (non-steroidal anti-inflammatory drug) Nausea And Vomiting         Current Outpatient Medications:     ALPRAZolam (XANAX) 1 MG tablet, Take 1 tablet (1 mg total) by mouth nightly as needed for Anxiety., Disp: 30 tablet, Rfl: 3    atorvastatin (LIPITOR) 40 MG  tablet, Take 1 tablet (40 mg total) by mouth every evening., Disp: 90 tablet, Rfl: 3    diazePAM (VALIUM) 5 MG tablet, Take 1 tablet (5 mg total) by mouth daily as needed for Anxiety., Disp: 30 tablet, Rfl: 2    HYDROcodone-acetaminophen (NORCO) 5-325 mg per tablet, Take 1 tablet by mouth 2 (two) times daily., Disp: , Rfl:     hydrOXYzine (ATARAX) 50 MG tablet, Take 1 tablet at bedtime as needed for sleep., Disp: 30 tablet, Rfl: 3    ketotifen (ZADITOR) 0.025 % (0.035 %) ophthalmic solution, Place 1 drop into both eyes 2 (two) times daily., Disp: 5 mL, Rfl: 1    omeprazole (PRILOSEC) 40 MG capsule, Take 1 capsule (40 mg total) by mouth every morning., Disp: 90 capsule, Rfl: 0    ondansetron (ZOFRAN-ODT) 4 MG TbDL, Take 1 tablet (4 mg total) by mouth every 6 (six) hours as needed (nausea and vomiting)., Disp: 30 tablet, Rfl: 5    paliperidone (INVEGA) 3 MG TR24, TAKE ONE TABLET BY MOUTH DAILY AT 5PM EVERY EVENING, Disp: 30 tablet, Rfl: 2    prednisoLONE acetate (PRED FORTE) 1 % DrpS, Place 1 drop into the right eye 4 (four) times daily., Disp: 5 mL, Rfl: 1    prednisoLONE acetate (PRED FORTE) 1 % DrpS, Place 1 drop into the left eye every 4 (four) hours., Disp: 5 mL, Rfl: 1    promethazine (PHENERGAN) 6.25 mg/5 mL syrup, Take 20 mLs (25 mg total) by mouth every 6 (six) hours as needed for Nausea., Disp: 473 mL, Rfl: 0    tamsulosin (FLOMAX) 0.4 mg Cap, TAKE 1 CAPSULE(0.4 MG) BY MOUTH EVERY DAY, Disp: 90 capsule, Rfl: 3    Review of Systems:  10 Pt ROS negative except as stated in HPI    Physical Exam:  General Appearance:    A&Ox3, no distress, appears stated age   Head:    Normocephalic, without obvious abnormality, atraumatic   Eyes:    PERRL, EOM's intact   Back:     Symmetric, no curvature   Lungs:     respirations unlabored   Chest Wall:    No tenderness or deformity    Heart:  Abdomen:  Extremities:  Skin:    S1 and S2 present    Soft, non-tender    Extremities normal, atraumatic    Skin color, texture, turgor  normal     Patient is stable for ophthalmic surgery under local and MAC.       _

## 2024-04-24 DIAGNOSIS — K22.0 ACHALASIA OF ESOPHAGUS: ICD-10-CM

## 2024-04-24 DIAGNOSIS — K21.9 GASTROESOPHAGEAL REFLUX DISEASE WITHOUT ESOPHAGITIS: ICD-10-CM

## 2024-04-24 NOTE — TELEPHONE ENCOUNTER
No care due was identified.  [facility] Embedded Care Due Messages. Reference number: [id number].   4/24/2024 6:26:26 PM CDT

## 2024-04-25 ENCOUNTER — TELEPHONE (OUTPATIENT)
Dept: PSYCHIATRY | Facility: CLINIC | Age: 64
End: 2024-04-25
Payer: MEDICARE

## 2024-04-25 ENCOUNTER — PATIENT MESSAGE (OUTPATIENT)
Dept: PSYCHIATRY | Facility: CLINIC | Age: 64
End: 2024-04-25
Payer: MEDICARE

## 2024-04-25 RX ORDER — OMEPRAZOLE 40 MG/1
40 CAPSULE, DELAYED RELEASE ORAL EVERY MORNING
Qty: 90 CAPSULE | Refills: 1 | Status: SHIPPED | OUTPATIENT
Start: 2024-04-25 | End: 2024-06-11

## 2024-04-25 NOTE — TELEPHONE ENCOUNTER
Refill Decision Note   Lenny Latham  is requesting a refill authorization.  Brief Assessment and Rationale for Refill:  Approve     Medication Therapy Plan:         Comments:     Note composed:8:40 AM 04/25/2024

## 2024-04-26 NOTE — TELEPHONE ENCOUNTER
----- Message from Saumya Contreras sent at 4/25/2024  1:33 PM CDT -----  Contact: Lenny  .Patient is calling to speak with the nurse regarding refill  . Reports can medication be called in today please  . Please give patient a call back at   [phone]

## 2024-04-29 ENCOUNTER — TELEPHONE (OUTPATIENT)
Dept: PSYCHIATRY | Facility: CLINIC | Age: 64
End: 2024-04-29
Payer: MEDICARE

## 2024-04-29 ENCOUNTER — TELEPHONE (OUTPATIENT)
Dept: INTERNAL MEDICINE | Facility: CLINIC | Age: 64
End: 2024-04-29
Payer: MEDICARE

## 2024-04-29 NOTE — TELEPHONE ENCOUNTER
----- Message from Juana Pulido sent at 4/29/2024  9:15 AM CDT -----  Contact: self   Patient is requesting a call back. It's regarding medication  from stomach surgery. Please call to advise   Care Management Initial Assessment       RUR:  Readmission? No  1st IM letter given? No  1st  letter given: No     01/16/24 0949   Service Assessment   Patient Orientation Alert and Oriented   Cognition Alert   History Provided By Patient   Primary Caregiver Self   Support Systems Family Members   Patient's Healthcare Decision Maker is: Legal Next of Kin   PCP Verified by CM Yes   Last Visit to PCP Within last 3 months   Prior Functional Level Independent in ADLs/IADLs   Current Functional Level Independent in ADLs/IADLs   Can patient return to prior living arrangement Yes   Ability to make needs known: Good   Family able to assist with home care needs: Yes   Social/Functional History   Lives With Alone   Type of Home House   Home Layout Laundry in basement;Two level   Home Access Stairs to enter with rails   Entrance Stairs - Number of Steps 4   Entrance Stairs - Rails Both   Bathroom Shower/Tub Walk-in shower   Home Equipment None   ADL Assistance Independent   Homemaking Assistance Independent   Homemaking Responsibilities Yes   Ambulation Assistance Independent   Transfer Assistance Independent   Active  Yes   Mode of Transportation Car   Occupation Retired   Type of Occupation    Discharge Planning   Type of Residence House   Living Arrangements Family Members   Current Services Prior To Admission None   Potential Assistance Needed N/A   Potential Assistance Purchasing Medications No   Patient expects to be discharged to: House   History of falls? 0   Services At/After Discharge   Services At/After Discharge Home Health   [place] Resource Information Provided? No   Mode of Transport at Discharge   (Family)   Confirm Follow Up Transport Self   Condition of Participation: Discharge Planning   The Patient and/or Patient Representative was provided with a Choice of Provider? Patient   The Patient and/Or Patient Representative agree with the Discharge Plan? Yes   Freedom of Choice list was

## 2024-04-29 NOTE — TELEPHONE ENCOUNTER
S/w pt and informed that the pharmacy owes him 60 more tablets due to RX being sent in on 4/25/2024 was for 90 days. Pt voiced that he will contact the pharmacy and give a call back if any further assistance is needed./Sloane

## 2024-04-29 NOTE — TELEPHONE ENCOUNTER
----- Message from Juana Pulido sent at 4/29/2024  9:19 AM CDT -----  Contact: self   Patient is requesting a call back. He is wanting to talk about some things going on with   Dr Campbell. Please call to advise

## 2024-05-09 ENCOUNTER — DOCUMENTATION ONLY (OUTPATIENT)
Dept: OPHTHALMOLOGY | Facility: CLINIC | Age: 64
End: 2024-05-09

## 2024-05-09 ENCOUNTER — OFFICE VISIT (OUTPATIENT)
Dept: OPHTHALMOLOGY | Facility: CLINIC | Age: 64
End: 2024-05-09
Payer: MEDICARE

## 2024-05-09 DIAGNOSIS — H40.033 ANATOMICAL NARROW ANGLE OF BOTH EYES: ICD-10-CM

## 2024-05-09 DIAGNOSIS — Z98.41 CATARACT EXTRACTION STATUS OF EYE, RIGHT: ICD-10-CM

## 2024-05-09 DIAGNOSIS — E78.2 MIXED HYPERLIPIDEMIA: ICD-10-CM

## 2024-05-09 DIAGNOSIS — H25.12 NUCLEAR SCLEROSIS OF LEFT EYE: ICD-10-CM

## 2024-05-09 DIAGNOSIS — Z98.890 POST-OPERATIVE STATE: Primary | ICD-10-CM

## 2024-05-09 PROCEDURE — 1159F MED LIST DOCD IN RCRD: CPT | Mod: CPTII,S$GLB,, | Performed by: STUDENT IN AN ORGANIZED HEALTH CARE EDUCATION/TRAINING PROGRAM

## 2024-05-09 PROCEDURE — 99999 PR PBB SHADOW E&M-EST. PATIENT-LVL III: CPT | Mod: PBBFAC,,, | Performed by: STUDENT IN AN ORGANIZED HEALTH CARE EDUCATION/TRAINING PROGRAM

## 2024-05-09 PROCEDURE — 1160F RVW MEDS BY RX/DR IN RCRD: CPT | Mod: CPTII,S$GLB,, | Performed by: STUDENT IN AN ORGANIZED HEALTH CARE EDUCATION/TRAINING PROGRAM

## 2024-05-09 PROCEDURE — 99024 POSTOP FOLLOW-UP VISIT: CPT | Mod: S$GLB,,, | Performed by: STUDENT IN AN ORGANIZED HEALTH CARE EDUCATION/TRAINING PROGRAM

## 2024-05-09 RX ORDER — ATORVASTATIN CALCIUM 40 MG/1
40 TABLET, FILM COATED ORAL NIGHTLY
Qty: 90 TABLET | Refills: 1 | Status: SHIPPED | OUTPATIENT
Start: 2024-05-09

## 2024-05-09 RX ORDER — PREDNISOLONE ACETATE 10 MG/ML
1 SUSPENSION/ DROPS OPHTHALMIC EVERY 4 HOURS
Qty: 5 ML | Refills: 0 | Status: SHIPPED | OUTPATIENT
Start: 2024-05-09 | End: 2025-05-09

## 2024-05-09 RX ORDER — PREDNISOLONE ACETATE 10 MG/ML
1 SUSPENSION/ DROPS OPHTHALMIC EVERY 4 HOURS
Qty: 5 ML | Refills: 1 | Status: SHIPPED | OUTPATIENT
Start: 2024-05-09 | End: 2025-05-09

## 2024-05-09 NOTE — PROGRESS NOTES
Short Stay Record    Diagnosis: Nuclear Sclerotic Cataract left    CC: Blurry Vision     HPI:  Lenny Latham Jr. is a 63 y.o. male who presents for evaluation prior to ophthalmic surgery. No current complaints.     Past Medical History:   Diagnosis Date    Achalasia of esophagus 11/28/2016    Anxiety state, unspecified     Cataract     Centrilobular emphysema 10/03/2022    Coronary artery disease     Esophageal reflux     Esophageal stricture     Hypertrophy of prostate without urinary obstruction and other lower urinary tract symptoms (LUTS)     Screening PSA (prostate specific antigen) 12/07/2012    0.4    Unspecified essential hypertension      Past Surgical History:   Procedure Laterality Date    BACK SURGERY      CATARACT EXTRACTION      CERVICAL EPIDURAL STEROID INJECTION      COLONOSCOPY      COLONOSCOPY N/A 08/17/2023    Procedure: COLONOSCOPY;  Surgeon: Windy León MD;  Location: [facility];  Service: Endoscopy;  Laterality: N/A;    ESOPHAGEAL MANOMETRY WITH MEASUREMENT OF IMPEDANCE N/A 05/23/2022    Procedure: MANOMETRY-ESOPHAGEAL-WITH IMPEDANCE;  Surgeon: Madina Hoover MD;  Location: [facility] ([address] [place] [address]);  Service: Endoscopy;  Laterality: N/A;  hold pain medication 24 hours prior to procedure    ESOPHAGOGASTRODUODENOSCOPY      ESOPHAGOGASTRODUODENOSCOPY N/A 08/16/2019    Procedure: EGD (ESOPHAGOGASTRODUODENOSCOPY);  Surgeon: Pawan Schwab MD;  Location: [facility];  Service: Endoscopy;  Laterality: N/A;    ESOPHAGOGASTRODUODENOSCOPY N/A 05/23/2022    Procedure: ESOPHAGOGASTRODUODENOSCOPY (EGD);  Surgeon: Madina Hoover MD;  Location: [address]);  Service: Endoscopy;  Laterality: N/A;  Endoflip/Endoscopically placed manometry probe  2nd floor-End stage achalasia  propofol only  full iquid diet x3 days, clear liquid diet x1 day prior to procedure  fully vaccinated, instructions sent to [facility] and [facility]    ESOPHAGOGASTRODUODENOSCOPY N/A 04/21/2023    Procedure:  ESOPHAGOGASTRODUODENOSCOPY (EGD);  Surgeon: Madina Hoover MD;  Location: [facility] ([address] [place] [address]);  Service: Endoscopy;  Laterality: N/A;  w/ Botox  2nd floor-End Stage Achalasia  full liquid diet x3 days, clear liquid diet x1 day prior to procedure  instructions sent to [facility] and emailed to [email or web address]    23 - Pt confirmed apt. Pt moved up to the 11am slot, to    ESOPHAGOGASTRODUODENOSCOPY N/A 2024    Procedure: EGD with BOTOX;  Surgeon: Nu Villanueva MD;  Location: [facility];  Service: Endoscopy;  Laterality: N/A;    HERNIA REPAIR      INGUINAL HERNIA REPAIR Right     LUMBAR EPIDURAL STEROID INJECTION      TOTAL KNEE ARTHROPLASTY Right 2023    UPPER GASTROINTESTINAL ENDOSCOPY  2016     Social History     Tobacco Use    Smoking status: Former     Types: Cigars     Start date:      Quit date:      Years since quittin.3    Smokeless tobacco: Never    Tobacco comments:     Cigar 1-2 times a week x about 4 years   Substance Use Topics    Alcohol use: Not Currently     Comment: once or twice a year 1-2 beers     Family History   Problem Relation Name Age of Onset    Lung cancer Mother      Hypertension Father      Prostate cancer Father      Ulcers Sister adonis     Ulcers Brother thuy     Prostate cancer Maternal Grandfather      Ulcers Sister marriel     Celiac disease Neg Hx      Colon cancer Neg Hx      Colon polyps Neg Hx      Crohn's disease Neg Hx      Esophageal cancer Neg Hx      Inflammatory bowel disease Neg Hx      Irritable bowel syndrome Neg Hx      Liver cancer Neg Hx      Liver disease Neg Hx      Rectal cancer Neg Hx      Stomach cancer Neg Hx       Review of patient's allergies indicates:   Allergen Reactions    Nsaids (non-steroidal anti-inflammatory drug) Nausea And Vomiting         Current Outpatient Medications:     ALPRAZolam (XANAX) 1 MG tablet, Take 1 tablet (1 mg total) by mouth nightly as needed for Anxiety., Disp: 30 tablet, Rfl:  3    atorvastatin (LIPITOR) 40 MG tablet, Take 1 tablet (40 mg total) by mouth every evening., Disp: 90 tablet, Rfl: 3    diazePAM (VALIUM) 5 MG tablet, Take 1 tablet (5 mg total) by mouth daily as needed for Anxiety., Disp: 30 tablet, Rfl: 2    HYDROcodone-acetaminophen (NORCO) 5-325 mg per tablet, Take 1 tablet by mouth 2 (two) times daily., Disp: , Rfl:     hydrOXYzine (ATARAX) 50 MG tablet, Take 1 tablet at bedtime as needed for sleep., Disp: 30 tablet, Rfl: 3    ketotifen (ZADITOR) 0.025 % (0.035 %) ophthalmic solution, Place 1 drop into both eyes 2 (two) times daily., Disp: 5 mL, Rfl: 1    omeprazole (PRILOSEC) 40 MG capsule, TAKE 1 CAPSULE(40 MG) BY MOUTH EVERY MORNING, Disp: 90 capsule, Rfl: 1    ondansetron (ZOFRAN-ODT) 4 MG TbDL, Take 1 tablet (4 mg total) by mouth every 6 (six) hours as needed (nausea and vomiting)., Disp: 30 tablet, Rfl: 5    paliperidone (INVEGA) 3 MG TR24, TAKE ONE TABLET BY MOUTH DAILY AT 5PM EVERY EVENING, Disp: 30 tablet, Rfl: 2    prednisoLONE acetate (PRED FORTE) 1 % DrpS, Place 1 drop into the right eye 4 (four) times daily., Disp: 5 mL, Rfl: 1    prednisoLONE acetate (PRED FORTE) 1 % DrpS, Place 1 drop into the left eye every 4 (four) hours., Disp: 5 mL, Rfl: 1    prednisoLONE acetate (PRED FORTE) 1 % DrpS, Place 1 drop into the left eye every 4 (four) hours., Disp: 5 mL, Rfl: 1    prednisoLONE acetate (PRED FORTE) 1 % DrpS, Place 1 drop into the right eye every 4 (four) hours., Disp: 5 mL, Rfl: 0    promethazine (PHENERGAN) 6.25 mg/5 mL syrup, Take 20 mLs (25 mg total) by mouth every 6 (six) hours as needed for Nausea., Disp: 473 mL, Rfl: 0    tamsulosin (FLOMAX) 0.4 mg Cap, TAKE 1 CAPSULE(0.4 MG) BY MOUTH EVERY DAY, Disp: 90 capsule, Rfl: 3    Review of Systems:  10 Pt ROS negative except as stated in HPI    Physical Exam:  General Appearance:    A&Ox3, no distress, appears stated age   Head:    Normocephalic, without obvious abnormality, atraumatic   Eyes:    PERRL, EOM's  intact   Back:     Symmetric, no curvature   Lungs:     respirations unlabored   Chest Wall:    No tenderness or deformity    Heart:  Abdomen:  Extremities:  Skin:    S1 and S2 present    Soft, non-tender    Extremities normal, atraumatic    Skin color, texture, turgor normal     Patient is stable for ophthalmic surgery under local and MAC.       _    \

## 2024-05-09 NOTE — TELEPHONE ENCOUNTER
No care due was identified.  [facility] Embedded Care Due Messages. Reference number: [id number].   5/09/2024 4:31:49 PM CDT

## 2024-05-09 NOTE — PROGRESS NOTES
HPI     Post-op Evaluation     Additional comments: S/p pciol OD 4/3/24  ONE MONTH PO OD  Pt stopped the pred last week but did take some past his stopping point.   Systane BID OD   Co grittiness OU  Pt does not want to do OS cat sx            Comments    1. Narrow Angle OU  2. PCIOL OD   NSC OS *retrobulbar and lid block OU    Pt has taken Pred a little past stopping point. Should have stopped 5/2/24            Last edited by Thomas Brice on 5/9/2024 11:25 AM.            Assessment /Plan     For exam results, see Encounter Report.    Post-operative state  Cataract extraction status of eye, right-   Assessment:  S/P CEIOL OD : Doing Well and completing healing process. Final visual correction options discussed and appropriate prescriptions and/or OTC reading glass recommendations.  Continue Pred QID OD    Nuclear sclerosis of left eye- Patient reports decreased vision in the fellow eye consistent with the clinical amount of lenticular opacity, which reaches the level of visual significance and affects activities of daily living including reading and glare. Risks, benefits, and alternatives to cataract surgery were discussed and pt desired to schedule cataract surgery. Pt was consented and the biometry and lens options were reviewed.      Phaco left eye,   Topical with Lid and Retrobulbar Block  Will aim for Monofocal - Distance IOL     Intraop Kenalog 40: yes  Post op gtts:   PF        Discussed that vision may be limited by:  Severe KEITH and Astigmatism >1D     Dilation: good  Alpha Blockers: none     SS record completed, IOL master reviewed, external referral completed.   Referral to [facility] for Ophthalmic surgery  Prescriptions sent for preoperative medications  Explained that patient may need glasses after surgery.     RTC for POD#1       Anatomical narrow angle of both eyes- IOP borderline. Angles narrow on gonioscopy but open with dynamic gonioscopy. Explained r/b/a to observation vs.  LPI vs. CEIOL. Patient elects for CEIOL.  - Plan for CEIOL OS

## 2024-05-28 ENCOUNTER — TELEPHONE (OUTPATIENT)
Dept: OPHTHALMOLOGY | Facility: CLINIC | Age: 64
End: 2024-05-28
Payer: MEDICAID

## 2024-05-28 RX ORDER — DIAZEPAM 5 MG/1
5 TABLET ORAL DAILY PRN
Qty: 30 TABLET | Refills: 0 | Status: SHIPPED | OUTPATIENT
Start: 2024-05-28 | End: 2024-05-31 | Stop reason: SDUPTHER

## 2024-05-31 ENCOUNTER — OFFICE VISIT (OUTPATIENT)
Dept: PSYCHIATRY | Facility: CLINIC | Age: 64
End: 2024-05-31
Payer: COMMERCIAL

## 2024-05-31 VITALS
BODY MASS INDEX: 25.65 KG/M2 | SYSTOLIC BLOOD PRESSURE: 103 MMHG | WEIGHT: 178.81 LBS | HEART RATE: 96 BPM | DIASTOLIC BLOOD PRESSURE: 72 MMHG

## 2024-05-31 DIAGNOSIS — F41.9 ANXIETY: ICD-10-CM

## 2024-05-31 DIAGNOSIS — F63.81 INTERMITTENT EXPLOSIVE DISORDER: Primary | ICD-10-CM

## 2024-05-31 PROCEDURE — 3078F DIAST BP <80 MM HG: CPT | Mod: CPTII,S$GLB,, | Performed by: PSYCHIATRY & NEUROLOGY

## 2024-05-31 PROCEDURE — 99214 OFFICE O/P EST MOD 30 MIN: CPT | Mod: S$GLB,,, | Performed by: PSYCHIATRY & NEUROLOGY

## 2024-05-31 PROCEDURE — 3074F SYST BP LT 130 MM HG: CPT | Mod: CPTII,S$GLB,, | Performed by: PSYCHIATRY & NEUROLOGY

## 2024-05-31 PROCEDURE — 99999 PR PBB SHADOW E&M-EST. PATIENT-LVL II: CPT | Mod: PBBFAC,,, | Performed by: PSYCHIATRY & NEUROLOGY

## 2024-05-31 PROCEDURE — 3008F BODY MASS INDEX DOCD: CPT | Mod: CPTII,S$GLB,, | Performed by: PSYCHIATRY & NEUROLOGY

## 2024-05-31 RX ORDER — HYDROXYZINE HYDROCHLORIDE 50 MG/1
TABLET, FILM COATED ORAL
Qty: 30 TABLET | Refills: 3 | Status: SHIPPED | OUTPATIENT
Start: 2024-05-31

## 2024-05-31 RX ORDER — PALIPERIDONE 3 MG/1
3 TABLET, EXTENDED RELEASE ORAL DAILY
Qty: 30 TABLET | Refills: 3 | Status: SHIPPED | OUTPATIENT
Start: 2024-05-31

## 2024-05-31 RX ORDER — ALPRAZOLAM 1 MG/1
1 TABLET ORAL NIGHTLY PRN
Qty: 30 TABLET | Refills: 3 | Status: SHIPPED | OUTPATIENT
Start: 2024-05-31 | End: 2024-09-28

## 2024-05-31 RX ORDER — DIAZEPAM 5 MG/1
5 TABLET ORAL DAILY PRN
Qty: 30 TABLET | Refills: 3 | Status: SHIPPED | OUTPATIENT
Start: 2024-05-31 | End: 2024-08-29
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
## 2024-06-11 DIAGNOSIS — K21.9 GASTROESOPHAGEAL REFLUX DISEASE WITHOUT ESOPHAGITIS: ICD-10-CM

## 2024-06-11 DIAGNOSIS — R11.0 CHRONIC NAUSEA: ICD-10-CM

## 2024-06-11 DIAGNOSIS — K22.0 ACHALASIA OF ESOPHAGUS: ICD-10-CM

## 2024-06-11 RX ORDER — OMEPRAZOLE 40 MG/1
CAPSULE, DELAYED RELEASE ORAL
Qty: 90 CAPSULE | Refills: 1 | Status: SHIPPED | OUTPATIENT
Start: 2024-06-11

## 2024-06-12 RX ORDER — ONDANSETRON 4 MG/1
TABLET, ORALLY DISINTEGRATING ORAL
Qty: 30 TABLET | Refills: 11 | Status: SHIPPED | OUTPATIENT
Start: 2024-06-12

## 2024-06-12 NOTE — TELEPHONE ENCOUNTER
Refill Decision Note   Lenny Latham  is requesting a refill authorization.  Brief Assessment and Rationale for Refill:  Approve     Medication Therapy Plan:         Comments:     Note composed:7:38 PM 06/11/2024

## 2024-06-12 NOTE — TELEPHONE ENCOUNTER
No care due was identified.  [facility] Embedded Care Due Messages. Reference number: [id number].   6/11/2024 7:33:58 PM CDT

## 2024-06-17 ENCOUNTER — TELEPHONE (OUTPATIENT)
Dept: OPHTHALMOLOGY | Facility: CLINIC | Age: 64
End: 2024-06-17
Payer: MEDICARE

## 2024-06-17 NOTE — TELEPHONE ENCOUNTER
Spoke with patient. Rescheduled cat sx to 7/31 will call patient with arrival time 1 to 2 days prior to procedure.

## 2024-06-20 ENCOUNTER — TELEPHONE (OUTPATIENT)
Dept: GASTROENTEROLOGY | Facility: CLINIC | Age: 64
End: 2024-06-20
Payer: MEDICARE

## 2024-06-20 NOTE — TELEPHONE ENCOUNTER
----- Message from Alexandria Mueller sent at 6/20/2024 11:56 AM CDT -----  Type:  Pharmacy Calling to Clarify an RX    Name of Caller:  Yoon    Pharmacy Name:  [facility]    Prescription Name:  ondansetron (ZOFRAN-ODT) 4 MG TbDL    What do they need to clarify?:  needing more info for PA    Best Call Back Number:  [phone]    Additional Information:    Please call back to advise. Thanks!

## 2024-06-21 ENCOUNTER — TELEPHONE (OUTPATIENT)
Dept: GASTROENTEROLOGY | Facility: CLINIC | Age: 64
End: 2024-06-21
Payer: MEDICARE

## 2024-06-21 NOTE — TELEPHONE ENCOUNTER
----- Message from Leeanna Crowder sent at 6/21/2024 11:40 AM CDT -----  Regarding: Prior Auth.  Type:  Pharmacy Calling to Clarify an RX    Name of Caller:Yoon([facility])  Pharmacy Name:  Prescription Name:ondansetron (ZOFRAN-ODT) 4 MG TbDL  What do they need to clarify?:Prior Authorization   Best Call Back Number:[phone] (Case# [id number]  Additional Information:

## 2024-06-27 ENCOUNTER — TELEPHONE (OUTPATIENT)
Dept: PSYCHIATRY | Facility: CLINIC | Age: 64
End: 2024-06-27
Payer: MEDICARE

## 2024-06-27 NOTE — TELEPHONE ENCOUNTER
Left pt a message to return call regarding his medication        ----- Message from Torie Richmond sent at 6/27/2024  8:49 AM CDT -----  Contact: self   .Type: Patient Call Back        Who called:   Patient      What is the request in detail:    Patient called in concerning anxeity medication refill . Please call back    .    [id number] - [place], [place] - [address] AT [facility] [address]  [address]  [place] [address]  Phone: [phone] Fax: [phone]        Can the clinic reply by [facility]?           Would the patient rather a call back or a response via [facility]?   call      Best call back number:  [phone]

## 2024-08-22 ENCOUNTER — HOSPITAL ENCOUNTER (EMERGENCY)
Facility: HOSPITAL | Age: 64
Discharge: HOME | End: 2024-08-22
Attending: EMERGENCY MEDICINE
Payer: MEDICARE

## 2024-08-22 VITALS
TEMPERATURE: 98 F | OXYGEN SATURATION: 100 % | DIASTOLIC BLOOD PRESSURE: 86 MMHG | RESPIRATION RATE: 18 BRPM | WEIGHT: 164.44 LBS | HEIGHT: 70 IN | SYSTOLIC BLOOD PRESSURE: 131 MMHG | HEART RATE: 94 BPM | BODY MASS INDEX: 23.54 KG/M2

## 2024-08-22 DIAGNOSIS — K21.9 GASTROESOPHAGEAL REFLUX DISEASE, UNSPECIFIED WHETHER ESOPHAGITIS PRESENT: Primary | ICD-10-CM

## 2024-08-22 DIAGNOSIS — R06.02 SOB (SHORTNESS OF BREATH): ICD-10-CM

## 2024-08-22 LAB
ALBUMIN SERPL BCP-MCNC: 3.8 G/DL (ref 3.5–5.2)
ALP SERPL-CCNC: 75 U/L (ref 55–135)
ALT SERPL W/O P-5'-P-CCNC: 11 U/L (ref 10–44)
ANION GAP SERPL CALC-SCNC: 13 MMOL/L (ref 8–16)
AST SERPL-CCNC: 18 U/L (ref 10–40)
BASOPHILS # BLD AUTO: 0.02 K/UL (ref 0–0.2)
BASOPHILS NFR BLD: 0.2 % (ref 0–1.9)
BILIRUB SERPL-MCNC: 0.9 MG/DL (ref 0.1–1)
BUN SERPL-MCNC: 10 MG/DL (ref 8–23)
CALCIUM SERPL-MCNC: 9.4 MG/DL (ref 8.7–10.5)
CHLORIDE SERPL-SCNC: 106 MMOL/L (ref 95–110)
CO2 SERPL-SCNC: 20 MMOL/L (ref 23–29)
CREAT SERPL-MCNC: 0.8 MG/DL (ref 0.5–1.4)
DIFFERENTIAL METHOD BLD: ABNORMAL
EOSINOPHIL # BLD AUTO: 0 K/UL (ref 0–0.5)
EOSINOPHIL NFR BLD: 0.1 % (ref 0–8)
ERYTHROCYTE [DISTWIDTH] IN BLOOD BY AUTOMATED COUNT: 13.3 % (ref 11.5–14.5)
EST. GFR  (NO RACE VARIABLE): >60 ML/MIN/1.73 M^2
GLUCOSE SERPL-MCNC: 93 MG/DL (ref 70–110)
HCT VFR BLD AUTO: 41.9 % (ref 40–54)
HCV AB SERPL QL IA: NEGATIVE
HEP C VIRUS HOLD SPECIMEN: NORMAL
HGB BLD-MCNC: 14 G/DL (ref 14–18)
HIV 1+2 AB+HIV1 P24 AG SERPL QL IA: NEGATIVE
IMM GRANULOCYTES # BLD AUTO: 0.02 K/UL (ref 0–0.04)
IMM GRANULOCYTES NFR BLD AUTO: 0.2 % (ref 0–0.5)
LIPASE SERPL-CCNC: 9 U/L (ref 4–60)
LYMPHOCYTES # BLD AUTO: 1.1 K/UL (ref 1–4.8)
LYMPHOCYTES NFR BLD: 11.9 % (ref 18–48)
MCH RBC QN AUTO: 30.2 PG (ref 27–31)
MCHC RBC AUTO-ENTMCNC: 33.4 G/DL (ref 32–36)
MCV RBC AUTO: 91 FL (ref 82–98)
MONOCYTES # BLD AUTO: 0.4 K/UL (ref 0.3–1)
MONOCYTES NFR BLD: 4.8 % (ref 4–15)
NEUTROPHILS # BLD AUTO: 7.7 K/UL (ref 1.8–7.7)
NEUTROPHILS NFR BLD: 82.8 % (ref 38–73)
NRBC BLD-RTO: 0 /100 WBC
PLATELET # BLD AUTO: 242 K/UL (ref 150–450)
PMV BLD AUTO: 9.1 FL (ref 9.2–12.9)
POTASSIUM SERPL-SCNC: 4.2 MMOL/L (ref 3.5–5.1)
PROT SERPL-MCNC: 7.3 G/DL (ref 6–8.4)
RBC # BLD AUTO: 4.63 M/UL (ref 4.6–6.2)
SODIUM SERPL-SCNC: 139 MMOL/L (ref 136–145)
WBC # BLD AUTO: 9.25 K/UL (ref 3.9–12.7)

## 2024-08-22 PROCEDURE — 93010 ELECTROCARDIOGRAM REPORT: CPT | Mod: ,,, | Performed by: INTERNAL MEDICINE

## 2024-08-22 PROCEDURE — 99284 EMERGENCY DEPT VISIT MOD MDM: CPT | Mod: 25

## 2024-08-22 PROCEDURE — 80053 COMPREHEN METABOLIC PANEL: CPT | Performed by: EMERGENCY MEDICINE

## 2024-08-22 PROCEDURE — 83690 ASSAY OF LIPASE: CPT | Performed by: EMERGENCY MEDICINE

## 2024-08-22 PROCEDURE — 63600175 PHARM REV CODE 636 W HCPCS: Performed by: EMERGENCY MEDICINE

## 2024-08-22 PROCEDURE — 96374 THER/PROPH/DIAG INJ IV PUSH: CPT

## 2024-08-22 PROCEDURE — 87389 HIV-1 AG W/HIV-1&-2 AB AG IA: CPT | Performed by: EMERGENCY MEDICINE

## 2024-08-22 PROCEDURE — 25000003 PHARM REV CODE 250: Performed by: EMERGENCY MEDICINE

## 2024-08-22 PROCEDURE — 96375 TX/PRO/DX INJ NEW DRUG ADDON: CPT

## 2024-08-22 PROCEDURE — 93005 ELECTROCARDIOGRAM TRACING: CPT

## 2024-08-22 PROCEDURE — 86803 HEPATITIS C AB TEST: CPT | Performed by: EMERGENCY MEDICINE

## 2024-08-22 PROCEDURE — 85025 COMPLETE CBC W/AUTO DIFF WBC: CPT | Performed by: EMERGENCY MEDICINE

## 2024-08-22 RX ORDER — FAMOTIDINE 10 MG/ML
20 INJECTION INTRAVENOUS
Status: COMPLETED | OUTPATIENT
Start: 2024-08-22 | End: 2024-08-22

## 2024-08-22 RX ORDER — SUCRALFATE 1 G/1
1 TABLET ORAL 4 TIMES DAILY
Qty: 120 TABLET | Refills: 0 | Status: SHIPPED | OUTPATIENT
Start: 2024-08-22 | End: 2024-08-22

## 2024-08-22 RX ORDER — SUCRALFATE 1 G/1
1 TABLET ORAL 4 TIMES DAILY
Qty: 120 TABLET | Refills: 0 | Status: SHIPPED | OUTPATIENT
Start: 2024-08-22

## 2024-08-22 RX ORDER — ALUMINUM HYDROXIDE, MAGNESIUM HYDROXIDE, AND SIMETHICONE 1200; 120; 1200 MG/30ML; MG/30ML; MG/30ML
15 SUSPENSION ORAL
Status: COMPLETED | OUTPATIENT
Start: 2024-08-22 | End: 2024-08-22

## 2024-08-22 RX ORDER — ONDANSETRON HYDROCHLORIDE 2 MG/ML
4 INJECTION, SOLUTION INTRAVENOUS
Status: COMPLETED | OUTPATIENT
Start: 2024-08-22 | End: 2024-08-22

## 2024-08-22 RX ADMIN — FAMOTIDINE 20 MG: 10 INJECTION, SOLUTION INTRAVENOUS at 10:08

## 2024-08-22 RX ADMIN — ONDANSETRON 4 MG: 2 INJECTION INTRAMUSCULAR; INTRAVENOUS at 10:08

## 2024-08-22 RX ADMIN — ALUMINUM HYDROXIDE, MAGNESIUM HYDROXIDE, AND SIMETHICONE 15 ML: 1200; 120; 1200 SUSPENSION ORAL at 10:08

## 2024-08-22 NOTE — ED PROVIDER NOTES
SCRIBE #1 NOTE: I, Tay Cabrera, am scribing for, and in the presence of, Magan Stein Jr., MD. I have scribed the entire note.       History     Chief Complaint   Patient presents with    Abdominal Pain     Pt c/o acid reflux since last night causing abdominal pain, vomiting, dizziness, chills, SOB, and weakness for the past 4 days and has gotten progressively worse. PMHx of achalasia of esophagus.      Review of patient's allergies indicates:   Allergen Reactions    Nsaids (non-steroidal anti-inflammatory drug) Nausea And Vomiting         History of Present Illness     HPI    8/22/2024, 9:50 AM  History obtained from the patient      History of Present Illness: Lenny Latham Jr. is a 64 y.o. male patient with a PMHx of HTN, esophageal reflux, hypertrophy of prostate, anxiety, achalasia of esophagus, CAD, centrilobular emphysema, and cataract who presents to the Emergency Department for evaluation of abdominal pain which onset gradually the past four days. Pt had a Hx of achalasia of the esophagus. Symptoms are constant and moderate in severity. No mitigating or exacerbating factors reported.  Patient was notes he recently had Botox to his GE junction.  He notes symptoms began shortly after.  Patient was has a history of a Nissen fundoplication.  Associated sxs include difficulty swallowing, acid reflux, vomiting, dizziness, chills, SOB, and weakness. Pt states the sxs are getting progressively worse. Patient denies any fever, cough, CP, HA, and all other sxs at this time. No prior Tx reported. No further complaints or concerns at this time.       Arrival mode: Personal vehicle      PCP: Herminia Longoria MD        Past Medical History:  Past Medical History:   Diagnosis Date    Achalasia of esophagus 11/28/2016    Anxiety state, unspecified     Arthritis     Cataract     Centrilobular emphysema 10/03/2022    Coronary artery disease     Esophageal reflux     Esophageal stricture     Hypertrophy of prostate  without urinary obstruction and other lower urinary tract symptoms (LUTS)     Screening PSA (prostate specific antigen) 12/07/2012    0.4    Unspecified essential hypertension        Past Surgical History:  Past Surgical History:   Procedure Laterality Date    BACK SURGERY      CATARACT EXTRACTION      CERVICAL EPIDURAL STEROID INJECTION      COLONOSCOPY      COLONOSCOPY N/A 08/17/2023    Procedure: COLONOSCOPY;  Surgeon: Windy León MD;  Location: [facility];  Service: Endoscopy;  Laterality: N/A;    ESOPHAGEAL MANOMETRY WITH MEASUREMENT OF IMPEDANCE N/A 05/23/2022    Procedure: MANOMETRY-ESOPHAGEAL-WITH IMPEDANCE;  Surgeon: Madina Hoover MD;  Location: [facility] ([facility]);  Service: Endoscopy;  Laterality: N/A;  hold pain medication 24 hours prior to procedure    ESOPHAGOGASTRODUODENOSCOPY      ESOPHAGOGASTRODUODENOSCOPY N/A 08/16/2019    Procedure: EGD (ESOPHAGOGASTRODUODENOSCOPY);  Surgeon: Pawan Schwab MD;  Location: [facility];  Service: Endoscopy;  Laterality: N/A;    ESOPHAGOGASTRODUODENOSCOPY N/A 05/23/2022    Procedure: ESOPHAGOGASTRODUODENOSCOPY (EGD);  Surgeon: Madina Hoover MD;  [facility]  Service: Endoscopy;  Laterality: N/A;  Endoflip/Endoscopically placed manometry probe  2nd floor-End stage achalasia  propofol only  full iquid diet x3 days, clear liquid diet x1 day prior to procedure  fully vaccinated, instructions sent to [facility] and mailed-[facility]    ESOPHAGOGASTRODUODENOSCOPY N/A 04/21/2023    Procedure: ESOPHAGOGASTRODUODENOSCOPY (EGD);  Surgeon: Madina Hoover MD;  Location: [facility] ([facility]);  Service: Endoscopy;  Laterality: N/A;  w/ Botox  2nd floor-End Stage Achalasia  full liquid diet x3 days, clear liquid diet x1 day prior to procedure  instructions sent to [facility] and emailed to [email or web address]    4/17/23 - Pt confirmed apt. Pt moved up to the 11am slot, to    ESOPHAGOGASTRODUODENOSCOPY N/A 01/11/2024    Procedure: EGD with BOTOX;   Surgeon: Nu Villanueva MD;  Location: [facility];  Service: Endoscopy;  Laterality: N/A;    HERNIA REPAIR      INGUINAL HERNIA REPAIR Right     LUMBAR EPIDURAL STEROID INJECTION      TOTAL KNEE ARTHROPLASTY Right 2023    UPPER GASTROINTESTINAL ENDOSCOPY  2016         Family History:  Family History   Problem Relation Name Age of Onset    Lung cancer Mother      Hypertension Father      Prostate cancer Father      Ulcers Sister adonis     Ulcers Brother thuy     Prostate cancer Maternal Grandfather      Ulcers Sister liliana     Celiac disease Neg Hx      Colon cancer Neg Hx      Colon polyps Neg Hx      Crohn's disease Neg Hx      Esophageal cancer Neg Hx      Inflammatory bowel disease Neg Hx      Irritable bowel syndrome Neg Hx      Liver cancer Neg Hx      Liver disease Neg Hx      Rectal cancer Neg Hx      Stomach cancer Neg Hx         Social History:  Social History     Tobacco Use    Smoking status: Former     Types: Cigars     Start date:      Quit date:      Years since quittin.6    Smokeless tobacco: Never    Tobacco comments:     Cigar 1-2 times a week x about 4 years   Substance and Sexual Activity    Alcohol use: Not Currently     Comment: once or twice a year 1-2 beers    Drug use: Yes     Types: Marijuana    Sexual activity: Yes     Partners: Female        Review of Systems     Review of Systems   Constitutional:  Positive for chills. Negative for fever.   HENT:  Positive for trouble swallowing. Negative for sore throat.    Respiratory:  Positive for shortness of breath. Negative for cough.    Cardiovascular:  Negative for chest pain.   Gastrointestinal:  Positive for abdominal pain and vomiting. Negative for nausea.   Genitourinary:  Negative for dysuria.   Musculoskeletal:  Negative for back pain.   Skin:  Negative for rash.   Neurological:  Positive for dizziness and weakness. Negative for light-headedness and headaches.   Hematological:  Does not bruise/bleed easily.  "  All other systems reviewed and are negative.     Physical Exam     Initial Vitals [08/22/24 0919]   BP Pulse Resp Temp SpO2   118/78 98 18 98 °F (36.7 °C) 100 %      MAP       --          Physical Exam  Nursing Notes and Vital Signs Reviewed.  Constitutional: Patient is in no acute distress. Well-developed and well-nourished.  Head: Atraumatic. Normocephalic.  Eyes:  EOM intact.  No scleral icterus.  ENT: Mucous membranes are moist.  Nares clear   Neck:  Full ROM. No JVD.  Cardiovascular: Regular rate. Regular rhythm No murmurs, rubs, or gallops. Distal pulses are 2+ and symmetric  Pulmonary/Chest: No respiratory distress. Clear to auscultation bilaterally. No wheezing or rales.  Equal chest wall rise bilaterally  Abdominal: Soft and non-distended.  There is no tenderness.  No rebound, guarding, or rigidity. Good bowel sounds.  Genitourinary: No CVA tenderness.  No suprapubic tenderness  Musculoskeletal: Moves all extremities. No obvious deformities.  5 x 5 strength in all extremities   Skin: Warm and dry.  Neurological:  Alert, awake, and appropriate.  Normal speech.  No acute focal neurological deficits are appreciated.  Two through 12 intact bilaterally.  Psychiatric: Normal affect. Good eye contact. Appropriate in content.       ED Course   Procedures  ED Vital Signs:  Vitals:    08/22/24 0919 08/22/24 1115   BP: 118/78 131/86   Pulse: 98 94   Resp: 18    Temp: 98 °F (36.7 °C)    TempSrc: Oral    SpO2: 100% 100%   Weight: 74.6 kg (164 lb 7.4 oz)    Height: 5' 10" (1.778 m)        Abnormal Lab Results:  Labs Reviewed   CBC W/ AUTO DIFFERENTIAL - Abnormal       Result Value    WBC 9.25      RBC 4.63      Hemoglobin 14.0      Hematocrit 41.9      MCV 91      MCH 30.2      MCHC 33.4      RDW 13.3      Platelets 242      MPV 9.1 (*)     Immature Granulocytes 0.2      Gran # (ANC) 7.7      Immature Grans (Abs) 0.02      Lymph # 1.1      Mono # 0.4      Eos # 0.0      Baso # 0.02      nRBC 0      Gran % 82.8 (*)     " Lymph % 11.9 (*)     Mono % 4.8      Eosinophil % 0.1      Basophil % 0.2      Differential Method Automated      Narrative:     Release to patient->Immediate   COMPREHENSIVE METABOLIC PANEL - Abnormal    Sodium 139      Potassium 4.2      Chloride 106      CO2 20 (*)     Glucose 93      BUN 10      Creatinine 0.8      Calcium 9.4      Total Protein 7.3      Albumin 3.8      Total Bilirubin 0.9      Alkaline Phosphatase 75      AST 18      ALT 11      eGFR >60      Anion Gap 13      Narrative:     Release to patient->Immediate   HIV 1 / 2 ANTIBODY    HIV 1/2 Ag/Ab Negative      Narrative:     Release to patient->Immediate   HEPATITIS C ANTIBODY    Hepatitis C Ab Negative      Narrative:     Release to patient->Immediate   HEP C VIRUS HOLD SPECIMEN    HEP C Virus Hold Specimen Hold for HCV sendout      Narrative:     Release to patient->Immediate   LIPASE    Lipase 9      Narrative:     Release to patient->Immediate        All Lab Results:  Results for orders placed or performed during the hospital encounter of 08/22/24   HIV 1/2 Ag/Ab (4th Gen)   Result Value Ref Range    HIV 1/2 Ag/Ab Negative Negative   Hepatitis C Antibody   Result Value Ref Range    Hepatitis C Ab Negative Negative   HCV Virus Hold Specimen   Result Value Ref Range    HEP C Virus Hold Specimen Hold for HCV sendout    CBC auto differential   Result Value Ref Range    WBC 9.25 3.90 - 12.70 K/uL    RBC 4.63 4.60 - 6.20 M/uL    Hemoglobin 14.0 14.0 - 18.0 g/dL    Hematocrit 41.9 40.0 - 54.0 %    MCV 91 82 - 98 fL    MCH 30.2 27.0 - 31.0 pg    MCHC 33.4 32.0 - 36.0 g/dL    RDW 13.3 11.5 - 14.5 %    Platelets 242 150 - 450 K/uL    MPV 9.1 (L) 9.2 - 12.9 fL    Immature Granulocytes 0.2 0.0 - 0.5 %    Gran # (ANC) 7.7 1.8 - 7.7 K/uL    Immature Grans (Abs) 0.02 0.00 - 0.04 K/uL    Lymph # 1.1 1.0 - 4.8 K/uL    Mono # 0.4 0.3 - 1.0 K/uL    Eos # 0.0 0.0 - 0.5 K/uL    Baso # 0.02 0.00 - 0.20 K/uL    nRBC 0 0 /100 WBC    Gran % 82.8 (H) 38.0 - 73.0 %     Lymph % 11.9 (L) 18.0 - 48.0 %    Mono % 4.8 4.0 - 15.0 %    Eosinophil % 0.1 0.0 - 8.0 %    Basophil % 0.2 0.0 - 1.9 %    Differential Method Automated    Comprehensive metabolic panel   Result Value Ref Range    Sodium 139 136 - 145 mmol/L    Potassium 4.2 3.5 - 5.1 mmol/L    Chloride 106 95 - 110 mmol/L    CO2 20 (L) 23 - 29 mmol/L    Glucose 93 70 - 110 mg/dL    BUN 10 8 - 23 mg/dL    Creatinine 0.8 0.5 - 1.4 mg/dL    Calcium 9.4 8.7 - 10.5 mg/dL    Total Protein 7.3 6.0 - 8.4 g/dL    Albumin 3.8 3.5 - 5.2 g/dL    Total Bilirubin 0.9 0.1 - 1.0 mg/dL    Alkaline Phosphatase 75 55 - 135 U/L    AST 18 10 - 40 U/L    ALT 11 10 - 44 U/L    eGFR >60 >60 mL/min/1.73 m^2    Anion Gap 13 8 - 16 mmol/L   Lipase   Result Value Ref Range    Lipase 9 4 - 60 U/L   EKG 12-lead   Result Value Ref Range    QRS Duration 72 ms    OHS QTC Calculation 452 ms         Imaging Results:  Imaging Results    None          The EKG was ordered, reviewed, and independently interpreted by the ED provider.  Interpretation time: 9:21  Rate: 96 BPM  Rhythm: normal sinus rhythm  Interpretation: No acute ST changes. No STEMI.           The Emergency Provider reviewed the vital signs and test results, which are outlined above.     ED Discussion     11:16 AM: Reassessed pt at this time. Discussed with pt all pertinent ED information and results. Discussed pt dx and plan of tx. Gave pt all f/u and return to the ED instructions. All questions and concerns were addressed at this time. Pt expresses understanding of information and instructions, and is comfortable with plan to discharge. Pt is stable for discharge.    I discussed with patient and/or family/caretaker that evaluation in the ED does not suggest any emergent or life threatening medical conditions requiring immediate intervention beyond what was provided in the ED, and I believe patient is safe for discharge.  Regardless, an unremarkable evaluation in the ED does not preclude the development  or presence of a serious of life threatening condition. As such, patient was instructed to return immediately for any worsening or change in current symptoms.           Medical Decision Making  Differential diagnosis: Chest pain, GERD, acid reflux, medication side effect, pancreatitis    Patient was evaluated history and physical examination.  Patient was complaining of water brash with epigastric pain worse with lying down consistent with a past reflux.  Recently had relaxation of the GE junction based upon injections Botox.  Patient was symptoms resolved with GI meds in his workup is negative.  Clinically the patient was having acid reflux secondary to his recent procedure.  He was stable safe for discharge.  I will add Carafate recommended Pepcid as needed.  Patient was verbalized understanding agreement seems    Amount and/or Complexity of Data Reviewed  Labs: ordered. Decision-making details documented in ED Course.     Details: Hep C HIV and lipase are normal.  CMP is normal.  CBC is normal.  ECG/medicine tests: ordered and independent interpretation performed. Decision-making details documented in ED Course.     Details: No STEMI    Risk  OTC drugs.  Prescription drug management.  Decision regarding hospitalization.                ED Medication(s):  Medications   famotidine (PF) injection 20 mg (20 mg Intravenous Given 8/22/24 1005)   ondansetron injection 4 mg (4 mg Intravenous Given 8/22/24 1005)   aluminum-magnesium hydroxide-simethicone 200-200-20 mg/5 mL suspension 15 mL (15 mLs Oral Given 8/22/24 1005)       Discharge Medication List as of 8/22/2024 11:16 AM        START taking these medications    Details   sucralfate (CARAFATE) 1 gram tablet Take 1 tablet (1 g total) by mouth 4 (four) times daily., Starting Thu 8/22/2024, Normal              Follow-up Information       Herminia Longoria MD.    Specialty: Family Medicine  Contact information:  [id number] [facility] [place] [address] [phone]                                  Scribe Attestation:   Scribe #1: I performed the above scribed service and the documentation accurately describes the services I performed. I attest to the accuracy of the note.     Attending:   Physician Attestation Statement for Scribe #1: I, Magan Stein Jr., MD, personally performed the services described in this documentation, as scribed by Tay Cabrera, in my presence, and it is both accurate and complete.           Clinical Impression       ICD-10-CM ICD-9-CM   1. Gastroesophageal reflux disease, unspecified whether esophagitis present  K21.9 530.81   2. SOB (shortness of breath)  R06.02 786.05       Disposition:   Disposition: Discharged  Condition: Stable         Magan Stein Jr., MD  08/22/24 1251

## 2024-08-22 NOTE — DISCHARGE INSTRUCTIONS
Continue all your home medications.  Add Carafate as prescribed.  Use Pepcid for any breakthrough symptoms.  Follow up with her doctor in 1-2 days for re-evaluation return as needed for any worsening symptoms, problems, questions or concerns

## 2024-08-23 DIAGNOSIS — R11.0 CHRONIC NAUSEA: ICD-10-CM

## 2024-08-23 RX ORDER — PROMETHAZINE HYDROCHLORIDE 6.25 MG/5ML
25 SYRUP ORAL EVERY 6 HOURS PRN
Qty: 473 ML | Refills: 0 | Status: SHIPPED | OUTPATIENT
Start: 2024-08-23

## 2024-08-23 NOTE — PROGRESS NOTES
Reviewed ER notes showing no dehydration from chronic nausea.   Phenergan will be prescribed today, as requested but patient to discuss further with GI for further evaluation secondary to chronic nausea and future refills needs to be requested through GI

## 2024-08-25 LAB
OHS QRS DURATION: 72 MS
OHS QTC CALCULATION: 452 MS

## 2024-08-26 ENCOUNTER — OFFICE VISIT (OUTPATIENT)
Dept: GASTROENTEROLOGY | Facility: CLINIC | Age: 64
End: 2024-08-26
Payer: MEDICARE

## 2024-08-26 VITALS
WEIGHT: 161.63 LBS | DIASTOLIC BLOOD PRESSURE: 74 MMHG | HEART RATE: 118 BPM | BODY MASS INDEX: 23.19 KG/M2 | SYSTOLIC BLOOD PRESSURE: 116 MMHG

## 2024-08-26 DIAGNOSIS — K22.0 ACHALASIA OF ESOPHAGUS: Primary | ICD-10-CM

## 2024-08-26 DIAGNOSIS — K31.84 GASTROPARESIS: ICD-10-CM

## 2024-08-26 PROCEDURE — 99999 PR PBB SHADOW E&M-EST. PATIENT-LVL IV: CPT | Mod: PBBFAC,,, | Performed by: NURSE PRACTITIONER

## 2024-08-26 PROCEDURE — 1160F RVW MEDS BY RX/DR IN RCRD: CPT | Mod: CPTII,S$GLB,, | Performed by: NURSE PRACTITIONER

## 2024-08-26 PROCEDURE — 3008F BODY MASS INDEX DOCD: CPT | Mod: CPTII,S$GLB,, | Performed by: NURSE PRACTITIONER

## 2024-08-26 PROCEDURE — 1159F MED LIST DOCD IN RCRD: CPT | Mod: CPTII,S$GLB,, | Performed by: NURSE PRACTITIONER

## 2024-08-26 PROCEDURE — 3074F SYST BP LT 130 MM HG: CPT | Mod: CPTII,S$GLB,, | Performed by: NURSE PRACTITIONER

## 2024-08-26 PROCEDURE — 3078F DIAST BP <80 MM HG: CPT | Mod: CPTII,S$GLB,, | Performed by: NURSE PRACTITIONER

## 2024-08-26 PROCEDURE — 99214 OFFICE O/P EST MOD 30 MIN: CPT | Mod: S$GLB,,, | Performed by: NURSE PRACTITIONER

## 2024-08-26 RX ORDER — METOCLOPRAMIDE 5 MG/1
5 TABLET ORAL
Qty: 56 TABLET | Refills: 0 | Status: SHIPPED | OUTPATIENT
Start: 2024-08-26 | End: 2024-09-09

## 2024-08-26 NOTE — PATIENT INSTRUCTIONS
"- Eat 4 to 5 small meals during the day instead of 2 or 3 big ones.  - Put food through the  before eating it.  - Cut down on foods that have a lot of fat, such as cheese and fried foods.  - Cut down on foods that have a lot of "insoluble" fiber, such as some fruits, vegetables, and beans.  - Avoid fizzy drinks, like soda, as they can cause more bloating and gas  - Avoid alcohol and smoking  If you have diabetes, it's also very important to keep your blood sugar as close to normal as possible.    "

## 2024-08-26 NOTE — H&P (VIEW-ONLY)
Clinic Follow Up:  [facility] Follow Up Note    Reason for Follow Up:  The primary encounter diagnosis was Achalasia of esophagus. A diagnosis of Gastroparesis was also pertinent to this visit.    PCP: Herminia Longoria       HPI:  This is a 64 y.o. male here for follow up of the above.   Has issues with both achalasia and gastroparesis. Is on wait list to be seen in [place]. He missed his scheduled appointment in February.   Continues with both dysphagia as well as nausea and vomiting. He says that promethazine syrup is the only effective treatment for his nausea and vomiting. We had discussed risks benefits of promethazine and more specifically promethazine syrup is not most appropriate long term use. Can use suppositories if unable to tolerate PO. Has not tried Reglan. Does feel that drinking some liquids before eating helps his food to go down.  He had an EGD 01/2024 with esophageal dilation and Botox injection.  He did have retained gastric contents.  Was recommended to retreat in 3 months.    Review of Systems   Constitutional:  Negative for activity change and appetite change.        As per interval history above   Respiratory:  Negative for cough and shortness of breath.    Cardiovascular:  Negative for chest pain.   Gastrointestinal:  Positive for nausea and vomiting. Negative for abdominal pain, blood in stool, constipation and diarrhea.        Dysphagia    Skin:  Negative for color change and rash.       Medical History:  Past Medical History:   Diagnosis Date    Achalasia of esophagus 11/28/2016    Anxiety state, unspecified     Arthritis     Cataract     Centrilobular emphysema 10/03/2022    Coronary artery disease     Esophageal reflux     Esophageal stricture     Hypertrophy of prostate without urinary obstruction and other lower urinary tract symptoms (LUTS)     Screening PSA (prostate specific antigen) 12/07/2012    0.4    Unspecified essential hypertension        Surgical  History:   Past Surgical History:   Procedure Laterality Date    BACK SURGERY      CATARACT EXTRACTION      CERVICAL EPIDURAL STEROID INJECTION      COLONOSCOPY      COLONOSCOPY N/A 08/17/2023    Procedure: COLONOSCOPY;  Surgeon: Windy León MD;  Location: [facility];  Service: Endoscopy;  Laterality: N/A;    ESOPHAGEAL MANOMETRY WITH MEASUREMENT OF IMPEDANCE N/A 05/23/2022    Procedure: MANOMETRY-ESOPHAGEAL-WITH IMPEDANCE;  Surgeon: Madina Hoover MD;  Location: [facility] ([facility]);  Service: Endoscopy;  Laterality: N/A;  hold pain medication 24 hours prior to procedure    ESOPHAGOGASTRODUODENOSCOPY      ESOPHAGOGASTRODUODENOSCOPY N/A 08/16/2019    Procedure: EGD (ESOPHAGOGASTRODUODENOSCOPY);  Surgeon: Pawan Schwab MD;  Location: [facility];  Service: Endoscopy;  Laterality: N/A;    ESOPHAGOGASTRODUODENOSCOPY N/A 05/23/2022    Procedure: ESOPHAGOGASTRODUODENOSCOPY (EGD);  Surgeon: Madina Hoover MD;  [facility]  Service: Endoscopy;  Laterality: N/A;  Endoflip/Endoscopically placed manometry probe  2nd floor-End stage achalasia  propofol only  full iquid diet x3 days, clear liquid diet x1 day prior to procedure  fully vaccinated, [facility] and [facility] [place]    ESOPHAGOGASTRODUODENOSCOPY N/A 04/21/2023    Procedure: ESOPHAGOGASTRODUODENOSCOPY (EGD);  Surgeon: Madina Hoover MD;  [facility]  Service: Endoscopy;  Laterality: N/A;  w/ Botox  2nd floor-End Stage Achalasia  full liquid diet x3 days, clear liquid diet x1 day prior to procedure  instructions sent to [facility] and emailed to [email or web address]""    4/17/23 - Pt confirmed apt. Pt moved up to the 11am slot, to    ESOPHAGOGASTRODUODENOSCOPY N/A 01/11/2024    Procedure: EGD with BOTOX;  Surgeon: Nu Villanueva MD;  Location: [facility];  Service: Endoscopy;  Laterality: N/A;    HERNIA REPAIR      INGUINAL HERNIA REPAIR Right     LUMBAR EPIDURAL STEROID INJECTION      TOTAL  KNEE ARTHROPLASTY Right 2023    UPPER GASTROINTESTINAL ENDOSCOPY  2016       Family History:   Family History   Problem Relation Name Age of Onset    Lung cancer Mother      Hypertension Father      Prostate cancer Father      Ulcers Sister adonis     Ulcers Brother thuy     Prostate cancer Maternal Grandfather      Ulcers Sister liliana     Celiac disease Neg Hx      Colon cancer Neg Hx      Colon polyps Neg Hx      Crohn's disease Neg Hx      Esophageal cancer Neg Hx      Inflammatory bowel disease Neg Hx      Irritable bowel syndrome Neg Hx      Liver cancer Neg Hx      Liver disease Neg Hx      Rectal cancer Neg Hx      Stomach cancer Neg Hx         Social History:   Social History     Tobacco Use    Smoking status: Former     Types: Cigars     Start date:      Quit date:      Years since quittin.6    Smokeless tobacco: Never    Tobacco comments:     Cigar 1-2 times a week x about 4 years   Substance Use Topics    Alcohol use: Not Currently     Comment: once or twice a year 1-2 beers    Drug use: Yes     Types: Marijuana       Allergies:   Review of patient's allergies indicates:   Allergen Reactions    Nsaids (non-steroidal anti-inflammatory drug) Nausea And Vomiting       Home Medications:  Current Outpatient Medications on File Prior to Visit   Medication Sig Dispense Refill    ALPRAZolam (XANAX) 1 MG tablet Take 1 tablet (1 mg total) by mouth nightly as needed for Anxiety. 30 tablet 3    atorvastatin (LIPITOR) 40 MG tablet TAKE 1 TABLET(40 MG) BY MOUTH EVERY EVENING 90 tablet 1    diazePAM (VALIUM) 5 MG tablet Take 1 tablet (5 mg total) by mouth daily as needed for Anxiety. 30 tablet 3    hydrOXYzine (ATARAX) 50 MG tablet Take 1 tablet at bedtime as needed for sleep. 30 tablet 3    ketotifen (ZADITOR) 0.025 % (0.035 %) ophthalmic solution Place 1 drop into both eyes 2 (two) times daily. 5 mL 1    omeprazole (PRILOSEC) 40 MG capsule TAKE ONE CAPSULE (40MG) BY MOUTH DAILY AT 9AM 90  capsule 1    paliperidone (INVEGA) 3 MG TR24 Take 1 tablet (3 mg total) by mouth once daily. 30 tablet 3    prednisoLONE acetate (PRED FORTE) 1 % DrpS Place 1 drop into the left eye every 4 (four) hours. 5 mL 1    prednisoLONE acetate (PRED FORTE) 1 % DrpS Place 1 drop into the left eye every 4 (four) hours. 5 mL 1    prednisoLONE acetate (PRED FORTE) 1 % DrpS Place 1 drop into the right eye every 4 (four) hours. 5 mL 0    tamsulosin (FLOMAX) 0.4 mg Cap TAKE 1 CAPSULE(0.4 MG) BY MOUTH EVERY DAY 90 capsule 3    prednisoLONE acetate (PRED FORTE) 1 % DrpS Place 1 drop into the right eye 4 (four) times daily. 5 mL 1    promethazine (PHENERGAN) 6.25 mg/5 mL syrup Take 20 mLs (25 mg total) by mouth every 6 (six) hours as needed for Nausea. (Patient not taking: Reported on 8/26/2024) 473 mL 0    sucralfate (CARAFATE) 1 gram tablet Take 1 tablet (1 g total) by mouth 4 (four) times daily. (Patient not taking: Reported on 8/26/2024) 120 tablet 0     No current facility-administered medications on file prior to visit.       /74 (BP Location: Right arm, Patient Position: Sitting, BP Method: Medium (Automatic))   Pulse (!) 118   Wt 73.3 kg (161 lb 9.6 oz)   BMI 23.19 kg/m²   Body mass index is 23.19 kg/m².  Physical Exam  Constitutional:       General: He is not in acute distress.  HENT:      Head: Normocephalic.   Neurological:      General: No focal deficit present.      Mental Status: He is alert.   Psychiatric:         Mood and Affect: Mood normal.         Judgment: Judgment normal.         Labs: Pertinent labs reviewed.  CRC Screening: up to date     Assessment:   1. Achalasia of esophagus    2. Gastroparesis        Recommendations:   - trial of Reglan-- we did discuss risks and benefits.   - repeat EGD with re treatment with botox. This was included in PAT appt notes.   - needs to follow clear liquid diet the day before and reglan the evening before.   - will reach out to motility clinic to see where he is on wait  list.     Achalasia of esophagus  -     Ambulatory referral/consult to Endo Procedure     Gastroparesis  -     metoclopramide HCl (REGLAN) 5 MG tablet; Take 1 tablet (5 mg total) by mouth 4 (four) times daily before meals and nightly. for 14 days  Dispense: 56 tablet; Refill: 0  -     Ambulatory referral/consult to Endo Procedure     Return to Clinic:  Follow up to be determined after results/ procedure(s).    Thank you for the opportunity to participate in the care of this patient.  RANULFO Bryant

## 2024-08-26 NOTE — PROGRESS NOTES
Clinic Follow Up:  [facility] Follow Up Note    Reason for Follow Up:  The primary encounter diagnosis was Achalasia of esophagus. A diagnosis of Gastroparesis was also pertinent to this visit.    PCP: Herminia Longoria       HPI:  This is a 64 y.o. male here for follow up of the above.   Has issues with both achalasia and gastroparesis. Is on wait list to be seen in [place]. He missed his scheduled appointment in February.   Continues with both dysphagia as well as nausea and vomiting. He says that promethazine syrup is the only effective treatment for his nausea and vomiting. We had discussed risks benefits of promethazine and more specifically promethazine syrup is not most appropriate long term use. Can use suppositories if unable to tolerate PO. Has not tried Reglan. Does feel that drinking some liquids before eating helps his food to go down.  He had an EGD 01/2024 with esophageal dilation and Botox injection.  He did have retained gastric contents.  Was recommended to retreat in 3 months.    Review of Systems   Constitutional:  Negative for activity change and appetite change.        As per interval history above   Respiratory:  Negative for cough and shortness of breath.    Cardiovascular:  Negative for chest pain.   Gastrointestinal:  Positive for nausea and vomiting. Negative for abdominal pain, blood in stool, constipation and diarrhea.        Dysphagia    Skin:  Negative for color change and rash.       Medical History:  Past Medical History:   Diagnosis Date    Achalasia of esophagus 11/28/2016    Anxiety state, unspecified     Arthritis     Cataract     Centrilobular emphysema 10/03/2022    Coronary artery disease     Esophageal reflux     Esophageal stricture     Hypertrophy of prostate without urinary obstruction and other lower urinary tract symptoms (LUTS)     Screening PSA (prostate specific antigen) 12/07/2012    0.4    Unspecified essential hypertension        Surgical  History:   Past Surgical History:   Procedure Laterality Date    BACK SURGERY      CATARACT EXTRACTION      CERVICAL EPIDURAL STEROID INJECTION      COLONOSCOPY      COLONOSCOPY N/A 08/17/2023    Procedure: COLONOSCOPY;  Surgeon: Windy León MD;  Location: [facility];  Service: Endoscopy;  Laterality: N/A;    ESOPHAGEAL MANOMETRY WITH MEASUREMENT OF IMPEDANCE N/A 05/23/2022    Procedure: MANOMETRY-ESOPHAGEAL-WITH IMPEDANCE;  Surgeon: Madina Hoover MD;  Location: [facility] ([facility]);  Service: Endoscopy;  Laterality: N/A;  hold pain medication 24 hours prior to procedure    ESOPHAGOGASTRODUODENOSCOPY      ESOPHAGOGASTRODUODENOSCOPY N/A 08/16/2019    Procedure: EGD (ESOPHAGOGASTRODUODENOSCOPY);  Surgeon: Pawan Schwab MD;  Location: [facility];  Service: Endoscopy;  Laterality: N/A;    ESOPHAGOGASTRODUODENOSCOPY N/A 05/23/2022    Procedure: ESOPHAGOGASTRODUODENOSCOPY (EGD);  Surgeon: Madina Hoover MD;  [facility]  Service: Endoscopy;  Laterality: N/A;  Endoflip/Endoscopically placed manometry probe  2nd floor-End stage achalasia  propofol only  full iquid diet x3 days, clear liquid diet x1 day prior to procedure  fully vaccinated, [facility]    ESOPHAGOGASTRODUODENOSCOPY N/A 04/21/2023    Procedure: ESOPHAGOGASTRODUODENOSCOPY (EGD);  Surgeon: Madina Hoover MD;  [facility]  Service: Endoscopy;  Laterality: N/A;  w/ Botox  2nd floor-End Stage Achalasia  full liquid diet x3 days, clear liquid diet x1 day prior to procedure  instructions sent to [email or web address] and emailed to [email or web address]    4/17/23 - Pt confirmed apt. Pt moved up to the 11am slot, to    ESOPHAGOGASTRODUODENOSCOPY N/A 01/11/2024    Procedure: EGD with BOTOX;  Surgeon: Nu Villanueva MD;  Location: [facility];  Service: Endoscopy;  Laterality: N/A;    HERNIA REPAIR      INGUINAL HERNIA REPAIR Right     LUMBAR EPIDURAL STEROID INJECTION      TOTAL  KNEE ARTHROPLASTY Right 2023    UPPER GASTROINTESTINAL ENDOSCOPY  2016       Family History:   Family History   Problem Relation Name Age of Onset    Lung cancer Mother      Hypertension Father      Prostate cancer Father      Ulcers Sister adonis     Ulcers Brother thuy     Prostate cancer Maternal Grandfather      Ulcers Sister liliana     Celiac disease Neg Hx      Colon cancer Neg Hx      Colon polyps Neg Hx      Crohn's disease Neg Hx      Esophageal cancer Neg Hx      Inflammatory bowel disease Neg Hx      Irritable bowel syndrome Neg Hx      Liver cancer Neg Hx      Liver disease Neg Hx      Rectal cancer Neg Hx      Stomach cancer Neg Hx         Social History:   Social History     Tobacco Use    Smoking status: Former     Types: Cigars     Start date:      Quit date:      Years since quittin.6    Smokeless tobacco: Never    Tobacco comments:     Cigar 1-2 times a week x about 4 years   Substance Use Topics    Alcohol use: Not Currently     Comment: once or twice a year 1-2 beers    Drug use: Yes     Types: Marijuana       Allergies:   Review of patient's allergies indicates:   Allergen Reactions    Nsaids (non-steroidal anti-inflammatory drug) Nausea And Vomiting       Home Medications:  Current Outpatient Medications on File Prior to Visit   Medication Sig Dispense Refill    ALPRAZolam (XANAX) 1 MG tablet Take 1 tablet (1 mg total) by mouth nightly as needed for Anxiety. 30 tablet 3    atorvastatin (LIPITOR) 40 MG tablet TAKE 1 TABLET(40 MG) BY MOUTH EVERY EVENING 90 tablet 1    diazePAM (VALIUM) 5 MG tablet Take 1 tablet (5 mg total) by mouth daily as needed for Anxiety. 30 tablet 3    hydrOXYzine (ATARAX) 50 MG tablet Take 1 tablet at bedtime as needed for sleep. 30 tablet 3    ketotifen (ZADITOR) 0.025 % (0.035 %) ophthalmic solution Place 1 drop into both eyes 2 (two) times daily. 5 mL 1    omeprazole (PRILOSEC) 40 MG capsule TAKE ONE CAPSULE (40MG) BY MOUTH DAILY AT 9AM 90  capsule 1    paliperidone (INVEGA) 3 MG TR24 Take 1 tablet (3 mg total) by mouth once daily. 30 tablet 3    prednisoLONE acetate (PRED FORTE) 1 % DrpS Place 1 drop into the left eye every 4 (four) hours. 5 mL 1    prednisoLONE acetate (PRED FORTE) 1 % DrpS Place 1 drop into the left eye every 4 (four) hours. 5 mL 1    prednisoLONE acetate (PRED FORTE) 1 % DrpS Place 1 drop into the right eye every 4 (four) hours. 5 mL 0    tamsulosin (FLOMAX) 0.4 mg Cap TAKE 1 CAPSULE(0.4 MG) BY MOUTH EVERY DAY 90 capsule 3    prednisoLONE acetate (PRED FORTE) 1 % DrpS Place 1 drop into the right eye 4 (four) times daily. 5 mL 1    promethazine (PHENERGAN) 6.25 mg/5 mL syrup Take 20 mLs (25 mg total) by mouth every 6 (six) hours as needed for Nausea. (Patient not taking: Reported on 8/26/2024) 473 mL 0    sucralfate (CARAFATE) 1 gram tablet Take 1 tablet (1 g total) by mouth 4 (four) times daily. (Patient not taking: Reported on 8/26/2024) 120 tablet 0     No current facility-administered medications on file prior to visit.       /74 (BP Location: Right arm, Patient Position: Sitting, BP Method: Medium (Automatic))   Pulse (!) 118   Wt 73.3 kg (161 lb 9.6 oz)   BMI 23.19 kg/m²   Body mass index is 23.19 kg/m².  Physical Exam  Constitutional:       General: He is not in acute distress.  HENT:      Head: Normocephalic.   Neurological:      General: No focal deficit present.      Mental Status: He is alert.   Psychiatric:         Mood and Affect: Mood normal.         Judgment: Judgment normal.         Labs: Pertinent labs reviewed.  CRC Screening: up to date     Assessment:   1. Achalasia of esophagus    2. Gastroparesis        Recommendations:   - trial of Reglan-- we did discuss risks and benefits.   - repeat EGD with re treatment with botox. This was included in PAT appt notes.   - needs to follow clear liquid diet the day before and reglan the evening before.   - will reach out to motility clinic to see where he is on wait  list.     Achalasia of esophagus  -     Ambulatory referral/consult to Endo Procedure     Gastroparesis  -     metoclopramide HCl (REGLAN) 5 MG tablet; Take 1 tablet (5 mg total) by mouth 4 (four) times daily before meals and nightly. for 14 days  Dispense: 56 tablet; Refill: 0  -     Ambulatory referral/consult to Endo Procedure     Return to Clinic:  Follow up to be determined after results/ procedure(s).    Thank you for the opportunity to participate in the care of this patient.  RANULFO Bryant

## 2024-08-27 ENCOUNTER — HOSPITAL ENCOUNTER (OUTPATIENT)
Dept: PREADMISSION TESTING | Facility: HOSPITAL | Age: 64
Discharge: HOME | End: 2024-08-27
Attending: INTERNAL MEDICINE
Payer: MEDICARE

## 2024-09-03 ENCOUNTER — HOSPITAL ENCOUNTER (OUTPATIENT)
Dept: PREADMISSION TESTING | Facility: HOSPITAL | Age: 64
Discharge: HOME | End: 2024-09-03
Attending: COLON & RECTAL SURGERY
Payer: MEDICARE

## 2024-09-03 DIAGNOSIS — K21.9 GASTROESOPHAGEAL REFLUX DISEASE WITHOUT ESOPHAGITIS: ICD-10-CM

## 2024-09-03 DIAGNOSIS — K22.0 ACHALASIA OF ESOPHAGUS: Primary | ICD-10-CM

## 2024-09-04 RX ORDER — TAMSULOSIN HYDROCHLORIDE 0.4 MG/1
CAPSULE ORAL
Qty: 90 CAPSULE | Refills: 3 | Status: SHIPPED | OUTPATIENT
Start: 2024-09-04

## 2024-09-06 RX ORDER — ALPRAZOLAM 1 MG/1
1 TABLET ORAL NIGHTLY PRN
Qty: 30 TABLET | Refills: 0 | Status: SHIPPED | OUTPATIENT
Start: 2024-09-06 | End: 2025-01-04

## 2024-09-12 ENCOUNTER — ANESTHESIA EVENT (OUTPATIENT)
Dept: ENDOSCOPY | Facility: HOSPITAL | Age: 64
End: 2024-09-12
Payer: MEDICARE

## 2024-09-12 ENCOUNTER — TELEPHONE (OUTPATIENT)
Dept: GASTROENTEROLOGY | Facility: CLINIC | Age: 64
End: 2024-09-12
Payer: MEDICARE

## 2024-09-12 ENCOUNTER — HOSPITAL ENCOUNTER (OUTPATIENT)
Facility: HOSPITAL | Age: 64
Discharge: HOME | End: 2024-09-12
Attending: INTERNAL MEDICINE | Admitting: INTERNAL MEDICINE
Payer: MEDICARE

## 2024-09-12 ENCOUNTER — ANESTHESIA (OUTPATIENT)
Dept: ENDOSCOPY | Facility: HOSPITAL | Age: 64
End: 2024-09-12
Payer: MEDICARE

## 2024-09-12 DIAGNOSIS — K21.9 GASTROESOPHAGEAL REFLUX DISEASE WITHOUT ESOPHAGITIS: ICD-10-CM

## 2024-09-12 DIAGNOSIS — K22.0 ACHALASIA OF ESOPHAGUS: Primary | ICD-10-CM

## 2024-09-12 DIAGNOSIS — R11.0 CHRONIC NAUSEA: ICD-10-CM

## 2024-09-12 PROCEDURE — 27201028 HC NEEDLE, SCLERO: Performed by: INTERNAL MEDICINE

## 2024-09-12 PROCEDURE — 25000003 PHARM REV CODE 250: Performed by: NURSE ANESTHETIST, CERTIFIED REGISTERED

## 2024-09-12 PROCEDURE — 37000009 HC ANESTHESIA EA ADD 15 MINS: Performed by: INTERNAL MEDICINE

## 2024-09-12 PROCEDURE — 43236 UPPR GI SCOPE W/SUBMUC INJ: CPT | Performed by: INTERNAL MEDICINE

## 2024-09-12 PROCEDURE — 63600175 PHARM REV CODE 636 W HCPCS: Performed by: NURSE ANESTHETIST, CERTIFIED REGISTERED

## 2024-09-12 PROCEDURE — 63600175 PHARM REV CODE 636 W HCPCS: Mod: JZ,JG | Performed by: INTERNAL MEDICINE

## 2024-09-12 PROCEDURE — 37000008 HC ANESTHESIA 1ST 15 MINUTES: Performed by: INTERNAL MEDICINE

## 2024-09-12 PROCEDURE — 43236 UPPR GI SCOPE W/SUBMUC INJ: CPT | Mod: ,,, | Performed by: INTERNAL MEDICINE

## 2024-09-12 RX ORDER — PROPOFOL 10 MG/ML
VIAL (ML) INTRAVENOUS
Status: DISCONTINUED | OUTPATIENT
Start: 2024-09-12 | End: 2024-09-12

## 2024-09-12 RX ORDER — OMEPRAZOLE 40 MG/1
40 CAPSULE, DELAYED RELEASE ORAL EVERY MORNING
Qty: 90 CAPSULE | Refills: 3 | Status: SHIPPED | OUTPATIENT
Start: 2024-09-12 | End: 2025-09-12

## 2024-09-12 RX ORDER — SODIUM CHLORIDE, SODIUM LACTATE, POTASSIUM CHLORIDE, CALCIUM CHLORIDE 600; 310; 30; 20 MG/100ML; MG/100ML; MG/100ML; MG/100ML
INJECTION, SOLUTION INTRAVENOUS CONTINUOUS
Status: DISCONTINUED | OUTPATIENT
Start: 2024-09-12 | End: 2024-09-12 | Stop reason: HOSPADM

## 2024-09-12 RX ORDER — LIDOCAINE HYDROCHLORIDE 10 MG/ML
INJECTION, SOLUTION EPIDURAL; INFILTRATION; INTRACAUDAL; PERINEURAL
Status: DISCONTINUED | OUTPATIENT
Start: 2024-09-12 | End: 2024-09-12

## 2024-09-12 RX ORDER — PROMETHAZINE HYDROCHLORIDE 6.25 MG/5ML
25 SYRUP ORAL EVERY 6 HOURS PRN
Qty: 473 ML | Refills: 0 | Status: SHIPPED | OUTPATIENT
Start: 2024-09-12 | End: 2024-09-14 | Stop reason: SDUPTHER

## 2024-09-12 RX ADMIN — PROPOFOL 20 MG: 10 INJECTION, EMULSION INTRAVENOUS at 09:09

## 2024-09-12 RX ADMIN — PROPOFOL 40 MG: 10 INJECTION, EMULSION INTRAVENOUS at 09:09

## 2024-09-12 RX ADMIN — PROPOFOL 30 MG: 10 INJECTION, EMULSION INTRAVENOUS at 09:09

## 2024-09-12 RX ADMIN — PROPOFOL 100 MG: 10 INJECTION, EMULSION INTRAVENOUS at 09:09

## 2024-09-12 RX ADMIN — SODIUM CHLORIDE, POTASSIUM CHLORIDE, SODIUM LACTATE AND CALCIUM CHLORIDE: 600; 310; 30; 20 INJECTION, SOLUTION INTRAVENOUS at 09:09

## 2024-09-12 RX ADMIN — LIDOCAINE HYDROCHLORIDE 50 MG: 10 SOLUTION INTRAVENOUS at 09:09

## 2024-09-12 NOTE — TRANSFER OF CARE
"Anesthesia Transfer of Care Note    Patient: Lenny Latham Jr.    Procedure(s) Performed: Procedure(s) (LRB):  EGD (ESOPHAGOGASTRODUODENOSCOPY) (N/A)    Patient location: PACU    Anesthesia Type: MAC    Transport from OR: Transported from OR on room air with adequate spontaneous ventilation    Post pain: adequate analgesia    Post assessment: no apparent anesthetic complications and tolerated procedure well    Post vital signs: stable    Level of consciousness: sedated    Nausea/Vomiting: no nausea/vomiting    Complications: none    Transfer of care protocol was followed    Last vitals: Visit Vitals  /74 (BP Location: Left arm, Patient Position: Lying)   Pulse 90   Resp 18   Ht 5' 3" (1.6 m)   Wt 72.6 kg (160 lb)   SpO2 98%   BMI 28.34 kg/m²     "

## 2024-09-12 NOTE — ANESTHESIA PREPROCEDURE EVALUATION
09/12/2024  Lenny Latham Jr. is a 64 y.o., male.      Pre-op Assessment    I have reviewed the Patient Summary Reports.     I have reviewed the Nursing Notes. I have reviewed the NPO Status.   I have reviewed the Medications.     Review of Systems  Anesthesia Hx:  No problems with previous Anesthesia             Denies Family Hx of Anesthesia complications.    Denies Personal Hx of Anesthesia complications.                    Social:  Recreational Drugs, Former Smoker, No Alcohol Use Drug use: Marijuana      Hematology/Oncology:  Hematology Normal   Oncology Normal                                   EENT/Dental:   cataract          Cardiovascular:     Hypertension   Denies MI. CAD    Denies CABG/stent.   Angina  Denies CHF.    hyperlipidemia   ECG has been reviewed. EKG 8/2024:  Normal sinus rhythm 96  Normal ECG   When compared with ECG of 16-MAY-2022 15:13,   No significant change was found     Stress echo 4/2021 :  ·  Normal myocardial perfusion scan. There is no evidence of myocardial ischemia or infarction.  ·  The gated perfusion images showed an ejection fraction of 54% at rest. The gated perfusion images showed an ejection fraction of 65% post stress. Normal ejection fraction is greater than 53%.  ·  The EKG portion of this study is negative for ischemia.  ·  The patient reported no chest pain during the stress test.  ·  There were no arrhythmias during stress.                             Pulmonary:   COPD  Denies Asthma.    Sleep Apnea                Renal/:   Denies Chronic Renal Disease.  BPH Hypertrophy of prostate without urinary obstruction and other lower urinary tract symptoms (LUTS)             Hepatic/GI:     GERD Denies Liver Disease.  Denies Hepatitis. Achalasia of esophagus  Esophageal stricture          Musculoskeletal:  Arthritis               Neurological:    Denies CVA.     Patient has been taking Ranitidine 150mg twice a day and now it has been recalled.  What would you like do order in place of this?   Denies Seizures.                                Endocrine:  Denies Diabetes. Denies Hypothyroidism.  Denies Hyperthyroidism.         Psych:   anxiety depression Adjustment disorder  Insomnia  Fear of needles           Physical Exam  General: Well nourished    Airway:  Mallampati: II   Mouth Opening: Normal    Dental:  Mult missing  Chest/Lungs:  Clear to auscultation, Normal Respiratory Rate    Heart:  Rate: Normal  Rhythm: Regular Rhythm    Anesthesia Plan  Type of Anesthesia, risks & benefits discussed:    Anesthesia Type: MAC  Intra-op Monitoring Plan: Standard ASA Monitors  Induction:  IV  Informed Consent: Informed consent signed with the Patient and all parties understand the risks and agree with anesthesia plan.  All questions answered.   ASA Score: 2  Day of Surgery Review of History & Physical: H&P Update referred to the surgeon/provider.    Ready For Surgery From Anesthesia Perspective.   .

## 2024-09-12 NOTE — PROVATION PATIENT INSTRUCTIONS
Discharge Summary/Instructions after an Endoscopic Procedure  Patient Name: Lenny Lathma  Patient MRN: [id number]  Patient YOB: 1960 Thursday, September 12, 2024 Nu Villanueva MD  Dear patient,  As a result of recent federal legislation (The Federal Cures Act), you may   receive lab or pathology results from your procedure in your [facility]   account before your physician is able to contact you. Your physician or   their representative will relay the results to you with their   recommendations at their soonest availability.  Thank you,  RESTRICTIONS:  During your procedure today, you received medications for sedation.  These   medications may affect your judgment, balance and coordination.  Therefore,   for 24 hours, you have the following restrictions:   - DO NOT drive a car, operate machinery, make legal/financial decisions,   sign important papers or drink alcohol.    ACTIVITY:  Today: no heavy lifting, straining or running due to procedural   sedation/anesthesia.  The following day: return to full activity including work.  DIET:  Eat and drink normally unless instructed otherwise.     TREATMENT FOR COMMON SIDE EFFECTS:  - Mild abdominal pain, nausea, belching, bloating or excessive gas:  rest,   eat lightly and use a heating pad.  - Sore Throat: treat with throat lozenges and/or gargle with warm salt   water.  - Because air was used during the procedure, expelling large amounts of air   from your rectum or belching is normal.  - If a bowel prep was taken, you may not have a bowel movement for 1-3 days.    This is normal.  SYMPTOMS TO WATCH FOR AND REPORT TO YOUR PHYSICIAN:  1. Abdominal pain or bloating, other than gas cramps.  2. Chest pain.  3. Back pain.  4. Signs of infection such as: chills or fever occurring within 24 hours   after the procedure.  5. Rectal bleeding, which would show as bright red, maroon, or black stools.   (A tablespoon of blood from the rectum is not serious, especially  if   hemorrhoids are present.)  6. Vomiting.  7. Weakness or dizziness.  GO DIRECTLY TO THE NEAREST EMERGENCY ROOM IF YOU HAVE ANY OF THE FOLLOWING:      Difficulty breathing              Chills and/or fever over 101 F   Persistent vomiting and/or vomiting blood   Severe abdominal pain   Severe chest pain   Black, tarry stools   Bleeding- more than one tablespoon   Any other symptom or condition that you feel may need urgent attention  Your doctor recommends these additional instructions:  If any biopsies were taken, your doctors clinic will contact you in 1 to 2   weeks with any results.  - Discharge patient to home (with escort).   - Clear liquid diet.   - Continue present medications.   - Continue Omeprazole at current dose. New prescription provided.   - Return to GI clinic with Dr. Willa Elizondo of Gastroenterology. You are on   a wait list to see her.  - The findings and recommendations were discussed with the patient.   - The findings and recommendations were discussed with the patient's   family.  For questions, problems or results please call your physician Nu Villanueva MD at Work:  [phone]  If you have any questions about the above instructions, call the GI   department at [phone] or call the endoscopy unit at [phone]   from 7am until 3 pm.  [facility] [place], EMERGENCY ROOM PHONE NUMBER:   [phone]  IF A COMPLICATION OR EMERGENCY SITUATION ARISES AND YOU ARE UNABLE TO REACH   YOUR PHYSICIAN - GO DIRECTLY TO THE EMERGENCY ROOM.  I have read or have had read to me these discharge instructions for my   procedure and have received a written copy.  I understand these   instructions and will follow-up with my physician if I have any questions.     __________________________________       _____________________________________  Nurse Signature                                          Patient/Designated   Responsible Party Signature  MD Nu Powell  MD Kay  9/12/2024 10:04:45 AM  This report has been verified and signed electronically.  Dear patient,  As a result of recent federal legislation (The Federal Cures Act), you may   receive lab or pathology results from your procedure in your [facility]   account before your physician is able to contact you. Your physician or   their representative will relay the results to you with their   recommendations at their soonest availability.  Thank you,  [employer]

## 2024-09-12 NOTE — INTERVAL H&P NOTE
The patient has been examined and the H&P has been reviewed:    I concur with the findings and changes have been noted since the H&P was written: lost to follow up. States he had knee replacement and shoulder surgery that led to him not returning after his last EGD in 01/2024. He was also referred to Dr. Elizondo at [facility] for further management of his dysphagia/gastroparesis but did not show. Advised he is now on a wait list. He presents today for EGD with Botox. He has tolerated previous treatment in the past with positive response. Denies blood thinners . Wife at bedside.     Anesthesia/Surgery risks, benefits and alternative options discussed and understood by patient/family.    The risks, benefits and alternatives of the procedure were discussed with the patient in detail. This discussion was had in the presence of his wife. The risks include, risks of adverse reaction to sedation requiring the use of reversal agents, bleeding requiring blood transfusion, perforation requiring surgical intervention and technical failure. Other risks include aspiration leading to respiratory distress and respiratory failure resulting in endotracheal intubation and mechanical ventilation including death. If anesthesia is being utilized for this procedure, it is up to the anesthesiologist to determine airway safety including elective endotracheal intubation. Questions were answered, they agree to proceed. There was no language barriers.        Active Hospital Problems    Diagnosis  POA    *Achalasia of esophagus [K22.0]  Yes      Resolved Hospital Problems   No resolved problems to display.
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
## 2024-09-12 NOTE — TELEPHONE ENCOUNTER
MA attempted to contact patient, but he did not answer. A voicemail was left informing patient of his scheduled appointment with Dr. Elizondo. A message was also sent to the referring provider.

## 2024-09-12 NOTE — ANESTHESIA POSTPROCEDURE EVALUATION
Anesthesia Post Evaluation    Patient: Lenny Latham JrDonavan    Procedure(s) Performed: Procedure(s) (LRB):  EGD (ESOPHAGOGASTRODUODENOSCOPY) (N/A)    Final Anesthesia Type: MAC      Patient location during evaluation: PACU  Patient participation: Yes- Able to Participate  Level of consciousness: awake  Post-procedure vital signs: reviewed and stable  Pain management: adequate  Airway patency: patent    PONV status at discharge: No PONV  Anesthetic complications: no      Cardiovascular status: blood pressure returned to baseline and hemodynamically stable  Respiratory status: unassisted and spontaneous ventilation  Hydration status: euvolemic  Follow-up not needed.                  No case tracking events are documented in the log.      Pain/Laly Score: No data recorded

## 2024-09-13 ENCOUNTER — TELEPHONE (OUTPATIENT)
Dept: GASTROENTEROLOGY | Facility: CLINIC | Age: 64
End: 2024-09-13
Payer: MEDICARE

## 2024-09-13 VITALS
DIASTOLIC BLOOD PRESSURE: 68 MMHG | RESPIRATION RATE: 18 BRPM | WEIGHT: 160 LBS | HEART RATE: 87 BPM | TEMPERATURE: 98 F | HEIGHT: 63 IN | SYSTOLIC BLOOD PRESSURE: 122 MMHG | OXYGEN SATURATION: 98 % | BODY MASS INDEX: 28.35 KG/M2

## 2024-09-13 NOTE — TELEPHONE ENCOUNTER
----- Message from Izzy Zurita NP sent at 9/13/2024  2:44 PM CDT -----  Regarding: FW: meds  Please call patient.   Phenergan syrup was sent in on 9/12/24. Also, please inform him of his upcoming motility clinic appt.      Disp Refills Start End IRENE  promethazine (PHENERGAN) 6.25 mg/5 mL syrup 473 mL 0 9/12/2024 - No  Sig - Route: Take 20 mLs (25 mg total) by mouth every 6 (six) hours as needed for Nausea. - Oral  Sent to pharmacy as: promethazine (PHENERGAN) 6.25 mg/5 mL syrup  Class: Normal  Order: [id number]  Date/Time Signed: 9/12/2024 10:38      E-Prescribing Status: Receipt confirmed by pharmacy (9/12/2024 10:53 AM CDT)    Pharmacy      [id number] - [place] - [address]  AT [address]  ----- Message -----  From: Devante Wilder  Sent: 9/13/2024   2:39 PM CDT  To: Izzy Zurita NP  Subject: meds                                             Patient has called 5 times asking for meds that was to be called in on 09/12/2024 to [facility] on [address] and [address] in [place], [place], the pharmacy keep telling him it was never called in, he is there waiting to get it.    promethazine (PHENERGAN) 6.25 mg/5 mL syrup    Please call this patient    Thanks

## 2024-09-14 ENCOUNTER — NURSE TRIAGE (OUTPATIENT)
Dept: ADMINISTRATIVE | Facility: CLINIC | Age: 64
End: 2024-09-14
Payer: MEDICARE

## 2024-09-14 DIAGNOSIS — R11.0 CHRONIC NAUSEA: ICD-10-CM

## 2024-09-14 RX ORDER — PROMETHAZINE HYDROCHLORIDE 6.25 MG/5ML
25 SYRUP ORAL EVERY 6 HOURS PRN
Qty: 473 ML | Refills: 0 | Status: SHIPPED | OUTPATIENT
Start: 2024-09-14

## 2024-09-14 NOTE — TELEPHONE ENCOUNTER
EGD procedure done, states his RX for Phenergan has not been sent in. I can see receipt received, spoke with pharmacy, they state they do not have an RX. Confirmed pharmacy. Will reach out to on call to ask if they can resend and I will call him back. Patient very upset, states he has been to the pharmacy several times.    Secure chat sent to on call Gastro, Dr. Ch. Per Dr. Ch she will resend.     Patient called back and advised of on call provider stating they would resend. Verb understanding.         Reason for Disposition   [1] Prescription not at pharmacy AND [2] was prescribed by PCP recently  (Exception: [facility] has access to EMR and prescription is recorded there. Go to Home Care and confirm for pharmacy.)    Protocols used: Medication Refill and Renewal Call-A-

## 2024-09-20 ENCOUNTER — PATIENT MESSAGE (OUTPATIENT)
Dept: INTERNAL MEDICINE | Facility: CLINIC | Age: 64
End: 2024-09-20
Payer: MEDICARE

## 2024-09-24 RX ORDER — HYDROXYZINE HYDROCHLORIDE 50 MG/1
TABLET, FILM COATED ORAL
Qty: 30 TABLET | Refills: 1 | Status: SHIPPED | OUTPATIENT
Start: 2024-09-24

## 2024-09-26 RX ORDER — DIAZEPAM 5 MG/1
5 TABLET ORAL DAILY PRN
Qty: 30 TABLET | Refills: 0 | Status: SHIPPED | OUTPATIENT
Start: 2024-09-26 | End: 2024-12-25

## 2024-10-02 ENCOUNTER — TELEPHONE (OUTPATIENT)
Dept: PSYCHIATRY | Facility: CLINIC | Age: 64
End: 2024-10-02
Payer: MEDICARE

## 2024-10-04 ENCOUNTER — OFFICE VISIT (OUTPATIENT)
Dept: PSYCHIATRY | Facility: CLINIC | Age: 64
End: 2024-10-04
Payer: COMMERCIAL

## 2024-10-04 DIAGNOSIS — F63.81 INTERMITTENT EXPLOSIVE DISORDER: Primary | ICD-10-CM

## 2024-10-04 DIAGNOSIS — F41.9 ANXIETY: ICD-10-CM

## 2024-10-04 PROCEDURE — 99214 OFFICE O/P EST MOD 30 MIN: CPT | Mod: S$GLB,,, | Performed by: PSYCHIATRY & NEUROLOGY

## 2024-10-04 PROCEDURE — 99999 PR PBB SHADOW E&M-EST. PATIENT-LVL I: CPT | Mod: PBBFAC,,, | Performed by: PSYCHIATRY & NEUROLOGY

## 2024-10-04 RX ORDER — DIAZEPAM 5 MG/1
5 TABLET ORAL DAILY PRN
Qty: 90 TABLET | Refills: 0 | Status: SHIPPED | OUTPATIENT
Start: 2024-10-04 | End: 2025-01-02

## 2024-10-04 RX ORDER — ALPRAZOLAM 1 MG/1
1 TABLET ORAL NIGHTLY PRN
Qty: 90 TABLET | Refills: 0 | Status: SHIPPED | OUTPATIENT
Start: 2024-10-04 | End: 2025-02-01

## 2024-10-04 RX ORDER — HYDROXYZINE HYDROCHLORIDE 50 MG/1
TABLET, FILM COATED ORAL
Qty: 90 TABLET | Refills: 1 | Status: SHIPPED | OUTPATIENT
Start: 2024-10-04

## 2024-10-04 RX ORDER — PALIPERIDONE 3 MG/1
3 TABLET, EXTENDED RELEASE ORAL DAILY
Qty: 90 TABLET | Refills: 1 | Status: SHIPPED | OUTPATIENT
Start: 2024-10-04
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
## 2024-10-16 ENCOUNTER — OFFICE VISIT (OUTPATIENT)
Dept: UROLOGY | Facility: CLINIC | Age: 64
End: 2024-10-16
Payer: MEDICARE

## 2024-10-16 VITALS
BODY MASS INDEX: 28.36 KG/M2 | HEIGHT: 63 IN | SYSTOLIC BLOOD PRESSURE: 100 MMHG | WEIGHT: 160.06 LBS | HEART RATE: 94 BPM | DIASTOLIC BLOOD PRESSURE: 75 MMHG

## 2024-10-16 DIAGNOSIS — N13.8 BENIGN PROSTATIC HYPERPLASIA WITH URINARY OBSTRUCTION: Primary | ICD-10-CM

## 2024-10-16 DIAGNOSIS — N52.03 COMBINED ARTERIAL INSUFFICIENCY AND CORPORO-VENOUS OCCLUSIVE ERECTILE DYSFUNCTION: ICD-10-CM

## 2024-10-16 DIAGNOSIS — N40.1 BENIGN PROSTATIC HYPERPLASIA WITH URINARY OBSTRUCTION: Primary | ICD-10-CM

## 2024-10-16 DIAGNOSIS — R39.15 URGENCY OF URINATION: ICD-10-CM

## 2024-10-16 PROCEDURE — 99999 PR PBB SHADOW E&M-EST. PATIENT-LVL III: CPT | Mod: PBBFAC,,, | Performed by: UROLOGY

## 2024-10-16 PROCEDURE — 3078F DIAST BP <80 MM HG: CPT | Mod: CPTII,S$GLB,, | Performed by: UROLOGY

## 2024-10-16 PROCEDURE — 1160F RVW MEDS BY RX/DR IN RCRD: CPT | Mod: CPTII,S$GLB,, | Performed by: UROLOGY

## 2024-10-16 PROCEDURE — 99214 OFFICE O/P EST MOD 30 MIN: CPT | Mod: S$GLB,,, | Performed by: UROLOGY

## 2024-10-16 PROCEDURE — 1159F MED LIST DOCD IN RCRD: CPT | Mod: CPTII,S$GLB,, | Performed by: UROLOGY

## 2024-10-16 PROCEDURE — 3074F SYST BP LT 130 MM HG: CPT | Mod: CPTII,S$GLB,, | Performed by: UROLOGY

## 2024-10-16 PROCEDURE — 3008F BODY MASS INDEX DOCD: CPT | Mod: CPTII,S$GLB,, | Performed by: UROLOGY

## 2024-10-16 RX ORDER — TAMSULOSIN HYDROCHLORIDE 0.4 MG/1
0.4 CAPSULE ORAL DAILY
Qty: 90 CAPSULE | Refills: 3 | Status: SHIPPED | OUTPATIENT
Start: 2024-10-16

## 2024-10-16 RX ORDER — SILDENAFIL 100 MG/1
100 TABLET, FILM COATED ORAL DAILY PRN
Qty: 20 TABLET | Refills: 11 | Status: SHIPPED | OUTPATIENT
Start: 2024-10-16

## 2024-10-16 NOTE — PROGRESS NOTES
Chief Complaint:   Encounter Diagnoses   Name Primary?    Benign prostatic hyperplasia with urinary obstruction Yes    Combined arterial insufficiency and corporo-venous occlusive erectile dysfunction     Urgency of urination        HPI:    10/16/24- currently doing well on tamsulosin, no problems with the urgency.  It has been a few years since we saw the patient, patient does have some questions about erectile dysfunction today.    61-year-old gentleman who comes in with multiple urological complaints.  He states that he is getting up to void about every hour for the last couple months.  Feels this all stems back to hernia repair 6 months ago, although he was voiding okay right after this.  He has had no dysuria, no gross hematuria, he has never been a smoker.  During the daytime he is going about every 2-3 hours.  Has definite increased frequency with increased urgency, no incontinence.  States that lately he will have intermittent split stream, otherwise his stream is usually week.  He does not remember having a Meade catheter during the surgery of note.  He has had no issues prior to this time.  In addition is also complaining of erectile dysfunction, can still get some morning erections but falls quickly.  He had a father and grandfather both with prostate cancer, no other family history of urological cancers or stones.  Patient states that he has good libido, no coronary artery disease.  He has tried no medications for his BPH or his erectile dysfunction.    Allergies:  Nsaids (non-steroidal anti-inflammatory drug)    Medications:  has a current medication list which includes the following prescription(s): alprazolam, atorvastatin, diazepam, hydroxyzine, ketotifen, omeprazole, paliperidone, prednisolone acetate, prednisolone acetate, prednisolone acetate, promethazine, and tamsulosin.    Review of Systems:  General: No fever, chills, fatigability, or weight loss.  Skin: No rashes, itching, or changes in color  or texture of skin.  Chest: Denies EVANS, cyanosis, wheezing, cough, and sputum production.  Abdomen: Appetite fine. No weight loss. Denies diarrhea, abdominal pain, hematemesis, or blood in stool.  Musculoskeletal: No joint stiffness or swelling. Denies back pain.  : As above.  All other review of systems negative.    PMH:   has a past medical history of Achalasia of esophagus (11/28/2016), Anxiety state, unspecified, Arthritis, Cataract, Centrilobular emphysema (10/03/2022), Coronary artery disease, Esophageal reflux, Esophageal stricture, Hypertrophy of prostate without urinary obstruction and other lower urinary tract symptoms (LUTS), Screening PSA (prostate specific antigen) (12/07/2012), and Unspecified essential hypertension.    PSH:   has a past surgical history that includes Back surgery; Esophagogastroduodenoscopy; Colonoscopy; Hernia repair; Upper gastrointestinal endoscopy (04/27/2016); Inguinal hernia repair (Right); CERVICAL EPIDURAL STEROID INJECTION; LUMBAR EPIDURAL STEROID INJECTION; Esophagogastroduodenoscopy (N/A, 08/16/2019); Esophagogastroduodenoscopy (N/A, 05/23/2022); Esophageal manometry with measurement of impedance (N/A, 05/23/2022); Esophagogastroduodenoscopy (N/A, 04/21/2023); Total knee arthroplasty (Right, 05/25/2023); Colonoscopy (N/A, 08/17/2023); Esophagogastroduodenoscopy (N/A, 01/11/2024); Cataract extraction; right shoulder (Right); and Esophagogastroduodenoscopy (N/A, 9/12/2024).    FamHx: family history includes Hypertension in his father; Lung cancer in his mother; Prostate cancer in his father and maternal grandfather; Ulcers in his brother, sister, and sister.    SocHx:  reports that he quit smoking about 4 years ago. His smoking use included cigars. He started smoking about 8 years ago. He has never used smokeless tobacco. He reports that he does not currently use alcohol. He reports current drug use. Drug: Marijuana.      Physical Exam:  Vitals:    10/16/24 0914   BP:  100/75   Pulse: 94     General: A&Ox3, no apparent distress, no deformities  Neck: No masses, normal ROM  Lungs: normal inspiration, no use of accessory muscles  Heart: normal pulse, no arrhythmias  Abdomen: Soft, NT, ND, no masses, no hernias, no hepatosplenomegaly  Skin: The skin is warm and dry. No jaundice.  Ext: No c/c/e.  :  10/21- Test desc shahla, no abnormalities of epididymus. Normal penile and scrotal skin. Meatus normal. Normal rectal tone. Prost 30 gm no nodules or masses appreciated. SV not palpable. Perineum and anus normal.    Labs/Studies:   PVR 12 mL 10/21  PSA 0.40 8/22    Impression/Plan:        1. BPH- tamsulosin is controlling his flow and will continue.  Call if he has any issues prior to the next appointment.  PSA today and proceed as appropriate.    2. Urgency- doing well and no need for medical management.    3. Erectile dysfunction- sildenafil 100 mg was previously working but he has not used it in some and he would like to retry it again.  In addition he may not have been taking it on an empty stomach.  A prescription has been sent in.  He does not want to pursue TriMix or surgery at this juncture.  See me back in 3 months and extend to yearly from there if stable.

## 2024-10-22 ENCOUNTER — NURSE TRIAGE (OUTPATIENT)
Dept: ADMINISTRATIVE | Facility: CLINIC | Age: 64
End: 2024-10-22
Payer: MEDICARE

## 2024-10-22 NOTE — TELEPHONE ENCOUNTER
Patient has chronic nausea. He states that he can only eat one boiled egg a day. He has lost significant weight over the past three weeks. Patient requesting liquid promethazine refill to be sent to   [id number] - [place], [place] - [address] AT [facility] OF [address]   [address] [place] [address]     Patient has promethazine tablets but states that they do not work. Symptoms persist with the tablets and he can not eat. Advised per protocol to speak with GI tomorrow. Will send a message to his provider.  Advised the patient to call back with any further questions or if symptoms worsen.        Reason for Disposition   [1] Prescription refill request for NON-ESSENTIAL medicine (i.e., no harm to patient if med not taken) AND [2] triager unable to refill per department policy    Protocols used: Medication Refill and Renewal Call-A-AH

## 2024-10-23 DIAGNOSIS — R11.0 CHRONIC NAUSEA: ICD-10-CM

## 2024-10-23 RX ORDER — PROMETHAZINE HYDROCHLORIDE 6.25 MG/5ML
25 SYRUP ORAL EVERY 6 HOURS PRN
Qty: 473 ML | Refills: 0 | Status: SHIPPED | OUTPATIENT
Start: 2024-10-23

## 2024-10-23 NOTE — TELEPHONE ENCOUNTER
----- Message from Janessa sent at 10/23/2024 11:06 AM CDT -----  Contact: Mobile  [phone]  Requesting an RX refill or new RX.    Is this a refill or new RX: Refill    RX name and strength   prednisoLONE acetate (PRED FORTE) 1 % DrpS    Is this a 30 day or 90 day RX: 5 mL    Pharmacy name and phone #     [id number] - [place], [place] - [address] AT [facility] OF [address]  [address]  [place] [address]  Phone: [phone] Fax: [phone]        The doctors have asked that we provide their patients with the following 2 reminders -- prescription refills can take up to 72 hours, and a friendly reminder that in the future you can use your [facility] account to request refills:    Comment: Patient said that its Urgent!

## 2024-10-23 NOTE — TELEPHONE ENCOUNTER
Care Due:                  Date            Visit Type   Department     Provider  --------------------------------------------------------------------------------                                EP -                              PRIMARY      [facility]  [place] Mainampati  Last Visit: 11-      CARE ([facility])   MEDICINE       Longoria                              EP -                              PRIMARY      [facility]  [place] Mainampati  Next Visit: 11-      CARE ([facility])   MEDICINE       Longoria                                                            Last  Test          Frequency    Reason                     Performed    Due Date  --------------------------------------------------------------------------------    Lipid Panel.  12 months..  atorvastatin.............  11- 11-    [employer] Embedded Care Due Messages. Reference number: [id number].   10/23/2024 11:44:53 AM CDT

## 2024-11-06 ENCOUNTER — TELEPHONE (OUTPATIENT)
Dept: GASTROENTEROLOGY | Facility: CLINIC | Age: 64
End: 2024-11-06
Payer: MEDICARE

## 2024-11-06 NOTE — TELEPHONE ENCOUNTER
----- Message from Lorenza sent at 11/6/2024  1:29 PM CST -----  Regarding: Self  Patient is requesting a sooner appointment.  Patient declined first available appointment listed as well as another facility and provider .  Patient will not accept being placed on the waitlist and is requesting a message be sent to doctor.    Name of Caller: Self     When is the first available appointment?  None are populating , Pt appt was canceled for today     Symptoms:  .the patient stated he spoke with staff regarding his situation     Would the patient rather a call back or a response via [facility]?  Call back     Best Call Back Number: [phone]      Additional Information: Pt stated he's losing weight.

## 2024-11-25 ENCOUNTER — OFFICE VISIT (OUTPATIENT)
Dept: INTERNAL MEDICINE | Facility: CLINIC | Age: 64
End: 2024-11-25
Payer: MEDICARE

## 2024-11-25 VITALS
DIASTOLIC BLOOD PRESSURE: 64 MMHG | OXYGEN SATURATION: 97 % | SYSTOLIC BLOOD PRESSURE: 106 MMHG | HEIGHT: 63 IN | BODY MASS INDEX: 26.32 KG/M2 | RESPIRATION RATE: 18 BRPM | HEART RATE: 80 BPM | WEIGHT: 148.56 LBS

## 2024-11-25 DIAGNOSIS — N13.8 BENIGN PROSTATIC HYPERPLASIA WITH URINARY OBSTRUCTION: ICD-10-CM

## 2024-11-25 DIAGNOSIS — K21.9 GASTROESOPHAGEAL REFLUX DISEASE WITHOUT ESOPHAGITIS: ICD-10-CM

## 2024-11-25 DIAGNOSIS — Z79.899 OTHER LONG TERM (CURRENT) DRUG THERAPY: ICD-10-CM

## 2024-11-25 DIAGNOSIS — F33.2 MAJOR DEPRESSIVE DISORDER, RECURRENT SEVERE WITHOUT PSYCHOTIC FEATURES: ICD-10-CM

## 2024-11-25 DIAGNOSIS — R11.0 CHRONIC NAUSEA: ICD-10-CM

## 2024-11-25 DIAGNOSIS — N52.03 COMBINED ARTERIAL INSUFFICIENCY AND CORPORO-VENOUS OCCLUSIVE ERECTILE DYSFUNCTION: ICD-10-CM

## 2024-11-25 DIAGNOSIS — K22.0 ACHALASIA OF ESOPHAGUS: ICD-10-CM

## 2024-11-25 DIAGNOSIS — R63.4 UNINTENDED WEIGHT LOSS: ICD-10-CM

## 2024-11-25 DIAGNOSIS — Z00.00 ROUTINE GENERAL MEDICAL EXAMINATION AT A HEALTH CARE FACILITY: Primary | ICD-10-CM

## 2024-11-25 DIAGNOSIS — N40.1 BENIGN PROSTATIC HYPERPLASIA WITH URINARY OBSTRUCTION: ICD-10-CM

## 2024-11-25 DIAGNOSIS — E78.2 MIXED HYPERLIPIDEMIA: ICD-10-CM

## 2024-11-25 DIAGNOSIS — J43.2 CENTRILOBULAR EMPHYSEMA: ICD-10-CM

## 2024-11-25 DIAGNOSIS — I25.118 CORONARY ARTERY DISEASE OF NATIVE ARTERY OF NATIVE HEART WITH STABLE ANGINA PECTORIS: ICD-10-CM

## 2024-11-25 DIAGNOSIS — G47.33 OSA ON CPAP: ICD-10-CM

## 2024-11-25 DIAGNOSIS — I25.119 ATHEROSCLEROSIS OF NATIVE CORONARY ARTERY OF NATIVE HEART WITH ANGINA PECTORIS: ICD-10-CM

## 2024-11-25 DIAGNOSIS — F41.8 MIXED ANXIETY AND DEPRESSIVE DISORDER: ICD-10-CM

## 2024-11-25 DIAGNOSIS — Z12.5 PROSTATE CANCER SCREENING: ICD-10-CM

## 2024-11-25 PROBLEM — R39.15 URGENCY OF URINATION: Status: RESOLVED | Noted: 2021-10-25 | Resolved: 2024-11-25

## 2024-11-25 PROBLEM — E66.811 OBESITY (BMI 30.0-34.9): Status: RESOLVED | Noted: 2022-01-24 | Resolved: 2024-11-25

## 2024-11-25 LAB
ALBUMIN SERPL BCP-MCNC: 3.4 G/DL (ref 3.5–5.2)
ALP SERPL-CCNC: 81 U/L (ref 40–150)
ALT SERPL W/O P-5'-P-CCNC: 9 U/L (ref 10–44)
ANION GAP SERPL CALC-SCNC: 9 MMOL/L (ref 8–16)
AST SERPL-CCNC: 14 U/L (ref 10–40)
BASOPHILS # BLD AUTO: 0.03 K/UL (ref 0–0.2)
BASOPHILS NFR BLD: 0.5 % (ref 0–1.9)
BILIRUB SERPL-MCNC: 0.3 MG/DL (ref 0.1–1)
BUN SERPL-MCNC: 11 MG/DL (ref 8–23)
CALCIUM SERPL-MCNC: 9.4 MG/DL (ref 8.7–10.5)
CHLORIDE SERPL-SCNC: 106 MMOL/L (ref 95–110)
CHOLEST SERPL-MCNC: 136 MG/DL (ref 120–199)
CHOLEST/HDLC SERPL: 2.3 {RATIO} (ref 2–5)
CO2 SERPL-SCNC: 29 MMOL/L (ref 23–29)
COMPLEXED PSA SERPL-MCNC: 0.52 NG/ML (ref 0–4)
CREAT SERPL-MCNC: 0.8 MG/DL (ref 0.5–1.4)
DIFFERENTIAL METHOD BLD: ABNORMAL
EOSINOPHIL # BLD AUTO: 0 K/UL (ref 0–0.5)
EOSINOPHIL NFR BLD: 0.6 % (ref 0–8)
ERYTHROCYTE [DISTWIDTH] IN BLOOD BY AUTOMATED COUNT: 16 % (ref 11.5–14.5)
EST. GFR  (NO RACE VARIABLE): >60 ML/MIN/1.73 M^2
ESTIMATED AVG GLUCOSE: 108 MG/DL (ref 68–131)
GLUCOSE SERPL-MCNC: 89 MG/DL (ref 70–110)
HBA1C MFR BLD: 5.4 % (ref 4–5.6)
HCT VFR BLD AUTO: 42.4 % (ref 40–54)
HDLC SERPL-MCNC: 58 MG/DL (ref 40–75)
HDLC SERPL: 42.6 % (ref 20–50)
HGB BLD-MCNC: 13 G/DL (ref 14–18)
IMM GRANULOCYTES # BLD AUTO: 0.01 K/UL (ref 0–0.04)
IMM GRANULOCYTES NFR BLD AUTO: 0.2 % (ref 0–0.5)
LDLC SERPL CALC-MCNC: 68.2 MG/DL (ref 63–159)
LYMPHOCYTES # BLD AUTO: 1.6 K/UL (ref 1–4.8)
LYMPHOCYTES NFR BLD: 24 % (ref 18–48)
MCH RBC QN AUTO: 29.5 PG (ref 27–31)
MCHC RBC AUTO-ENTMCNC: 30.7 G/DL (ref 32–36)
MCV RBC AUTO: 96 FL (ref 82–98)
MONOCYTES # BLD AUTO: 0.4 K/UL (ref 0.3–1)
MONOCYTES NFR BLD: 5.5 % (ref 4–15)
NEUTROPHILS # BLD AUTO: 4.5 K/UL (ref 1.8–7.7)
NEUTROPHILS NFR BLD: 69.2 % (ref 38–73)
NONHDLC SERPL-MCNC: 78 MG/DL
NRBC BLD-RTO: 0 /100 WBC
PLATELET # BLD AUTO: 306 K/UL (ref 150–450)
PMV BLD AUTO: 9.6 FL (ref 9.2–12.9)
POTASSIUM SERPL-SCNC: 4.1 MMOL/L (ref 3.5–5.1)
PROT SERPL-MCNC: 6.4 G/DL (ref 6–8.4)
RBC # BLD AUTO: 4.41 M/UL (ref 4.6–6.2)
SODIUM SERPL-SCNC: 144 MMOL/L (ref 136–145)
TRIGL SERPL-MCNC: 49 MG/DL (ref 30–150)
TSH SERPL DL<=0.005 MIU/L-ACNC: 0.86 UIU/ML (ref 0.4–4)
WBC # BLD AUTO: 6.5 K/UL (ref 3.9–12.7)

## 2024-11-25 PROCEDURE — 1159F MED LIST DOCD IN RCRD: CPT | Mod: CPTII,S$GLB,, | Performed by: FAMILY MEDICINE

## 2024-11-25 PROCEDURE — 85025 COMPLETE CBC W/AUTO DIFF WBC: CPT | Performed by: FAMILY MEDICINE

## 2024-11-25 PROCEDURE — 84153 ASSAY OF PSA TOTAL: CPT | Performed by: FAMILY MEDICINE

## 2024-11-25 PROCEDURE — 3078F DIAST BP <80 MM HG: CPT | Mod: CPTII,S$GLB,, | Performed by: FAMILY MEDICINE

## 2024-11-25 PROCEDURE — 80053 COMPREHEN METABOLIC PANEL: CPT | Performed by: FAMILY MEDICINE

## 2024-11-25 PROCEDURE — 99999 PR PBB SHADOW E&M-EST. PATIENT-LVL IV: CPT | Mod: PBBFAC,,, | Performed by: FAMILY MEDICINE

## 2024-11-25 PROCEDURE — 99214 OFFICE O/P EST MOD 30 MIN: CPT | Mod: 25,S$GLB,, | Performed by: FAMILY MEDICINE

## 2024-11-25 PROCEDURE — 83036 HEMOGLOBIN GLYCOSYLATED A1C: CPT | Performed by: FAMILY MEDICINE

## 2024-11-25 PROCEDURE — 80061 LIPID PANEL: CPT | Performed by: FAMILY MEDICINE

## 2024-11-25 PROCEDURE — 84443 ASSAY THYROID STIM HORMONE: CPT | Performed by: FAMILY MEDICINE

## 2024-11-25 PROCEDURE — 99396 PREV VISIT EST AGE 40-64: CPT | Mod: S$GLB,,, | Performed by: FAMILY MEDICINE

## 2024-11-25 PROCEDURE — 3074F SYST BP LT 130 MM HG: CPT | Mod: CPTII,S$GLB,, | Performed by: FAMILY MEDICINE

## 2024-11-25 PROCEDURE — 3008F BODY MASS INDEX DOCD: CPT | Mod: CPTII,S$GLB,, | Performed by: FAMILY MEDICINE

## 2024-11-25 RX ORDER — PROMETHAZINE HYDROCHLORIDE 6.25 MG/5ML
25 SYRUP ORAL NIGHTLY PRN
Qty: 473 ML | Refills: 0 | Status: SHIPPED | OUTPATIENT
Start: 2024-11-25

## 2024-11-25 RX ORDER — ONDANSETRON 4 MG/1
4 TABLET, ORALLY DISINTEGRATING ORAL EVERY 6 HOURS PRN
COMMUNITY
Start: 2024-09-03 | End: 2024-11-25

## 2024-11-25 RX ORDER — PROMETHAZINE HYDROCHLORIDE 6.25 MG/5ML
25 SYRUP ORAL NIGHTLY PRN
Qty: 473 ML | Refills: 0 | Status: SHIPPED | OUTPATIENT
Start: 2024-11-25 | End: 2024-11-25 | Stop reason: SDUPTHER

## 2024-11-25 NOTE — PROGRESS NOTES
Subjective:       Patient ID: Lenny Latham Jr. is a 64 y.o. male presents with   Patient Active Problem List   Diagnosis    GERD (gastroesophageal reflux disease)    Achalasia of esophagus    Atherosclerosis of native coronary artery of native heart with angina pectoris    Mixed anxiety and depressive disorder    Mixed hyperlipidemia    Coronary artery disease of native artery of native heart with stable angina pectoris    Benign prostatic hyperplasia with urinary obstruction    Erectile dysfunction    Fear of needles    Adjustment disorder    Cognitive complaints with normal exam    Insomnia    Centrilobular emphysema    NATALIE on CPAP    Major depressive disorder, recurrent severe without psychotic features        Chief Complaint: Annual Exam    History of Present Illness    Lenny presents today for routine annual physicals and follow-up regarding weight loss and GI issues. He reports significant weight loss after tightening of the esophagus despite intact appetite. He experiences nausea which affects his ability to eat, along with stomach soreness and delayed digestion. He wakes up hoarse. Promethazine (Phenergan) provides effective nausea relief, allowing him to eat and regain weight. He reports anxiety and depression, attributed to family issues including his father's cancer diagnosis and financial stress. He expresses that these circumstances are taking an emotional toll. Current medications include: Xanax at night for sleep, Valium in the morning, promethazine (Phenergan) daily for nausea, omeprazole in the morning with tea, Lipitor and Flomax in the evening. He has virtual appointments scheduled with a neurologist and gastroenterologist on December 11th.      ROS:  General: -fever, -chills, -fatigue, -weight gain, +weight loss, -loss of appetite  Eyes: -vision changes, -blurry vision, -eye pain, -eye discharge  ENT: -ear pain, -hearing loss, -tinnitus, -nasal congestion, -sore throat  Cardiovascular:  "-chest pain, -palpitations, -lower extremity edema  Respiratory: -cough, -shortness of breath, -wheezing, -sputum production  Endocrine: -polyuria, -polydipsia, -heat intolerance, -cold intolerance  Gastrointestinal: -abdominal pain, -heartburn, +nausea, -vomiting, -diarrhea, -constipation, -blood in stool  Genitourinary: -dysuria, -urgency, -frequency, -hematuria, -nocturia, -incontinence  Heme & Lymphatic: -easy or excessive bleeding, -easy bruising, -swollen lymph nodes  Musculoskeletal: -muscle pain, -back pain, -joint pain, -joint swelling  Skin: -rash, -lesion, -itching, -skin texture changes, -skin color changes  Neurological: -headache, -dizziness, -numbness, -tingling, -seizure activity, -speech difficulty, -memory loss, -confusion  Psychiatric: +anxiety, +depression, -sleep difficulty                /64 (BP Location: Right arm, Patient Position: Sitting)   Pulse 80   Resp 18   Ht 5' 3" (1.6 m)   Wt 67.4 kg (148 lb 9.4 oz)   SpO2 97%   BMI 26.32 kg/m²   Objective:      Physical Exam  Constitutional:       Appearance: He is well-developed.   HENT:      Head: Normocephalic and atraumatic.   Cardiovascular:      Rate and Rhythm: Normal rate and regular rhythm.      Heart sounds: Normal heart sounds. No murmur heard.  Pulmonary:      Effort: Pulmonary effort is normal.      Breath sounds: Normal breath sounds. No wheezing.   Abdominal:      General: Bowel sounds are normal.      Palpations: Abdomen is soft.      Tenderness: There is no abdominal tenderness.   Skin:     General: Skin is warm and dry.      Findings: No rash.   Neurological:      Mental Status: He is alert and oriented to person, place, and time.   Psychiatric:         Mood and Affect: Mood normal.           Assessment/Plan:   1. Routine general medical examination at a health care facility  -     Urinalysis, Reflex to Urine Culture Urine, Clean Catch; Future; Expected date: 11/25/2024  -     Hemoglobin A1C; Future; Expected date: " 11/25/2024  -     TSH; Future; Expected date: 11/25/2024  -     Lipid Panel; Future; Expected date: 11/25/2024  -     Comprehensive Metabolic Panel; Future; Expected date: 11/25/2024  -     CBC Auto Differential; Future; Expected date: 11/25/2024  -     PSA, Screening; Future; Expected date: 11/25/2024  Vital signs stable today.  Clinical exam stable  Continue lifestyle modifications with low-fat and low-cholesterol diet and exercise 30 minutes daily    2. Coronary artery disease of native artery of native heart with stable angina pectoris  -     Urinalysis, Reflex to Urine Culture Urine, Clean Catch; Future; Expected date: 11/25/2024  -     TSH; Future; Expected date: 11/25/2024  -     Lipid Panel; Future; Expected date: 11/25/2024  -     Comprehensive Metabolic Panel; Future; Expected date: 11/25/2024  Stable    3. Mixed hyperlipidemia  -     Lipid Panel; Future; Expected date: 11/25/2024  Currently taking Lipitor 40 mg daily    4. Gastroesophageal reflux disease without esophagitis  Currently on omeprazole 40 mg but still complains of chronic nausea for which patient has been on promethazine    5. Benign prostatic hyperplasia with urinary obstruction  Stable on Flomax    6. Centrilobular emphysema  Overview:  Centrilobular emphysema seen on CT imaging 10/13/2020 CTchest/abdomen mild emphysematous changes in both lungs.   Not symptomatic, not on inhalers.  6/16/2023 spirometry is normal. No COPD gold findings.  12/16/22 spirometry is normal, no COPD gold findings.  10/4/2022 chest xray mild scarring no pleural plaque seen.   12/16/2022 CPFT Normal spirometry FEV 98.2%. Lung volumes normal. Moderate reduced Diffusing capacity,diffusing capacity may be underestimated.    Stable and asymptomatic      7. NATALIE on CPAP  Overview:  12/28/2023 PSG Moderate obstructive sleep apnea: AHI was 21.7/hr  1/5/2023 cpap 8 cm optimal  1/12/2023 order cpap 8 w/nasal mask, needs over night on cpap 8 after IDL  Never obtained CPAP  after ordered, missed 3 appointments with [facility] to  machine. He had other medical problems/surgeries that affected him picking up CPAP machine.    9/26/2023 order for CPAP 8 cm   Full face mask requested   H[facility]: [facility]   Follow up 31-90 days from obtaining PAP therapy for IDL.     Stable        8. Major depressive disorder, recurrent severe without psychotic features  9. Mixed anxiety and depressive disorder  Followed by Psychiatry currently on Invega Xanax and Valium    10. Prostate cancer screening  -     PSA, Screening; Future; Expected date: 11/25/2024    11. Achalasia of esophagus  Overview:  EGD 9/12/24: dilated tortuous esohagus. Abnormal motility GE at 38cm, s/p Botox 25U in 4 quadrant. Normal stomach and duodenum. No large amounts of fluid in stomach, pylorus wide open. No signs of gastroparesis.   Advised to discuss further with Gastroenterology    12. Atherosclerosis of native coronary artery of native heart with angina pectoris  Stable and asymptomatic    13. Combined arterial insufficiency and corporo-venous occlusive erectile dysfunction    14. Unintended weight loss  -     TSH; Future; Expected date: 11/25/2024  -     CBC Auto Differential; Future; Expected date: 11/25/2024  -     CT Chest Abdomen Pelvis Without Contrast (XPD); Future; Expected date: 11/25/2024  Secondary to unintended weight loss with normal appetite will get CT scan of the chest abdomen and pelvis for further evaluation  Patient was also advised to discuss further with Gastroenterology as scheduled secondary to chronic nausea and epigastric pain  Encouraged to eat protein rich diet    15. Other long term (current) drug therapy  -     Hemoglobin A1C; Future; Expected date: 11/25/2024    16. Chronic nausea  -     promethazine (PHENERGAN) 6.25 mg/5 mL syrup; Take 20 mLs (25 mg total) by mouth nightly as needed for Nausea.  Dispense: 473 mL; Refill: 0  Refill has been given on Phenergan as requested today but encouraged to  discuss further refills with Gastroenterology as patient has been on this medication more frequently and reports Zofran does not help him with chronic nausea               This note was generated with the assistance of ambient listening technology. Verbal consent was obtained by the patient and accompanying visitor(s) for the recording of patient appointment to facilitate this note. I attest to having reviewed and edited the generated note for accuracy, though some syntax or spelling errors may persist. Please contact the author of this note for any clarification.

## 2024-12-11 ENCOUNTER — TELEPHONE (OUTPATIENT)
Dept: GASTROENTEROLOGY | Facility: CLINIC | Age: 64
End: 2024-12-11
Payer: MEDICARE

## 2024-12-11 NOTE — TELEPHONE ENCOUNTER
Patient no showed motility appointment x 2. Unable to reschedule appointment due to multiple no shows.

## 2024-12-12 ENCOUNTER — TELEPHONE (OUTPATIENT)
Dept: INTERNAL MEDICINE | Facility: CLINIC | Age: 64
End: 2024-12-12
Payer: MEDICARE

## 2024-12-12 NOTE — TELEPHONE ENCOUNTER
----- Message from Sampson sent at 12/12/2024  1:32 PM CST -----  Contact: @ [facility] [phone]  Requesting an RX refill or new RX.    Is this a refill or new RX: refill    RX name and strength (copy/paste from chart):  omeprazole (PRILOSEC) 40 MG capsule and atorvastatin (LIPITOR) 40 MG tablet     Is this a 30 day or 90 day RX:     Pharmacy name and phone # (copy/paste from chart):  re      [facility] - [address]  [address]  [place] [address]  Phone: [phone] Fax: [phone]

## 2024-12-13 DIAGNOSIS — K22.0 ACHALASIA OF ESOPHAGUS: ICD-10-CM

## 2024-12-13 DIAGNOSIS — K21.9 GASTROESOPHAGEAL REFLUX DISEASE WITHOUT ESOPHAGITIS: ICD-10-CM

## 2024-12-13 DIAGNOSIS — E78.2 MIXED HYPERLIPIDEMIA: ICD-10-CM

## 2024-12-14 RX ORDER — OMEPRAZOLE 40 MG/1
CAPSULE, DELAYED RELEASE ORAL
Qty: 90 CAPSULE | Refills: 3 | Status: SHIPPED | OUTPATIENT
Start: 2024-12-14

## 2024-12-14 RX ORDER — ATORVASTATIN CALCIUM 40 MG/1
TABLET, FILM COATED ORAL
Qty: 90 TABLET | Refills: 3 | Status: SHIPPED | OUTPATIENT
Start: 2024-12-14

## 2024-12-14 NOTE — TELEPHONE ENCOUNTER
Refill Decision Note   Lenny Latham  is requesting a refill authorization.  Brief Assessment and Rationale for Refill:  Approve     Medication Therapy Plan:         Comments:     Note composed:12:53 PM 12/14/2024

## 2024-12-14 NOTE — TELEPHONE ENCOUNTER
No care due was identified.  [facility] Embedded Care Due Messages. Reference number: [id number].   12/13/2024 10:22:55 PM CST

## 2024-12-23 ENCOUNTER — HOSPITAL ENCOUNTER (OUTPATIENT)
Dept: RADIOLOGY | Facility: HOSPITAL | Age: 64
Discharge: HOME | End: 2024-12-23
Attending: FAMILY MEDICINE
Payer: MEDICARE

## 2024-12-23 DIAGNOSIS — R63.4 UNINTENDED WEIGHT LOSS: ICD-10-CM

## 2024-12-23 PROCEDURE — 71250 CT THORAX DX C-: CPT | Mod: 26,,, | Performed by: RADIOLOGY

## 2024-12-23 PROCEDURE — 71250 CT THORAX DX C-: CPT | Mod: TC

## 2024-12-23 PROCEDURE — 74176 CT ABD & PELVIS W/O CONTRAST: CPT | Mod: 26,,, | Performed by: RADIOLOGY

## 2024-12-24 DIAGNOSIS — R11.0 CHRONIC NAUSEA: ICD-10-CM

## 2024-12-24 RX ORDER — PROMETHAZINE HYDROCHLORIDE 6.25 MG/5ML
25 SYRUP ORAL NIGHTLY PRN
Qty: 473 ML | Refills: 0 | Status: SHIPPED | OUTPATIENT
Start: 2024-12-24

## 2024-12-25 ENCOUNTER — TELEPHONE (OUTPATIENT)
Dept: INTERNAL MEDICINE | Facility: CLINIC | Age: 64
End: 2024-12-25
Payer: MEDICARE

## 2024-12-25 DIAGNOSIS — R93.5 ABNORMAL CT OF THE ABDOMEN: Primary | ICD-10-CM

## 2024-12-27 ENCOUNTER — TELEPHONE (OUTPATIENT)
Dept: INTERNAL MEDICINE | Facility: CLINIC | Age: 64
End: 2024-12-27
Payer: MEDICARE

## 2024-12-27 NOTE — TELEPHONE ENCOUNTER
Spoke w/Meño at  [phone], appt scheduled for pt w/Dr. Aburto Jan. 2nd at 1430. Spoke w/pt and notified him of appt date and time, address, MD, and  number, encouraged pt to keep appt as scheduled. Also informed pt that paperwork is printed and will be at registration desk for p/u prior to appt. Pt verbalized understanding.

## 2025-01-02 ENCOUNTER — TELEPHONE (OUTPATIENT)
Dept: INTERNAL MEDICINE | Facility: CLINIC | Age: 65
End: 2025-01-02
Payer: MEDICARE

## 2025-01-02 NOTE — TELEPHONE ENCOUNTER
----- Message from Gaston sent at 1/2/2025 10:21 AM CST -----  Contact: [phone]  Type:  Patient Returning Call    Who Called:PETER NOLAN JR. [[id number]]  Who Left Message for Patient:  Does the patient know what this is regarding?:need to talk to the nurse to   Would the patient rather a call back or a response via [facility]? callback  Best Call Back Number: [phone]  Additional Information: n [phone]

## 2025-01-06 ENCOUNTER — OFFICE VISIT (OUTPATIENT)
Dept: PSYCHIATRY | Facility: CLINIC | Age: 65
End: 2025-01-06
Payer: COMMERCIAL

## 2025-01-06 VITALS
WEIGHT: 142 LBS | DIASTOLIC BLOOD PRESSURE: 63 MMHG | HEART RATE: 87 BPM | SYSTOLIC BLOOD PRESSURE: 96 MMHG | BODY MASS INDEX: 25.15 KG/M2

## 2025-01-06 DIAGNOSIS — F63.81 INTERMITTENT EXPLOSIVE DISORDER: ICD-10-CM

## 2025-01-06 DIAGNOSIS — F41.9 ANXIETY: Primary | ICD-10-CM

## 2025-01-06 DIAGNOSIS — G47.00 INSOMNIA, UNSPECIFIED TYPE: ICD-10-CM

## 2025-01-06 PROCEDURE — 3008F BODY MASS INDEX DOCD: CPT | Mod: CPTII,S$GLB,, | Performed by: PSYCHIATRY & NEUROLOGY

## 2025-01-06 PROCEDURE — 99213 OFFICE O/P EST LOW 20 MIN: CPT | Mod: S$GLB,,, | Performed by: PSYCHIATRY & NEUROLOGY

## 2025-01-06 PROCEDURE — 3074F SYST BP LT 130 MM HG: CPT | Mod: CPTII,S$GLB,, | Performed by: PSYCHIATRY & NEUROLOGY

## 2025-01-06 PROCEDURE — 3078F DIAST BP <80 MM HG: CPT | Mod: CPTII,S$GLB,, | Performed by: PSYCHIATRY & NEUROLOGY

## 2025-01-06 PROCEDURE — 99999 PR PBB SHADOW E&M-EST. PATIENT-LVL II: CPT | Mod: PBBFAC,,, | Performed by: PSYCHIATRY & NEUROLOGY

## 2025-01-06 RX ORDER — MIRTAZAPINE 15 MG/1
15 TABLET, FILM COATED ORAL NIGHTLY
Qty: 30 TABLET | Refills: 3 | Status: SHIPPED | OUTPATIENT
Start: 2025-01-06 | End: 2026-01-06

## 2025-01-06 RX ORDER — PALIPERIDONE 3 MG/1
3 TABLET, EXTENDED RELEASE ORAL DAILY
Qty: 90 TABLET | Refills: 0 | Status: SHIPPED | OUTPATIENT
Start: 2025-01-06

## 2025-01-06 RX ORDER — ALPRAZOLAM 1 MG/1
1 TABLET ORAL NIGHTLY PRN
Qty: 90 TABLET | Refills: 0 | Status: SHIPPED | OUTPATIENT
Start: 2025-01-06 | End: 2025-05-06

## 2025-01-06 RX ORDER — DIAZEPAM 5 MG/1
5 TABLET ORAL DAILY PRN
Qty: 90 TABLET | Refills: 0 | Status: SHIPPED | OUTPATIENT
Start: 2025-01-06 | End: 2025-04-06
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
## 2025-01-19 DIAGNOSIS — R11.0 CHRONIC NAUSEA: ICD-10-CM

## 2025-01-20 ENCOUNTER — NURSE TRIAGE (OUTPATIENT)
Dept: ADMINISTRATIVE | Facility: CLINIC | Age: 65
End: 2025-01-20
Payer: MEDICARE

## 2025-01-20 ENCOUNTER — OCHSNER VIRTUAL EMERGENCY DEPARTMENT (OUTPATIENT)
Facility: CLINIC | Age: 65
End: 2025-01-20
Payer: MEDICARE

## 2025-01-20 RX ORDER — PROMETHAZINE HYDROCHLORIDE 6.25 MG/5ML
25 SYRUP ORAL NIGHTLY PRN
Qty: 120 ML | Refills: 0 | Status: SHIPPED | OUTPATIENT
Start: 2025-01-20 | End: 2025-02-03

## 2025-01-20 NOTE — PLAN OF CARE-OVED
[facility] Emergency Department Plan of Care Note  Referral Source: Nurse On-Call                               Chief Complaint   Patient presents with    Medication Refill     Phenergan, used for chronic nausea       Recommendation: Treat in place                        Will give 6 doses, has missed several GI appts for this issue      Medications Ordered This Encounter   Medications    promethazine (PHENERGAN) 6.25 mg/5 mL syrup     Sig: Take 20 mLs (25 mg total) by mouth nightly as needed for Nausea.     Dispense:  120 mL     Refill:  0
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
## 2025-01-20 NOTE — TELEPHONE ENCOUNTER
No care due was identified.  [facility] Embedded Care Due Messages. Reference number: [id number].   1/19/2025 7:48:30 PM CST

## 2025-01-24 RX ORDER — PROMETHAZINE HYDROCHLORIDE 6.25 MG/5ML
25 SYRUP ORAL NIGHTLY PRN
Qty: 473 ML | Refills: 0 | Status: SHIPPED | OUTPATIENT
Start: 2025-01-24

## 2025-01-24 NOTE — TELEPHONE ENCOUNTER
Refill Routing Note   Medication(s) are not appropriate for processing by [facility] for the following reason(s):        Outside of protocol    [facility] action(s):  Route               Appointments  past 12m or future 3m with PCP    Date Provider   Last Visit   11/25/2024 Herminia Longoria MD   Next Visit   Visit date not found Herminia Longoria MD   ED visits in past 90 days: 0        Note composed:8:59 AM 01/24/2025

## 2025-03-07 DIAGNOSIS — R11.0 CHRONIC NAUSEA: ICD-10-CM

## 2025-03-07 NOTE — TELEPHONE ENCOUNTER
No care due was identified.  [facility] Embedded Care Due Messages. Reference number: [id number].   3/07/2025 4:42:26 PM CST

## 2025-03-10 RX ORDER — PROMETHAZINE HYDROCHLORIDE 6.25 MG/5ML
SYRUP ORAL
Qty: 473 ML | Refills: 0 | Status: SHIPPED | OUTPATIENT
Start: 2025-03-10

## 2025-03-10 NOTE — TELEPHONE ENCOUNTER
Refill Routing Note   Medication(s) are not appropriate for processing by [facility] for the following reason(s):        Outside of protocol    [facility] action(s):  Route               Appointments  past 12m or future 3m with PCP    Date Provider   Last Visit   11/25/2024 Herminia Longoria MD   Next Visit   Visit date not found Herminia Longoria MD   ED visits in past 90 days: 0        Note composed:8:39 AM 03/10/2025

## 2025-03-13 DIAGNOSIS — G47.00 INSOMNIA, UNSPECIFIED TYPE: ICD-10-CM

## 2025-03-13 DIAGNOSIS — H16.229 KERATOCONJUNCTIVITIS SICCA: ICD-10-CM

## 2025-03-13 DIAGNOSIS — F41.9 ANXIETY: ICD-10-CM

## 2025-03-13 RX ORDER — ALPRAZOLAM 1 MG/1
1 TABLET ORAL NIGHTLY PRN
Qty: 90 TABLET | Refills: 0 | Status: SHIPPED | OUTPATIENT
Start: 2025-03-13 | End: 2025-07-11

## 2025-03-13 RX ORDER — DIAZEPAM 5 MG/1
5 TABLET ORAL DAILY PRN
Qty: 90 TABLET | Refills: 0 | Status: SHIPPED | OUTPATIENT
Start: 2025-03-13 | End: 2025-06-11

## 2025-03-13 NOTE — TELEPHONE ENCOUNTER
Pt scheduled for 04/07/2025 at 3:30 PM         ----- Message from Sydnee sent at 3/13/2025  9:06 AM CDT -----  Contact: self  ..Type:  RX Refill RequestWho Called: .Lenny Latham Jr.Refill or New Rx:Refill RX Name and Strength:  ALPRAZolam (XANAX) 1 MG tablet, and diazePAM (VALIUM) 5 MG tabletHow is the patient currently taking it? (ex. 1XDay):1xday Is this a 30 day or 90 day RX: 90Preferred Pharmacy with phone number: .[id number] - [place], [place] - [address] [facility] [address] [place] [address]Xembs: [phone] Fax: [phone] or Mail Order:local Ordering Provider:Shannan Would the patient rather a call back or a response via [facility]? Call back Best Call Back Number:.[phone] ([place]) Additional Information: pt is asking that he be contacted once prescription is called into pharmacy.

## 2025-04-06 DIAGNOSIS — R11.0 CHRONIC NAUSEA: ICD-10-CM

## 2025-04-06 NOTE — TELEPHONE ENCOUNTER
No care due was identified.  [facility] Embedded Care Due Messages. Reference number: [id number].   4/06/2025 5:38:00 PM CDT

## 2025-04-07 ENCOUNTER — OFFICE VISIT (OUTPATIENT)
Dept: PSYCHIATRY | Facility: CLINIC | Age: 65
End: 2025-04-07
Payer: MEDICARE

## 2025-04-07 DIAGNOSIS — F41.9 ANXIETY: ICD-10-CM

## 2025-04-07 DIAGNOSIS — G47.00 INSOMNIA, UNSPECIFIED TYPE: ICD-10-CM

## 2025-04-07 PROCEDURE — 99214 OFFICE O/P EST MOD 30 MIN: CPT | Mod: S$GLB,,, | Performed by: PSYCHIATRY & NEUROLOGY

## 2025-04-07 RX ORDER — ALPRAZOLAM 1 MG/1
1 TABLET ORAL NIGHTLY PRN
Qty: 90 TABLET | Refills: 0 | Status: SHIPPED | OUTPATIENT
Start: 2025-04-07 | End: 2025-08-05

## 2025-04-07 RX ORDER — DIAZEPAM 5 MG/1
5 TABLET ORAL DAILY PRN
Qty: 90 TABLET | Refills: 0 | Status: SHIPPED | OUTPATIENT
Start: 2025-04-07 | End: 2025-07-06

## 2025-04-07 RX ORDER — MIRTAZAPINE 15 MG/1
15 TABLET, FILM COATED ORAL NIGHTLY
Qty: 90 TABLET | Refills: 1 | Status: SHIPPED | OUTPATIENT
Start: 2025-04-07 | End: 2026-04-07

## 2025-04-07 RX ORDER — PROMETHAZINE HYDROCHLORIDE 6.25 MG/5ML
SYRUP ORAL
Qty: 473 ML | Refills: 0 | Status: SHIPPED | OUTPATIENT
Start: 2025-04-07

## 2025-04-07 RX ORDER — PALIPERIDONE 3 MG/1
3 TABLET, EXTENDED RELEASE ORAL DAILY
Qty: 90 TABLET | Refills: 1 | Status: SHIPPED | OUTPATIENT
Start: 2025-04-07
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
## 2025-04-08 ENCOUNTER — TELEPHONE (OUTPATIENT)
Dept: PSYCHIATRY | Facility: CLINIC | Age: 65
End: 2025-04-08
Payer: MEDICARE

## 2025-04-08 NOTE — TELEPHONE ENCOUNTER
----- Message from Ihsandenis sent at 4/8/2025  2:55 PM CDT -----  Regarding: urgent Lottie  Type:  RX Refill RequestWho Called: PETER NOLAN JR. [[id number]]Refill or New Rx:RX Name and Strength:diazePAM (VALIUM) 5 MG tabletALPRAZolam (XANAX) 1 MG tabletHow is the patient currently taking it? (ex. 1XDay):Is this a 30 day or 90 day RX:Preferred Pharmacy with phone number:[id number] - [place], [place] - [address] AT [facility] [address] [place] [address]Drfkb: [phone] Fax: [phone] or Mail Order:Ordering Provider:Would the patient rather a call back or a response via [facility]? Best Call Back Number: [phone] SilveriaAdditional Information: Patient stated he has wrong medication Addrall

## 2025-04-08 NOTE — TELEPHONE ENCOUNTER
Spoke with pt to confirm he did not receive adderall from pharmacy.  He states he did not get the valium or the xanax.  Called pharmacy who stated the xanax is in their que being filled and the valium was last picked up on 1/17 so it has not been 90 days yet.  Called patient back to let him know.

## 2025-04-21 ENCOUNTER — OFFICE VISIT (OUTPATIENT)
Dept: INTERNAL MEDICINE | Facility: CLINIC | Age: 65
End: 2025-04-21
Payer: MEDICARE

## 2025-04-21 VITALS
HEART RATE: 106 BPM | SYSTOLIC BLOOD PRESSURE: 97 MMHG | TEMPERATURE: 98 F | DIASTOLIC BLOOD PRESSURE: 64 MMHG | WEIGHT: 130.75 LBS | BODY MASS INDEX: 23.16 KG/M2 | OXYGEN SATURATION: 99 %

## 2025-04-21 DIAGNOSIS — K22.0 ACHALASIA OF ESOPHAGUS: Primary | ICD-10-CM

## 2025-04-21 DIAGNOSIS — F33.2 MAJOR DEPRESSIVE DISORDER, RECURRENT SEVERE WITHOUT PSYCHOTIC FEATURES: ICD-10-CM

## 2025-04-21 DIAGNOSIS — K21.9 GASTROESOPHAGEAL REFLUX DISEASE WITHOUT ESOPHAGITIS: ICD-10-CM

## 2025-04-21 DIAGNOSIS — R13.10 DYSPHAGIA, UNSPECIFIED TYPE: ICD-10-CM

## 2025-04-21 DIAGNOSIS — J43.2 CENTRILOBULAR EMPHYSEMA: ICD-10-CM

## 2025-04-21 PROCEDURE — 3008F BODY MASS INDEX DOCD: CPT | Mod: CPTII,S$GLB,, | Performed by: FAMILY MEDICINE

## 2025-04-21 PROCEDURE — 99999 PR PBB SHADOW E&M-EST. PATIENT-LVL IV: CPT | Mod: PBBFAC,,, | Performed by: FAMILY MEDICINE

## 2025-04-21 PROCEDURE — 3074F SYST BP LT 130 MM HG: CPT | Mod: CPTII,S$GLB,, | Performed by: FAMILY MEDICINE

## 2025-04-21 PROCEDURE — 1159F MED LIST DOCD IN RCRD: CPT | Mod: CPTII,S$GLB,, | Performed by: FAMILY MEDICINE

## 2025-04-21 PROCEDURE — 99213 OFFICE O/P EST LOW 20 MIN: CPT | Mod: S$GLB,,, | Performed by: FAMILY MEDICINE

## 2025-04-21 PROCEDURE — 3078F DIAST BP <80 MM HG: CPT | Mod: CPTII,S$GLB,, | Performed by: FAMILY MEDICINE

## 2025-04-21 NOTE — PROGRESS NOTES
Subjective:       Patient ID: Lenny Latham Jr. is a 64 y.o. male presents with Problem List[1]     Chief Complaint: Annual Exam    64-year-old  male patient with Patient Active Problem List:     GERD (gastroesophageal reflux disease)     Achalasia of esophagus     Atherosclerosis of native coronary artery of native heart with angina pectoris     Mixed anxiety and depressive disorder     Mixed hyperlipidemia     Coronary artery disease of native artery of native heart with stable angina pectoris     Benign prostatic hyperplasia with urinary obstruction     Erectile dysfunction     Fear of needles     Adjustment disorder     Cognitive complaints with normal exam     Insomnia     Centrilobular emphysema     NATALIE on CPAP     Major depressive disorder, recurrent severe without psychotic features  Here to discuss about recent CT scan done and about recent test results  Patient occasionally has been having stuck like sensation after eating food for certain period of time, which was all spontaneously  Denies of any weight loss and has been taking promethazine which has been helpful with nausea  Continues to take omeprazole regularly  Denies of any abdominal discomfort and vomiting      Review of Systems   Constitutional:  Negative for appetite change, fatigue and unexpected weight change.   HENT:  Positive for trouble swallowing.    Eyes:  Negative for visual disturbance.   Respiratory:  Negative for shortness of breath.    Cardiovascular:  Negative for chest pain, palpitations and leg swelling.   Gastrointestinal:  Positive for nausea. Negative for abdominal pain and vomiting.   Musculoskeletal:  Negative for myalgias.   Skin:  Negative for rash.   Neurological:  Negative for headaches.   Psychiatric/Behavioral:  Negative for sleep disturbance.          BP 97/64 (BP Location: Left arm, Patient Position: Sitting)   Pulse 106   Temp 97.7 °F (36.5 °C) (Tympanic)   Wt 59.3 kg (130 lb 11.7 oz)   SpO2  99%   BMI 23.16 kg/m²   Objective:      Physical Exam  Constitutional:       Appearance: He is well-developed.   HENT:      Head: Normocephalic and atraumatic.   Cardiovascular:      Rate and Rhythm: Normal rate and regular rhythm.      Heart sounds: Normal heart sounds. No murmur heard.  Pulmonary:      Effort: Pulmonary effort is normal.      Breath sounds: Normal breath sounds. No wheezing.   Abdominal:      General: Bowel sounds are normal.      Palpations: Abdomen is soft.      Tenderness: There is no abdominal tenderness.   Skin:     General: Skin is warm and dry.      Findings: No rash.   Neurological:      Mental Status: He is alert and oriented to person, place, and time.   Psychiatric:         Mood and Affect: Mood normal.         CT Chest Abdomen Pelvis Without Contrast (XPD)  Narrative: EXAMINATION:  CT CHEST ABDOMEN PELVIS WITHOUT CONTRAST(XPD)    CLINICAL HISTORY:  Weight loss, unintended;Abnormal weight loss    TECHNIQUE:  Low dose axial images, sagittal and coronal reformations were obtained from the thoracic inlet to the pubic synthesis following the oral administration of 30 mL of Omnipaque 350.    COMPARISON:  CT chest abdomen pelvis August 18, 2021.    FINDINGS:  Chest:    Lungs: No acute finding.  No suspicious pulmonary nodule.    Coronary artery calcification: Present.    Aorta: Minimal atherosclerosis.  No thoracic aortic aneurysm.    Pleural space:No pleural effusion or pneumothorax.    Heart: Normal size. No pericardial effusion.    Bones/joints: Multilevel flowing osteophytosis.  No acute osseous finding.    Other: Patulous esophagus with dilated distal esophagus.  Possible thickening of the proximal esophagus.  Suspected lesion at the GE junction leading to obstruction with ingested material in the dilated distal esophagus.    Abdomen/pelvis:    Liver: Normal in size and attenuation, with no focal hepatic lesions.    Gallbladder: No calcified gallstones.    Bile Ducts: No evidence of  dilated ducts.    Pancreas: No mass or peripancreatic fat stranding.    Spleen: Unremarkable.    Adrenals: Unremarkable.    Kidneys/ Ureters: Unremarkable.  No hydronephrosis or nephrolithiasis.    Bladder: Unremarkable.    Reproductive organs: Unremarkable.    GI Tract/Mesentery: Suspected thickening of the proximal stomach.  No evidence of acute appendicitis.    Peritoneal Space: No ascites. No free air.    Retroperitoneum: No significant adenopathy.    Abdominal wall: Unremarkable.    Vasculature: No significant atherosclerosis or aneurysm.    Bones: No acute fracture.  No aggressive osseous lesion.  Impression: Concern for obstruction of the GE junction with dilated distal esophagus full of ingested material versus large diverticulum containing ingested material.  The esophagus is patulous there is possible thickening of the proximal esophagus.  Correlation with upper endoscopy is recommended.    There is suspected thickening of the proximal stomach.    Findings communicated to Dr. Longoria by epic chat on December 23, 2024 at 16:42.    Electronically signed by: Atif Mcmillan  Date:    12/23/2024  Time:    16:43     Assessment/Plan:   1. Achalasia of esophagus  Overview:  EGD 9/12/24: dilated tortuous esohagus. Abnormal motility GE at 38cm, s/p Botox 25U in 4 quadrant. Normal stomach and duodenum. No large amounts of fluid in stomach, pylorus wide open. No signs of gastroparesis.     Orders:  -     Ambulatory referral/consult to Gastroenterology; Future; Expected date: 04/28/2025  2. Dysphagia, unspecified type  -     Ambulatory referral/consult to Gastroenterology; Future; Expected date: 04/28/2025    Will refer to Gastroenterology for further evaluation  Patient to continue omeprazole and promethazine as needed  Reviewed CT scan of the abdomen test results with patient today and advised to see Gastroenterology for further evaluation to see if patient would need repeat EGD    3. Gastroesophageal reflux disease  without esophagitis  Continue omeprazole 40 mg daily    4. Major depressive disorder, recurrent severe without psychotic features  Clinically doing well on Invega followed by Psychiatry    5. Centrilobular emphysema  Overview:  Centrilobular emphysema seen on CT imaging 10/13/2020 CTchest/abdomen mild emphysematous changes in both lungs.   Not symptomatic, not on inhalers.  6/16/2023 spirometry is normal. No COPD gold findings.  12/16/22 spirometry is normal, no COPD gold findings.  10/4/2022 chest xray mild scarring no pleural plaque seen.   12/16/2022 CPFT Normal spirometry FEV 98.2%. Lung volumes normal. Moderate reduced Diffusing capacity,diffusing capacity may be underestimated.    Stable and asymptomatic                    [1]   Patient Active Problem List  Diagnosis    GERD (gastroesophageal reflux disease)    Achalasia of esophagus    Atherosclerosis of native coronary artery of native heart with angina pectoris    Mixed anxiety and depressive disorder    Mixed hyperlipidemia    Coronary artery disease of native artery of native heart with stable angina pectoris    Benign prostatic hyperplasia with urinary obstruction    Erectile dysfunction    Fear of needles    Adjustment disorder    Cognitive complaints with normal exam    Insomnia    Centrilobular emphysema    NATALIE on CPAP    Major depressive disorder, recurrent severe without psychotic features

## 2025-05-07 DIAGNOSIS — R11.0 CHRONIC NAUSEA: ICD-10-CM

## 2025-05-07 RX ORDER — PROMETHAZINE HYDROCHLORIDE 6.25 MG/5ML
25 SYRUP ORAL NIGHTLY PRN
Qty: 473 ML | Refills: 0 | Status: SHIPPED | OUTPATIENT
Start: 2025-05-07 | End: 2025-06-06

## 2025-05-07 NOTE — TELEPHONE ENCOUNTER
----- Message from Sidra sent at 5/7/2025  9:15 AM CDT -----  Contact: [phone]  .[email or web address] Patient is calling for Medical Advice regarding: Pt said he needs a call back has questions How long has patient had these symptoms:Pharmacy name and phone#:Patient wants a call back or thru [facility]: call back Comments:Please advise patient replies from provider may take up to 48 hours.

## 2025-05-07 NOTE — TELEPHONE ENCOUNTER
No care due was identified.  Health [facility] Embedded Care Due Messages. Reference number: [id number].   5/07/2025 10:03:35 AM CDT

## 2025-05-07 NOTE — TELEPHONE ENCOUNTER
Refill Routing Note   Medication(s) are not appropriate for processing by [facility] for the following reason(s):        Outside of protocol    [facility] action(s):  Route               Appointments  past 12m or future 3m with PCP    Date Provider   Last Visit   4/21/2025 Herminia Longoria MD   Next Visit   Visit date not found Herminia Longoria MD   ED visits in past 90 days: 0        Note composed:10:07 AM 05/07/2025

## 2025-05-26 DIAGNOSIS — Z00.00 ENCOUNTER FOR MEDICARE ANNUAL WELLNESS EXAM: ICD-10-CM

## 2025-05-28 DIAGNOSIS — R11.0 CHRONIC NAUSEA: ICD-10-CM

## 2025-05-28 NOTE — TELEPHONE ENCOUNTER
No care due was identified.  [facility] Embedded Care Due Messages. Reference number: [id number].   5/28/2025 5:21:01 PM CDT

## 2025-05-29 RX ORDER — PROMETHAZINE HYDROCHLORIDE 6.25 MG/5ML
SYRUP ORAL
Qty: 473 ML | Refills: 0 | Status: SHIPPED | OUTPATIENT
Start: 2025-05-29

## 2025-06-05 ENCOUNTER — OFFICE VISIT (OUTPATIENT)
Dept: INTERNAL MEDICINE | Facility: CLINIC | Age: 65
End: 2025-06-05
Payer: MEDICARE

## 2025-06-05 VITALS — WEIGHT: 127.19 LBS | BODY MASS INDEX: 22.54 KG/M2 | RESPIRATION RATE: 20 BRPM | HEIGHT: 63 IN

## 2025-06-05 DIAGNOSIS — F43.20 ADJUSTMENT DISORDER, UNSPECIFIED TYPE: ICD-10-CM

## 2025-06-05 DIAGNOSIS — E78.2 MIXED HYPERLIPIDEMIA: ICD-10-CM

## 2025-06-05 DIAGNOSIS — G47.00 INSOMNIA, UNSPECIFIED TYPE: ICD-10-CM

## 2025-06-05 DIAGNOSIS — I70.0 AORTIC CALCIFICATION: ICD-10-CM

## 2025-06-05 DIAGNOSIS — K21.9 GASTROESOPHAGEAL REFLUX DISEASE WITHOUT ESOPHAGITIS: ICD-10-CM

## 2025-06-05 DIAGNOSIS — N40.1 BENIGN PROSTATIC HYPERPLASIA WITH URINARY OBSTRUCTION: ICD-10-CM

## 2025-06-05 DIAGNOSIS — N13.8 BENIGN PROSTATIC HYPERPLASIA WITH URINARY OBSTRUCTION: ICD-10-CM

## 2025-06-05 DIAGNOSIS — G47.33 OSA ON CPAP: ICD-10-CM

## 2025-06-05 DIAGNOSIS — K22.0 ACHALASIA OF ESOPHAGUS: ICD-10-CM

## 2025-06-05 DIAGNOSIS — I77.1 TORTUOUS AORTA: ICD-10-CM

## 2025-06-05 DIAGNOSIS — J43.2 CENTRILOBULAR EMPHYSEMA: ICD-10-CM

## 2025-06-05 DIAGNOSIS — F33.2 MAJOR DEPRESSIVE DISORDER, RECURRENT SEVERE WITHOUT PSYCHOTIC FEATURES: ICD-10-CM

## 2025-06-05 DIAGNOSIS — E53.1 VITAMIN B6 DEFICIENCY: ICD-10-CM

## 2025-06-05 DIAGNOSIS — I25.119 ATHEROSCLEROSIS OF NATIVE CORONARY ARTERY OF NATIVE HEART WITH ANGINA PECTORIS: ICD-10-CM

## 2025-06-05 DIAGNOSIS — N52.9 ERECTILE DYSFUNCTION, UNSPECIFIED ERECTILE DYSFUNCTION TYPE: ICD-10-CM

## 2025-06-05 DIAGNOSIS — I25.118 CORONARY ARTERY DISEASE OF NATIVE ARTERY OF NATIVE HEART WITH STABLE ANGINA PECTORIS: ICD-10-CM

## 2025-06-05 DIAGNOSIS — F41.8 MIXED ANXIETY AND DEPRESSIVE DISORDER: ICD-10-CM

## 2025-06-05 DIAGNOSIS — Z59.00 HOMELESS SINGLE PERSON: ICD-10-CM

## 2025-06-05 DIAGNOSIS — R41.9 COGNITIVE COMPLAINTS WITH NORMAL EXAM: ICD-10-CM

## 2025-06-05 DIAGNOSIS — Z98.890 HISTORY OF ESOPHAGEAL DILATATION: ICD-10-CM

## 2025-06-05 DIAGNOSIS — Z00.00 ENCOUNTER FOR MEDICARE ANNUAL WELLNESS EXAM: Primary | ICD-10-CM

## 2025-06-05 PROBLEM — R26.9 ABNORMALITY OF GAIT AND MOBILITY: Status: ACTIVE | Noted: 2025-06-05

## 2025-06-05 PROBLEM — E51.9 THIAMIN DEFICIENCY: Status: ACTIVE | Noted: 2025-06-05

## 2025-06-05 PROCEDURE — 99999 PR PBB SHADOW E&M-EST. PATIENT-LVL IV: CPT | Mod: PBBFAC,,, | Performed by: NURSE PRACTITIONER

## 2025-06-05 SDOH — SOCIAL DETERMINANTS OF HEALTH (SDOH): HOMELESSNESS UNSPECIFIED: Z59.00

## 2025-06-13 ENCOUNTER — TELEPHONE (OUTPATIENT)
Dept: INTERNAL MEDICINE | Facility: CLINIC | Age: 65
End: 2025-06-13
Payer: MEDICARE

## 2025-06-13 NOTE — TELEPHONE ENCOUNTER
Copied from CRM #[id number]. Topic: Medications - Medication Refill  >> Jun 13, 2025  2:12 PM Anjana wrote:  Type:  RX Refill Request    Who Called: Lenny   Refill or New Rx:refill  RX Name and Strength: Promethazine (please call to confirm)   How is the patient currently taking it? (ex. 1XDay):   Is this a 30 day or 90 day RX:   Preferred Pharmacy with phone number:    [id number] - [place], [place] - [address] AT [facility] [address]  [address]  [place] [address]  Phone: [phone] Fax: [phone]    Local or Mail Order:local   Ordering Provider:Von   Would the patient rather a call back or a response via [facility]? call  Best Call Back Number:[phone]       Additional Information:

## 2025-06-16 RX ORDER — PROMETHAZINE HYDROCHLORIDE 6.25 MG/5ML
20 SYRUP ORAL NIGHTLY PRN
Qty: 90 ML | Refills: 0 | Status: SHIPPED | OUTPATIENT
Start: 2025-06-16

## 2025-06-16 RX ORDER — PROMETHAZINE HYDROCHLORIDE 6.25 MG/5ML
20 SYRUP ORAL NIGHTLY PRN
COMMUNITY
End: 2025-06-16 | Stop reason: SDUPTHER

## 2025-06-16 NOTE — TELEPHONE ENCOUNTER
Copied from CRM #[id number]. Topic: General Inquiry - Patient Advice  >> Jun 16, 2025 12:57 PM Vidhi wrote:  Type:  Patient Requesting Call Back    Who Called:PETER  Does the patient know what this is regarding?:PATIENT HAS QUESTIONS ABOUT GETTING A REFILL ON promethazine,  [id number] - [place], [place] - [address] AT [facility] [address]  [address]  [place] [address]  Phone: [phone] Fax: [phone]  [facility] - [address] [facility] [address]  [address] [facility] [address]  [place] [address]  Phone: [phone] Fax: [phone]  Would the patient rather a call back or a response via [facility]? PLEASE CALL BACK  Best Call Back Number:[phone]  Additional Information:

## 2025-06-16 NOTE — TELEPHONE ENCOUNTER
No care due was identified.  [facility] Embedded Care Due Messages. Reference number: [id number].   6/16/2025 4:00:14 PM CDT

## 2025-06-17 ENCOUNTER — PATIENT OUTREACH (OUTPATIENT)
Dept: ADMINISTRATIVE | Facility: OTHER | Age: 65
End: 2025-06-17
Payer: MEDICARE

## 2025-06-17 NOTE — PROGRESS NOTES
"CHW - Initial Contact    This Community Health Worker verified the Social Determinant of Health questionnaire with patient via telephone today.    Pt identified barriers of most importance are: Housing Stability/Homeless  Support and Services: Pt stated that he is currently homeless, unemployed, and has been living in his car for the past 2 months alone, and that it has been difficult for him to afford gas. Pt stated that he has had 20 surgeries within the past 3-4 years and is currently taking 13 different medications. CHW asked pt if he has or is interested in applying for SNAP benefits, but pt denied stating that he is already receive SNAP benefits of $80 and goes to the [email or web address] to eat. Pt stated he is also interested in getting a . CHW will be providing pt with resources regarding homelessness from the "[employer]" to address his housing status. CHW will be sending a referral to [facility] Case Management on pt behalf. CHW will be looking for resources regarding Gas assistance.  Other information discussed the patient needs / wants help with: Assistance with paying for gas.  Follow up required: Yes  Follow-up Outreach - Due: 6/20/2025   "

## 2025-06-20 ENCOUNTER — PATIENT OUTREACH (OUTPATIENT)
Dept: ADMINISTRATIVE | Facility: OTHER | Age: 65
End: 2025-06-20
Payer: MEDICARE

## 2025-06-20 NOTE — PROGRESS NOTES
"CHW - Follow Up    This Community Health Worker completed a follow up visit with patient via telephone today.  Pt/ reported: Pt stated that he will be contacting the telephone numbers that the CHW provided.  Community Health Worker provided: CHW provided senior housing resources as well as homeless shelters from [email or web address]. CHW informed pt that a referral was sent to "[facility]" regarding his request for a . CHW informed pt that there are no gas card's that meet his requirement.  Follow up required: Yes  Follow-up Outreach - Due: 6/27/2025     "

## 2025-06-27 ENCOUNTER — PATIENT OUTREACH (OUTPATIENT)
Dept: ADMINISTRATIVE | Facility: OTHER | Age: 65
End: 2025-06-27
Payer: MEDICARE

## 2025-06-27 NOTE — PROGRESS NOTES
CHW - Follow Up    This Community Health Worker completed a follow up visit with patient via telephone today.  Pt/Caregiver reported: Pt stated that he has called most of the numbers the CHW provided. Pt stated he is now on their waiting list and is waiting for them to call him back. CHW asked the pt if he has any family he can stay with at this time, but pt stated all of his family is located in [place]. CHW will be providing pt with the Pharmacy Assistance telephone number and food bank resources on our next call due to the pt stating that he was driving at the moment.  Community Health Worker provided: CHW confirmed if the pt had reached out to the telephone number's provided in the last call.  Follow up required: Yes  Follow-up Outreach - Due: 7/9/2025

## 2025-07-07 ENCOUNTER — TELEPHONE (OUTPATIENT)
Dept: PHARMACY | Facility: CLINIC | Age: 65
End: 2025-07-07
Payer: MEDICARE

## 2025-07-07 NOTE — TELEPHONE ENCOUNTER
----- Message from Carlos A Flores sent at 7/1/2025  4:21 PM CDT -----  Hello,     Mr. Latham's case has been assigned to Yadira Stone @[phone]. Provider may review progress notes by typing pharmacy patient assistance in Epic search box.      What happens next? Assigned advocate will review your patients chart and research available options.  Patient and Provider may be asked to provide specific documentation to facilitate the PAP process. Failure to provide the requested documentation will delay assistance.    Please note: epic chart must reflect a current order for the requested medication written by an [facility] provider to begin PAP process.   Please note: all requests are subject to program availability and patient eligibility verification.   Please note: each program has it's own unique eligibility criteria (e.g., income limits, insurance status medical condition, residency).Therefore eligibility is determined by the specific program being applied to not by the Pharmacy Patient Assistance Team.         Thank you,   [facility]  [address]  [place] [address]  Fax: [phone]  Email: [email or web address]  ----- Message -----  From: Tiny Bowers  Sent: 7/1/2025   4:07 PM CDT  To: Pharmacy Patient Assistance Team    Good afternoon, this is Babatunde a CHW with [facility], the pt listed above reported having a difficulty in affording his medications. Are there any programs that can assist with that?

## 2025-07-07 NOTE — TELEPHONE ENCOUNTER
I have confirmed with Lenny Latham Jr. that he does not need prescription assistance at this time. Patient stated he did not need assistance.     Yadira Stone  Pharmacy Patient Assistance Team

## 2025-07-08 DIAGNOSIS — G47.00 INSOMNIA, UNSPECIFIED TYPE: ICD-10-CM

## 2025-07-08 RX ORDER — PROMETHAZINE HYDROCHLORIDE 6.25 MG/5ML
SYRUP ORAL
Qty: 120 ML | Refills: 0 | Status: SHIPPED | OUTPATIENT
Start: 2025-07-08

## 2025-07-08 RX ORDER — ALPRAZOLAM 1 MG/1
1 TABLET ORAL NIGHTLY PRN
Qty: 30 TABLET | Refills: 0 | Status: SHIPPED | OUTPATIENT
Start: 2025-07-08 | End: 2025-11-05

## 2025-07-08 NOTE — TELEPHONE ENCOUNTER
No care due was identified.  [facility] Embedded Care Due Messages. Reference number: [id number].   7/08/2025 11:57:17 AM CDT

## 2025-07-08 NOTE — TELEPHONE ENCOUNTER
Pt came in late for appointment R/S for 8/4 in office with Dr. Manoj Campbell and need refill on his Xanax

## 2025-07-08 NOTE — TELEPHONE ENCOUNTER
Refill Decision Note   Lenny Latham  is requesting a refill authorization.  Brief Assessment and Rationale for Refill:  Approve     Medication Therapy Plan:        Comments:     Note composed:4:33 PM 05/09/2024            alone

## 2025-07-09 ENCOUNTER — PATIENT OUTREACH (OUTPATIENT)
Dept: ADMINISTRATIVE | Facility: OTHER | Age: 65
End: 2025-07-09
Payer: MEDICARE

## 2025-07-09 NOTE — PROGRESS NOTES
CHW - Follow Up    This Community Health Worker completed a follow up visit with patient via telephone today.  Pt/Caregiver reported: Pt reported that he is still on the housing resources wait list, and is still waiting to be contacted. Pt confirmed that he has contacted [facility]. Pt stated he would like for the CHW to call him weekly and check in on him.   Community Health Worker provided: The CHW confirmed the pt status regarding housing instability.  Follow up required: Yes  Follow-up Outreach - Due: 7/18/2025

## 2025-07-17 ENCOUNTER — TELEPHONE (OUTPATIENT)
Dept: PSYCHIATRY | Facility: CLINIC | Age: 65
End: 2025-07-17
Payer: MEDICARE

## 2025-07-17 DIAGNOSIS — F41.9 ANXIETY: ICD-10-CM

## 2025-07-17 RX ORDER — DIAZEPAM 5 MG/1
5 TABLET ORAL DAILY PRN
Qty: 90 TABLET | Refills: 0 | Status: SHIPPED | OUTPATIENT
Start: 2025-07-17 | End: 2025-10-15

## 2025-07-17 NOTE — TELEPHONE ENCOUNTER
Spoke with pt and informed that his request has been submitted to provider    Copied from CRM #[id number]. Topic: Medications - Medication Refill  >> Jul 17, 2025  9:55 AM Iniki wrote:  .Type:  RX Refill Request    Who Called: patient  Refill or New Rx:refill  RX Name and Strength:diazePAM (VALIUM) 5 MG tablet  How is the patient currently taking it? (ex. 1XDay):  Is this a 30 day or 90 day RX:  Preferred Pharmacy with phone number:.  [id number] - [place], [place] - [address] AT [facility] [address]  [address]  [place] [address]  Phone: [phone] Fax: [phone]        Local or Mail Order:local  Ordering Provider:Manoj Campbell MD  Would the patient rather a call back or a response via [facility]? call  Best Call Back Number:.[phone]    Additional Information:   Patient stated he is completely out of medication and needing it as soon as possible please call back   88

## 2025-07-18 ENCOUNTER — PATIENT OUTREACH (OUTPATIENT)
Dept: ADMINISTRATIVE | Facility: OTHER | Age: 65
End: 2025-07-18
Payer: MEDICARE

## 2025-07-18 NOTE — PROGRESS NOTES
CHW - Outreach Attempt    Community Health Worker left a voicemail message for 1st attempt to contact patient regarding: Follow-up  Community Health Worker to attempt to contact patient on: Via telephone

## 2025-07-25 NOTE — PROGRESS NOTES
"CHW - Follow Up    This Community Health Worker completed a follow up visit with patient via telephone today.  Pt/Caregiver reported: The CHW contacted the pt to confirm if he was even interested in staying in a homeless shelter and if he would like assistance when contacting the homeless shelters the CHW provided. The pt stated he does not want to be in a homeless shelter. The pt informed the CHW that he receives $1,130 in Social Security Checks. The CHW attempted provided the pt with an income based senior housing resource from "[facility]", but the pt stated he had to get off the call and would like for the CHW to contact him in 1 hour. The CHW attempted to contact the pt 1 hour later but was unable to reach. The CHW will attempt to reach the pt on 7/30/25 to close this case  Community Health Worker provided: Attempted to provide pt with senior housing resources  Follow up required: Yes  Follow-up Outreach - Due: 7/30/2025     "

## 2025-07-25 NOTE — PROGRESS NOTES
"CHW - Case Closure    This Community Health Worker spoke to patient via telephone today.   Pt/Caregiver reported: The CHW contacted the pt to confirm if he was even interested in staying in a homeless shelter and if he would like assistance when contacting the homeless shelters the CHW provided. The pt stated he does not want to be in a homeless shelter. The pt informed the CHW that he receives $1,130 in Social Security Checks. The CHW attempted provided the pt with an income based senior housing resource from "[facility]", but the pt stated he had to get off the call and would like for the CHW to contact him in 1 hour.  "

## 2025-07-31 ENCOUNTER — PATIENT OUTREACH (OUTPATIENT)
Dept: ADMINISTRATIVE | Facility: OTHER | Age: 65
End: 2025-07-31
Payer: MEDICARE

## 2025-07-31 NOTE — TELEPHONE ENCOUNTER
Copied from CRM #[id number]. Topic: Medications - Medication Refill  >> Jul 31, 2025  3:16 PM Riana wrote:  Type:  RX Refill Request    Who Called: Lenny   Refill or New Rx:refill   RX Name and Strength:promethazine (PHENERGAN) 6.25 mg/5 mL syrup   How is the patient currently taking it? (ex. 1XDay):TAKE 20 ML(25 MG) BY MOUTH EVERY NIGHT AS NEEDED FOR NAUSEA  Is this a 30 day or 90 day RX:n/a  Preferred Pharmacy with phone number:  [facility] #[id number] - [place], [place] - [address] AT [facility] OF [address]  [address]  [place] [address]  Phone: [phone] Fax: [phone]   Local or Mail Order:local   Ordering Provider: Yudith anderson   Would the patient rather a call back or a response via [facility]? Call Back   Best Call Back Number:[phone] (W)    Additional Information: n/a      Thanks KB

## 2025-08-01 ENCOUNTER — TELEPHONE (OUTPATIENT)
Dept: GASTROENTEROLOGY | Facility: CLINIC | Age: 65
End: 2025-08-01
Payer: MEDICARE

## 2025-08-01 RX ORDER — PROMETHAZINE HYDROCHLORIDE 6.25 MG/5ML
6.25 SYRUP ORAL
Qty: 120 ML | Refills: 0 | Status: SHIPPED | OUTPATIENT
Start: 2025-08-01

## 2025-08-01 NOTE — TELEPHONE ENCOUNTER
Spoke with Trish at the patient pharmacy on [phone] to advise the provider would like the patient to take the promethazine every 6 hours as needed. She voices understanding.

## 2025-08-05 ENCOUNTER — TELEPHONE (OUTPATIENT)
Dept: PSYCHIATRY | Facility: CLINIC | Age: 65
End: 2025-08-05
Payer: MEDICARE

## 2025-08-05 DIAGNOSIS — G47.00 INSOMNIA, UNSPECIFIED TYPE: ICD-10-CM

## 2025-08-05 RX ORDER — ALPRAZOLAM 1 MG/1
1 TABLET ORAL NIGHTLY PRN
Qty: 30 TABLET | Refills: 0 | Status: SHIPPED | OUTPATIENT
Start: 2025-08-05 | End: 2025-12-03

## 2025-08-05 NOTE — TELEPHONE ENCOUNTER
Copied from CRM #[id number]. Topic: Appointments - Appointment Access  >> Aug 5, 2025  7:30 AM Torie wrote:  .Type: Appt Access       Who called:   pt     What is the request in detail:    Called in to get an appt rescheduled . Pt states that he really needs to get this appt rescheduled . Please call back  Can the clinic reply by [facility]?           Would the patient rather a call back or a response via [facility]?        Best call back number:  [phone]

## 2025-08-05 NOTE — TELEPHONE ENCOUNTER
Left pt a message to return call regarding and appt and informed that his request for refill has been submitted to provider    Copied from CRM #[id number]. Topic: General Inquiry - Patient Advice  >> Aug 5, 2025  7:43 AM Cami wrote:  Type:  Needs Medical Advice    Who Called: Lenny Latham Jr.   Would the patient rather a call back or a response via [facility]? Call back  Best Call Back Number: [phone]  Additional Information: Pt stated, he only need to talk to the nurse. Please call.

## 2025-08-13 RX ORDER — PROMETHAZINE HYDROCHLORIDE 6.25 MG/5ML
6.25 SYRUP ORAL
Qty: 120 ML | Refills: 0 | OUTPATIENT
Start: 2025-08-13

## 2025-09-03 ENCOUNTER — NURSE TRIAGE (OUTPATIENT)
Dept: ADMINISTRATIVE | Facility: CLINIC | Age: 65
End: 2025-09-03
Payer: MEDICARE

## 2025-09-03 DIAGNOSIS — E78.2 MIXED HYPERLIPIDEMIA: ICD-10-CM

## 2025-09-03 DIAGNOSIS — K21.9 GASTROESOPHAGEAL REFLUX DISEASE WITHOUT ESOPHAGITIS: ICD-10-CM

## 2025-09-03 DIAGNOSIS — K22.0 ACHALASIA OF ESOPHAGUS: ICD-10-CM

## 2025-09-03 RX ORDER — ATORVASTATIN CALCIUM 40 MG/1
40 TABLET, FILM COATED ORAL NIGHTLY
Qty: 90 TABLET | Refills: 3 | Status: SHIPPED | OUTPATIENT
Start: 2025-09-03

## 2025-09-03 RX ORDER — OMEPRAZOLE 40 MG/1
40 CAPSULE, DELAYED RELEASE ORAL EVERY MORNING
Qty: 90 CAPSULE | Refills: 3 | Status: SHIPPED | OUTPATIENT
Start: 2025-09-03

## 2025-09-03 RX ORDER — PROMETHAZINE HYDROCHLORIDE 6.25 MG/5ML
6.25 SYRUP ORAL
Qty: 120 ML | Refills: 0 | Status: SHIPPED | OUTPATIENT
Start: 2025-09-03

## (undated) DEVICE — KIT ENDOKIT COMPLIANCE CUSTOM